# Patient Record
Sex: FEMALE | Employment: OTHER | URBAN - METROPOLITAN AREA
[De-identification: names, ages, dates, MRNs, and addresses within clinical notes are randomized per-mention and may not be internally consistent; named-entity substitution may affect disease eponyms.]

---

## 2017-02-03 ENCOUNTER — HOSPITAL ENCOUNTER (EMERGENCY)
Age: 67
Discharge: HOME | End: 2017-02-03
Attending: EMERGENCY MEDICINE

## 2017-02-03 VITALS
OXYGEN SATURATION: 98 % | HEIGHT: 59 IN | BODY MASS INDEX: 28.48 KG/M2 | DIASTOLIC BLOOD PRESSURE: 71 MMHG | SYSTOLIC BLOOD PRESSURE: 115 MMHG | RESPIRATION RATE: 18 BRPM | TEMPERATURE: 97.8 F | HEART RATE: 74 BPM | WEIGHT: 141.3 LBS

## 2017-02-03 DIAGNOSIS — K08.89 PAIN, DENTAL: Primary | ICD-10-CM

## 2017-02-03 RX ORDER — AMOXICILLIN 500 MG/1
500 TABLET, FILM COATED ORAL 3 TIMES DAILY
Qty: 30 TAB | Refills: 0 | Status: SHIPPED | OUTPATIENT
Start: 2017-02-03 | End: 2017-07-11

## 2017-02-03 NOTE — DISCHARGE INSTRUCTIONS
Dental Pain: After Your Visit  Your Care Instructions  The most common cause of dental pain is tooth decay. It can also be caused by an infection of the tooth (abscess) or gum, a tooth that has not broken all the way through the gum (impacted tooth), or a problem with the nerve-filled center of the tooth. Follow-up care is a key part of your treatment and safety. Be sure to make and go to all appointments, and call your doctor if you are having problems. Its also a good idea to know your test results and keep a list of the medicines you take. How can you care for yourself at home? · Contact a dentist for follow-up care. · Put ice or a cold pack on the outside of your mouth for 10 to 20 minutes at a time to reduce pain and swelling. Put a thin cloth between the ice and your skin. · Take an over-the-counter pain medicine, such as acetaminophen (Tylenol), ibuprofen (Advil, Motrin), or naproxen (Aleve). Read and follow all instructions on the label. · Do not take two or more pain medicines at the same time unless the doctor told you to. Many pain medicines have acetaminophen, which is Tylenol. Too much acetaminophen (Tylenol) can be harmful. · Rinse your mouth with warm salt water every 2 hours to help relieve pain and swelling from an infected tooth. Mix 1 teaspoon of salt in 8 ounces of water. · If your doctor prescribed antibiotics, take them as directed. Do not stop taking them just because you feel better. You need to take the full course of antibiotics. When should you call for help? Call your doctor now or seek immediate medical care if:  · You have signs of infection, such as:  ¨ Increased pain, swelling, warmth, or redness. ¨ Pus draining from the gum, tooth, or face. ¨ A fever. Watch closely for changes in your health, and be sure to contact your doctor if:  · You do not get better as expected. Where can you learn more?    Go to [employer]  Enter S764 in the search box to learn more about \"Dental Pain: After Your Visit. \"   © 1016-6531 Healthwise, Incorporated. Care instructions adapted under license by [facility] (which disclaims liability or warranty for this information). This care instruction is for use with your licensed healthcare professional. If you have questions about a medical condition or this instruction, always ask your healthcare professional. [address] any warranty or liability for your use of this information. Content Version: 82.2.284372;  Last Revised: May 17, 2013

## 2017-02-03 NOTE — UC PROVIDER NOTE
Patient is a 78 y.o. female presenting with dental problem. The history is provided by the patient. Dental Pain    This is a new problem. The current episode started more than 1 week ago. The problem occurs constantly. The problem has not changed since onset. The pain is located in the left lower mouth. The quality of the pain is aching. The pain is moderate. Associated symptoms include swelling and gum redness. There was no vomiting, no nausea, no fever, no chest pain, no shortness of breath, no headaches and no drainage. She has tried nothing for the symptoms. The treatment provided no relief. The patient has no cardiac history. 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 No pertinent family history. Social History     Social History    Marital status:      Spouse name: N/A    Number of children: N/A    Years of education: N/A     Occupational History    Not on file. Social History Main Topics    Smoking status: Never Smoker    Smokeless tobacco: Not on file    Alcohol use Yes      Comment: occ    Drug use: No    Sexual activity: Not on file     Other Topics Concern    Not on file     Social History Narrative                ALLERGIES: Review of patient's allergies indicates no known allergies. Review of Systems   HENT: Positive for dental problem. Negative for ear pain, facial swelling, mouth sores and nosebleeds. Neurological: Positive for headaches. All other systems reviewed and are negative.       Vitals:    02/03/17 1543   BP: 115/71   Pulse: 74   Resp: 18   Temp: 97.8 °F (36.6 °C)   SpO2: 98%   Weight: 64.1 kg (141 lb 4.8 oz)   Height: 4' 11\" (1.499 m)       Physical Exam   Constitutional: She is oriented to person, place, and time. She appears well-developed and well-nourished. HENT:   Head: Normocephalic and atraumatic. Nose: Nose normal.   Mouth/Throat: Oropharynx is clear and moist. No oropharyngeal exudate. Intraoral exam mild erythema at left lower canine, no exudate, no swelling   Eyes: Conjunctivae and EOM are normal. Pupils are equal, round, and reactive to light. Neurological: She is alert and oriented to person, place, and time. No cranial nerve deficit. She exhibits normal muscle tone. Skin: Skin is warm and dry. No rash noted. No erythema. No pallor. Psychiatric: She has a normal mood and affect. Her behavior is normal. Judgment and thought content normal.   Nursing note and vitals reviewed.       MDM     Differential Diagnosis; Clinical Impression; Plan:     Dental pain, gingivitis      Procedures

## 2017-06-30 ENCOUNTER — OFFICE VISIT (OUTPATIENT)
Dept: INTERNAL MEDICINE CLINIC | Age: 67
End: 2017-06-30

## 2017-06-30 VITALS
HEIGHT: 59 IN | SYSTOLIC BLOOD PRESSURE: 181 MMHG | DIASTOLIC BLOOD PRESSURE: 104 MMHG | HEART RATE: 66 BPM | TEMPERATURE: 97.7 F | WEIGHT: 134.6 LBS | RESPIRATION RATE: 18 BRPM | BODY MASS INDEX: 27.13 KG/M2 | OXYGEN SATURATION: 97 %

## 2017-06-30 DIAGNOSIS — N25.81 SECONDARY HYPERPARATHYROIDISM, RENAL (HCC): ICD-10-CM

## 2017-06-30 DIAGNOSIS — G89.29 CHRONIC INTRACTABLE HEADACHE, UNSPECIFIED HEADACHE TYPE: ICD-10-CM

## 2017-06-30 DIAGNOSIS — Z86.73 HISTORY OF CVA (CEREBROVASCULAR ACCIDENT): ICD-10-CM

## 2017-06-30 DIAGNOSIS — N18.3 CKD (CHRONIC KIDNEY DISEASE), STAGE 3 (MODERATE): ICD-10-CM

## 2017-06-30 DIAGNOSIS — R51.9 CHRONIC INTRACTABLE HEADACHE, UNSPECIFIED HEADACHE TYPE: ICD-10-CM

## 2017-06-30 DIAGNOSIS — N25.81 HYPERPARATHYROIDISM, SECONDARY (HCC): ICD-10-CM

## 2017-06-30 DIAGNOSIS — I10 ESSENTIAL HYPERTENSION: Primary | ICD-10-CM

## 2017-06-30 DIAGNOSIS — R80.9 PROTEINURIA, UNSPECIFIED TYPE: ICD-10-CM

## 2017-06-30 RX ORDER — TRAMADOL HYDROCHLORIDE 50 MG/1
50 TABLET ORAL
COMMUNITY
End: 2017-06-30 | Stop reason: ALTCHOICE

## 2017-06-30 RX ORDER — ACETAMINOPHEN AND CODEINE PHOSPHATE 300; 30 MG/1; MG/1
1 TABLET ORAL
Qty: 28 TAB | Refills: 0 | Status: SHIPPED | OUTPATIENT
Start: 2017-06-30 | End: 2017-08-30 | Stop reason: SDUPTHER

## 2017-06-30 NOTE — PROGRESS NOTES
RM#9  Chief Complaint   Patient presents with    Complete Physical     2 strokes last year     1. Have you been to the ER, urgent care clinic since your last visit? Hospitalized since your last visit? No    2. Have you seen or consulted any other health care providers outside of the [address] [place] [address] since your last visit? Include any pap smears or colon screening.  No  Health Maintenance Due   Topic Date Due    Hepatitis C Screening  1950    DTaP/Tdap/Td series (1 - Tdap) 02/25/1970    BREAST CANCER SCRN MAMMOGRAM  02/25/1999    FOBT Q 1 YEAR AGE 50-75  02/25/1999    ZOSTER VACCINE AGE 60>  02/25/2009    GLAUCOMA SCREENING Q2Y  02/25/2014    OSTEOPOROSIS SCREENING (DEXA)  02/25/2014    Pneumococcal 65+ Low/Medium Risk (1 of 2 - PCV13) 02/25/2014    MEDICARE YEARLY EXAM  02/25/2014

## 2017-06-30 NOTE — MR AVS SNAPSHOT
Visit Information Date & Time Provider Department Dept. Phone Encounter #  
 6/30/2017 10:30 AM Jamia Barrera NP [facility] [phone] [id number] Follow-up Instructions Return in about 1 week (around 7/7/2017) for htn,headache. Upcoming Health Maintenance Date Due Hepatitis C Screening 1950 DTaP/Tdap/Td series (1 - Tdap) 2/25/1970 BREAST CANCER SCRN MAMMOGRAM 2/25/1999 FOBT Q 1 YEAR AGE 50-75 2/25/1999 ZOSTER VACCINE AGE 60> 2/25/2009 GLAUCOMA SCREENING Q2Y 2/25/2014 OSTEOPOROSIS SCREENING (DEXA) 2/25/2014 Pneumococcal 65+ Low/Medium Risk (1 of 2 - PCV13) 2/25/2014 MEDICARE YEARLY EXAM 2/25/2014 INFLUENZA AGE 9 TO ADULT 8/1/2017 Allergies as of 6/30/2017  Review Complete On: 6/30/2017 By: Krista Carson No Known Allergies Current Immunizations  Never Reviewed No immunizations on file. Not reviewed this visit You Were Diagnosed With   
  
 Codes Comments Essential hypertension    -  Primary ICD-10-CM: I10 
ICD-9-CM: 401.9 CKD (chronic kidney disease), stage 3 (moderate)     ICD-10-CM: N18.3 ICD-9-CM: 794. 3 Proteinuria, unspecified type     ICD-10-CM: R80.9 ICD-9-CM: 791.0 Secondary hyperparathyroidism, renal ([facility] [address]     ICD-10-CM: N25.81 ICD-9-CM: 18.80 Chronic intractable headache, unspecified headache type     ICD-10-CM: R51 ICD-9-CM: 221. 0 Vitals BP Pulse Temp Resp Height(growth percentile) Weight(growth percentile) (!) 181/104 (BP 1 [facility]: Left arm, BP Patient Position: Sitting) 66 97.7 °F (36.5 °C) (Oral) 18 4' 11.02\" (1.499 m) 134 lb 9.6 oz (61.1 kg) SpO2 BMI OB Status Smoking Status 97% 27.17 kg/m2 Hysterectomy Never Smoker Vitals History BMI and BSA Data Body Mass Index Body Surface Area  
 27.17 kg/m 2 1.6 m 2 Preferred Pharmacy Pharmacy Name Phone [facility] [address] [phone] Your Updated Medication List  
  
   
This list is accurate as of: 6/30/17 11:56 AM.  Always use your most recent med list.  
  
  
  
  
 acetaminophen-codeine 300-30 mg per tablet Commonly known as:  TYLENOL #3 Take 1 Tab by mouth every six (6) hours as needed for Pain. Max Daily Amount: 4 Tabs. AMLODIPINE BESYLATE (BULK) Take 5 mg by mouth daily. amoxicillin 500 mg Tab Take 500 mg by mouth three (3) times daily. ANTACID (CALCIUM CARBONATE) PO Take 750 mg by mouth as needed (as needed for indigestion). BYSTOLIC 5 mg tablet Generic drug:  nebivolol Take 5 mg by mouth daily. cloNIDine HCl 0.3 mg tablet Commonly known as:  CATAPRES Take 0.4 mg by mouth nightly. EDARBYCLOR 40-12.5 mg Tab Generic drug:  azilsartan med-chlorthalidone Take  by mouth daily. ERGOCALCIFEROL (VITAMIN D2) Take 50,000 mg by mouth every seven (7) days. hydrALAZINE 25 mg tablet Commonly known as:  APRESOLINE Take 1 Tab by mouth three (3) times daily. methocarbamol 500 mg tablet Commonly known as:  ROBAXIN Take 1 Tab by mouth two (2) times daily as needed for Pain. Prescriptions Printed Refills  
 acetaminophen-codeine (TYLENOL #3) 300-30 mg per tablet 0 Sig: Take 1 Tab by mouth every six (6) hours as needed for Pain. Max Daily Amount: 4 Tabs. Class: Print Route: Oral  
  
Follow-up Instructions Return in about 1 week (around 7/7/2017) for htn,headache. Introducing [facility]! Elma Marvin introduces mPATH patient portal. Now you can access parts of your medical record, email your doctor's office, and request medication refills online. 1. In your internet browser, go to https://[facility]. [employer]/[facility] 2. Click on the First Time User? Click Here link in the Sign In box. You will see the New Member Sign Up page. 3. Enter your [employer] Access Code exactly as it appears below. You will not need to use this code after youve completed the sign-up process. If you do not sign up before the expiration date, you must request a new code. · [employer] Access Code: [email or web address] Expires: 9/28/2017 11:52 AM 
 
4. Enter the last four digits of your Social Security Number (xxxx) and Date of Birth (mm/dd/yyyy) as indicated and click Submit. You will be taken to the next sign-up page. 5. Create a [employer] ID. This will be your [employer] login ID and cannot be changed, so think of one that is secure and easy to remember. 6. Create a [employer] password. You can change your password at any time. 7. Enter your Password Reset Question and Answer. This can be used at a later time if you forget your password. 8. Enter your e-mail address. You will receive e-mail notification when new information is available in [address] 9. Click Sign Up. You can now view and download portions of your medical record. 10. Click the Download Summary menu link to download a portable copy of your medical information. If you have questions, please visit the Frequently Asked Questions section of the [employer] website. Remember, [employer] is NOT to be used for urgent needs. For medical emergencies, dial 911. Now available from your iPhone and Android! Please provide this summary of care documentation to your next provider. Your primary care clinician is listed as Carson Rutledge. If you have any questions after today's visit, please call [phone].

## 2017-07-11 PROBLEM — G89.29 CHRONIC INTRACTABLE HEADACHE: Status: ACTIVE | Noted: 2017-07-11

## 2017-07-11 PROBLEM — N25.81 SECONDARY HYPERPARATHYROIDISM, RENAL (HCC): Status: ACTIVE | Noted: 2017-07-11

## 2017-07-11 PROBLEM — Z86.73 HISTORY OF CVA (CEREBROVASCULAR ACCIDENT): Status: ACTIVE | Noted: 2017-07-11

## 2017-07-11 PROBLEM — R80.9 PROTEINURIA: Status: ACTIVE | Noted: 2017-07-11

## 2017-07-11 PROBLEM — N18.9 CKD (CHRONIC KIDNEY DISEASE): Status: ACTIVE | Noted: 2017-07-11

## 2017-07-11 PROBLEM — N25.81 HYPERPARATHYROIDISM, SECONDARY (HCC): Status: ACTIVE | Noted: 2017-07-11

## 2017-07-11 PROBLEM — R51.9 CHRONIC INTRACTABLE HEADACHE: Status: ACTIVE | Noted: 2017-07-11

## 2017-07-24 ENCOUNTER — OFFICE VISIT (OUTPATIENT)
Dept: INTERNAL MEDICINE CLINIC | Age: 67
End: 2017-07-24

## 2017-07-24 VITALS
SYSTOLIC BLOOD PRESSURE: 150 MMHG | HEART RATE: 66 BPM | BODY MASS INDEX: 27.78 KG/M2 | TEMPERATURE: 97.4 F | WEIGHT: 137.8 LBS | HEIGHT: 59 IN | OXYGEN SATURATION: 97 % | RESPIRATION RATE: 18 BRPM | DIASTOLIC BLOOD PRESSURE: 100 MMHG

## 2017-07-24 DIAGNOSIS — R29.898 RIGHT HAND WEAKNESS: ICD-10-CM

## 2017-07-24 DIAGNOSIS — N18.3 CKD (CHRONIC KIDNEY DISEASE), STAGE 3 (MODERATE): ICD-10-CM

## 2017-07-24 DIAGNOSIS — I10 ESSENTIAL HYPERTENSION: Primary | ICD-10-CM

## 2017-07-24 DIAGNOSIS — F51.01 PRIMARY INSOMNIA: ICD-10-CM

## 2017-07-24 DIAGNOSIS — M25.511 ACUTE PAIN OF RIGHT SHOULDER: ICD-10-CM

## 2017-07-24 RX ORDER — DOXEPIN HYDROCHLORIDE 25 MG/1
25 CAPSULE ORAL
Qty: 30 CAP | Refills: 3 | Status: SHIPPED | OUTPATIENT
Start: 2017-07-24 | End: 2017-10-23 | Stop reason: SDUPTHER

## 2017-07-24 RX ORDER — NEBIVOLOL 10 MG/1
10 TABLET ORAL DAILY
Qty: 30 TAB | Refills: 0 | Status: SHIPPED | OUTPATIENT
Start: 2017-07-24 | End: 2017-08-30 | Stop reason: SDUPTHER

## 2017-07-24 NOTE — PATIENT INSTRUCTIONS
Increase Bystolic to 10 mg daily    Monitor blood pressure.  Bring blood pressure monitor with you next visit       Get Xrays of shoulder and hand as soon as possible

## 2017-07-24 NOTE — PROGRESS NOTES
HISTORY OF PRESENT ILLNESS  Devon Orellana is a 75 y.o. female presents for short interval follo wup  HPI     Blood pressure medication not adjusted by nephrologist as expected. Blood pressure reading 150's/90's-100  She continues to have headaches. Long hx of migraine  Difficulty focusing and reading, poor sleep. Requests medical management    Daily walks     Right upper arm pain and limited ROM right shoulder since CVA in February; not evaluation in hospital    Past Medical History:   Diagnosis Date    Cancer ([facility] [address]     ovaian stomach    CVA (cerebral vascular accident) ([facility] [address]     Heart failure ([facility] [address]     MI    Hypertension        Current Outpatient Prescriptions on File Prior to Visit   Medication Sig Dispense Refill    acetaminophen-codeine (TYLENOL #3) 300-30 mg per tablet Take 1 Tab by mouth every six (6) hours as needed for Pain. Max Daily Amount: 4 Tabs. 28 Tab 0     No current facility-administered medications on file prior to visit. Review of Systems   Constitutional: Positive for malaise/fatigue. Eyes: Negative. Respiratory: Negative. Cardiovascular: Negative. Gastrointestinal: Negative. Genitourinary: Negative. Musculoskeletal: Positive for joint pain and myalgias. Negative for back pain, falls and neck pain. Neurological: Positive for speech change, focal weakness and headaches. Negative for dizziness. Psychiatric/Behavioral: Positive for memory loss. The patient is nervous/anxious and has insomnia. Physical Exam   Constitutional: She appears well-developed and well-nourished. No distress. Cardiovascular: Normal rate, regular rhythm and normal heart sounds. Pulmonary/Chest: Effort normal and breath sounds normal.   Musculoskeletal: She exhibits tenderness. She exhibits no edema. Right shoulder: She exhibits decreased range of motion and decreased strength. Right upper arm: She exhibits tenderness. She exhibits no edema and no deformity.         Right hand: She exhibits decreased range of motion, tenderness and swelling. Decreased sensation noted. Neurological: She is alert. No cranial nerve deficit. Gait abnormal. Coordination normal.   Right hand weakness, fingers mildly edematous   Skin: Skin is warm and dry. No rash noted. She is not diaphoretic. No erythema. Psychiatric: Her behavior is normal. Her mood appears anxious. Her speech is tangential.   Noticeable improvement in remote memory and speech since LOV       ASSESSMENT and PLAN    ICD-10-CM ICD-9-CM    1. Essential hypertension I10 401.9 nebivolol (BYSTOLIC) 10 mg tablet   2. Primary insomnia F51.01 307.42 doxepin (SINEQUAN) 25 mg capsule   3. CKD (chronic kidney disease), stage 3 (moderate) N18.3 585.3    4. Acute pain of right shoulder M25.511 719.41 XR SHOULDER RT AP/LAT MIN 2 V   5. Right hand weakness R29.898 728.87 XR HAND RT MIN 3 V     Follow-up Disposition:  Return in about 4 weeks (around 8/21/2017) for htn, shoulder pain, hand weakness. lab results and schedule of future lab studies reviewed with patient  reviewed diet, exercise and weight control  reviewed medications and side effects in detail      1. Uncontrolled, increase Bystolic to 10 mg     2. Discouraged use of caffeine    3. Follow up with nephrologist as prescribed.  Avoid NSAIDs    4 etiology unknown    Patient expected daughter to be present in exam room, she did not show

## 2017-07-24 NOTE — PROGRESS NOTES
RM#8  Chief Complaint   Patient presents with    Follow-up     f/u for HTN,headache     1. Have you been to the ER, urgent care clinic since your last visit? Hospitalized since your last visit? No    2. Have you seen or consulted any other health care providers outside of the [address] [place] [address] since your last visit? Include any pap smears or colon screening.  No  Health Maintenance Due   Topic Date Due    Hepatitis C Screening  1950    DTaP/Tdap/Td series (1 - Tdap) 02/25/1970    BREAST CANCER SCRN MAMMOGRAM  02/25/1999    FOBT Q 1 YEAR AGE 50-75  02/25/1999    ZOSTER VACCINE AGE 60>  12/25/2008    GLAUCOMA SCREENING Q2Y  02/25/2014    OSTEOPOROSIS SCREENING (DEXA)  02/25/2014    Pneumococcal 65+ Low/Medium Risk (1 of 2 - PCV13) 02/25/2014    MEDICARE YEARLY EXAM  02/25/2014

## 2017-08-30 DIAGNOSIS — R51.9 CHRONIC INTRACTABLE HEADACHE, UNSPECIFIED HEADACHE TYPE: ICD-10-CM

## 2017-08-30 DIAGNOSIS — I10 ESSENTIAL HYPERTENSION: ICD-10-CM

## 2017-08-30 DIAGNOSIS — G89.29 CHRONIC INTRACTABLE HEADACHE, UNSPECIFIED HEADACHE TYPE: ICD-10-CM

## 2017-09-01 ENCOUNTER — TELEPHONE (OUTPATIENT)
Dept: INTERNAL MEDICINE CLINIC | Age: 67
End: 2017-09-01

## 2017-09-01 DIAGNOSIS — R51.9 CHRONIC INTRACTABLE HEADACHE, UNSPECIFIED HEADACHE TYPE: ICD-10-CM

## 2017-09-01 DIAGNOSIS — G89.29 CHRONIC INTRACTABLE HEADACHE, UNSPECIFIED HEADACHE TYPE: ICD-10-CM

## 2017-09-01 RX ORDER — NEBIVOLOL HYDROCHLORIDE 10 MG/1
TABLET ORAL
Qty: 30 TAB | Refills: 0 | Status: SHIPPED | OUTPATIENT
Start: 2017-09-01 | End: 2017-10-06 | Stop reason: SDUPTHER

## 2017-09-01 RX ORDER — ACETAMINOPHEN AND CODEINE PHOSPHATE 300; 30 MG/1; MG/1
TABLET ORAL
Qty: 28 TAB | Refills: 0 | Status: SHIPPED | OUTPATIENT
Start: 2017-09-01 | End: 2017-09-01 | Stop reason: SDUPTHER

## 2017-09-01 RX ORDER — ACETAMINOPHEN AND CODEINE PHOSPHATE 300; 30 MG/1; MG/1
TABLET ORAL
Qty: 28 TAB | Refills: 0 | Status: SHIPPED | OUTPATIENT
Start: 2017-09-01 | End: 2018-01-12 | Stop reason: SDUPTHER

## 2017-10-04 ENCOUNTER — HOSPITAL ENCOUNTER (OUTPATIENT)
Dept: GENERAL RADIOLOGY | Age: 67
Discharge: HOME | End: 2017-10-04
Payer: MEDICARE

## 2017-10-04 DIAGNOSIS — R29.898 RIGHT HAND WEAKNESS: ICD-10-CM

## 2017-10-04 DIAGNOSIS — M25.511 ACUTE PAIN OF RIGHT SHOULDER: ICD-10-CM

## 2017-10-04 PROCEDURE — 73030 X-RAY EXAM OF SHOULDER: CPT

## 2017-10-04 PROCEDURE — 73130 X-RAY EXAM OF HAND: CPT

## 2017-10-06 DIAGNOSIS — I10 ESSENTIAL HYPERTENSION: ICD-10-CM

## 2017-10-06 RX ORDER — NEBIVOLOL HYDROCHLORIDE 10 MG/1
TABLET ORAL
Qty: 30 TAB | Refills: 0 | Status: SHIPPED | OUTPATIENT
Start: 2017-10-06 | End: 2017-10-22 | Stop reason: SDUPTHER

## 2017-10-10 ENCOUNTER — TELEPHONE (OUTPATIENT)
Dept: INTERNAL MEDICINE CLINIC | Age: 67
End: 2017-10-10

## 2017-10-10 NOTE — LETTER
10/10/2017 1:44 PM 
 
Ms. Kj Mccoy [address] [id number] Dear Kj Mccoy I have reviewed your results and have found the results showing arthritis of hand and shoulder. My recommendations are as follows: If you would like to see a orthopaedist, please contact our office for the name and number of specialist. 
 
 
 
Please call if you have any questions [phone] . Sincerely, Xavier Demarco NP

## 2017-10-22 DIAGNOSIS — I10 ESSENTIAL HYPERTENSION: ICD-10-CM

## 2017-10-23 DIAGNOSIS — F51.01 PRIMARY INSOMNIA: ICD-10-CM

## 2017-10-23 RX ORDER — NEBIVOLOL HYDROCHLORIDE 10 MG/1
TABLET ORAL
Qty: 30 TAB | Refills: 0 | Status: SHIPPED | OUTPATIENT
Start: 2017-10-23 | End: 2017-12-06 | Stop reason: SDUPTHER

## 2017-10-23 NOTE — TELEPHONE ENCOUNTER
[facility] sent a 90 day request for the pt's Doxepin 25 mg.   LOV 7/24/17  No Future Appt's    [facility] # [phone]  FAX # [phone]

## 2017-10-24 RX ORDER — DOXEPIN HYDROCHLORIDE 25 MG/1
25 CAPSULE ORAL
Qty: 90 CAP | Refills: 3 | Status: SHIPPED | OUTPATIENT
Start: 2017-10-24 | End: 2018-05-22 | Stop reason: ALTCHOICE

## 2017-11-14 ENCOUNTER — OFFICE VISIT (OUTPATIENT)
Dept: INTERNAL MEDICINE CLINIC | Age: 67
End: 2017-11-14

## 2017-11-14 VITALS
SYSTOLIC BLOOD PRESSURE: 173 MMHG | TEMPERATURE: 97.6 F | RESPIRATION RATE: 24 BRPM | DIASTOLIC BLOOD PRESSURE: 91 MMHG | HEART RATE: 76 BPM | BODY MASS INDEX: 31.65 KG/M2 | HEIGHT: 59 IN | WEIGHT: 157 LBS | OXYGEN SATURATION: 97 %

## 2017-11-14 DIAGNOSIS — J40 BRONCHITIS: Primary | ICD-10-CM

## 2017-11-14 DIAGNOSIS — I15.8 OTHER SECONDARY HYPERTENSION: ICD-10-CM

## 2017-11-14 DIAGNOSIS — J31.0 CHRONIC RHINITIS, UNSPECIFIED TYPE: ICD-10-CM

## 2017-11-14 DIAGNOSIS — R12 HEART BURN: ICD-10-CM

## 2017-11-14 RX ORDER — BENZONATATE 100 MG/1
100 CAPSULE ORAL
Qty: 24 CAP | Refills: 0 | Status: SHIPPED | OUTPATIENT
Start: 2017-11-14 | End: 2017-11-21

## 2017-11-14 RX ORDER — LOSARTAN POTASSIUM 25 MG/1
25 TABLET ORAL DAILY
Refills: 3 | COMMUNITY
Start: 2017-10-09 | End: 2018-01-12 | Stop reason: ALTCHOICE

## 2017-11-14 RX ORDER — PHENOL/SODIUM PHENOLATE
20 AEROSOL, SPRAY (ML) MUCOUS MEMBRANE DAILY
Qty: 30 TAB | Refills: 1 | Status: SHIPPED | OUTPATIENT
Start: 2017-11-14 | End: 2018-01-22 | Stop reason: SDUPTHER

## 2017-11-14 RX ORDER — FLUTICASONE PROPIONATE 50 MCG
2 SPRAY, SUSPENSION (ML) NASAL DAILY
Qty: 1 BOTTLE | Refills: 1 | Status: SHIPPED | OUTPATIENT
Start: 2017-11-14 | End: 2018-01-23

## 2017-11-14 NOTE — PROGRESS NOTES
ACUTE VISIT     HPI:   Shilpa Washburn is a 75 y.o. female, she presents today for:     Cough ongoing for 1 month. Has had some vomiting after coughing. No history of asthma, COPD, emphysema. Denies smoking or drinking. Follows with kidney specialist who adjust blood pressure medications. ROS: No fevers this week. Medications used for acute illness: Has not tried any medicines at home. Has used hot tea. Current Outpatient Prescriptions on File Prior to Visit   Medication Sig    acetaminophen-codeine (TYLENOL #3) 300-30 mg per tablet TAKE ONE TABLET BY MOUTH EVERY 6 HOURS AS NEEDED FOR PAIN (MAX 4/DAY)    doxepin (SINEQUAN) 25 mg capsule Take 1 Cap by mouth nightly.  BYSTOLIC 10 mg tablet TAKE 1 TABLET BY MOUTH EVERY DAY     No current facility-administered medications on file prior to visit. No Known Allergies    PMH/PSH/FH: reviewed and updated    Sochx:   reports that she has never smoked. She has never used smokeless tobacco. She reports that she drinks alcohol. She reports that she does not use illicit drugs. PE:  Blood pressure (!) 173/91, pulse 76, temperature 97.6 °F (36.4 °C), temperature source Oral, resp. rate 24, height 4' 11\" (1.499 m), weight 157 lb (71.2 kg), SpO2 97 %. Body mass index is 31.71 kg/(m^2). Physical Exam   Constitutional: She is oriented to person, place, and time. She appears well-developed and well-nourished. No distress. HENT:   Head: Normocephalic. Mouth/Throat: Oropharynx is clear and moist.   Eyes: Conjunctivae and EOM are normal. Pupils are equal, round, and reactive to light. Neck: Neck supple. Cardiovascular: Normal rate, regular rhythm and normal heart sounds. Pulmonary/Chest: Effort normal and breath sounds normal. No respiratory distress. She has no wheezes. Neurological: She is alert and oriented to person, place, and time. Skin: Skin is warm and dry. No rash noted. Nursing note and vitals reviewed.     A/P  Shilpa Washburn was seen today for had concerns including Cold. .  The diagnosis and plan was discussed including:        ICD-10-CM ICD-9-CM    1. Bronchitis J40 490 benzonatate (TESSALON) 100 mg capsule   2. Heart burn R12 787.1 Omeprazole delayed release (PRILOSEC D/R) 20 mg tablet   3. Chronic rhinitis, unspecified type J31.0 472.0 fluticasone (FLONASE) 50 mcg/actuation nasal spray   4. Other secondary hypertension I15.8 405.99      Bronchitis with chronic rhinitis: add nasal steroid, benzonatate. Heart burn: previously treating with tums or similar. Advised to trial omeprazole daily to see if resolved. HTN: to follow-up with renal specialist.    - I advised her to call back or return to office if symptoms worsen/change/persist.  - She was given AVS and expressed understanding with the diagnosis and plan as discussed. Follow-up Disposition:  Return in about 4 weeks (around 12/12/2017) for follow-up elevated blood pressure, cough etc..

## 2017-11-14 NOTE — PROGRESS NOTES
Rm#1  whats refill on tylenol #3  Chief Complaint   Patient presents with    Cold     cough, post-nasal drainage, nasal congested, upset stomach. x2-3months      1. Have you been to the ER, urgent care clinic since your last visit? Hospitalized since your last visit? No    2. Have you seen or consulted any other health care providers outside of the [address] [place] [address] since your last visit? Include any pap smears or colon screening. No  Health Maintenance Due   Topic Date Due    Hepatitis C Screening  1950    DTaP/Tdap/Td series (1 - Tdap) 02/25/1970    BREAST CANCER SCRN MAMMOGRAM  02/25/1999    FOBT Q 1 YEAR AGE 50-75  02/25/1999    ZOSTER VACCINE AGE 60>  12/25/2008    GLAUCOMA SCREENING Q2Y  02/25/2014    OSTEOPOROSIS SCREENING (DEXA)  02/25/2014    Pneumococcal 65+ Low/Medium Risk (1 of 2 - PCV13) 02/25/2014    MEDICARE YEARLY EXAM  02/25/2014    Influenza Age 9 to Adult  08/01/2017     Pt refused flu vaccine  PHQ over the last two weeks 11/14/2017   Little interest or pleasure in doing things Not at all   Feeling down, depressed or hopeless Not at all   Total Score PHQ 2 0     Fall Risk Assessment, last 12 mths 11/14/2017   Able to walk? Yes   Fall in past 12 months?  No   Fall with injury? -   Number of falls in past 12 months -   Fall Risk Score -

## 2017-11-14 NOTE — PATIENT INSTRUCTIONS
Bronchitis: Care Instructions  Your Care Instructions    Bronchitis is inflammation of the bronchial tubes, which carry air to the lungs. The tubes swell and produce mucus, or phlegm. The mucus and inflamed bronchial tubes make you cough. You may have trouble breathing. Most cases of bronchitis are caused by viruses like those that cause colds. Antibiotics usually do not help and they may be harmful. Bronchitis usually develops rapidly and lasts about 2 to 3 weeks in otherwise healthy people. Follow-up care is a key part of your treatment and safety. Be sure to make and go to all appointments, and call your doctor if you are having problems. It's also a good idea to know your test results and keep a list of the medicines you take. How can you care for yourself at home? · Take all medicines exactly as prescribed. Call your doctor if you think you are having a problem with your medicine. · Get some extra rest.  · Take an over-the-counter pain medicine, such as acetaminophen (Tylenol), ibuprofen (Advil, Motrin), or naproxen (Aleve) to reduce fever and relieve body aches. Read and follow all instructions on the label. · Do not take two or more pain medicines at the same time unless the doctor told you to. Many pain medicines have acetaminophen, which is Tylenol. Too much acetaminophen (Tylenol) can be harmful. · Take an over-the-counter cough medicine that contains dextromethorphan to help quiet a dry, hacking cough so that you can sleep. Avoid cough medicines that have more than one active ingredient. Read and follow all instructions on the label. · Breathe moist air from a humidifier, hot shower, or sink filled with hot water. The heat and moisture will thin mucus so you can cough it out. · Do not smoke. Smoking can make bronchitis worse. If you need help quitting, talk to your doctor about stop-smoking programs and medicines. These can increase your chances of quitting for good.   When should you call for help? Call 911 anytime you think you may need emergency care. For example, call if:  ? · You have severe trouble breathing. ?Call your doctor now or seek immediate medical care if:  ? · You have new or worse trouble breathing. ? · You cough up dark brown or bloody mucus (sputum). ? · You have a new or higher fever. ? · You have a new rash. ? Watch closely for changes in your health, and be sure to contact your doctor if:  ? · You cough more deeply or more often, especially if you notice more mucus or a change in the color of your mucus. ? · You are not getting better as expected. Where can you learn more? Go to http://heike-darcie.info/. Enter H333 in the search box to learn more about \"Bronchitis: Care Instructions. \"  Current as of: May 12, 2017  Content Version: 11.4  © 6866-1733 [employer]. Care instructions adapted under license by [facility] (which disclaims liability or warranty for this information). If you have questions about a medical condition or this instruction, always ask your healthcare professional. [place] [address] any warranty or liability for your use of this information. Rhinitis: Care Instructions  Your Care Instructions  Rhinitis is swelling and irritation in the nose. Allergies and infections are often the cause. Your nose may run or feel stuffy. Other symptoms are itchy and sore eyes, ears, throat, and mouth. If allergies are the cause, your doctor may do tests to find out what you are allergic to. You may be able to stop symptoms if you avoid the things that cause them. Your doctor may suggest or prescribe medicine to ease your symptoms. Follow-up care is a key part of your treatment and safety. Be sure to make and go to all appointments, and call your doctor if you are having problems. It's also a good idea to know your test results and keep a list of the medicines you take.   How can you care for yourself at home? · If your rhinitis is caused by allergies, try to find out what sets off (triggers) your symptoms. Take steps to avoid these triggers. ¨ Avoid yard work. It can stir up both pollen and mold. ¨ Do not smoke or allow others to smoke around you. If you need help quitting, talk to your doctor about stop-smoking programs and medicines. These can increase your chances of quitting for good. ¨ Do not use aerosol sprays, cleaning products, or perfumes. ¨ If pollen is one of your triggers, close your house and car windows during blooming season. ¨ Clean your house often to control dust.  ¨ Keep pets outside. · If your doctor recommends over-the-counter medicines to relieve symptoms, take your medicines exactly as prescribed. Call your doctor if you think you are having a problem with your medicine. · Use saline (saltwater) nasal washes to help keep your nasal passages open and wash out mucus and bacteria. You can buy saline nose drops at a grocery store or drugstore. Or you can make your own at home by adding 1 teaspoon of salt and 1 teaspoon of baking soda to 2 cups of distilled water. If you make your own, fill a bulb syringe with the solution, insert the tip into your nostril, and squeeze gently. Be Laura your nose. When should you call for help? Call your doctor now or seek immediate medical care if:  ? · You are having trouble breathing. ? Watch closely for changes in your health, and be sure to contact your doctor if:  ? · Mucus from your nose gets thicker (like pus) or has new blood in it. ? · You have new or worse symptoms. ? · You do not get better as expected. Where can you learn more? Go to http://heike-darcie.info/. Enter M030 in the search box to learn more about \"Rhinitis: Care Instructions. \"  Current as of: May 12, 2017  Content Version: 11.4  © 0122-0852 [employer].  Care instructions adapted under license by [employer] (which disclaims liability or warranty for this information). If you have questions about a medical condition or this instruction, always ask your healthcare professional. [address] any warranty or liability for your use of this information.

## 2017-11-14 NOTE — MR AVS SNAPSHOT
Visit Information Date & Time Provider Department Dept. Phone Encounter #  
 11/14/2017  3:45 PM Earl Stone MD [address] [facility] [phone] [id number] Follow-up Instructions Return in about 4 weeks (around 12/12/2017) for follow-up elevated blood pressure, cough etc.. Upcoming Health Maintenance Date Due Hepatitis C Screening 1950 DTaP/Tdap/Td series (1 - Tdap) 2/25/1970 BREAST CANCER SCRN MAMMOGRAM 2/25/1999 FOBT Q 1 YEAR AGE 50-75 2/25/1999 ZOSTER VACCINE AGE 60> 12/25/2008 GLAUCOMA SCREENING Q2Y 2/25/2014 OSTEOPOROSIS SCREENING (DEXA) 2/25/2014 Pneumococcal 65+ Low/Medium Risk (1 of 2 - PCV13) 2/25/2014 MEDICARE YEARLY EXAM 2/25/2014 Influenza Age 5 to Adult 8/1/2017 Allergies as of 11/14/2017  Review Complete On: 11/14/2017 By: Mian Jensen LPN No Known Allergies Current Immunizations  Never Reviewed No immunizations on file. Not reviewed this visit You Were Diagnosed With   
  
 Codes Comments Bronchitis    -  Primary ICD-10-CM: W10 ICD-9-CM: 510 Heart burn     ICD-10-CM: R12 
ICD-9-CM: 787.1 Chronic rhinitis, unspecified type     ICD-10-CM: J31.0 ICD-9-CM: 472.0 Other secondary hypertension     ICD-10-CM: I15.8 ICD-9-CM: 405.99 Vitals BP Pulse Temp Resp Height(growth percentile) Weight(growth percentile) (!) 173/91 (BP 1 [facility]: Left arm, BP Patient Position: Sitting) 76 97.6 °F (36.4 °C) (Oral) 24 4' 11\" (1.499 m) 157 lb (71.2 kg) SpO2 BMI OB Status Smoking Status 97% 31.71 kg/m2 Hysterectomy Never Smoker BMI and BSA Data Body Mass Index Body Surface Area 31.71 kg/m 2 1.72 m 2 Preferred Pharmacy Pharmacy Name Phone [id number] [address] [place] [address] [address] [phone] Your Updated Medication List  
  
   
 This list is accurate as of: 17  4:30 PM.  Always use your most recent med list.  
  
  
  
  
 acetaminophen-codeine 300-30 mg per tablet Commonly known as:  TYLENOL #3  
TAKE ONE TABLET BY MOUTH EVERY 6 HOURS AS NEEDED FOR PAIN (MAX 4/DAY)  
  
 benzonatate 100 mg capsule Commonly known as:  TESSALON Take 1 Cap by mouth three (3) times daily as needed for Cough for up to 7 days. BYSTOLIC 10 mg tablet Generic drug:  nebivolol TAKE 1 TABLET BY MOUTH EVERY DAY  
  
 doxepin 25 mg capsule Commonly known as:  SINEquan Take 1 Cap by mouth nightly. fluticasone 50 mcg/actuation nasal spray Commonly known as:  Janette Reges 2 Sprays by Both Nostrils route daily. losartan 25 mg tablet Commonly known as:  COZAAR Take 25 mg by mouth daily. Omeprazole delayed release 20 mg tablet Commonly known as:  PRILOSEC D/R Take 1 Tab by mouth daily. Prescriptions Sent to Pharmacy Refills  
 fluticasone (FLONASE) 50 mcg/actuation nasal spray 1 Si Sprays by Both Nostrils route daily. Class: Normal  
 Pharmacy: [address] [place] [address] [address] [place] [address] Ph #: [phone] Route: Both Nostrils Omeprazole delayed release (PRILOSEC D/R) 20 mg tablet 1 Sig: Take 1 Tab by mouth daily. Class: Normal  
 Pharmacy: [address] [place] [address] [address] [place] [address] Ph #: [phone] Route: Oral  
 benzonatate (TESSALON) 100 mg capsule 0 Sig: Take 1 Cap by mouth three (3) times daily as needed for Cough for up to 7 days. Class: Normal  
 Pharmacy: [address] [place] [address] [address] [place] [address] Ph #: [phone] Route: Oral  
  
Follow-up Instructions Return in about 4 weeks (around 2017) for follow-up elevated blood pressure, cough etc.. Patient Instructions Bronchitis: Care Instructions Your Care Instructions Bronchitis is inflammation of the bronchial tubes, which carry air to the lungs. The tubes swell and produce mucus, or phlegm. The mucus and inflamed bronchial tubes make you cough. You may have trouble breathing. Most cases of bronchitis are caused by viruses like those that cause colds. Antibiotics usually do not help and they may be harmful. Bronchitis usually develops rapidly and lasts about 2 to 3 weeks in otherwise healthy people. Follow-up care is a key part of your treatment and safety. Be sure to make and go to all appointments, and call your doctor if you are having problems. It's also a good idea to know your test results and keep a list of the medicines you take. How can you care for yourself at home? · Take all medicines exactly as prescribed. Call your doctor if you think you are having a problem with your medicine. · Get some extra rest. 
· Take an over-the-counter pain medicine, such as acetaminophen (Tylenol), ibuprofen (Advil, Motrin), or naproxen (Aleve) to reduce fever and relieve body aches. Read and follow all instructions on the label. · Do not take two or more pain medicines at the same time unless the doctor told you to. Many pain medicines have acetaminophen, which is Tylenol. Too much acetaminophen (Tylenol) can be harmful. · Take an over-the-counter cough medicine that contains dextromethorphan to help quiet a dry, hacking cough so that you can sleep. Avoid cough medicines that have more than one active ingredient. Read and follow all instructions on the label. · Breathe moist air from a humidifier, hot shower, or sink filled with hot water. The heat and moisture will thin mucus so you can cough it out. · Do not smoke. Smoking can make bronchitis worse. If you need help quitting, talk to your doctor about stop-smoking programs and medicines. These can increase your chances of quitting for good. When should you call for help? Call 911 anytime you think you may need emergency care. For example, call if: 
? · You have severe trouble breathing. ?Call your doctor now or seek immediate medical care if: 
? · You have new or worse trouble breathing. ? · You cough up dark brown or bloody mucus (sputum). ? · You have a new or higher fever. ? · You have a new rash. ? Watch closely for changes in your health, and be sure to contact your doctor if: 
? · You cough more deeply or more often, especially if you notice more mucus or a change in the color of your mucus. ? · You are not getting better as expected. Where can you learn more? Go to http://heike-darcie.info/. Enter H333 in the search box to learn more about \"Bronchitis: Care Instructions. \" Current as of: May 12, 2017 Content Version: 11.4 © 2421-3614 [employer]. Care instructions adapted under license by [employer] (which disclaims liability or warranty for this information). If you have questions about a medical condition or this instruction, always ask your healthcare professional. [place] [address] any warranty or liability for your use of this information. Rhinitis: Care Instructions Your Care Instructions Rhinitis is swelling and irritation in the nose. Allergies and infections are often the cause. Your nose may run or feel stuffy. Other symptoms are itchy and sore eyes, ears, throat, and mouth. If allergies are the cause, your doctor may do tests to find out what you are allergic to. You may be able to stop symptoms if you avoid the things that cause them. Your doctor may suggest or prescribe medicine to ease your symptoms. Follow-up care is a key part of your treatment and safety. Be sure to make and go to all appointments, and call your doctor if you are having problems. It's also a good idea to know your test results and keep a list of the medicines you take. How can you care for yourself at home? · If your rhinitis is caused by allergies, try to find out what sets off (triggers) your symptoms. Take steps to avoid these triggers. ¨ Avoid yard work. It can stir up both pollen and mold. ¨ Do not smoke or allow others to smoke around you. If you need help quitting, talk to your doctor about stop-smoking programs and medicines. These can increase your chances of quitting for good. ¨ Do not use aerosol sprays, cleaning products, or perfumes. ¨ If pollen is one of your triggers, close your house and car windows during blooming season. ¨ Clean your house often to control dust. 
¨ Keep pets outside. · If your doctor recommends over-the-counter medicines to relieve symptoms, take your medicines exactly as prescribed. Call your doctor if you think you are having a problem with your medicine. · Use saline (saltwater) nasal washes to help keep your nasal passages open and wash out mucus and bacteria. You can buy saline nose drops at a grocery store or drugstore. Or you can make your own at home by adding 1 teaspoon of salt and 1 teaspoon of baking soda to 2 cups of distilled water. If you make your own, fill a bulb syringe with the solution, insert the tip into your nostril, and squeeze gently. Helon Nathalie your nose. When should you call for help? Call your doctor now or seek immediate medical care if: 
? · You are having trouble breathing. ? Watch closely for changes in your health, and be sure to contact your doctor if: 
? · Mucus from your nose gets thicker (like pus) or has new blood in it. ? · You have new or worse symptoms. ? · You do not get better as expected. Where can you learn more? Go to http://heike-darcie.info/. Enter M030 in the search box to learn more about \"Rhinitis: Care Instructions. \" Current as of: May 12, 2017 Content Version: 11.4 © 0367-2607 Healthwise[employer].  Care instructions adapted under license by [address] (which disclaims liability or warranty for this information). If you have questions about a medical condition or this instruction, always ask your healthcare professional. [address] any warranty or liability for your use of this information. Introducing [facility]! [employer] introduces Karma Snap patient portal. Now you can access parts of your medical record, email your doctor's office, and request medication refills online. 1. In your internet browser, go to [email or web address] 2. Click on the First Time User? Click Here link in the Sign In box. You will see the New Member Sign Up page. 3. Enter your Karma Snap Access Code exactly as it appears below. You will not need to use this code after youve completed the sign-up process. If you do not sign up before the expiration date, you must request a new code. · Karma Snap Access Code: [email or web address] Expires: 2/12/2018  4:30 PM 
 
4. Enter the last four digits of your Social Security Number (xxxx) and Date of Birth (mm/dd/yyyy) as indicated and click Submit. You will be taken to the next sign-up page. 5. Create a Karma Snap ID. This will be your Karma Snap login ID and cannot be changed, so think of one that is secure and easy to remember. 6. Create a Karma Snap password. You can change your password at any time. 7. Enter your Password Reset Question and Answer. This can be used at a later time if you forget your password. 8. Enter your e-mail address. You will receive e-mail notification when new information is available in [address] 9. Click Sign Up. You can now view and download portions of your medical record. 10. Click the Download Summary menu link to download a portable copy of your medical information. If you have questions, please visit the Frequently Asked Questions section of the Karma Snap website.  Remember, Karma Snap is NOT to be used for urgent needs. For medical emergencies, dial 911. Now available from your iPhone and Android! Please provide this summary of care documentation to your next provider. Your primary care clinician is listed as Roberta Zapata. If you have any questions after today's visit, please call [phone].

## 2017-12-06 DIAGNOSIS — I10 ESSENTIAL HYPERTENSION: ICD-10-CM

## 2017-12-06 RX ORDER — NEBIVOLOL HYDROCHLORIDE 10 MG/1
TABLET ORAL
Qty: 30 TAB | Refills: 0 | Status: SHIPPED | OUTPATIENT
Start: 2017-12-06 | End: 2018-01-23 | Stop reason: SDUPTHER

## 2018-01-12 ENCOUNTER — OFFICE VISIT (OUTPATIENT)
Dept: INTERNAL MEDICINE CLINIC | Age: 68
End: 2018-01-12

## 2018-01-12 VITALS
WEIGHT: 161.2 LBS | HEIGHT: 59 IN | BODY MASS INDEX: 32.5 KG/M2 | DIASTOLIC BLOOD PRESSURE: 96 MMHG | HEART RATE: 71 BPM | RESPIRATION RATE: 18 BRPM | SYSTOLIC BLOOD PRESSURE: 175 MMHG | TEMPERATURE: 97.4 F | OXYGEN SATURATION: 94 %

## 2018-01-12 DIAGNOSIS — R41.3 MEMORY CHANGES: ICD-10-CM

## 2018-01-12 DIAGNOSIS — N18.30 STAGE 3 CHRONIC KIDNEY DISEASE (HCC): ICD-10-CM

## 2018-01-12 DIAGNOSIS — G89.29 CHRONIC INTRACTABLE HEADACHE, UNSPECIFIED HEADACHE TYPE: ICD-10-CM

## 2018-01-12 DIAGNOSIS — I10 ESSENTIAL HYPERTENSION: Primary | ICD-10-CM

## 2018-01-12 DIAGNOSIS — R51.9 CHRONIC INTRACTABLE HEADACHE, UNSPECIFIED HEADACHE TYPE: ICD-10-CM

## 2018-01-12 DIAGNOSIS — Z86.73 HISTORY OF CVA (CEREBROVASCULAR ACCIDENT): ICD-10-CM

## 2018-01-12 RX ORDER — ACETAMINOPHEN AND CODEINE PHOSPHATE 300; 30 MG/1; MG/1
TABLET ORAL
Qty: 28 TAB | Refills: 0 | Status: SHIPPED | OUTPATIENT
Start: 2018-01-12 | End: 2018-05-22

## 2018-01-12 RX ORDER — SPIRONOLACTONE 25 MG/1
25 TABLET ORAL DAILY
Qty: 30 TAB | Refills: 3 | Status: SHIPPED | OUTPATIENT
Start: 2018-01-12 | End: 2019-03-26

## 2018-01-12 NOTE — PATIENT INSTRUCTIONS
Discontinue Losartan 25 mg  Learning About High Blood Pressure  What is high blood pressure? Blood pressure is a measure of how hard the blood pushes against the walls of your arteries. It's normal for blood pressure to go up and down throughout the day, but if it stays up, you have high blood pressure. Another name for high blood pressure is hypertension. Two numbers tell you your blood pressure. The first number is the systolic pressure. It shows how hard the blood pushes when your heart is pumping. The second number is the diastolic pressure. It shows how hard the blood pushes between heartbeats, when your heart is relaxed and filling with blood. A blood pressure of less than 120/80 (say \"120 over 80\") is ideal for an adult. High blood pressure is 140/90 or higher. You have high blood pressure if your top number is 140 or higher or your bottom number is 90 or higher, or both. Many people fall into the category in between, called prehypertension. People with prehypertension need to make lifestyle changes to bring their blood pressure down and help prevent or delay high blood pressure. What happens when you have high blood pressure? · Blood flows through your arteries with too much force. Over time, this damages the walls of your arteries. But you can't feel it. High blood pressure usually doesn't cause symptoms. · Fat and calcium start to build up in your arteries. This buildup is called plaque. Plaque makes your arteries narrower and stiffer. Blood can't flow through them as easily. · This lack of good blood flow starts to damage some of the organs in your body. This can lead to problems such as coronary artery disease and heart attack, heart failure, stroke, kidney failure, and eye damage. How can you prevent high blood pressure? · Stay at a healthy weight. · Try to limit how much sodium you eat to less than 2,300 milligrams (mg) a day.  If you limit your sodium to 1,500 mg a day, you can lower your blood pressure even more. ¨ Buy foods that are labeled \"unsalted,\" \"sodium-free,\" or \"low-sodium. \" Foods labeled \"reduced-sodium\" and \"light sodium\" may still have too much sodium. ¨ Flavor your food with garlic, lemon juice, onion, vinegar, herbs, and spices instead of salt. Do not use soy sauce, steak sauce, onion salt, garlic salt, mustard, or ketchup on your food. ¨ Use less salt (or none) when recipes call for it. You can often use half the salt a recipe calls for without losing flavor. · Be physically active. Get at least 30 minutes of exercise on most days of the week. Walking is a good choice. You also may want to do other activities, such as running, swimming, cycling, or playing tennis or team sports. · Limit alcohol to 2 drinks a day for men and 1 drink a day for women. · Eat plenty of fruits, vegetables, and low-fat dairy products. Eat less saturated and total fats. How is high blood pressure treated? · Your doctor will suggest making lifestyle changes. For example, your doctor may ask you to eat healthy foods, quit smoking, lose extra weight, and be more active. · If lifestyle changes don't help enough or your blood pressure is very high, you will have to take medicine every day. Follow-up care is a key part of your treatment and safety. Be sure to make and go to all appointments, and call your doctor if you are having problems. It's also a good idea to know your test results and keep a list of the medicines you take. Where can you learn more? Go to http://heike-darcie.info/. Enter P501 in the search box to learn more about \"Learning About High Blood Pressure. \"  Current as of: September 21, 2016  Content Version: 11.4  © 5886-2161 [employer]. Care instructions adapted under license by [employer] (which disclaims liability or warranty for this information).  If you have questions about a medical condition or this instruction, always ask your healthcare professional. [address] any warranty or liability for your use of this information.

## 2018-01-12 NOTE — PROGRESS NOTES
RM#8  Chief Complaint   Patient presents with    Follow-up     HTN f/u     1. Have you been to the ER, urgent care clinic since your last visit? Hospitalized since your last visit? No    2. Have you seen or consulted any other health care providers outside of the [address] [place] [address] since your last visit? Include any pap smears or colon screening.  No  Health Maintenance Due   Topic Date Due    Hepatitis C Screening  1950    DTaP/Tdap/Td series (1 - Tdap) 02/25/1970    BREAST CANCER SCRN MAMMOGRAM  02/25/1999    FOBT Q 1 YEAR AGE 50-75  02/25/1999    ZOSTER VACCINE AGE 60>  12/25/2008    GLAUCOMA SCREENING Q2Y  02/25/2014    OSTEOPOROSIS SCREENING (DEXA)  02/25/2014    Pneumococcal 65+ Low/Medium Risk (1 of 2 - PCV13) 02/25/2014    MEDICARE YEARLY EXAM  02/25/2014    Influenza Age 9 to Adult  08/01/2017

## 2018-01-12 NOTE — MR AVS SNAPSHOT
Visit Information Date & Time Provider Department Dept. Phone Encounter #  
 1/12/2018 11:45 AM Elke Esparza [facility] [phone] [phone] [id number] Follow-up Instructions Return in about 2 weeks (around 1/26/2018) for htn. Upcoming Health Maintenance Date Due Hepatitis C Screening 1950 DTaP/Tdap/Td series (1 - Tdap) 2/25/1970 BREAST CANCER SCRN MAMMOGRAM 2/25/1999 FOBT Q 1 YEAR AGE 50-75 2/25/1999 ZOSTER VACCINE AGE 60> 12/25/2008 GLAUCOMA SCREENING Q2Y 2/25/2014 OSTEOPOROSIS SCREENING (DEXA) 2/25/2014 Pneumococcal 65+ Low/Medium Risk (1 of 2 - PCV13) 2/25/2014 MEDICARE YEARLY EXAM 2/25/2014 Influenza Age 5 to Adult 8/1/2017 Allergies as of 1/12/2018  Review Complete On: 1/12/2018 By: Samina Ashley NP No Known Allergies Current Immunizations  Never Reviewed No immunizations on file. Not reviewed this visit You Were Diagnosed With   
  
 Codes Comments Essential hypertension    -  Primary ICD-10-CM: I10 
ICD-9-CM: 401.9 Stage 3 chronic kidney disease     ICD-10-CM: N18.3 ICD-9-CM: 073. 3 Memory changes     ICD-10-CM: R41.3 ICD-9-CM: 780.93 Vitals BP Pulse Temp Resp Height(growth percentile) Weight(growth percentile) (!) 175/96 (BP 1 [facility]: Right arm, BP Patient Position: Sitting) 71 97.4 °F (36.3 °C) (Oral) 18 4' 11.02\" (1.499 m) 161 lb 3.2 oz (73.1 kg) SpO2 BMI OB Status Smoking Status 94% 32.54 kg/m2 Hysterectomy Never Smoker BMI and BSA Data Body Mass Index Body Surface Area 32.54 kg/m 2 1.74 m 2 Preferred Pharmacy Pharmacy Name Phone [id number] [address] [place] [address] [phone] Your Updated Medication List  
  
   
This list is accurate as of: 1/12/18  1:08 PM.  Always use your most recent med list.  
  
  
  
  
 acetaminophen-codeine 300-30 mg per tablet Commonly known as:  TYLENOL #3  
TAKE ONE TABLET BY MOUTH EVERY 6 HOURS AS NEEDED FOR PAIN (MAX 4/DAY) BYSTOLIC 10 mg tablet Generic drug:  nebivolol TAKE 1 TABLET BY MOUTH EVERY DAY  
  
 doxepin 25 mg capsule Commonly known as:  SINEquan Take 1 Cap by mouth nightly. fluticasone 50 mcg/actuation nasal spray Commonly known as:  Aubery Cables 2 Sprays by Both Nostrils route daily. Omeprazole delayed release 20 mg tablet Commonly known as:  PRILOSEC D/R Take 1 Tab by mouth daily. spironolactone 25 mg tablet Commonly known as:  ALDACTONE Take 1 Tab by mouth daily. Prescriptions Sent to Pharmacy Refills  
 spironolactone (ALDACTONE) 25 mg tablet 3 Sig: Take 1 Tab by mouth daily. Class: Normal  
 Pharmacy: [address] [place] [address] , [address] [place] [address] Ph #: [phone] Route: Oral  
  
We Performed the Following AMB POC URINALYSIS DIP STICK AUTO W/O MICRO [87967 CPT(R)] CBC WITH AUTOMATED DIFF [56060 CPT(R)] METABOLIC PANEL, COMPREHENSIVE [17069 CPT(R)] REFERRAL TO NEPHROLOGY [XDV46 Custom] TSH RFX ON ABNORMAL TO FREE T4 [[id number] Custom] Follow-up Instructions Return in about 2 weeks (around 1/26/2018) for htn. Referral Information Referral ID Referred By Referred To  
  
 [id number] Jarvis Kruse MD   
   [address] Phone: [phone] Fax: [phone] Visits Status Start Date End Date 1 New Request 1/12/18 1/12/19 If your referral has a status of pending review or denied, additional information will be sent to support the outcome of this decision. Patient Instructions Learning About High Blood Pressure What is high blood pressure?  
 
Blood pressure is a measure of how hard the blood pushes against the walls of your arteries. It's normal for blood pressure to go up and down throughout the day, but if it stays up, you have high blood pressure. Another name for high blood pressure is hypertension. Two numbers tell you your blood pressure. The first number is the systolic pressure. It shows how hard the blood pushes when your heart is pumping. The second number is the diastolic pressure. It shows how hard the blood pushes between heartbeats, when your heart is relaxed and filling with blood. A blood pressure of less than 120/80 (say \"120 over 80\") is ideal for an adult. High blood pressure is 140/90 or higher. You have high blood pressure if your top number is 140 or higher or your bottom number is 90 or higher, or both. Many people fall into the category in between, called prehypertension. People with prehypertension need to make lifestyle changes to bring their blood pressure down and help prevent or delay high blood pressure. What happens when you have high blood pressure? · Blood flows through your arteries with too much force. Over time, this damages the walls of your arteries. But you can't feel it. High blood pressure usually doesn't cause symptoms. · Fat and calcium start to build up in your arteries. This buildup is called plaque. Plaque makes your arteries narrower and stiffer. Blood can't flow through them as easily. · This lack of good blood flow starts to damage some of the organs in your body. This can lead to problems such as coronary artery disease and heart attack, heart failure, stroke, kidney failure, and eye damage. How can you prevent high blood pressure? · Stay at a healthy weight. · Try to limit how much sodium you eat to less than 2,300 milligrams (mg) a day. If you limit your sodium to 1,500 mg a day, you can lower your blood pressure even more. ¨ Buy foods that are labeled \"unsalted,\" \"sodium-free,\" or \"low-sodium. \" Foods labeled \"reduced-sodium\" and \"light sodium\" may still have too much sodium. ¨ Flavor your food with garlic, lemon juice, onion, vinegar, herbs, and spices instead of salt. Do not use soy sauce, steak sauce, onion salt, garlic salt, mustard, or ketchup on your food. ¨ Use less salt (or none) when recipes call for it. You can often use half the salt a recipe calls for without losing flavor. · Be physically active. Get at least 30 minutes of exercise on most days of the week. Walking is a good choice. You also may want to do other activities, such as running, swimming, cycling, or playing tennis or team sports. · Limit alcohol to 2 drinks a day for men and 1 drink a day for women. · Eat plenty of fruits, vegetables, and low-fat dairy products. Eat less saturated and total fats. How is high blood pressure treated? · Your doctor will suggest making lifestyle changes. For example, your doctor may ask you to eat healthy foods, quit smoking, lose extra weight, and be more active. · If lifestyle changes don't help enough or your blood pressure is very high, you will have to take medicine every day. Follow-up care is a key part of your treatment and safety. Be sure to make and go to all appointments, and call your doctor if you are having problems. It's also a good idea to know your test results and keep a list of the medicines you take. Where can you learn more? Go to http://heike-darcie.info/. Enter P501 in the search box to learn more about \"Learning About High Blood Pressure. \" Current as of: September 21, 2016 Content Version: 11.4 © 4266-1255 [employer]. Care instructions adapted under license by [employer] (which disclaims liability or warranty for this information).  If you have questions about a medical condition or this instruction, always ask your healthcare professional. [address] any warranty or liability for your use of this information. Introducing [facility]! [facility] introduces [employer] patient portal. Now you can access parts of your medical record, email your doctor's office, and request medication refills online. 1. In your internet browser, go to [email or web address] 2. Click on the First Time User? Click Here link in the Sign In box. You will see the New Member Sign Up page. 3. Enter your [employer] Access Code exactly as it appears below. You will not need to use this code after youve completed the sign-up process. If you do not sign up before the expiration date, you must request a new code. · [employer] Access Code: [email or web address] Expires: 2/12/2018  4:30 PM 
 
4. Enter the last four digits of your Social Security Number (xxxx) and Date of Birth (mm/dd/yyyy) as indicated and click Submit. You will be taken to the next sign-up page. 5. Create a [employer] ID. This will be your [employer] login ID and cannot be changed, so think of one that is secure and easy to remember. 6. Create a [employer] password. You can change your password at any time. 7. Enter your Password Reset Question and Answer. This can be used at a later time if you forget your password. 8. Enter your e-mail address. You will receive e-mail notification when new information is available in [address] 9. Click Sign Up. You can now view and download portions of your medical record. 10. Click the Download Summary menu link to download a portable copy of your medical information. If you have questions, please visit the Frequently Asked Questions section of the [employer] website. Remember, [employer] is NOT to be used for urgent needs. For medical emergencies, dial 911. Now available from your iPhone and Android! Please provide this summary of care documentation to your next provider. Your primary care clinician is listed as Gregory Augustin.  If you have any questions after today's visit, please call [phone].

## 2018-01-22 DIAGNOSIS — R12 HEART BURN: ICD-10-CM

## 2018-01-23 RX ORDER — OMEPRAZOLE 20 MG/1
CAPSULE, DELAYED RELEASE ORAL
Qty: 30 CAP | Refills: 1 | Status: SHIPPED | OUTPATIENT
Start: 2018-01-23 | End: 2018-10-31 | Stop reason: SDUPTHER

## 2018-01-23 RX ORDER — NEBIVOLOL 10 MG/1
TABLET ORAL
Qty: 30 TAB | Refills: 0 | Status: SHIPPED | OUTPATIENT
Start: 2018-01-23 | End: 2018-03-15 | Stop reason: SDUPTHER

## 2018-01-23 NOTE — PROGRESS NOTES
HISTORY OF PRESENT ILLNESS  Julio Howard is a 75 y.o. female presents for hypetension follow up  HPI  She insists she  had labs several weeks ago. Chronic migraine; only tylenol #3 effective    Impaired memory since CVA    Cannot recall seeing nephrologist, Dr. Marilynn Merlin, she was seen in March 2017 with uncontrolled hypertension, secondary hyperthyroidism and CKD. CVA March 2017 and 2012. 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 Qi Segura     Current Outpatient Prescriptions on File Prior to Visit   Medication Sig Dispense Refill    Omeprazole delayed release (PRILOSEC D/R) 20 mg tablet Take 1 Tab by mouth daily. 30 Tab 1    doxepin (SINEQUAN) 25 mg capsule Take 1 Cap by mouth nightly. 90 Cap 3     No current facility-administered medications on file prior to visit. Review of Systems   Constitutional: Negative. HENT: Negative. Eyes: Negative. Cardiovascular: Negative for chest pain. Gastrointestinal: Negative. Genitourinary: Negative. Musculoskeletal: Negative for falls and myalgias. Skin: Negative. Neurological: Positive for headaches. Psychiatric/Behavioral: Positive for memory loss. Negative for depression. The patient does not have insomnia. Physical Exam   Constitutional: She is oriented to person, place, and time. She appears well-developed and well-nourished. No distress. Neck: Normal range of motion. Neck supple. No JVD present. Carotid bruit is not present. Cardiovascular: Normal rate.     Pulmonary/Chest: Effort normal and breath sounds normal. No respiratory distress. Abdominal: Soft. Bowel sounds are normal.   Neurological: She is alert and oriented to person, place, and time. Skin: Skin is warm and dry. She is not diaphoretic. Psychiatric: Her mood appears anxious. Her speech is rapid and/or pressured and tangential. She is hyperactive. Cognition and memory are impaired. ASSESSMENT and PLAN    ICD-10-CM ICD-9-CM    1. Essential hypertension O28 587.9 METABOLIC PANEL, COMPREHENSIVE      spironolactone (ALDACTONE) 25 mg tablet      REFERRAL TO NEPHROLOGY      AMB POC URINALYSIS DIP STICK AUTO W/O MICRO      nebivolol (BYSTOLIC) 10 mg tablet   2. Stage 3 chronic kidney disease N18.3 585.3 spironolactone (ALDACTONE) 25 mg tablet      REFERRAL TO NEPHROLOGY      CBC WITH AUTOMATED DIFF   3. Memory changes R41.3 780.93 TSH RFX ON ABNORMAL TO FREE T4   4. Chronic intractable headache, unspecified headache type R51 784.0 acetaminophen-codeine (TYLENOL #3) 300-30 mg per tablet   5. History of CVA (cerebrovascular accident) Z86.73 V12.54      Follow-up Disposition:  Return in about 2 weeks (around 1/26/2018) for htn.  reviewed diet, exercise and weight control  reviewed medications and side effects in detail    1, 2, . Uncontrolled. sas managed by nephrologist, encouraged follow up. Add Spironolactone. No visit note from specialist, will request    3,4. Revisit, consider neuro evaluation. Concern  for patient living alone. Reported to have difficulty relationship with children. Patient voices understanding and acceptance of this advice and will call back if any further questions or concerns. An After Visit Summary was printed and given to the patient.

## 2018-01-25 ENCOUNTER — TELEPHONE (OUTPATIENT)
Dept: INTERNAL MEDICINE CLINIC | Age: 68
End: 2018-01-25

## 2018-03-15 DIAGNOSIS — I10 ESSENTIAL HYPERTENSION: ICD-10-CM

## 2018-03-16 RX ORDER — NEBIVOLOL HYDROCHLORIDE 10 MG/1
TABLET ORAL
Qty: 30 TAB | Refills: 0 | Status: SHIPPED | OUTPATIENT
Start: 2018-03-16 | End: 2018-05-09 | Stop reason: SDUPTHER

## 2018-05-07 DIAGNOSIS — I10 ESSENTIAL HYPERTENSION: ICD-10-CM

## 2018-05-09 DIAGNOSIS — I10 ESSENTIAL HYPERTENSION: ICD-10-CM

## 2018-05-09 RX ORDER — NEBIVOLOL HYDROCHLORIDE 10 MG/1
TABLET ORAL
Qty: 30 TAB | Refills: 0 | Status: SHIPPED | OUTPATIENT
Start: 2018-05-09 | End: 2018-05-09 | Stop reason: SDUPTHER

## 2018-05-09 RX ORDER — NEBIVOLOL HYDROCHLORIDE 10 MG/1
TABLET ORAL
Qty: 30 TAB | Refills: 0 | Status: SHIPPED | OUTPATIENT
Start: 2018-05-09 | End: 2018-06-19 | Stop reason: SDUPTHER

## 2018-05-22 ENCOUNTER — OFFICE VISIT (OUTPATIENT)
Dept: INTERNAL MEDICINE CLINIC | Age: 68
End: 2018-05-22

## 2018-05-22 ENCOUNTER — HOSPITAL ENCOUNTER (OUTPATIENT)
Dept: LAB | Age: 68
Discharge: HOME | End: 2018-05-22
Payer: MEDICARE

## 2018-05-22 VITALS
HEIGHT: 59 IN | OXYGEN SATURATION: 95 % | RESPIRATION RATE: 18 BRPM | TEMPERATURE: 97.4 F | SYSTOLIC BLOOD PRESSURE: 168 MMHG | BODY MASS INDEX: 32.42 KG/M2 | DIASTOLIC BLOOD PRESSURE: 94 MMHG | WEIGHT: 160.8 LBS | HEART RATE: 67 BPM

## 2018-05-22 DIAGNOSIS — E78.00 PURE HYPERCHOLESTEROLEMIA: ICD-10-CM

## 2018-05-22 DIAGNOSIS — R51.9 BILATERAL HEADACHES: ICD-10-CM

## 2018-05-22 DIAGNOSIS — Z86.73 HISTORY OF CVA (CEREBROVASCULAR ACCIDENT): ICD-10-CM

## 2018-05-22 DIAGNOSIS — Z00.00 MEDICARE ANNUAL WELLNESS VISIT, SUBSEQUENT: Primary | ICD-10-CM

## 2018-05-22 DIAGNOSIS — Z71.89 ADVANCE DIRECTIVE DISCUSSED WITH PATIENT: ICD-10-CM

## 2018-05-22 DIAGNOSIS — Z53.20 COLONOSCOPY REFUSED: ICD-10-CM

## 2018-05-22 DIAGNOSIS — R42 DIZZINESS: ICD-10-CM

## 2018-05-22 DIAGNOSIS — Z12.11 COLON CANCER SCREENING: ICD-10-CM

## 2018-05-22 DIAGNOSIS — Z28.21 PNEUMOCOCCAL VACCINATION DECLINED: ICD-10-CM

## 2018-05-22 DIAGNOSIS — Z53.20 MAMMOGRAM DECLINED: ICD-10-CM

## 2018-05-22 DIAGNOSIS — G47.00 INSOMNIA, UNSPECIFIED TYPE: ICD-10-CM

## 2018-05-22 DIAGNOSIS — D64.9 ANEMIA, UNSPECIFIED TYPE: ICD-10-CM

## 2018-05-22 DIAGNOSIS — I10 ESSENTIAL HYPERTENSION: ICD-10-CM

## 2018-05-22 PROCEDURE — 84443 ASSAY THYROID STIM HORMONE: CPT

## 2018-05-22 PROCEDURE — 80053 COMPREHEN METABOLIC PANEL: CPT

## 2018-05-22 PROCEDURE — 86800 THYROGLOBULIN ANTIBODY: CPT

## 2018-05-22 PROCEDURE — 84439 ASSAY OF FREE THYROXINE: CPT

## 2018-05-22 PROCEDURE — 85025 COMPLETE CBC W/AUTO DIFF WBC: CPT

## 2018-05-22 PROCEDURE — 80061 LIPID PANEL: CPT

## 2018-05-22 RX ORDER — AMLODIPINE BESYLATE 5 MG/1
5 TABLET ORAL DAILY
Qty: 30 TAB | Refills: 0 | Status: SHIPPED | OUTPATIENT
Start: 2018-05-22 | End: 2018-06-19 | Stop reason: SDUPTHER

## 2018-05-22 RX ORDER — LOSARTAN POTASSIUM 100 MG/1
TABLET ORAL
Refills: 0 | COMMUNITY
Start: 2018-02-27 | End: 2020-05-02

## 2018-05-22 RX ORDER — HYDROCODONE BITARTRATE AND ACETAMINOPHEN 5; 325 MG/1; MG/1
1 TABLET ORAL
Qty: 20 TAB | Refills: 0 | Status: SHIPPED | OUTPATIENT
Start: 2018-05-22 | End: 2019-03-26

## 2018-05-22 RX ORDER — TRAZODONE HYDROCHLORIDE 50 MG/1
50 TABLET ORAL
Qty: 30 TAB | Refills: 1 | Status: SHIPPED | OUTPATIENT
Start: 2018-05-22 | End: 2018-08-22 | Stop reason: SDUPTHER

## 2018-05-22 NOTE — PROGRESS NOTES
HISTORY OF PRESENT ILLNESS  Jd Sage is a 70 y.o. female with history of hypertension, CVA, lumbar stenosis, headache, CKD presents for Medicare Wellness   HPI  She has not seen nephrologist since shortly after she was discharged from hospital in 2017. She does not believe she needs follow up    Notes blood pressure will always be high. Declines referral for mammogram and colonoscopy    Declines immunization    Recurrent headache across front  of head and dizziness. She feel better after she sits for a while    Requests medication for headache and sleep    Declines neurology referral    She lives alone    No safety equipment in home    Denies fall    Good vision and hearing     No alcohol use          Past Medical History:   Diagnosis Date    [facility] [address]     ovaian stomach    [facility]cerebral [facility]) [facility] [address]     Heart failure ([facility] [address]     MI    Hypertension        Current Outpatient Prescriptions on File Prior to Visit   Medication Sig Dispense Refill    BYSTOLIC 10 mg tablet take 1 tablet by mouth once daily 30 Tab 0    spironolactone (ALDACTONE) 25 mg tablet Take 1 Tab by mouth daily. 30 Tab 3    omeprazole (PRILOSEC) 20 mg capsule TAKE 1 TAB BY MOUTH DAILY. 30 Cap 1     No current facility-administered medications on file prior to visit. Review of Systems   Constitutional: Positive for malaise/fatigue. HENT: Negative for hearing loss and sore throat. Eyes: Negative. Respiratory: Negative. Cardiovascular: Negative. Negative for chest pain and palpitations. Gastrointestinal: Negative. Genitourinary: Negative. Musculoskeletal: Positive for back pain. Negative for falls, myalgias and neck pain. Skin: Negative. Neurological: Positive for dizziness, focal weakness (extremity s/p CVA) and headaches. Negative for seizures and loss of consciousness. Psychiatric/Behavioral: Negative for substance abuse. The patient is nervous/anxious.         Physical Exam Constitutional: She is oriented to person, place, and time. She appears well-developed and well-nourished. No distress. HENT:   Head: Normocephalic. Neck: No JVD present. Carotid bruit is not present. Cardiovascular: Normal rate, regular rhythm and normal heart sounds. Exam reveals no gallop and no friction rub. No murmur heard. Abdominal: Soft. Bowel sounds are normal. She exhibits no distension. There is no tenderness. Musculoskeletal: She exhibits no edema, tenderness or deformity. Lymphadenopathy:     She has no cervical adenopathy. Neurological: She is alert and oriented to person, place, and time. Skin: Skin is warm and dry. No rash noted. She is not diaphoretic. No erythema. Psychiatric: Her behavior is normal. Thought content normal. Her mood appears anxious. Her speech is tangential. Cognition and memory are normal.       ASSESSMENT and PLAN    ICD-10-CM ICD-9-CM    1. Medicare annual wellness visit, subsequent Z00.00 V70.0 AMB POC URINALYSIS DIP STICK AUTO W/O MICRO   2. Colon cancer screening Z12.11 V76.51 OCCULT BLOOD, IMMUNOASSAY (FIT)   3. Pneumococcal vaccination declined Z28.21 V64.06    4. Mammogram declined Z53.20 V64.2    5. Colonoscopy refused Z53.20 V64.2    6. Advance directive discussed with patient Z71.89 V65.49    7. Dizziness R42 780.4 CBC WITH AUTOMATED DIFF      METABOLIC PANEL, COMPREHENSIVE      TSH RFX ON ABNORMAL TO FREE T4      THYROID ANTIBODY PANEL      REFERRAL TO NEUROLOGY   8. Bilateral headaches R51 784.0 REFERRAL TO NEUROLOGY      HYDROcodone-acetaminophen (NORCO) 5-325 mg per tablet   9. Anemia, unspecified type D64.9 285.9 CBC WITH AUTOMATED DIFF   10. Pure hypercholesterolemia E78.00 272.0 LIPID PANEL   11. Essential hypertension I10 401.9 amLODIPine (NORVASC) 5 mg tablet   12.  History of CVA (cerebrovascular accident) Z86.73 V12.54 REFERRAL TO NEUROLOGY   13. Insomnia, unspecified type G47.00 780.52 traZODone (DESYREL) 50 mg tablet     Follow-up Disposition:  Return in about 4 weeks (around 6/19/2018) for htn.  lab results and schedule of future lab studies reviewed with patient  reviewed diet, exercise and weight control  reviewed medications and side effects in detail  use of aspirin to prevent MI and TIA's discussed      Hypertension uncontrolled. Add Amlodipine to current medications     Patient strongly encouraged to see neurologist to evaluate and manage headache and dizziness    Patient declines HM exams    Patient voices understanding and acceptance of this advice and will call back if any further questions or concerns. An After Visit Summary was printed and given to the patient.

## 2018-05-22 NOTE — MR AVS SNAPSHOT
[address] [facility] [place] [facility] [place] [address] 
[phone] Patient: Génesis Goddard MRN: [id number] LRY:[birth year] Visit Information Date & Time Provider Department Dept. Phone Encounter #  
 5/22/2018 10:30 AM Garvin Libman, [id number] Pediatric[facility] Internal [facility] [phone] [id number] Follow-up Instructions Return in about 4 weeks (around 6/19/2018) for htn. Upcoming Health Maintenance Date Due Hepatitis C Screening 1950 DTaP/Tdap/Td series (1 - Tdap) 2/25/1970 BREAST CANCER SCRN MAMMOGRAM 2/25/1999 FOBT Q 1 YEAR AGE 50-75 2/25/1999 ZOSTER VACCINE AGE 60> 12/25/2008 GLAUCOMA SCREENING Q2Y 2/25/2014 Bone Densitometry (Dexa) Screening 2/25/2014 Pneumococcal 65+ Low/Medium Risk (1 of 2 - PCV13) 2/25/2014 Influenza Age 5 to Adult 8/1/2018 MEDICARE YEARLY EXAM 5/23/2019 Allergies as of 5/22/2018  Review Complete On: 5/22/2018 By: Garvin Libman, NP No Known Allergies Current Immunizations  Never Reviewed No immunizations on file. Not reviewed this visit You Were Diagnosed With   
  
 Codes Comments Medicare annual wellness visit, subsequent    -  Primary ICD-10-CM: Z00.00 ICD-9-CM: V70.0 Colon cancer screening     ICD-10-CM: Z12.11 ICD-9-CM: V76.51 Pneumococcal vaccination declined     ICD-10-CM: C29.10 ICD-9-CM: V64.06 Mammogram declined     ICD-10-CM: Z53.20 ICD-9-CM: V64.2 Colonoscopy refused     ICD-10-CM: Z53.20 ICD-9-CM: V64.2 Advance directive discussed with patient     ICD-10-CM: Z71.89 ICD-9-CM: V65.49 Dizziness     ICD-10-CM: I69 ICD-9-CM: 780.4 Bilateral headaches     ICD-10-CM: R51 ICD-9-CM: 784.0 Anemia, unspecified type     ICD-10-CM: D64.9 ICD-9-CM: 285.9 Pure hypercholesterolemia     ICD-10-CM: E78.00 ICD-9-CM: 272.0 Essential hypertension     ICD-10-CM: I10 
ICD-9-CM: 401.9 History of CVA (cerebrovascular accident)     ICD-10-CM: Z86.73 
ICD-9-CM: V12.54 Insomnia, unspecified type     ICD-10-CM: G47.00 ICD-9-CM: 780.52 Vitals BP Pulse Temp Resp Height(growth percentile) Weight(growth percentile) (!) 168/94 (BP 1 [facility]: Left arm, BP Patient Position: Sitting) 67 97.4 °F (36.3 °C) (Oral) 18 4' 11.02\" (1.499 m) 160 lb 12.8 oz (72.9 kg) SpO2 BMI OB Status Smoking Status 95% 32.46 kg/m2 Hysterectomy Never Smoker Vitals History BMI and BSA Data Body Mass Index Body Surface Area  
 32.46 kg/m 2 1.74 m 2 Preferred Pharmacy Pharmacy Name Phone [facility], [address] [phone] Your Updated Medication List  
  
   
This list is accurate as of 5/22/18 11:49 AM.  Always use your most recent med list. amLODIPine 5 mg tablet Commonly known as:  Remonia Grates Take 1 Tab by mouth daily. BYSTOLIC 10 mg tablet Generic drug:  nebivolol  
take 1 tablet by mouth once daily HYDROcodone-acetaminophen 5-325 mg per tablet Commonly known as:  Farideh Martinez Take 1 Tab by mouth every eight (8) hours as needed for Pain. Max Daily Amount: 3 Tabs. losartan 100 mg tablet Commonly known as:  COZAAR  
take 1 tablet by mouth once daily  
  
 omeprazole 20 mg capsule Commonly known as:  PRILOSEC  
TAKE 1 TAB BY MOUTH DAILY. spironolactone 25 mg tablet Commonly known as:  ALDACTONE Take 1 Tab by mouth daily. traZODone 50 mg tablet Commonly known as:  Brant [place] Take 1 Tab by mouth nightly. Prescriptions Printed Refills HYDROcodone-acetaminophen (NORCO) 5-325 mg per tablet 0 Sig: Take 1 Tab by mouth every eight (8) hours as needed for Pain. Max Daily Amount: 3 Tabs. Class: Print Route: Oral  
  
Prescriptions Sent to Pharmacy Refills  
 amLODIPine (NORVASC) 5 mg tablet 0 Sig: Take 1 Tab by mouth daily. Class: Normal  
 [email or web address] [address] [facility] [address], [address] Ph #: [phone] Route: Oral  
 traZODone (DESYREL) 50 mg tablet 1 Sig: Take 1 Tab by mouth nightly. Class: Normal  
 Pharmacy: [email or web address] [address] [facility] [address], [address] Ph #: [phone] Route: Oral  
  
We Performed the Following AMB POC URINALYSIS DIP STICK AUTO W/O MICRO [35746 CPT(R)] CBC WITH AUTOMATED DIFF [08719 CPT(R)] LIPID PANEL [48949 CPT(R)] METABOLIC PANEL, COMPREHENSIVE [36582 CPT(R)] OCCULT BLOOD, IMMUNOASSAY (FIT) [id number] CPT(R)] REFERRAL TO NEUROLOGY [AQD71 Custom] THYROID ANTIBODY PANEL [85815 CPT(R)] TSH RFX ON ABNORMAL TO FREE T4 [[id number] Custom] Follow-up Instructions Return in about 4 weeks (around 6/19/2018) for htn. Referral Information Referral ID Referred By Referred To  
  
 [id number] Mariangel Zamora MD   
   [address] [facility] [address] [place], [place] Cristhian Salazar Phone: [phone] Fax: [phone] Visits Status Start Date End Date 1 New Request 5/22/18 5/22/19 If your referral has a status of pending review or denied, additional information will be sent to support the outcome of this decision. Patient Instructions Stop current sleep medication, start medication prescribed today Schedule appointment with neurologist for evaluation of headache Start Amlodipine 5 mg for blood pressure sent to pharmacy, continue current blood pressure medications Follow up 4 weeks for blood pressure evaluation Dizziness: Care Instructions Your Care Instructions Dizziness is the feeling of unsteadiness or fuzziness in your head. It is different than having vertigo, which is a feeling that the room is spinning or that you are moving or falling. It is also different from lightheadedness, which is the feeling that you are about to faint. It can be hard to know what causes dizziness. Some people feel dizzy when they have migraine headaches. Sometimes bouts of flu can make you feel dizzy. Some medical conditions, such as heart problems or high blood pressure, can make you feel dizzy. Many medicines can cause dizziness, including medicines for high blood pressure, pain, or anxiety. If a medicine causes your symptoms, your doctor may recommend that you stop or change the medicine. If it is a problem with your heart, you may need medicine to help your heart work better. If there is no clear reason for your symptoms, your doctor may suggest watching and waiting for a while to see if the dizziness goes away on its own. Follow-up care is a key part of your treatment and safety. Be sure to make and go to all appointments, and call your doctor if you are having problems. It's also a good idea to know your test results and keep a list of the medicines you take. How can you care for yourself at home? · If your doctor recommends or prescribes medicine, take it exactly as directed. Call your doctor if you think you are having a problem with your medicine. · Do not drive while you feel dizzy. · Try to prevent falls. Steps you can take include: ¨ Using nonskid mats, adding grab bars near the tub, and using night-lights. ¨ Clearing your home so that walkways are free of anything you might trip on. ¨ Letting family and friends know that you have been feeling dizzy. This will help them know how to help you. When should you call for help? Call 911 anytime you think you may need emergency care. For example, call if: 
? · You passed out (lost consciousness). ? · You have dizziness along with symptoms of a heart attack. These may include: ¨ Chest pain or pressure, or a strange feeling in the chest. 
¨ Sweating. ¨ Shortness of breath. ¨ Nausea or vomiting.  
¨ Pain, pressure, or a strange feeling in the back, neck, jaw, or upper belly or in one or both shoulders or arms. ¨ Lightheadedness or sudden weakness. ¨ A fast or irregular heartbeat. ? · You have symptoms of a stroke. These may include: 
¨ Sudden numbness, tingling, weakness, or loss of movement in your face, arm, or leg, especially on only one side of your body. ¨ Sudden vision changes. ¨ Sudden trouble speaking. ¨ Sudden confusion or trouble understanding simple statements. ¨ Sudden problems with walking or balance. ¨ A sudden, severe headache that is different from past headaches. ?Call your doctor now or seek immediate medical care if: 
? · You feel dizzy and have a fever, headache, or ringing in your ears. ? · You have new or increased nausea and vomiting. ? · Your dizziness does not go away or comes back. ? Watch closely for changes in your health, and be sure to contact your doctor if: 
? · You do not get better as expected. Where can you learn more? Go to http://heike-darcie.info/. Enter B307 in the search box to learn more about \"Dizziness: Care Instructions. \" Current as of: March 20, 2017 Content Version: 11.4 © 1391-2971 [employer]. Care instructions adapted under license by [employer]iz (which disclaims liability or warranty for this information). If you have questions about a medical condition or this instruction, always ask your healthcare professional. [place] [address] any warranty or liability for your use of this information. Well Visit, Over 72: Care Instructions Your Care Instructions Physical exams can help you stay healthy. Your doctor has checked your overall health and may have suggested ways to take good care of yourself. He or she also may have recommended tests. At home, you can help prevent illness with healthy eating, regular exercise, and other steps. Follow-up care is a key part of your treatment and safety.  Be sure to make and go to all appointments, and call your doctor if you are having problems. It's also a good idea to know your test results and keep a list of the medicines you take. How can you care for yourself at home? · Reach and stay at a healthy weight. This will lower your risk for many problems, such as obesity, diabetes, heart disease, and high blood pressure. · Get at least 30 minutes of exercise on most days of the week. Walking is a good choice. You also may want to do other activities, such as running, swimming, cycling, or playing tennis or team sports. · Do not smoke. Smoking can make health problems worse. If you need help quitting, talk to your doctor about stop-smoking programs and medicines. These can increase your chances of quitting for good. · Protect your skin from too much sun. When you're outdoors from 10 a.m. to 4 p.m., stay in the shade or cover up with clothing and a hat with a wide brim. Wear sunglasses that block UV rays. Even when it's cloudy, put broad-spectrum sunscreen (SPF 30 or higher) on any exposed skin. · See a dentist one or two times a year for checkups and to have your teeth cleaned. · Wear a seat belt in the car. · Limit alcohol to 2 drinks a day for men and 1 drink a day for women. Too much alcohol can cause health problems. Follow your doctor's advice about when to have certain tests. These tests can spot problems early. For men and women · Cholesterol. Your doctor will tell you how often to have this done based on your overall health and other things that can increase your risk for heart attack and stroke. · Blood pressure. Have your blood pressure checked during a routine doctor visit. Your doctor will tell you how often to check your blood pressure based on your age, your blood pressure results, and other factors. · Diabetes. Ask your doctor whether you should have tests for diabetes. · Vision.  Experts recommend that you have yearly exams for glaucoma and other age-related eye problems. · Hearing. Tell your doctor if you notice any change in your hearing. You can have tests to find out how well you hear. · Colon cancer tests. Keep having colon cancer tests as your doctor recommends. You can have one of several types of tests. · Heart attack and stroke risk. At least every 4 to 6 years, you should have your risk for heart attack and stroke assessed. Your doctor uses factors such as your age, blood pressure, cholesterol, and whether you smoke or have diabetes to show what your risk for a heart attack or stroke is over the next 10 years. · Osteoporosis. Talk to your doctor about whether you should have a bone density test to find out whether you have thinning bones. Also ask your doctor about whether you should take calcium and vitamin D supplements. For women · Pap test and pelvic exam. You may no longer need a Pap test. Talk with your doctor about whether to stop or continue to have Pap tests. · Breast exam and mammogram. Ask how often you should have a mammogram, which is an X-ray of your breasts. A mammogram can spot breast cancer before it can be felt and when it is easiest to treat. · Thyroid disease. Talk to your doctor about whether to have your thyroid checked as part of a regular physical exam. Women have an increased chance of a thyroid problem. For men · Prostate exam. Talk to your doctor about whether you should have a blood test (called a PSA test) for prostate cancer. Experts disagree on whether men should have this test. Some experts recommend that you discuss the benefits and risks of the test with your doctor. · Abdominal aortic aneurysm. Ask your doctor whether you should have a test to check for an aneurysm. You may need a test if you ever smoked or if your parent, brother, sister, or child has had an aneurysm. When should you call for help?  
Watch closely for changes in your health, and be sure to contact your doctor if you have any problems or symptoms that concern you. Where can you learn more? Go to http://heike-darcie.info/. Enter L973 in the search box to learn more about \"Well Visit, Over 65: Care Instructions. \" Current as of: May 12, 2017 Content Version: 11.4 © 8545-2187 [employer]. Care instructions adapted under license by Kids Note (which disclaims liability or warranty for this information). If you have questions about a medical condition or this instruction, always ask your healthcare professional. [address] any warranty or liability for your use of this information. Learning About High Blood Pressure What is high blood pressure? Blood pressure is a measure of how hard the blood pushes against the walls of your arteries. It's normal for blood pressure to go up and down throughout the day, but if it stays up, you have high blood pressure. Another name for high blood pressure is hypertension. Two numbers tell you your blood pressure. The first number is the systolic pressure. It shows how hard the blood pushes when your heart is pumping. The second number is the diastolic pressure. It shows how hard the blood pushes between heartbeats, when your heart is relaxed and filling with blood. A blood pressure of less than 120/80 (say \"120 over 80\") is ideal for an adult. High blood pressure is 140/90 or higher. You have high blood pressure if your top number is 140 or higher or your bottom number is 90 or higher, or both. Many people fall into the category in between, called prehypertension. People with prehypertension need to make lifestyle changes to bring their blood pressure down and help prevent or delay high blood pressure. What happens when you have high blood pressure? · Blood flows through your arteries with too much force. Over time, this damages the walls of your arteries. But you can't feel it.  High blood pressure usually doesn't cause symptoms. · Fat and calcium start to build up in your arteries. This buildup is called plaque. Plaque makes your arteries narrower and stiffer. Blood can't flow through them as easily. · This lack of good blood flow starts to damage some of the organs in your body. This can lead to problems such as coronary artery disease and heart attack, heart failure, stroke, kidney failure, and eye damage. How can you prevent high blood pressure? · Stay at a healthy weight. · Try to limit how much sodium you eat to less than 2,300 milligrams (mg) a day. If you limit your sodium to 1,500 mg a day, you can lower your blood pressure even more. ¨ Buy foods that are labeled \"unsalted,\" \"sodium-free,\" or \"low-sodium. \" Foods labeled \"reduced-sodium\" and \"light sodium\" may still have too much sodium. ¨ Flavor your food with garlic, lemon juice, onion, vinegar, herbs, and spices instead of salt. Do not use soy sauce, steak sauce, onion salt, garlic salt, mustard, or ketchup on your food. ¨ Use less salt (or none) when recipes call for it. You can often use half the salt a recipe calls for without losing flavor. · Be physically active. Get at least 30 minutes of exercise on most days of the week. Walking is a good choice. You also may want to do other activities, such as running, swimming, cycling, or playing tennis or team sports. · Limit alcohol to 2 drinks a day for men and 1 drink a day for women. · Eat plenty of fruits, vegetables, and low-fat dairy products. Eat less saturated and total fats. How is high blood pressure treated? · Your doctor will suggest making lifestyle changes. For example, your doctor may ask you to eat healthy foods, quit smoking, lose extra weight, and be more active. · If lifestyle changes don't help enough or your blood pressure is very high, you will have to take medicine every day. Follow-up care is a key part of your treatment and safety.  Be sure to make and go to all appointments, and call your doctor if you are having problems. It's also a good idea to know your test results and keep a list of the medicines you take. Where can you learn more? Go to http://heike-darcie.info/. Enter P501 in the search box to learn more about \"Learning About High Blood Pressure. \" Current as of: September 21, 2016 Content Version: 11.4 © 2704-3033 [employer]. Care instructions adapted under license by [employer] (which disclaims liability or warranty for this information). If you have questions about a medical condition or this instruction, always ask your healthcare professional. [address] any warranty or liability for your use of this information. Introducing [facility]! Elly Díaz introduces [employer] [facility] [employer] [facility] [employer] patient portal. Now you can access parts of your medical record, email your doctor's office, and request medication refills online. 1. In your internet browser, go to [email or web address] 2. Click on the First Time User? Click Here link in the Sign In box. You will see the New Member Sign Up page. 3. Enter your [facility] [employer] [facility] [employer] Access Code exactly as it appears below. You will not need to use this code after youve completed the sign-up process. If you do not sign up before the expiration date, you must request a new code. · [employer] [facility] [employer] [facility] [employer] [place] Access Code: [email or web address] Expires: 8/20/2018 10:31 AM 
 
4. Enter the last four digits of your Social Security Number (xxxx) and Date of Birth (mm/dd/yyyy) as indicated and click Submit. You will be taken to the next sign-up page. 5. Create a zulilyt ID. This will be your [employer] [facility] [employer] [facility] [employer] login ID and cannot be changed, so think of one that is secure and easy to remember. 6. Create a [employer] [facility] [employer] [place] password. You can change your password at any time. 7. Enter your Password Reset Question and Answer.  This can be used at a later time if you forget your password. 8. Enter your e-mail address. You will receive e-mail notification when new information is available in [address] 9. Click Sign Up. You can now view and download portions of your medical record. 10. Click the Download Summary menu link to download a portable copy of your medical information. If you have questions, please visit the Frequently Asked Questions section of the Brightfish website. Remember, [employer] is NOT to be used for urgent needs. For medical emergencies, dial 911. Now available from your iPhone and Android! Please provide this summary of care documentation to your next provider. Your primary care clinician is listed as [place]. If you have any questions after today's visit, please call [phone].

## 2018-05-22 NOTE — PATIENT INSTRUCTIONS
Stop current sleep medication, start medication prescribed today    Schedule appointment with neurologist for evaluation of headache    Start Amlodipine 5 mg for blood pressure sent to pharmacy, continue current blood pressure medications    Follow up 4 weeks for blood pressure evaluation     Dizziness: Care Instructions  Your Care Instructions  Dizziness is the feeling of unsteadiness or fuzziness in your head. It is different than having vertigo, which is a feeling that the room is spinning or that you are moving or falling. It is also different from lightheadedness, which is the feeling that you are about to faint. It can be hard to know what causes dizziness. Some people feel dizzy when they have migraine headaches. Sometimes bouts of flu can make you feel dizzy. Some medical conditions, such as heart problems or high blood pressure, can make you feel dizzy. Many medicines can cause dizziness, including medicines for high blood pressure, pain, or anxiety. If a medicine causes your symptoms, your doctor may recommend that you stop or change the medicine. If it is a problem with your heart, you may need medicine to help your heart work better. If there is no clear reason for your symptoms, your doctor may suggest watching and waiting for a while to see if the dizziness goes away on its own. Follow-up care is a key part of your treatment and safety. Be sure to make and go to all appointments, and call your doctor if you are having problems. It's also a good idea to know your test results and keep a list of the medicines you take. How can you care for yourself at home? · If your doctor recommends or prescribes medicine, take it exactly as directed. Call your doctor if you think you are having a problem with your medicine. · Do not drive while you feel dizzy. · Try to prevent falls. Steps you can take include:  ¨ Using nonskid mats, adding grab bars near the tub, and using night-lights.   ¨ Clearing your home so that walkways are free of anything you might trip on. ¨ Letting family and friends know that you have been feeling dizzy. This will help them know how to help you. When should you call for help? Call 911 anytime you think you may need emergency care. For example, call if:  ? · You passed out (lost consciousness). ? · You have dizziness along with symptoms of a heart attack. These may include:  ¨ Chest pain or pressure, or a strange feeling in the chest.  ¨ Sweating. ¨ Shortness of breath. ¨ Nausea or vomiting. ¨ Pain, pressure, or a strange feeling in the back, neck, jaw, or upper belly or in one or both shoulders or arms. ¨ Lightheadedness or sudden weakness. ¨ A fast or irregular heartbeat. ? · You have symptoms of a stroke. These may include:  ¨ Sudden numbness, tingling, weakness, or loss of movement in your face, arm, or leg, especially on only one side of your body. ¨ Sudden vision changes. ¨ Sudden trouble speaking. ¨ Sudden confusion or trouble understanding simple statements. ¨ Sudden problems with walking or balance. ¨ A sudden, severe headache that is different from past headaches. ?Call your doctor now or seek immediate medical care if:  ? · You feel dizzy and have a fever, headache, or ringing in your ears. ? · You have new or increased nausea and vomiting. ? · Your dizziness does not go away or comes back. ? Watch closely for changes in your health, and be sure to contact your doctor if:  ? · You do not get better as expected. Where can you learn more? Go to http://heike-darcie.info/. Enter E465 in the search box to learn more about \"Dizziness: Care Instructions. \"  Current as of: March 20, 2017  Content Version: 11.4  © 9953-7991 [employer]. Care instructions adapted under license by [employer] (which disclaims liability or warranty for this information).  If you have questions about a medical condition or this instruction, always ask your healthcare professional. [place] [address] any warranty or liability for your use of this information. Well Visit, Over 72: Care Instructions  Your Care Instructions    Physical exams can help you stay healthy. Your doctor has checked your overall health and may have suggested ways to take good care of yourself. He or she also may have recommended tests. At home, you can help prevent illness with healthy eating, regular exercise, and other steps. Follow-up care is a key part of your treatment and safety. Be sure to make and go to all appointments, and call your doctor if you are having problems. It's also a good idea to know your test results and keep a list of the medicines you take. How can you care for yourself at home? · Reach and stay at a healthy weight. This will lower your risk for many problems, such as obesity, diabetes, heart disease, and high blood pressure. · Get at least 30 minutes of exercise on most days of the week. Walking is a good choice. You also may want to do other activities, such as running, swimming, cycling, or playing tennis or team sports. · Do not smoke. Smoking can make health problems worse. If you need help quitting, talk to your doctor about stop-smoking programs and medicines. These can increase your chances of quitting for good. · Protect your skin from too much sun. When you're outdoors from 10 a.m. to 4 p.m., stay in the shade or cover up with clothing and a hat with a wide brim. Wear sunglasses that block UV rays. Even when it's cloudy, put broad-spectrum sunscreen (SPF 30 or higher) on any exposed skin. · See a dentist one or two times a year for checkups and to have your teeth cleaned. · Wear a seat belt in the car. · Limit alcohol to 2 drinks a day for men and 1 drink a day for women. Too much alcohol can cause health problems. Follow your doctor's advice about when to have certain tests.  These tests can spot problems early.  For men and women  · Cholesterol. Your doctor will tell you how often to have this done based on your overall health and other things that can increase your risk for heart attack and stroke. · Blood pressure. Have your blood pressure checked during a routine doctor visit. Your doctor will tell you how often to check your blood pressure based on your age, your blood pressure results, and other factors. · Diabetes. Ask your doctor whether you should have tests for diabetes. · Vision. Experts recommend that you have yearly exams for glaucoma and other age-related eye problems. · Hearing. Tell your doctor if you notice any change in your hearing. You can have tests to find out how well you hear. · Colon cancer tests. Keep having colon cancer tests as your doctor recommends. You can have one of several types of tests. · Heart attack and stroke risk. At least every 4 to 6 years, you should have your risk for heart attack and stroke assessed. Your doctor uses factors such as your age, blood pressure, cholesterol, and whether you smoke or have diabetes to show what your risk for a heart attack or stroke is over the next 10 years. · Osteoporosis. Talk to your doctor about whether you should have a bone density test to find out whether you have thinning bones. Also ask your doctor about whether you should take calcium and vitamin D supplements. For women  · Pap test and pelvic exam. You may no longer need a Pap test. Talk with your doctor about whether to stop or continue to have Pap tests. · Breast exam and mammogram. Ask how often you should have a mammogram, which is an X-ray of your breasts. A mammogram can spot breast cancer before it can be felt and when it is easiest to treat. · Thyroid disease. Talk to your doctor about whether to have your thyroid checked as part of a regular physical exam. Women have an increased chance of a thyroid problem.   For men  · Prostate exam. Talk to your doctor about whether you should have a blood test (called a PSA test) for prostate cancer. Experts disagree on whether men should have this test. Some experts recommend that you discuss the benefits and risks of the test with your doctor. · Abdominal aortic aneurysm. Ask your doctor whether you should have a test to check for an aneurysm. You may need a test if you ever smoked or if your parent, brother, sister, or child has had an aneurysm. When should you call for help? Watch closely for changes in your health, and be sure to contact your doctor if you have any problems or symptoms that concern you. Where can you learn more? Go to http://heike-darcie.info/. Enter Y394 in the search box to learn more about \"Well Visit, Over 65: Care Instructions. \"  Current as of: May 12, 2017  Content Version: 11.4  © 7361-4655 [employer]. Care instructions adapted under license by [employer] (which disclaims liability or warranty for this information). If you have questions about a medical condition or this instruction, always ask your healthcare professional. [place] [address] any warranty or liability for your use of this information. Learning About High Blood Pressure  What is high blood pressure? Blood pressure is a measure of how hard the blood pushes against the walls of your arteries. It's normal for blood pressure to go up and down throughout the day, but if it stays up, you have high blood pressure. Another name for high blood pressure is hypertension. Two numbers tell you your blood pressure. The first number is the systolic pressure. It shows how hard the blood pushes when your heart is pumping. The second number is the diastolic pressure. It shows how hard the blood pushes between heartbeats, when your heart is relaxed and filling with blood. A blood pressure of less than 120/80 (say \"120 over 80\") is ideal for an adult.  High blood pressure is 140/90 or higher. You have high blood pressure if your top number is 140 or higher or your bottom number is 90 or higher, or both. Many people fall into the category in between, called prehypertension. People with prehypertension need to make lifestyle changes to bring their blood pressure down and help prevent or delay high blood pressure. What happens when you have high blood pressure? · Blood flows through your arteries with too much force. Over time, this damages the walls of your arteries. But you can't feel it. High blood pressure usually doesn't cause symptoms. · Fat and calcium start to build up in your arteries. This buildup is called plaque. Plaque makes your arteries narrower and stiffer. Blood can't flow through them as easily. · This lack of good blood flow starts to damage some of the organs in your body. This can lead to problems such as coronary artery disease and heart attack, heart failure, stroke, kidney failure, and eye damage. How can you prevent high blood pressure? · Stay at a healthy weight. · Try to limit how much sodium you eat to less than 2,300 milligrams (mg) a day. If you limit your sodium to 1,500 mg a day, you can lower your blood pressure even more. ¨ Buy foods that are labeled \"unsalted,\" \"sodium-free,\" or \"low-sodium. \" Foods labeled \"reduced-sodium\" and \"light sodium\" may still have too much sodium. ¨ Flavor your food with garlic, lemon juice, onion, vinegar, herbs, and spices instead of salt. Do not use soy sauce, steak sauce, onion salt, garlic salt, mustard, or ketchup on your food. ¨ Use less salt (or none) when recipes call for it. You can often use half the salt a recipe calls for without losing flavor. · Be physically active. Get at least 30 minutes of exercise on most days of the week. Walking is a good choice. You also may want to do other activities, such as running, swimming, cycling, or playing tennis or team sports.   · Limit alcohol to 2 drinks a day for men and 1 drink a day for women. · Eat plenty of fruits, vegetables, and low-fat dairy products. Eat less saturated and total fats. How is high blood pressure treated? · Your doctor will suggest making lifestyle changes. For example, your doctor may ask you to eat healthy foods, quit smoking, lose extra weight, and be more active. · If lifestyle changes don't help enough or your blood pressure is very high, you will have to take medicine every day. Follow-up care is a key part of your treatment and safety. Be sure to make and go to all appointments, and call your doctor if you are having problems. It's also a good idea to know your test results and keep a list of the medicines you take. Where can you learn more? Go to http://heike-darcie.info/. Enter P501 in the search box to learn more about \"Learning About High Blood Pressure. \"  Current as of: September 21, 2016  Content Version: 11.4  © 4559-9195 [employer]. Care instructions adapted under license by [employer] (which disclaims liability or warranty for this information). If you have questions about a medical condition or this instruction, always ask your healthcare professional. [address] any warranty or liability for your use of this information.

## 2018-05-22 NOTE — PROGRESS NOTES
Exam Room 8  Christian Gardiner is a 70 y.o. female  Chief Complaint   Patient presents with   [facility] Wellness Visit     1. Have you been to the ER, urgent care clinic since your last visit? Hospitalized since your last visit? No    2. Have you seen or consulted any other health care providers outside of the [address] [place] [address] since your last visit? Include any pap smears or colon screening.  No

## 2018-05-23 LAB
ALBUMIN SERPL-MCNC: 4.5 G/DL (ref 3.6–4.8)
ALBUMIN/GLOB SERPL: 1.6 {RATIO} (ref 1.2–2.2)
ALP SERPL-CCNC: 126 IU/L (ref 39–117)
ALT SERPL-CCNC: 13 IU/L (ref 0–32)
AST SERPL-CCNC: 18 IU/L (ref 0–40)
BASOPHILS # BLD AUTO: 0 X10E3/UL (ref 0–0.2)
BASOPHILS NFR BLD AUTO: 1 %
BILIRUB SERPL-MCNC: 0.5 MG/DL (ref 0–1.2)
BUN SERPL-MCNC: 25 MG/DL (ref 8–27)
BUN/CREAT SERPL: 13 (ref 12–28)
CALCIUM SERPL-MCNC: 10.1 MG/DL (ref 8.7–10.3)
CHLORIDE SERPL-SCNC: 108 MMOL/L (ref 96–106)
CHOLEST SERPL-MCNC: 204 MG/DL (ref 100–199)
CO2 SERPL-SCNC: 21 MMOL/L (ref 18–29)
CREAT SERPL-MCNC: 1.98 MG/DL (ref 0.57–1)
EOSINOPHIL # BLD AUTO: 0.2 X10E3/UL (ref 0–0.4)
EOSINOPHIL NFR BLD AUTO: 3 %
ERYTHROCYTE [DISTWIDTH] IN BLOOD BY AUTOMATED COUNT: 15.4 % (ref 12.3–15.4)
GLOBULIN SER CALC-MCNC: 2.9 G/DL (ref 1.5–4.5)
GLUCOSE SERPL-MCNC: 110 MG/DL (ref 65–99)
HCT VFR BLD AUTO: 38.1 % (ref 34–46.6)
HDLC SERPL-MCNC: 61 MG/DL
HGB BLD-MCNC: 12.4 G/DL (ref 11.1–15.9)
IMM GRANULOCYTES # BLD: 0 X10E3/UL (ref 0–0.1)
IMM GRANULOCYTES NFR BLD: 0 %
LDLC SERPL CALC-MCNC: 122 MG/DL (ref 0–99)
LYMPHOCYTES # BLD AUTO: 2.3 X10E3/UL (ref 0.7–3.1)
LYMPHOCYTES NFR BLD AUTO: 29 %
MCH RBC QN AUTO: 29.5 PG (ref 26.6–33)
MCHC RBC AUTO-ENTMCNC: 32.5 G/DL (ref 31.5–35.7)
MCV RBC AUTO: 91 FL (ref 79–97)
MONOCYTES # BLD AUTO: 0.8 X10E3/UL (ref 0.1–0.9)
MONOCYTES NFR BLD AUTO: 10 %
NEUTROPHILS # BLD AUTO: 4.5 X10E3/UL (ref 1.4–7)
NEUTROPHILS NFR BLD AUTO: 57 %
PLATELET # BLD AUTO: 288 X10E3/UL (ref 150–379)
POTASSIUM SERPL-SCNC: 5.7 MMOL/L (ref 3.5–5.2)
PROT SERPL-MCNC: 7.4 G/DL (ref 6–8.5)
RBC # BLD AUTO: 4.21 X10E6/UL (ref 3.77–5.28)
SODIUM SERPL-SCNC: 142 MMOL/L (ref 134–144)
T4 FREE SERPL-MCNC: 0.95 NG/DL (ref 0.82–1.77)
THYROGLOB AB SERPL-ACNC: <1 IU/ML (ref 0–0.9)
THYROPEROXIDASE AB SERPL-ACNC: 9 IU/ML (ref 0–34)
TRIGL SERPL-MCNC: 103 MG/DL (ref 0–149)
TSH SERPL DL<=0.005 MIU/L-ACNC: 7.62 UIU/ML (ref 0.45–4.5)
VLDLC SERPL CALC-MCNC: 21 MG/DL (ref 5–40)
WBC # BLD AUTO: 7.9 X10E3/UL (ref 3.4–10.8)

## 2018-06-19 DIAGNOSIS — I10 ESSENTIAL HYPERTENSION: ICD-10-CM

## 2018-06-19 RX ORDER — NEBIVOLOL HYDROCHLORIDE 10 MG/1
TABLET ORAL
Qty: 30 TAB | Refills: 0 | Status: SHIPPED | OUTPATIENT
Start: 2018-06-19 | End: 2018-08-22 | Stop reason: SDUPTHER

## 2018-06-19 RX ORDER — AMLODIPINE BESYLATE 5 MG/1
TABLET ORAL
Qty: 30 TAB | Refills: 0 | Status: SHIPPED | OUTPATIENT
Start: 2018-06-19 | End: 2018-08-22 | Stop reason: SDUPTHER

## 2018-08-22 DIAGNOSIS — I10 ESSENTIAL HYPERTENSION: ICD-10-CM

## 2018-08-22 DIAGNOSIS — G47.00 INSOMNIA, UNSPECIFIED TYPE: ICD-10-CM

## 2018-08-23 RX ORDER — NEBIVOLOL HYDROCHLORIDE 10 MG/1
TABLET ORAL
Qty: 30 TAB | Refills: 0 | Status: SHIPPED | OUTPATIENT
Start: 2018-08-23 | End: 2018-10-22 | Stop reason: SDUPTHER

## 2018-08-23 RX ORDER — TRAZODONE HYDROCHLORIDE 50 MG/1
TABLET ORAL
Qty: 30 TAB | Refills: 1 | Status: SHIPPED | OUTPATIENT
Start: 2018-08-23 | End: 2018-12-04 | Stop reason: SDUPTHER

## 2018-08-23 RX ORDER — AMLODIPINE BESYLATE 5 MG/1
TABLET ORAL
Qty: 30 TAB | Refills: 0 | Status: SHIPPED | OUTPATIENT
Start: 2018-08-23 | End: 2018-10-31 | Stop reason: SDUPTHER

## 2018-10-22 DIAGNOSIS — I10 ESSENTIAL HYPERTENSION: ICD-10-CM

## 2018-10-22 RX ORDER — NEBIVOLOL HYDROCHLORIDE 10 MG/1
TABLET ORAL
Qty: 30 TAB | Refills: 0 | Status: SHIPPED | OUTPATIENT
Start: 2018-10-22 | End: 2018-10-24 | Stop reason: SDUPTHER

## 2018-10-23 DIAGNOSIS — I10 ESSENTIAL HYPERTENSION: ICD-10-CM

## 2018-10-24 RX ORDER — NEBIVOLOL HYDROCHLORIDE 10 MG/1
TABLET ORAL
Qty: 30 TAB | Refills: 0 | Status: SHIPPED | OUTPATIENT
Start: 2018-10-24 | End: 2018-12-05 | Stop reason: SDUPTHER

## 2018-10-31 DIAGNOSIS — R12 HEART BURN: ICD-10-CM

## 2018-10-31 DIAGNOSIS — I10 ESSENTIAL HYPERTENSION: ICD-10-CM

## 2018-11-01 RX ORDER — AMLODIPINE BESYLATE 5 MG/1
TABLET ORAL
Qty: 30 TAB | Refills: 0 | Status: SHIPPED | OUTPATIENT
Start: 2018-11-01 | End: 2018-12-04 | Stop reason: SDUPTHER

## 2018-11-01 RX ORDER — OMEPRAZOLE 20 MG/1
CAPSULE, DELAYED RELEASE ORAL
Qty: 30 CAP | Refills: 1 | Status: SHIPPED | OUTPATIENT
Start: 2018-11-01 | End: 2019-03-26

## 2018-12-04 DIAGNOSIS — G47.00 INSOMNIA, UNSPECIFIED TYPE: ICD-10-CM

## 2018-12-04 DIAGNOSIS — I10 ESSENTIAL HYPERTENSION: ICD-10-CM

## 2018-12-05 RX ORDER — NEBIVOLOL HYDROCHLORIDE 10 MG/1
TABLET ORAL
Qty: 30 TAB | Refills: 0 | Status: SHIPPED | OUTPATIENT
Start: 2018-12-05 | End: 2019-01-11 | Stop reason: SDUPTHER

## 2018-12-05 RX ORDER — TRAZODONE HYDROCHLORIDE 50 MG/1
TABLET ORAL
Qty: 30 TAB | Refills: 1 | Status: SHIPPED | OUTPATIENT
Start: 2018-12-05 | End: 2020-08-24 | Stop reason: SDUPTHER

## 2018-12-05 RX ORDER — AMLODIPINE BESYLATE 5 MG/1
TABLET ORAL
Qty: 30 TAB | Refills: 0 | Status: SHIPPED | OUTPATIENT
Start: 2018-12-05 | End: 2019-01-11 | Stop reason: SDUPTHER

## 2019-01-11 DIAGNOSIS — I10 ESSENTIAL HYPERTENSION: ICD-10-CM

## 2019-01-14 RX ORDER — NEBIVOLOL HYDROCHLORIDE 10 MG/1
TABLET ORAL
Qty: 30 TAB | Refills: 0 | Status: SHIPPED | OUTPATIENT
Start: 2019-01-14 | End: 2019-02-25 | Stop reason: SDUPTHER

## 2019-01-14 RX ORDER — AMLODIPINE BESYLATE 5 MG/1
TABLET ORAL
Qty: 30 TAB | Refills: 0 | Status: SHIPPED | OUTPATIENT
Start: 2019-01-14 | End: 2019-02-25 | Stop reason: SDUPTHER

## 2019-02-25 DIAGNOSIS — I10 ESSENTIAL HYPERTENSION: ICD-10-CM

## 2019-02-25 RX ORDER — AMLODIPINE BESYLATE 5 MG/1
TABLET ORAL
Qty: 30 TAB | Refills: 0 | Status: SHIPPED | OUTPATIENT
Start: 2019-02-25 | End: 2019-03-26

## 2019-02-25 RX ORDER — NEBIVOLOL HYDROCHLORIDE 10 MG/1
TABLET ORAL
Qty: 30 TAB | Refills: 0 | Status: SHIPPED | OUTPATIENT
Start: 2019-02-25 | End: 2019-03-26 | Stop reason: SDUPTHER

## 2019-03-07 ENCOUNTER — TELEPHONE (OUTPATIENT)
Dept: INTERNAL MEDICINE CLINIC | Age: 69
End: 2019-03-07

## 2019-03-07 NOTE — TELEPHONE ENCOUNTER
Angela Poisson called to schedule appt for pt. She states that pt has had a couple strokes and thinks she had another one a couple weeks ago in her sleep. She states that her speech has gotten worse but she has been evaluated for that. Asked Li if pt was having any facial drooping, or weakness in arms she states that she is unsure as she was not with the pt right now. Advised Li that if she has any facial drooping or weakness in arms she needs to go to the ER to be evaluated and Li verbalized understanding. Pt is scheduled with Dr. Mak Blackman at the request of daughter Dillan Kong to change pcp at first avail 3/26 @ 230. Let Li know that we will be in touch with her if we need to change appt.

## 2019-03-07 NOTE — TELEPHONE ENCOUNTER
Chart reviewed. It appears pt has been reluctant to accept recommendations for various things in the past.  But, [facility] could do a home safety assessment, speech therapy, and PT/OT in the pt's own home. Please verify if these services would be desired or accepted and we can send an order to [facility].

## 2019-03-26 ENCOUNTER — OFFICE VISIT (OUTPATIENT)
Dept: INTERNAL MEDICINE CLINIC | Age: 69
End: 2019-03-26

## 2019-03-26 ENCOUNTER — HOSPITAL ENCOUNTER (OUTPATIENT)
Dept: LAB | Age: 69
Discharge: HOME | End: 2019-03-26
Payer: MEDICARE

## 2019-03-26 VITALS
BODY MASS INDEX: 30.24 KG/M2 | HEIGHT: 59 IN | HEART RATE: 74 BPM | TEMPERATURE: 97.9 F | WEIGHT: 150 LBS | DIASTOLIC BLOOD PRESSURE: 86 MMHG | SYSTOLIC BLOOD PRESSURE: 178 MMHG | OXYGEN SATURATION: 98 % | RESPIRATION RATE: 16 BRPM

## 2019-03-26 DIAGNOSIS — K21.9 GASTROESOPHAGEAL REFLUX DISEASE, ESOPHAGITIS PRESENCE NOT SPECIFIED: ICD-10-CM

## 2019-03-26 DIAGNOSIS — R73.9 HYPERGLYCEMIA: ICD-10-CM

## 2019-03-26 DIAGNOSIS — Z86.73 HISTORY OF CVA (CEREBROVASCULAR ACCIDENT): ICD-10-CM

## 2019-03-26 DIAGNOSIS — N18.30 STAGE 3 CHRONIC KIDNEY DISEASE (HCC): ICD-10-CM

## 2019-03-26 DIAGNOSIS — I10 ESSENTIAL HYPERTENSION: ICD-10-CM

## 2019-03-26 DIAGNOSIS — R51.9 NONINTRACTABLE HEADACHE, UNSPECIFIED CHRONICITY PATTERN, UNSPECIFIED HEADACHE TYPE: ICD-10-CM

## 2019-03-26 DIAGNOSIS — E55.9 VITAMIN D DEFICIENCY: ICD-10-CM

## 2019-03-26 DIAGNOSIS — E78.00 PURE HYPERCHOLESTEROLEMIA: ICD-10-CM

## 2019-03-26 DIAGNOSIS — R94.6 ABNORMAL THYROID FUNCTION TEST: ICD-10-CM

## 2019-03-26 DIAGNOSIS — R29.898 RIGHT ARM WEAKNESS: ICD-10-CM

## 2019-03-26 DIAGNOSIS — I63.9 CEREBROVASCULAR ACCIDENT (CVA), UNSPECIFIED MECHANISM (HCC): Primary | ICD-10-CM

## 2019-03-26 DIAGNOSIS — I67.89 OTHER CEREBROVASCULAR DISEASE: ICD-10-CM

## 2019-03-26 DIAGNOSIS — I69.322 DYSARTHRIA AS LATE EFFECT OF CEREBROVASCULAR ACCIDENT (CVA): ICD-10-CM

## 2019-03-26 DIAGNOSIS — E53.8 B12 DEFICIENCY: ICD-10-CM

## 2019-03-26 PROCEDURE — 82306 VITAMIN D 25 HYDROXY: CPT

## 2019-03-26 PROCEDURE — 85025 COMPLETE CBC W/AUTO DIFF WBC: CPT

## 2019-03-26 PROCEDURE — 85651 RBC SED RATE NONAUTOMATED: CPT

## 2019-03-26 PROCEDURE — 84439 ASSAY OF FREE THYROXINE: CPT

## 2019-03-26 PROCEDURE — 80061 LIPID PANEL: CPT

## 2019-03-26 PROCEDURE — 83036 HEMOGLOBIN GLYCOSYLATED A1C: CPT

## 2019-03-26 PROCEDURE — 80053 COMPREHEN METABOLIC PANEL: CPT

## 2019-03-26 PROCEDURE — 82607 VITAMIN B-12: CPT

## 2019-03-26 PROCEDURE — 84443 ASSAY THYROID STIM HORMONE: CPT

## 2019-03-26 RX ORDER — BUTALBITAL, ACETAMINOPHEN AND CAFFEINE 50; 325; 40 MG/1; MG/1; MG/1
1 TABLET ORAL
Qty: 20 TAB | Refills: 1 | Status: ON HOLD | OUTPATIENT
Start: 2019-03-26 | End: 2020-05-02 | Stop reason: SDUPTHER

## 2019-03-26 RX ORDER — TOPIRAMATE 50 MG/1
50 TABLET, FILM COATED ORAL
Qty: 90 TAB | Refills: 1 | Status: SHIPPED | OUTPATIENT
Start: 2019-03-26 | End: 2020-08-24 | Stop reason: SDUPTHER

## 2019-03-26 RX ORDER — NEBIVOLOL 20 MG/1
20 TABLET ORAL DAILY
Qty: 90 TAB | Refills: 1 | Status: SHIPPED | OUTPATIENT
Start: 2019-03-26 | End: 2020-01-21

## 2019-03-26 RX ORDER — AMLODIPINE BESYLATE 10 MG/1
10 TABLET ORAL DAILY
Qty: 90 TAB | Refills: 1 | Status: SHIPPED | OUTPATIENT
Start: 2019-03-26 | End: 2019-10-01 | Stop reason: SDUPTHER

## 2019-03-26 RX ORDER — PRAVASTATIN SODIUM 20 MG/1
20 TABLET ORAL
Qty: 90 TAB | Refills: 1 | Status: SHIPPED | OUTPATIENT
Start: 2019-03-26 | End: 2020-08-24 | Stop reason: SDUPTHER

## 2019-03-26 RX ORDER — ASPIRIN 81 MG/1
81 TABLET ORAL DAILY
Qty: 90 TAB | Refills: 3 | Status: SHIPPED | OUTPATIENT
Start: 2019-03-26

## 2019-03-26 RX ORDER — OMEPRAZOLE 20 MG/1
20 CAPSULE, DELAYED RELEASE ORAL DAILY
Qty: 90 CAP | Refills: 1 | Status: SHIPPED | OUTPATIENT
Start: 2019-03-26 | End: 2021-03-10 | Stop reason: ALTCHOICE

## 2019-03-26 NOTE — PROGRESS NOTES
HPI: 
Presents for f/u HTN, HAs, neurologic changes Pt is a poor historian. Lives alone. Pt denies any family support initially. But, does acknowledge that daughter is in town and helps provide transportation. Pt cites that daughter has her own family and is too busy to assist her. Pt reports daily HAs Left sided, throbbing Better with activity Worse when at rest 
 
Within the past 6 months, pt has developed a dysarthria as well as right arm weakness. Pt reports good daily compliance with norvasc 5 mg and bystolic 10 mg each daily. Pt has GERD Past medical, Social, and Family history reviewed Prior to Admission medications Medication Sig Start Date End Date Taking? Authorizing Provider  
pravastatin (PRAVACHOL) 20 mg tablet Take 1 Tab by mouth nightly. 3/26/19  Yes Porfirio Duran MD  
amLODIPine (NORVASC) 10 mg tablet Take 1 Tab by mouth daily. 3/26/19  Yes Porfirio Duran MD  
nebivolol (BYSTOLIC) 20 mg tablet Take 1 Tab by mouth daily. 3/26/19  Yes Porfirio Duran MD  
aspirin delayed-release (ADULT LOW DOSE ASPIRIN) 81 mg tablet Take 1 Tab by mouth daily. 3/26/19  Yes Porfirio Duran MD  
butalbital-acetaminophen-caffeine (FIORICET, [facility]) -40 mg per tablet Take 1 Tab by mouth every six (6) hours as needed for Headache. 3/26/19  Yes Porfirio Duran MD  
topiramate (TOPAMAX) 50 mg tablet Take 1 Tab by mouth nightly. 3/26/19  Yes Porfirio Duran MD  
omeprazole (PRILOSEC) 20 mg capsule Take 1 Cap by mouth daily. 3/26/19  Yes Porfirio Duran MD  
levothyroxine (SYNTHROID) 50 mcg tablet Take 1 Tab by mouth Daily (before breakfast). 3/27/19   Porfirio Duran MD  
cholecalciferol (VITAMIN D3) 1,000 unit tablet Take 1 Tab by mouth daily.  3/27/19   Porfirio Durna MD  
traZODone (DESYREL) 50 mg tablet take 1 tablet by mouth NIGHTLY 12/5/18   aPige Cobb TREEMA  
losartan (COZAAR) 100 mg tablet take 1 tablet by mouth once daily 2/27/18   Provider, Historical ROS 
Complete ROS reviewed and negative or stable except as noted in HPI. Physical Exam  
Constitutional: She is oriented to person, place, and time. She appears well-nourished. No distress. HENT:  
Head: Normocephalic and atraumatic. Eyes: Pupils are equal, round, and reactive to light. EOM are normal. No scleral icterus. Neck: Normal range of motion. Neck supple. No bruits Cardiovascular: Normal rate, regular rhythm and normal heart sounds. Exam reveals no gallop and no friction rub. No murmur heard. Pulmonary/Chest: Effort normal and breath sounds normal. No respiratory distress. She has no wheezes. She has no rales. Abdominal: Soft. She exhibits no distension. There is no tenderness. Musculoskeletal: Normal range of motion. She exhibits no edema. Neurological: She is alert and oriented to person, place, and time. She exhibits normal muscle tone. Facial asymmetry. +dysarthria. Right upper extremity weakness. Skin: Skin is warm. No rash noted. Psychiatric: She has a normal mood and affect. Nursing note and vitals reviewed. Prior labs reviewed. Reviewed prior imaging reports Assessment/Plan: ICD-10-CM ICD-9-CM 1. Cerebrovascular accident (CVA), unspecified mechanism ([facility] [address] I63.9 434.91 aspirin delayed-release (ADULT LOW DOSE ASPIRIN) 81 mg tablet SED RATE (ESR) AMB POC EKG ROUTINE W/ 12 LEADS, INTER & REP  
   MRA BRAIN WO CONT  
   MRA NECK W CONT  
   MRI BRAIN WO CONT  
   ECHO ADULT COMPLETE  
   REFERRAL TO NEUROLOGY 2. Essential hypertension I10 401.9 amLODIPine (NORVASC) 10 mg tablet  
   nebivolol (BYSTOLIC) 20 mg tablet CBC WITH AUTOMATED DIFF 3. Stage 3 chronic kidney disease (HCC) N18.3 585.3 4. History of CVA (cerebrovascular accident) Z86.73 V12.54   
5. Pure hypercholesterolemia E78.00 272.0 pravastatin (PRAVACHOL) 20 mg tablet LIPID PANEL  
   METABOLIC PANEL, COMPREHENSIVE 6.  Right arm weakness R29.898 729.89 REFERRAL TO NEUROLOGY 7. Dysarthria as late effect of cerebrovascular accident (CVA) I86.968 438.13 REFERRAL TO NEUROLOGY 8. Nonintractable headache, unspecified chronicity pattern, unspecified headache type R51 784.0 butalbital-acetaminophen-caffeine (FIORICET, ESGIC) -40 mg per tablet  
   topiramate (TOPAMAX) 50 mg tablet REFERRAL TO NEUROLOGY 9. B12 deficiency E53.8 266.2 VITAMIN B12  
10. Vitamin D deficiency E55.9 268.9 VITAMIN D, 25 HYDROXY 11. Abnormal thyroid function test R94.6 794.5 T4, FREE  
   TSH 3RD GENERATION 12. Hyperglycemia R73.9 790.29 HEMOGLOBIN A1C WITH EAG  
13. Other cerebrovascular disease  I67.89 437.8 ECHO ADULT COMPLETE 14. Gastroesophageal reflux disease, esophagitis presence not specified K21.9 530.81 omeprazole (PRILOSEC) 20 mg capsule Follow-up and Dispositions · Return in about 1 month (around 4/26/2019), or if symptoms worsen or fail to improve, for blood pressure, cholesterol, thyroid. results and schedule of future studies reviewed with patient 
reviewed diet, exercise and weight   
cardiovascular risk and specific lipid/LDL goals reviewed 
reviewed medications and side effects in detail Increase norvasc to 10mg Increase bystolic to 20 mg 
EKG Echo MRI/MRA Ref to PT - Beatrice Caldera PT - PT/OT, speech Ref neuro ASA EC 
PPI Defer triptan due to possible vascular compromise 
fioricet HA proph - topamax Repeat labs May need thyroid supplement >40 minutes time spent with >50% in counseling and coordination of care

## 2019-03-26 NOTE — PROGRESS NOTES
Rm#13 Chief Complaint Patient presents with  
 Headache  
  pt states she believes her bp is elveated. and has had 3 strokes 1. Have you been to the ER, urgent care clinic since your last visit? Hospitalized since your last visit? No 
 
2. Have you seen or consulted any other health care providers outside of the [address] [place] [address] since your last visit? Include any pap smears or colon screening. No 
Health Maintenance Due Topic Date Due  
 Hepatitis C Screening  1950  
 DTaP/Tdap/Td series (1 - Tdap) 02/25/1970  Shingrix Vaccine Age 50> (1 of 2) 02/25/1999  BREAST CANCER SCRN MAMMOGRAM  02/25/1999  
 FOBT Q 1 YEAR AGE 50-75  02/25/1999  GLAUCOMA SCREENING Q2Y  02/25/2014  Bone Densitometry (Dexa) Screening  02/25/2014  Pneumococcal 65+ years (1 of 2 - PCV13) 02/25/2014  Influenza Age 5 to Adult  08/01/2018 Fall Risk Assessment, last 12 mths 3/26/2019 Able to walk? Yes Fall in past 12 months? No  
Fall with injury? -  
Number of falls in past 12 months - Fall Risk Score -  
 
3 most recent PHQ Screens 3/26/2019 Little interest or pleasure in doing things Nearly every day Feeling down, depressed, irritable, or hopeless Nearly every day Total Score PHQ 2 6

## 2019-03-27 DIAGNOSIS — E55.9 VITAMIN D DEFICIENCY: ICD-10-CM

## 2019-03-27 DIAGNOSIS — E03.9 ACQUIRED HYPOTHYROIDISM: Primary | ICD-10-CM

## 2019-03-27 LAB
25(OH)D3+25(OH)D2 SERPL-MCNC: 21.5 NG/ML (ref 30–100)
ALBUMIN SERPL-MCNC: 4.6 G/DL (ref 3.5–4.8)
ALBUMIN/GLOB SERPL: 1.5 {RATIO} (ref 1.2–2.2)
ALP SERPL-CCNC: 113 IU/L (ref 39–117)
ALT SERPL-CCNC: 11 IU/L (ref 0–32)
AST SERPL-CCNC: 20 IU/L (ref 0–40)
BASOPHILS # BLD AUTO: 0 X10E3/UL (ref 0–0.2)
BASOPHILS NFR BLD AUTO: 1 %
BILIRUB SERPL-MCNC: 0.4 MG/DL (ref 0–1.2)
BUN SERPL-MCNC: 18 MG/DL (ref 8–27)
BUN/CREAT SERPL: 10 (ref 12–28)
CALCIUM SERPL-MCNC: 10.1 MG/DL (ref 8.7–10.3)
CHLORIDE SERPL-SCNC: 103 MMOL/L (ref 96–106)
CHOLEST SERPL-MCNC: 238 MG/DL (ref 100–199)
CO2 SERPL-SCNC: 21 MMOL/L (ref 20–29)
CREAT SERPL-MCNC: 1.76 MG/DL (ref 0.57–1)
EOSINOPHIL # BLD AUTO: 0.2 X10E3/UL (ref 0–0.4)
EOSINOPHIL NFR BLD AUTO: 3 %
ERYTHROCYTE [DISTWIDTH] IN BLOOD BY AUTOMATED COUNT: 15.2 % (ref 12.3–15.4)
ERYTHROCYTE [SEDIMENTATION RATE] IN BLOOD BY WESTERGREN METHOD: 42 MM/HR (ref 0–40)
EST. AVERAGE GLUCOSE BLD GHB EST-MCNC: 117 MG/DL
GLOBULIN SER CALC-MCNC: 3 G/DL (ref 1.5–4.5)
GLUCOSE SERPL-MCNC: 94 MG/DL (ref 65–99)
HBA1C MFR BLD: 5.7 % (ref 4.8–5.6)
HCT VFR BLD AUTO: 37.9 % (ref 34–46.6)
HDLC SERPL-MCNC: 61 MG/DL
HGB BLD-MCNC: 12.8 G/DL (ref 11.1–15.9)
IMM GRANULOCYTES # BLD AUTO: 0 X10E3/UL (ref 0–0.1)
IMM GRANULOCYTES NFR BLD AUTO: 0 %
LDLC SERPL CALC-MCNC: 151 MG/DL (ref 0–99)
LYMPHOCYTES # BLD AUTO: 2.5 X10E3/UL (ref 0.7–3.1)
LYMPHOCYTES NFR BLD AUTO: 31 %
MCH RBC QN AUTO: 29.6 PG (ref 26.6–33)
MCHC RBC AUTO-ENTMCNC: 33.8 G/DL (ref 31.5–35.7)
MCV RBC AUTO: 88 FL (ref 79–97)
MONOCYTES # BLD AUTO: 0.7 X10E3/UL (ref 0.1–0.9)
MONOCYTES NFR BLD AUTO: 9 %
NEUTROPHILS # BLD AUTO: 4.5 X10E3/UL (ref 1.4–7)
NEUTROPHILS NFR BLD AUTO: 56 %
PLATELET # BLD AUTO: 329 X10E3/UL (ref 150–379)
POTASSIUM SERPL-SCNC: 4.9 MMOL/L (ref 3.5–5.2)
PROT SERPL-MCNC: 7.6 G/DL (ref 6–8.5)
RBC # BLD AUTO: 4.32 X10E6/UL (ref 3.77–5.28)
SODIUM SERPL-SCNC: 142 MMOL/L (ref 134–144)
T4 FREE SERPL-MCNC: 0.86 NG/DL (ref 0.82–1.77)
TRIGL SERPL-MCNC: 130 MG/DL (ref 0–149)
TSH SERPL DL<=0.005 MIU/L-ACNC: 8.36 UIU/ML (ref 0.45–4.5)
VIT B12 SERPL-MCNC: 635 PG/ML (ref 232–1245)
VLDLC SERPL CALC-MCNC: 26 MG/DL (ref 5–40)
WBC # BLD AUTO: 7.9 X10E3/UL (ref 3.4–10.8)

## 2019-03-27 RX ORDER — MELATONIN
1000 DAILY
Qty: 90 TAB | Refills: 3 | Status: SHIPPED | OUTPATIENT
Start: 2019-03-27 | End: 2020-08-24 | Stop reason: SDUPTHER

## 2019-03-27 RX ORDER — LEVOTHYROXINE SODIUM 50 UG/1
50 TABLET ORAL
Qty: 90 TAB | Refills: 1 | Status: SHIPPED | OUTPATIENT
Start: 2019-03-27 | End: 2020-08-24 | Stop reason: SDUPTHER

## 2019-03-27 NOTE — PROGRESS NOTES
Please notify pt of results Cholesterol is high as expected, pt needs to take the pravastatin as Rx'd. Thyroid levels are confirmed to be low. This is contributing to her weight gain. She should start a low dosed thyroid supplement at 50 mcg per day. Rx sent. Vitamin D is low. Pt should take 1000 units of vitamin D daily. HgbA1C at 5.7 is right at the threshold for pre-diabetes. Pt should limit her sugar intake. Other labs are at least stable.

## 2019-03-28 ENCOUNTER — DOCUMENTATION ONLY (OUTPATIENT)
Dept: INTERNAL MEDICINE CLINIC | Age: 69
End: 2019-03-28

## 2019-09-06 ENCOUNTER — TELEPHONE (OUTPATIENT)
Dept: INTERNAL MEDICINE CLINIC | Age: 69
End: 2019-09-06

## 2019-09-06 NOTE — TELEPHONE ENCOUNTER
Spoke with pt after verifying pt name and . Pt stated she had been experiencing elevated BP for 1 week ,last bp reading 191/113. Pt stated she would take \" 2 pills \" and her bp would not go down. While on the phone pt speech became more slurred. Advised pt to do F.A.S.T.( explained what she needed to do). Pt  Stated she could not lift her left arm as high as her right arm. Pt has history of CVA's . Pt denied any chest discomfort . Pt did complain of a headache. I asked pt if she had anyone with her or if we could call her daughter to come over . Pt stated her daughter had not spoken with her in awhile. I advised pt that due to her current symptoms and history of CVA's that I would like for her to evaluated at the ER. Pt stated \" I dont have a way there\" I advised pt that I can have one of the other nurses call EMS to go over to her home . Verified pt address, Anayeli Leonard LPN called EMS while I stayed on the phone with pt. Once EMS arrived , I spoke with one of the paramedics , provided some medical history along with current situation. Call ended.

## 2019-09-16 ENCOUNTER — PATIENT OUTREACH (OUTPATIENT)
Dept: INTERNAL MEDICINE CLINIC | Age: 69
End: 2019-09-16

## 2019-09-16 NOTE — PROGRESS NOTES
Dr Armando Valentin requested records on patient. Patient was in the ED at [facility] to rule out CVA on 9/6/19. Gave records to [facility] because patient has an appointment with Dr Shoshana Moran on 10/1/19.

## 2019-10-01 ENCOUNTER — OFFICE VISIT (OUTPATIENT)
Dept: INTERNAL MEDICINE CLINIC | Age: 69
End: 2019-10-01

## 2019-10-01 VITALS
HEART RATE: 63 BPM | OXYGEN SATURATION: 96 % | SYSTOLIC BLOOD PRESSURE: 199 MMHG | HEIGHT: 59 IN | WEIGHT: 158.25 LBS | BODY MASS INDEX: 31.9 KG/M2 | DIASTOLIC BLOOD PRESSURE: 82 MMHG | TEMPERATURE: 97.4 F | RESPIRATION RATE: 16 BRPM

## 2019-10-01 DIAGNOSIS — R20.0 NUMBNESS OF RIGHT HAND: ICD-10-CM

## 2019-10-01 DIAGNOSIS — I10 ESSENTIAL HYPERTENSION: Primary | ICD-10-CM

## 2019-10-01 DIAGNOSIS — R47.01 EXPRESSIVE APHASIA: ICD-10-CM

## 2019-10-01 RX ORDER — AMLODIPINE BESYLATE 10 MG/1
10 TABLET ORAL DAILY
Qty: 90 TAB | Refills: 1 | Status: SHIPPED | OUTPATIENT
Start: 2019-10-01 | End: 2020-05-02

## 2019-10-01 NOTE — PATIENT INSTRUCTIONS
1.  Please re-start the amlodipine, and return here for BP monitoring as reviewed. 2.  Please bring your home BP monitor to your next visit here. Please bring all your home medications, to make your medication review more clear. 3.  Please schedule appt with neurology, as reviewed with Dr. Crow Vela in March 2019. Please follow the following instructions to process/authorize your referral, if needed: 
 
Referrals processing Please verify with your insurance IF you need referral authorization submitted. For insurance plans which require this, please follow the following steps. FAILURE TO DO SO MAY RESULT IN INABILITY TO SEE THE SPECIALIST YOU HAVE BEEN REFERRED TO (once you are scheduled to see them). 1. Call and schedule appointment with specialist 
2. Call our clinic and leave message with provider name, and date of appointment 3. We will then submit the referral to your insurance. This process takes 2-5 business days. If you have questions about scheduling or authorizing referral, you can review with our referral coordinator Nuvia Louis). You can review with her today if available/if you have time, or you can call to review once you have made your referral/appointment. If you are not sure if you need referral authorizations, please review with the referral coordinator(s), either prior to or after you have made the appointment, as reviewed.

## 2019-10-01 NOTE — LETTER
10/28/2019 8:16 PM 
 
Ms. Chloe Almodovar [address] [facility] [address] [id number] Dear Ms. Zoran Marquez [facility] missed you! Please call our office at [phone] and schedule a follow up appointment for your continued care.  
 
 
 
Sincerely, 
 
 
Josh Nathan MD

## 2019-10-01 NOTE — PROGRESS NOTES
RM 16    Patient refused flu vaccine. Patient states she has headaches occur every day, wakes her up at night sometimes. Walking improves head aches. Patient unsure of last eye exam.     Chief Complaint   Patient presents with    Blood Pressure Check    Cholesterol Problem     follow up    Thyroid Problem     follow up    [facility] [address] Visit     Patient fasting at this time. 1. Have you been to the ER, urgent care clinic since your last visit? Hospitalized since your last visit? Yes Reason for visit: A month ago, HDH, elevated bp     2. Have you seen or consulted any other health care providers outside of the [address] [place] [address] since your last visit? Include any pap smears or colon screening. No    Health Maintenance Due   Topic Date Due    Hepatitis C Screening  1950    DTaP/Tdap/Td series (1 - Tdap) 02/25/1970    Shingrix Vaccine Age 50> (1 of 2) 02/25/1999    BREAST CANCER SCRN MAMMOGRAM  02/25/1999    FOBT Q 1 YEAR AGE 50-75  02/25/1999    GLAUCOMA SCREENING Q2Y  02/25/2014    Bone Densitometry (Dexa) Screening  02/25/2014    Pneumococcal 65+ years (1 of 2 - PCV13) 02/25/2014    MEDICARE YEARLY EXAM  05/23/2019     Abuse Screening Questionnaire 10/1/2019   Do you ever feel afraid of your partner? N   Are you in a relationship with someone who physically or mentally threatens you? N   Is it safe for you to go home? Y     3 most recent PHQ Screens 10/1/2019   Little interest or pleasure in doing things Not at all   Feeling down, depressed, irritable, or hopeless Not at all   Total Score PHQ 2 0     Fall Risk Assessment, last 12 mths 10/1/2019   Able to walk? Yes   Fall in past 12 months?  No   Fall with injury? -   Number of falls in past 12 months -   Fall Risk Score -       ADL Assessment 10/1/2019   Feeding yourself No Help Needed   Getting from bed to chair No Help Needed   Getting dressed No Help Needed   Bathing or showering No Help Needed   Walk across the room (includes cane/walker) No Help Needed   Using the telphone No Help Needed   Taking your medications No Help Needed   Preparing meals No Help Needed   Managing money (expenses/bills) No Help Needed   Moderately strenuous housework (laundry) No Help Needed   Shopping for personal items (toiletries/medicines) No Help Needed   Shopping for groceries No Help Needed   Driving No Help Needed   Climbing a flight of stairs No Help Needed   Getting to places beyond walking distances No Help Needed     Learning Assessment 5/22/2018   PRIMARY LEARNER Patient   HIGHEST LEVEL OF EDUCATION - PRIMARY LEARNER  GRADUATED HIGH SCHOOL OR GED   BARRIERS PRIMARY LEARNER COGNITIVE   PRIMARY LANGUAGE ENGLISH   LEARNER PREFERENCE PRIMARY PICTURES   ANSWERED BY Lila Salazar

## 2019-10-01 NOTE — PROGRESS NOTES
History of Present Illness:   Madeleine Duke is a 78 y.o. female here for evaluation:    Chief Complaint   Patient presents with    Blood Pressure Check    Cholesterol Problem     follow up    Thyroid Problem     follow up    [facility] Visit     Patient fasting at this time. Patient refused flu vaccine. Patient states she has headaches occur every day, wakes her up at night sometimes. Walking improves head aches. Patient unsure of last eye exam.     Reviewed unable to do AWV today due to other acute concerns needed. Here by herself. She has daughter who helps her. Her daughter has indicated to her that pt will be responsible for her transportation. She has covered transportation now through insurance. She notes continued speech problems. She has problems with expressive aphasia. She notes had more rapid speech prior but trouble finding words now. She is right-handed and cannot write as well as prior--since ~Jan 2019. She notes did MRI through ED [facility]. She was in ED for elev BP. This was 2wks ago. She was there for elevated BP. She thinks they changed her old meds. She is not aware of medication changes there. Nursing screenings reviewed by provider at visit. 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 Authorizing Provider   levothyroxine (SYNTHROID) 50 mcg tablet Take 1 Tab by mouth Daily (before breakfast). 3/27/19  Yes Ryan Rodriguez MD   nebivolol (BYSTOLIC) 20 mg tablet Take 1 Tab by mouth daily. 3/26/19  Yes Ryan Rodriguez MD   aspirin delayed-release (ADULT LOW DOSE ASPIRIN) 81 mg tablet Take 1 Tab by mouth daily.  3/26/19  Yes Ryan Rodriguez MD   butalbital-acetaminophen-caffeine (FIORICET, ESGIC) -40 mg per tablet Take 1 Tab by mouth every six (6) hours as needed for Headache. 3/26/19  Yes Fay Ta MD   topiramate (TOPAMAX) 50 mg tablet Take 1 Tab by mouth nightly. 3/26/19  Yes Fay Ta MD   omeprazole (PRILOSEC) 20 mg capsule Take 1 Cap by mouth daily. 3/26/19  Yes aFy Ta MD   traZODone (DESYREL) 50 mg tablet take 1 tablet by mouth NIGHTLY 12/5/18  Yes Italia Ward NP   cholecalciferol (VITAMIN D3) 1,000 unit tablet Take 1 Tab by mouth daily. 3/27/19   Fay Ta MD   pravastatin (PRAVACHOL) 20 mg tablet Take 1 Tab by mouth nightly. 3/26/19   Fay Ta MD   amLODIPine (NORVASC) 10 mg tablet Take 1 Tab by mouth daily. 3/26/19   Fay Ta MD   losartan (COZAAR) 100 mg tablet take 1 tablet by mouth once daily 2/27/18   Provider, Historical        ROS    Vitals:    10/01/19 1351   BP: 196/83   Pulse: 63   Resp: 16   Temp: 97.4 °F (36.3 °C)   TempSrc: Oral   SpO2: 96%   Weight: 158 lb 4 oz (71.8 kg)   Height: 4' 11.02\" (1.499 m)   PainSc:   0 - No pain      Body mass index is 31.94 kg/m². Physical Exam:     Physical Exam   Constitutional: She appears well-developed and well-nourished. No distress. HENT:   Head: Normocephalic and atraumatic. Face grossly intact--no droop. Eyes: Pupils are equal, round, and reactive to light. Conjunctivae are normal. Right eye exhibits no discharge. Left eye exhibits no discharge. No scleral icterus. Neck: Neck supple. Cardiovascular: Normal rate, regular rhythm, normal heart sounds and intact distal pulses. Exam reveals no gallop and no friction rub. No murmur heard. Pulmonary/Chest: Effort normal and breath sounds normal. No respiratory distress. She has no wheezes. She has no rales. She exhibits no tenderness. Abdominal: Soft. Bowel sounds are normal. She exhibits no distension. There is no tenderness. Musculoskeletal: She exhibits no edema or tenderness. Neurological: She is alert. She exhibits normal muscle tone.  Coordination normal.   RUE/hand weakness. Notes numbness right hand--chronic but slightly improved. No LE symptoms noted bilat. Expressive aphasia. Skin: Skin is warm. No rash noted. She is not diaphoretic. No erythema. No pallor. Psychiatric: She has a normal mood and affect. Her behavior is normal. Judgment and thought content normal.       Assessment and Plan:       ICD-10-CM ICD-9-CM    1. Essential hypertension I10 401.9 amLODIPine (NORVASC) 10 mg tablet   2. Numbness of right hand R20.0 782.0    3. Expressive aphasia R47.01 784.3        1. Re-starting medication reviewed at visit. 2.  Stable as reviewed above. 3.  Stable. Reviewed prior neuro evaluation recommendations with pt. Follow-up and Dispositions    · Return in about 1 week (around 10/8/2019) for Blood Pressure follow-up, fasting labs. lab results and schedule of future lab studies reviewed with patient  reviewed medications and side effects in detail  radiology results and schedule of future radiology studies reviewed with patient    For additional documentation of information and/or recommendations discussed this visit, please see notes in instructions. Plan and evaluation (above) reviewed with pt at visit  Patient voiced understanding of plan and provided with time to ask/review questions. After Visit Summary (AVS) provided to pt after visit with additional instructions as needed/reviewed. No future appointments.

## 2019-10-25 ENCOUNTER — PATIENT OUTREACH (OUTPATIENT)
Dept: INTERNAL MEDICINE CLINIC | Age: 69
End: 2019-10-25

## 2019-10-25 NOTE — PROGRESS NOTES
Left Ariel Cummings NP with [facility] a message to call me concerning patient. Placed a call to the transportation line with [facility]. Spoke to [place]. Patient has not used her transportation benefit at all this year. Patient needs to call 3 day prior to physician's visit to schedule her ride. Will call [facility] to see if patient has a CM first part of next week.

## 2019-10-28 ENCOUNTER — PATIENT OUTREACH (OUTPATIENT)
Dept: INTERNAL MEDICINE CLINIC | Age: 69
End: 2019-10-28

## 2019-10-28 NOTE — PROGRESS NOTES
Tried to reach out to [facility] concerning getting patient into case management. Was transferred several times and got a different number to try for Case Management. Was told patient will have to request these services herself. See it the [address] team could help.

## 2019-10-31 ENCOUNTER — DOCUMENTATION ONLY (OUTPATIENT)
Dept: INTERNAL MEDICINE CLINIC | Age: 69
End: 2019-10-31

## 2019-11-06 ENCOUNTER — PATIENT OUTREACH (OUTPATIENT)
Dept: INTERNAL MEDICINE CLINIC | Age: 69
End: 2019-11-06

## 2019-11-06 NOTE — PROGRESS NOTES
Spoke to daughter about patient missing appointment. Per daughter, patient did not tell her about the appointment on 10/8. I explained it was to follow up on her BP. Spoke about Medicaid transport and daughter was unsure on how to use it. Explained she needed to call the number on the back on insurance card 7 days prior to appointment to set up transport. Daughter went on to say patient's gas was cut off and her electric was about to be cut off. Per daughter she has called the gas company, [employer]  with the [place] [address]  and [employer]. Per daughter, she has gotten the run a round. Told daughter to call patient's [facility] Medicaid the number on the back of the care and ask for case management. Daughter said she would.

## 2019-11-10 ENCOUNTER — OFFICE VISIT (OUTPATIENT)
Dept: URGENT CARE | Age: 69
End: 2019-11-10

## 2019-11-10 VITALS
HEART RATE: 75 BPM | TEMPERATURE: 98.1 F | OXYGEN SATURATION: 97 % | WEIGHT: 158 LBS | DIASTOLIC BLOOD PRESSURE: 93 MMHG | SYSTOLIC BLOOD PRESSURE: 165 MMHG | RESPIRATION RATE: 18 BRPM | BODY MASS INDEX: 31.85 KG/M2 | HEIGHT: 59 IN

## 2019-11-10 DIAGNOSIS — M79.672 LEFT FOOT PAIN: Primary | ICD-10-CM

## 2019-11-10 NOTE — PATIENT INSTRUCTIONS
Rest and seek medical care for increased problems, any questions or concern including but not limited to the ones discussed with you here today. Consider trying tylenol as directed for pain relief and discussing other options for treatment with your primary care physician as the medications that would often be used in gout can affect your kidney function. If your pain or redness gets worse or you develop new symptoms, please, get immediate re-evaluation. Continue to take your medications as directed. Your blood pressure her was elevated     Gout: Care Instructions  Your Care Instructions    Gout is a form of arthritis caused by a buildup of uric acid crystals in a joint. It causes sudden attacks of pain, swelling, redness, and stiffness, usually in one joint, especially the big toe. Gout usually comes on without a cause. But it can be brought on by drinking alcohol (especially beer) or eating seafood and red meat. Taking certain medicines, such as diuretics or aspirin, also can bring on an attack of gout. Taking your medicines as prescribed and following up with your doctor regularly can help you avoid gout attacks in the future. Follow-up care is a key part of your treatment and safety. Be sure to make and go to all appointments, and call your doctor if you are having problems. It's also a good idea to know your test results and keep a list of the medicines you take. How can you care for yourself at home? · If the joint is swollen, put ice or a cold pack on the area for 10 to 20 minutes at a time. Put a thin cloth between the ice and your skin. · Prop up the sore limb on a pillow when you ice it or anytime you sit or lie down during the next 3 days. Try to keep it above the level of your heart. This will help reduce swelling. · Rest sore joints. Avoid activities that put weight or strain on the joints for a few days. Take short rest breaks from your regular activities during the day.   · Take your medicines exactly as prescribed. Call your doctor if you think you are having a problem with your medicine. · Take pain medicines exactly as directed. ? If the doctor gave you a prescription medicine for pain, take it as prescribed. ? If you are not taking a prescription pain medicine, ask your doctor if you can take an over-the-counter medicine. · Eat less seafood and red meat. · Check with your doctor before drinking alcohol. · Losing weight, if you are overweight, may help reduce attacks of gout. But do not go on a [employer]. \" Losing a lot of weight in a short amount of time can cause a gout attack. When should you call for help? Call your doctor now or seek immediate medical care if:    · You have a fever.     · The joint is so painful you cannot use it.     · You have sudden, unexplained swelling, redness, warmth, or severe pain in one or more joints.    Watch closely for changes in your health, and be sure to contact your doctor if:    · You have joint pain.     · Your symptoms get worse or are not improving after 2 or 3 days. Where can you learn more? Go to http://heike-darcie.info/. Enter S852 in the search box to learn more about \"Gout: Care Instructions. \"  Current as of: April 1, 2019  Content Version: 12.2  © 0652-5867 [employer]. Care instructions adapted under license by [employer] (which disclaims liability or warranty for this information). If you have questions about a medical condition or this instruction, always ask your healthcare professional. [place] [address] any warranty or liability for your use of this information.

## 2019-11-10 NOTE — PROGRESS NOTES
Some pain and redness and swelling noted to the her left great toe where it meets the foot for about 2 days. No injuries. No h/o this     The history is provided by the patient and a relative (daughter). Foot Pain   This is a new problem. The current episode started 2 days ago. The problem occurs constantly. The problem has not changed since onset. Pertinent negatives include no chest pain, no abdominal pain, no headaches and no shortness of breath. Exacerbated by: palpation and walking. Nothing relieves the symptoms. She has tried nothing for the symptoms.         Past Medical History:   Diagnosis Date    Cancer ([address]     ovaian stomach    CVA (cerebral vascular accident) ([address]     Heart failure ([address]     MI    Hypertension         Past Surgical History:   Procedure Laterality Date    ABDOMEN SURGERY PROC UNLISTED      cancer removal    HX GYN      hysterectomy    HX LUMBAR LAMINECTOMY  06/29/2016    L4-S1, Dr. Hany Hernandez         Family History   Problem Relation Age of Onset    Hypertension Mother     Hypertension Sister     Heart Disease Brother     Suicide Brother     Diabetes Brother     Diabetes Brother     Suicide Brother     Cancer Brother     Hypertension Sister     Hypertension Sister     Hypertension Sister     Hypertension Sister         Social History     Socioeconomic History    Marital status:      Spouse name: Not on file    Number of children: Not on file    Years of education: Not on file    Highest education level: Not on file   Occupational History    Not on file   Social Needs    Financial resource strain: Not on file    Food insecurity:     Worry: Not on file     Inability: Not on file    Transportation needs:     Medical: Not on file     Non-medical: Not on file   Tobacco Use    Smoking status: Never Smoker    Smokeless tobacco: Never Used   Substance and Sexual Activity    Alcohol use: Not Currently     Frequency: Never     Comment: occ    Drug use: No    Sexual activity: Not on file   Lifestyle    Physical activity:     Days per week: Not on file     Minutes per session: Not on file    Stress: Not on file   Relationships    Social connections:     Talks on phone: Not on file     Gets together: Not on file     Attends Mandaeism service: Not on file     Active member of club or organization: Not on file     Attends meetings of clubs or organizations: Not on file     Relationship status: Not on file    Intimate partner violence:     Fear of current or ex partner: Not on file     Emotionally abused: Not on file     Physically abused: Not on file     Forced sexual activity: Not on file   Other Topics Concern    Not on file   Social History Narrative    Not on file                ALLERGIES: Patient has no known allergies. Review of Systems   Constitutional: Negative. Respiratory: Negative for shortness of breath. Cardiovascular: Negative for chest pain. Gastrointestinal: Negative for abdominal pain. Musculoskeletal: Positive for gait problem (pain in her left great toe with walking ) and joint swelling (left MTP ). Skin: Positive for color change (erythema). Neurological: Negative for weakness, numbness and headaches. Vitals:    11/10/19 1549   BP: 179/80   Pulse: 75   Resp: 18   Temp: 98.1 °F (36.7 °C)   SpO2: 97%   Weight: 158 lb (71.7 kg)   Height: 4' 11\" (1.499 m)       Physical Exam   Constitutional: She is oriented to person, place, and time. She appears well-developed and well-nourished. No distress. HENT:   Head: Normocephalic and atraumatic. Mouth/Throat: Oropharynx is clear and moist. No oropharyngeal exudate. Eyes: Pupils are equal, round, and reactive to light. Conjunctivae and EOM are normal. Right eye exhibits no discharge. Left eye exhibits no discharge. No scleral icterus. Neck: Normal range of motion. No tracheal deviation present. No thyromegaly present.    Cardiovascular: Normal rate, regular rhythm and normal heart sounds. No murmur heard. Pulmonary/Chest: Effort normal and breath sounds normal. No respiratory distress. She has no wheezes. She has no rales. Abdominal: Soft. Bowel sounds are normal. She exhibits no distension. There is no tenderness. There is no rebound and no guarding. Musculoskeletal: Normal range of motion. She exhibits tenderness. She exhibits no edema. Left foot: There is tenderness and swelling (mild). There is normal range of motion, no bony tenderness, normal capillary refill, no crepitus, no deformity and no laceration. Feet:    Mild local blanchable erythema without crepitance or deformity. Rest of foot and heel are NT, No ankle pain. Pt able to ambulate and weight bear with slight limp   Lymphadenopathy:     She has no cervical adenopathy. Neurological: She is alert and oriented to person, place, and time. No cranial nerve deficit. Coordination normal.   NVI distal left great toe   Skin: Skin is warm. She is not diaphoretic. There is erythema (local at left MTP without lymphatic streaking. No signs of trauma. Slight increased warmth). Psychiatric: She has a normal mood and affect. Her behavior is normal. Judgment and thought content normal.   Nursing note and vitals reviewed. MDM     ICD-10-CM ICD-9-CM    1. Left foot pain M79.672 729.5 XR FOOT LT MIN 3 V    suspect gout     No orders of the defined types were placed in this encounter. The patients condition was discussed with the patient and they understand. The patient is to follow up with primary care doctor ,If signs and symptoms become worse the pt is to go to the ER. The patient is to take medications as prescribed.        Encouraged to try tylenol as she has tried nothing yet and encouraged to d/w PCP            Procedures

## 2019-12-09 ENCOUNTER — TELEPHONE (OUTPATIENT)
Dept: INTERNAL MEDICINE CLINIC | Age: 69
End: 2019-12-09

## 2019-12-09 NOTE — TELEPHONE ENCOUNTER
----- Message from [address] sent at 12/9/2019  1:23 PM EST -----  Regarding: Dr. Kaila Fernández first and last name: Myrtis Meigs      Reason for call: Pt reported that the power is off and would like to have something sent to [employer] to have her electricity turned back on.       Callback required yes/no and why: Yes      Best contact number(s): [phone]      Details to clarify the request: Pt is also requesting to be seen sometime this month for a f/up      Ellie James

## 2019-12-09 NOTE — TELEPHONE ENCOUNTER
Lvm for returned call. What medical reason is needed for the form to be completed?      Also, please schedule f/u apt upon request.

## 2020-01-21 DIAGNOSIS — I10 ESSENTIAL HYPERTENSION: ICD-10-CM

## 2020-01-21 RX ORDER — NEBIVOLOL HYDROCHLORIDE 20 MG/1
TABLET ORAL
Qty: 90 TAB | Refills: 0 | Status: SHIPPED | OUTPATIENT
Start: 2020-01-21 | End: 2020-05-02

## 2020-01-24 DIAGNOSIS — I10 ESSENTIAL HYPERTENSION: ICD-10-CM

## 2020-01-25 RX ORDER — NEBIVOLOL HYDROCHLORIDE 20 MG/1
TABLET ORAL
Qty: 90 TAB | Refills: 0 | OUTPATIENT
Start: 2020-01-25

## 2020-04-23 ENCOUNTER — HOSPITAL ENCOUNTER (INPATIENT)
Age: 70
LOS: 9 days | Discharge: NURSING, REHAB OR LONG-TERM CARE | DRG: 193 | End: 2020-05-02
Attending: EMERGENCY MEDICINE | Admitting: FAMILY MEDICINE
Payer: MEDICARE

## 2020-04-23 ENCOUNTER — APPOINTMENT (OUTPATIENT)
Dept: GENERAL RADIOLOGY | Age: 70
DRG: 193 | End: 2020-04-23
Attending: EMERGENCY MEDICINE
Payer: MEDICARE

## 2020-04-23 DIAGNOSIS — R05.9 COUGH: ICD-10-CM

## 2020-04-23 DIAGNOSIS — I48.91 ATRIAL FIBRILLATION WITH RAPID VENTRICULAR RESPONSE (HCC): ICD-10-CM

## 2020-04-23 DIAGNOSIS — N17.9 AKI (ACUTE KIDNEY INJURY) (HCC): ICD-10-CM

## 2020-04-23 DIAGNOSIS — R06.02 SOB (SHORTNESS OF BREATH): ICD-10-CM

## 2020-04-23 DIAGNOSIS — R51.9 NONINTRACTABLE HEADACHE, UNSPECIFIED CHRONICITY PATTERN, UNSPECIFIED HEADACHE TYPE: ICD-10-CM

## 2020-04-23 DIAGNOSIS — I10 ESSENTIAL HYPERTENSION: ICD-10-CM

## 2020-04-23 DIAGNOSIS — Z71.89 GOALS OF CARE, COUNSELING/DISCUSSION: ICD-10-CM

## 2020-04-23 DIAGNOSIS — J18.9 PNEUMONIA OF RIGHT LUNG DUE TO INFECTIOUS ORGANISM, UNSPECIFIED PART OF LUNG: Primary | ICD-10-CM

## 2020-04-23 DIAGNOSIS — R53.83 OTHER FATIGUE: ICD-10-CM

## 2020-04-23 LAB
ALBUMIN SERPL-MCNC: 3.7 G/DL (ref 3.5–5)
ALBUMIN/GLOB SERPL: 1 {RATIO} (ref 1.1–2.2)
ALP SERPL-CCNC: 105 U/L (ref 45–117)
ALT SERPL-CCNC: 21 U/L (ref 12–78)
ANION GAP SERPL CALC-SCNC: 9 MMOL/L (ref 5–15)
APTT PPP: 22.3 SEC (ref 22.1–32)
AST SERPL-CCNC: 11 U/L (ref 15–37)
BASOPHILS # BLD: 0.1 K/UL (ref 0–0.1)
BASOPHILS NFR BLD: 1 % (ref 0–1)
BILIRUB SERPL-MCNC: 0.7 MG/DL (ref 0.2–1)
BNP SERPL-MCNC: 6272 PG/ML
BUN SERPL-MCNC: 24 MG/DL (ref 6–20)
BUN/CREAT SERPL: 10 (ref 12–20)
CALCIUM SERPL-MCNC: 8.9 MG/DL (ref 8.5–10.1)
CHLORIDE SERPL-SCNC: 111 MMOL/L (ref 97–108)
CK SERPL-CCNC: 68 U/L (ref 26–192)
CO2 SERPL-SCNC: 20 MMOL/L (ref 21–32)
COMMENT, HOLDF: NORMAL
CREAT SERPL-MCNC: 2.31 MG/DL (ref 0.55–1.02)
D DIMER PPP FEU-MCNC: 2.82 MG/L FEU (ref 0–0.65)
DIFFERENTIAL METHOD BLD: ABNORMAL
EOSINOPHIL # BLD: 0.2 K/UL (ref 0–0.4)
EOSINOPHIL NFR BLD: 3 % (ref 0–7)
ERYTHROCYTE [DISTWIDTH] IN BLOOD BY AUTOMATED COUNT: 15.2 % (ref 11.5–14.5)
GLOBULIN SER CALC-MCNC: 3.7 G/DL (ref 2–4)
GLUCOSE SERPL-MCNC: 166 MG/DL (ref 65–100)
HCT VFR BLD AUTO: 35.8 % (ref 35–47)
HGB BLD-MCNC: 11 G/DL (ref 11.5–16)
IMM GRANULOCYTES # BLD AUTO: 0 K/UL (ref 0–0.04)
IMM GRANULOCYTES NFR BLD AUTO: 0 % (ref 0–0.5)
LACTATE SERPL-SCNC: 2.2 MMOL/L (ref 0.4–2)
LACTATE SERPL-SCNC: 2.2 MMOL/L (ref 0.4–2)
LDH SERPL L TO P-CCNC: 260 U/L (ref 81–246)
LYMPHOCYTES # BLD: 1 K/UL (ref 0.8–3.5)
LYMPHOCYTES NFR BLD: 14 % (ref 12–49)
MCH RBC QN AUTO: 29.2 PG (ref 26–34)
MCHC RBC AUTO-ENTMCNC: 30.7 G/DL (ref 30–36.5)
MCV RBC AUTO: 95 FL (ref 80–99)
MONOCYTES # BLD: 0.6 K/UL (ref 0–1)
MONOCYTES NFR BLD: 8 % (ref 5–13)
NEUTS SEG # BLD: 5.4 K/UL (ref 1.8–8)
NEUTS SEG NFR BLD: 74 % (ref 32–75)
NRBC # BLD: 0 K/UL (ref 0–0.01)
NRBC BLD-RTO: 0 PER 100 WBC
PLATELET # BLD AUTO: 297 K/UL (ref 150–400)
PMV BLD AUTO: 10.7 FL (ref 8.9–12.9)
POTASSIUM SERPL-SCNC: 4.3 MMOL/L (ref 3.5–5.1)
PROT SERPL-MCNC: 7.4 G/DL (ref 6.4–8.2)
RBC # BLD AUTO: 3.77 M/UL (ref 3.8–5.2)
SAMPLES BEING HELD,HOLD: NORMAL
SODIUM SERPL-SCNC: 140 MMOL/L (ref 136–145)
THERAPEUTIC RANGE,PTTT: NORMAL SECS (ref 58–77)
TROPONIN I SERPL-MCNC: <0.05 NG/ML
WBC # BLD AUTO: 7.3 K/UL (ref 3.6–11)

## 2020-04-23 PROCEDURE — 83605 ASSAY OF LACTIC ACID: CPT

## 2020-04-23 PROCEDURE — 80053 COMPREHEN METABOLIC PANEL: CPT

## 2020-04-23 PROCEDURE — 87635 SARS-COV-2 COVID-19 AMP PRB: CPT

## 2020-04-23 PROCEDURE — 99285 EMERGENCY DEPT VISIT HI MDM: CPT

## 2020-04-23 PROCEDURE — 71045 X-RAY EXAM CHEST 1 VIEW: CPT

## 2020-04-23 PROCEDURE — 74011000250 HC RX REV CODE- 250: Performed by: EMERGENCY MEDICINE

## 2020-04-23 PROCEDURE — 74011250636 HC RX REV CODE- 250/636: Performed by: EMERGENCY MEDICINE

## 2020-04-23 PROCEDURE — 93005 ELECTROCARDIOGRAM TRACING: CPT

## 2020-04-23 PROCEDURE — 74011250636 HC RX REV CODE- 250/636

## 2020-04-23 PROCEDURE — 96365 THER/PROPH/DIAG IV INF INIT: CPT

## 2020-04-23 PROCEDURE — 85379 FIBRIN DEGRADATION QUANT: CPT

## 2020-04-23 PROCEDURE — 96367 TX/PROPH/DG ADDL SEQ IV INF: CPT

## 2020-04-23 PROCEDURE — 82728 ASSAY OF FERRITIN: CPT

## 2020-04-23 PROCEDURE — 74011000258 HC RX REV CODE- 258: Performed by: EMERGENCY MEDICINE

## 2020-04-23 PROCEDURE — 96375 TX/PRO/DX INJ NEW DRUG ADDON: CPT

## 2020-04-23 PROCEDURE — 83880 ASSAY OF NATRIURETIC PEPTIDE: CPT

## 2020-04-23 PROCEDURE — 0100U RESPIRATORY PANEL,PCR,NASOPHARYNGEAL: CPT

## 2020-04-23 PROCEDURE — 85730 THROMBOPLASTIN TIME PARTIAL: CPT

## 2020-04-23 PROCEDURE — 65660000000 HC RM CCU STEPDOWN

## 2020-04-23 PROCEDURE — 86140 C-REACTIVE PROTEIN: CPT

## 2020-04-23 PROCEDURE — 36415 COLL VENOUS BLD VENIPUNCTURE: CPT

## 2020-04-23 PROCEDURE — 84484 ASSAY OF TROPONIN QUANT: CPT

## 2020-04-23 PROCEDURE — 74011250637 HC RX REV CODE- 250/637: Performed by: FAMILY MEDICINE

## 2020-04-23 PROCEDURE — 85025 COMPLETE CBC W/AUTO DIFF WBC: CPT

## 2020-04-23 PROCEDURE — 83615 LACTATE (LD) (LDH) ENZYME: CPT

## 2020-04-23 PROCEDURE — 74011250636 HC RX REV CODE- 250/636: Performed by: FAMILY MEDICINE

## 2020-04-23 PROCEDURE — 82550 ASSAY OF CK (CPK): CPT

## 2020-04-23 RX ORDER — LEVOTHYROXINE SODIUM 50 UG/1
50 TABLET ORAL
Status: DISCONTINUED | OUTPATIENT
Start: 2020-04-24 | End: 2020-05-02 | Stop reason: HOSPADM

## 2020-04-23 RX ORDER — PANTOPRAZOLE SODIUM 40 MG/10ML
40 INJECTION, POWDER, LYOPHILIZED, FOR SOLUTION INTRAVENOUS EVERY 24 HOURS
Status: DISCONTINUED | OUTPATIENT
Start: 2020-04-23 | End: 2020-04-23

## 2020-04-23 RX ORDER — SODIUM CHLORIDE 0.9 % (FLUSH) 0.9 %
5-40 SYRINGE (ML) INJECTION EVERY 8 HOURS
Status: DISCONTINUED | OUTPATIENT
Start: 2020-04-23 | End: 2020-05-02 | Stop reason: HOSPADM

## 2020-04-23 RX ORDER — HEPARIN SODIUM 5000 [USP'U]/ML
80 INJECTION, SOLUTION INTRAVENOUS; SUBCUTANEOUS AS NEEDED
Status: DISCONTINUED | OUTPATIENT
Start: 2020-04-23 | End: 2020-04-24

## 2020-04-23 RX ORDER — ONDANSETRON 2 MG/ML
INJECTION INTRAMUSCULAR; INTRAVENOUS
Status: COMPLETED
Start: 2020-04-23 | End: 2020-04-23

## 2020-04-23 RX ORDER — PRAVASTATIN SODIUM 10 MG/1
20 TABLET ORAL
Status: DISCONTINUED | OUTPATIENT
Start: 2020-04-23 | End: 2020-05-02 | Stop reason: HOSPADM

## 2020-04-23 RX ORDER — NEBIVOLOL 10 MG/1
20 TABLET ORAL DAILY
Status: DISCONTINUED | OUTPATIENT
Start: 2020-04-24 | End: 2020-04-26

## 2020-04-23 RX ORDER — SODIUM CHLORIDE 0.9 % (FLUSH) 0.9 %
5-40 SYRINGE (ML) INJECTION AS NEEDED
Status: DISCONTINUED | OUTPATIENT
Start: 2020-04-23 | End: 2020-05-02 | Stop reason: HOSPADM

## 2020-04-23 RX ORDER — HEPARIN SODIUM 5000 [USP'U]/ML
40 INJECTION, SOLUTION INTRAVENOUS; SUBCUTANEOUS AS NEEDED
Status: DISCONTINUED | OUTPATIENT
Start: 2020-04-23 | End: 2020-04-24

## 2020-04-23 RX ORDER — ACETAMINOPHEN 650 MG/1
650 SUPPOSITORY RECTAL
Status: DISCONTINUED | OUTPATIENT
Start: 2020-04-23 | End: 2020-05-02 | Stop reason: HOSPADM

## 2020-04-23 RX ORDER — LANOLIN ALCOHOL/MO/W.PET/CERES
3 CREAM (GRAM) TOPICAL
Status: DISCONTINUED | OUTPATIENT
Start: 2020-04-23 | End: 2020-05-02 | Stop reason: HOSPADM

## 2020-04-23 RX ORDER — TRAZODONE HYDROCHLORIDE 50 MG/1
50 TABLET ORAL
Status: DISCONTINUED | OUTPATIENT
Start: 2020-04-23 | End: 2020-05-02 | Stop reason: HOSPADM

## 2020-04-23 RX ORDER — SODIUM CHLORIDE 9 MG/ML
100 INJECTION, SOLUTION INTRAVENOUS CONTINUOUS
Status: DISPENSED | OUTPATIENT
Start: 2020-04-23 | End: 2020-04-25

## 2020-04-23 RX ORDER — AMLODIPINE BESYLATE 5 MG/1
10 TABLET ORAL DAILY
Status: DISCONTINUED | OUTPATIENT
Start: 2020-04-24 | End: 2020-04-24

## 2020-04-23 RX ORDER — DILTIAZEM HYDROCHLORIDE 5 MG/ML
10 INJECTION INTRAVENOUS
Status: COMPLETED | OUTPATIENT
Start: 2020-04-23 | End: 2020-04-23

## 2020-04-23 RX ORDER — ASPIRIN 81 MG/1
81 TABLET ORAL DAILY
Status: DISCONTINUED | OUTPATIENT
Start: 2020-04-24 | End: 2020-05-02 | Stop reason: HOSPADM

## 2020-04-23 RX ORDER — HEPARIN SODIUM 10000 [USP'U]/100ML
12-25 INJECTION, SOLUTION INTRAVENOUS
Status: DISCONTINUED | OUTPATIENT
Start: 2020-04-23 | End: 2020-04-24

## 2020-04-23 RX ORDER — BUTALBITAL, ACETAMINOPHEN AND CAFFEINE 50; 325; 40 MG/1; MG/1; MG/1
1 TABLET ORAL
Status: DISCONTINUED | OUTPATIENT
Start: 2020-04-23 | End: 2020-05-02 | Stop reason: HOSPADM

## 2020-04-23 RX ORDER — MELATONIN
1000 DAILY
Status: DISCONTINUED | OUTPATIENT
Start: 2020-04-24 | End: 2020-05-02 | Stop reason: HOSPADM

## 2020-04-23 RX ORDER — GUAIFENESIN/DEXTROMETHORPHAN 100-10MG/5
10 SYRUP ORAL
Status: DISCONTINUED | OUTPATIENT
Start: 2020-04-23 | End: 2020-05-02 | Stop reason: HOSPADM

## 2020-04-23 RX ORDER — ONDANSETRON 2 MG/ML
4 INJECTION INTRAMUSCULAR; INTRAVENOUS
Status: COMPLETED | OUTPATIENT
Start: 2020-04-23 | End: 2020-04-23

## 2020-04-23 RX ORDER — HEPARIN SODIUM 5000 [USP'U]/ML
4000 INJECTION, SOLUTION INTRAVENOUS; SUBCUTANEOUS ONCE
Status: COMPLETED | OUTPATIENT
Start: 2020-04-23 | End: 2020-04-23

## 2020-04-23 RX ORDER — TOPIRAMATE 25 MG/1
50 TABLET ORAL
Status: DISCONTINUED | OUTPATIENT
Start: 2020-04-23 | End: 2020-05-02 | Stop reason: HOSPADM

## 2020-04-23 RX ORDER — ACETAMINOPHEN 325 MG/1
650 TABLET ORAL
Status: DISCONTINUED | OUTPATIENT
Start: 2020-04-23 | End: 2020-05-02 | Stop reason: HOSPADM

## 2020-04-23 RX ADMIN — AZITHROMYCIN MONOHYDRATE 500 MG: 500 INJECTION, POWDER, LYOPHILIZED, FOR SOLUTION INTRAVENOUS at 20:34

## 2020-04-23 RX ADMIN — ONDANSETRON 4 MG: 2 INJECTION INTRAMUSCULAR; INTRAVENOUS at 21:15

## 2020-04-23 RX ADMIN — Medication 10 ML: at 23:30

## 2020-04-23 RX ADMIN — TRAZODONE HYDROCHLORIDE 50 MG: 50 TABLET ORAL at 22:48

## 2020-04-23 RX ADMIN — HEPARIN SODIUM 4000 UNITS: 5000 INJECTION INTRAVENOUS; SUBCUTANEOUS at 22:43

## 2020-04-23 RX ADMIN — DILTIAZEM HYDROCHLORIDE 10 MG: 5 INJECTION INTRAVENOUS at 18:51

## 2020-04-23 RX ADMIN — HEPARIN SODIUM 12 UNITS/KG/HR: 10000 INJECTION, SOLUTION INTRAVENOUS at 22:44

## 2020-04-23 RX ADMIN — TOPIRAMATE 50 MG: 25 TABLET, FILM COATED ORAL at 22:48

## 2020-04-23 RX ADMIN — PRAVASTATIN SODIUM 20 MG: 40 TABLET ORAL at 22:52

## 2020-04-23 RX ADMIN — CEFTRIAXONE 1 G: 1 INJECTION, POWDER, FOR SOLUTION INTRAMUSCULAR; INTRAVENOUS at 20:32

## 2020-04-23 RX ADMIN — DEXTROSE MONOHYDRATE 2.5 MG/HR: 50 INJECTION, SOLUTION INTRAVENOUS at 21:11

## 2020-04-23 RX ADMIN — SODIUM CHLORIDE 75 ML/HR: 900 INJECTION, SOLUTION INTRAVENOUS at 22:41

## 2020-04-23 NOTE — ED NOTES
Bedside and Verbal shift change report given to Xavi Griffith RN (oncoming nurse) by Lien Elder RN (offgoing nurse). Report included the following information SBAR, Kardex, ED Summary, Intake/Output, MAR and Recent Results     CXR at bedside.

## 2020-04-23 NOTE — ED TRIAGE NOTES
Pt arrives by medic, has been sob for about three weeks, pt has a barking cough, and labored breathing pt was sating at 96 on room when medics picked her up was placed on 6 L non rebreather

## 2020-04-24 ENCOUNTER — APPOINTMENT (OUTPATIENT)
Dept: NON INVASIVE DIAGNOSTICS | Age: 70
DRG: 193 | End: 2020-04-24
Attending: FAMILY MEDICINE
Payer: MEDICARE

## 2020-04-24 LAB
ALBUMIN SERPL-MCNC: 3.3 G/DL (ref 3.5–5)
ALBUMIN/GLOB SERPL: 1 {RATIO} (ref 1.1–2.2)
ALP SERPL-CCNC: 92 U/L (ref 45–117)
ALT SERPL-CCNC: 17 U/L (ref 12–78)
ANION GAP SERPL CALC-SCNC: 9 MMOL/L (ref 5–15)
APTT PPP: 88.4 SEC (ref 22.1–32)
AST SERPL-CCNC: 12 U/L (ref 15–37)
ATRIAL RATE: 119 BPM
ATRIAL RATE: 83 BPM
B PERT DNA SPEC QL NAA+PROBE: NOT DETECTED
BASOPHILS # BLD: 0.1 K/UL (ref 0–0.1)
BASOPHILS NFR BLD: 1 % (ref 0–1)
BILIRUB SERPL-MCNC: 0.4 MG/DL (ref 0.2–1)
BORDETELLA PARAPERTUSSIS PCR, BORPAR: NOT DETECTED
BUN SERPL-MCNC: 26 MG/DL (ref 6–20)
BUN/CREAT SERPL: 12 (ref 12–20)
C PNEUM DNA SPEC QL NAA+PROBE: NOT DETECTED
CALCIUM SERPL-MCNC: 8.4 MG/DL (ref 8.5–10.1)
CALCULATED R AXIS, ECG10: 11 DEGREES
CALCULATED R AXIS, ECG10: 11 DEGREES
CALCULATED T AXIS, ECG11: -52 DEGREES
CALCULATED T AXIS, ECG11: -93 DEGREES
CHLORIDE SERPL-SCNC: 114 MMOL/L (ref 97–108)
CO2 SERPL-SCNC: 20 MMOL/L (ref 21–32)
CREAT SERPL-MCNC: 2.22 MG/DL (ref 0.55–1.02)
CRP SERPL-MCNC: 1.28 MG/DL (ref 0–0.6)
DIAGNOSIS, 93000: NORMAL
DIAGNOSIS, 93000: NORMAL
DIFFERENTIAL METHOD BLD: ABNORMAL
EOSINOPHIL # BLD: 0 K/UL (ref 0–0.4)
EOSINOPHIL NFR BLD: 0 % (ref 0–7)
ERYTHROCYTE [DISTWIDTH] IN BLOOD BY AUTOMATED COUNT: 15.2 % (ref 11.5–14.5)
FERRITIN SERPL-MCNC: 30 NG/ML (ref 26–388)
FLUAV H1 2009 PAND RNA SPEC QL NAA+PROBE: NOT DETECTED
FLUAV H1 RNA SPEC QL NAA+PROBE: NOT DETECTED
FLUAV H3 RNA SPEC QL NAA+PROBE: NOT DETECTED
FLUAV SUBTYP SPEC NAA+PROBE: NOT DETECTED
FLUBV RNA SPEC QL NAA+PROBE: NOT DETECTED
GLOBULIN SER CALC-MCNC: 3.3 G/DL (ref 2–4)
GLUCOSE SERPL-MCNC: 90 MG/DL (ref 65–100)
HADV DNA SPEC QL NAA+PROBE: NOT DETECTED
HCOV 229E RNA SPEC QL NAA+PROBE: NOT DETECTED
HCOV HKU1 RNA SPEC QL NAA+PROBE: NOT DETECTED
HCOV NL63 RNA SPEC QL NAA+PROBE: NOT DETECTED
HCOV OC43 RNA SPEC QL NAA+PROBE: NOT DETECTED
HCT VFR BLD AUTO: 30.4 % (ref 35–47)
HGB BLD-MCNC: 9.5 G/DL (ref 11.5–16)
HMPV RNA SPEC QL NAA+PROBE: NOT DETECTED
HPIV1 RNA SPEC QL NAA+PROBE: NOT DETECTED
HPIV2 RNA SPEC QL NAA+PROBE: NOT DETECTED
HPIV3 RNA SPEC QL NAA+PROBE: NOT DETECTED
HPIV4 RNA SPEC QL NAA+PROBE: NOT DETECTED
IMM GRANULOCYTES # BLD AUTO: 0.1 K/UL (ref 0–0.04)
IMM GRANULOCYTES NFR BLD AUTO: 1 % (ref 0–0.5)
LACTATE SERPL-SCNC: 1.7 MMOL/L (ref 0.4–2)
LYMPHOCYTES # BLD: 1.1 K/UL (ref 0.8–3.5)
LYMPHOCYTES NFR BLD: 13 % (ref 12–49)
M PNEUMO DNA SPEC QL NAA+PROBE: NOT DETECTED
MAGNESIUM SERPL-MCNC: 2 MG/DL (ref 1.6–2.4)
MCH RBC QN AUTO: 29.4 PG (ref 26–34)
MCHC RBC AUTO-ENTMCNC: 31.3 G/DL (ref 30–36.5)
MCV RBC AUTO: 94.1 FL (ref 80–99)
MONOCYTES # BLD: 0.7 K/UL (ref 0–1)
MONOCYTES NFR BLD: 8 % (ref 5–13)
NEUTS SEG # BLD: 6.6 K/UL (ref 1.8–8)
NEUTS SEG NFR BLD: 77 % (ref 32–75)
NRBC # BLD: 0 K/UL (ref 0–0.01)
NRBC BLD-RTO: 0 PER 100 WBC
PLATELET # BLD AUTO: 253 K/UL (ref 150–400)
PMV BLD AUTO: 10.5 FL (ref 8.9–12.9)
POTASSIUM SERPL-SCNC: 4.9 MMOL/L (ref 3.5–5.1)
PROT SERPL-MCNC: 6.6 G/DL (ref 6.4–8.2)
Q-T INTERVAL, ECG07: 314 MS
Q-T INTERVAL, ECG07: 346 MS
QRS DURATION, ECG06: 64 MS
QRS DURATION, ECG06: 68 MS
QTC CALCULATION (BEZET), ECG08: 446 MS
QTC CALCULATION (BEZET), ECG08: 465 MS
RBC # BLD AUTO: 3.23 M/UL (ref 3.8–5.2)
RSV RNA SPEC QL NAA+PROBE: NOT DETECTED
RV+EV RNA SPEC QL NAA+PROBE: NOT DETECTED
SODIUM SERPL-SCNC: 143 MMOL/L (ref 136–145)
THERAPEUTIC RANGE,PTTT: ABNORMAL SECS (ref 58–77)
VENTRICULAR RATE, ECG03: 100 BPM
VENTRICULAR RATE, ECG03: 132 BPM
WBC # BLD AUTO: 8.5 K/UL (ref 3.6–11)

## 2020-04-24 PROCEDURE — 85025 COMPLETE CBC W/AUTO DIFF WBC: CPT

## 2020-04-24 PROCEDURE — 65660000000 HC RM CCU STEPDOWN

## 2020-04-24 PROCEDURE — 36415 COLL VENOUS BLD VENIPUNCTURE: CPT

## 2020-04-24 PROCEDURE — 83735 ASSAY OF MAGNESIUM: CPT

## 2020-04-24 PROCEDURE — C9113 INJ PANTOPRAZOLE SODIUM, VIA: HCPCS | Performed by: FAMILY MEDICINE

## 2020-04-24 PROCEDURE — 74011250637 HC RX REV CODE- 250/637: Performed by: FAMILY MEDICINE

## 2020-04-24 PROCEDURE — 74011000250 HC RX REV CODE- 250: Performed by: FAMILY MEDICINE

## 2020-04-24 PROCEDURE — 93306 TTE W/DOPPLER COMPLETE: CPT

## 2020-04-24 PROCEDURE — 74011250636 HC RX REV CODE- 250/636: Performed by: FAMILY MEDICINE

## 2020-04-24 PROCEDURE — 85730 THROMBOPLASTIN TIME PARTIAL: CPT

## 2020-04-24 PROCEDURE — 80053 COMPREHEN METABOLIC PANEL: CPT

## 2020-04-24 PROCEDURE — 74011250636 HC RX REV CODE- 250/636: Performed by: INTERNAL MEDICINE

## 2020-04-24 PROCEDURE — 83605 ASSAY OF LACTIC ACID: CPT

## 2020-04-24 PROCEDURE — 74011000258 HC RX REV CODE- 258: Performed by: FAMILY MEDICINE

## 2020-04-24 PROCEDURE — 77010033678 HC OXYGEN DAILY

## 2020-04-24 RX ORDER — DILTIAZEM HYDROCHLORIDE 60 MG/1
60 TABLET, FILM COATED ORAL
Status: DISCONTINUED | OUTPATIENT
Start: 2020-04-24 | End: 2020-04-26

## 2020-04-24 RX ORDER — ENOXAPARIN SODIUM 100 MG/ML
1 INJECTION SUBCUTANEOUS EVERY 24 HOURS
Status: DISCONTINUED | OUTPATIENT
Start: 2020-04-24 | End: 2020-04-28

## 2020-04-24 RX ADMIN — Medication 10 ML: at 21:00

## 2020-04-24 RX ADMIN — ASPIRIN 81 MG: 81 TABLET ORAL at 09:12

## 2020-04-24 RX ADMIN — TOPIRAMATE 50 MG: 25 TABLET, FILM COATED ORAL at 21:00

## 2020-04-24 RX ADMIN — Medication 10 ML: at 06:00

## 2020-04-24 RX ADMIN — TRAZODONE HYDROCHLORIDE 50 MG: 50 TABLET ORAL at 21:00

## 2020-04-24 RX ADMIN — CEFTRIAXONE 1 G: 1 INJECTION, POWDER, FOR SOLUTION INTRAMUSCULAR; INTRAVENOUS at 21:00

## 2020-04-24 RX ADMIN — SODIUM CHLORIDE 75 ML/HR: 900 INJECTION, SOLUTION INTRAVENOUS at 05:51

## 2020-04-24 RX ADMIN — PRAVASTATIN SODIUM 20 MG: 40 TABLET ORAL at 21:00

## 2020-04-24 RX ADMIN — LEVOTHYROXINE SODIUM 50 MCG: 0.05 TABLET ORAL at 09:12

## 2020-04-24 RX ADMIN — SODIUM CHLORIDE 40 MG: 9 INJECTION INTRAMUSCULAR; INTRAVENOUS; SUBCUTANEOUS at 09:13

## 2020-04-24 RX ADMIN — AZITHROMYCIN DIHYDRATE 500 MG: 500 INJECTION, POWDER, LYOPHILIZED, FOR SOLUTION INTRAVENOUS at 20:00

## 2020-04-24 RX ADMIN — ENOXAPARIN SODIUM 70 MG: 100 INJECTION SUBCUTANEOUS at 12:01

## 2020-04-24 RX ADMIN — AMLODIPINE BESYLATE 10 MG: 5 TABLET ORAL at 09:12

## 2020-04-24 RX ADMIN — MELATONIN 1 TABLET: at 09:12

## 2020-04-24 NOTE — CONSULTS
[facility]    Name:  Amanda Car  MR#:  [id number]  :  1950  ACCOUNT #:  [de-identified]  DATE OF SERVICE:  2020      REQUESTING PHYSICIAN:  Mi Miller MD    REASON FOR CONSULTATION:  Evaluate AFib. CHIEF COMPLAINT:  Shortness of breath and cough. HISTORY OF PRESENT ILLNESS:  The patient is a 76-year-old female with a history of hypertension, dyslipidemia and an apparent history of coronary artery disease and prior myocardial infarction. We do not have prior records. The patient also has a history of a prior CVA and multiple other medical issues. She was admitted after she presented yesterday to the [facility] ER with complaints of shortness of breath, cough, low-grade fever as well as nausea, vomiting and diarrhea. Apparently, the patient has been quite weak and has been unable to get up or move around much for the last few days with persistent nausea, vomiting and diarrhea. As noted above, she has also had a fever and cough. She came to the [facility] ER finally and was noted to be in rapid AFib. Her chest x-ray did show evidence of an infiltrate and she was admitted for evaluation and treatment. She is currently being ruled out for COVID-19 and the results are pending. Influenza check was negative. She was initially placed on IV diltiazem for her AFib, but this was stopped overnight, when the patient became hypotensive. An echocardiogram has been ordered and the results are pending. The patient has remained in atrial fibrillation and initial cardiac enzymes were negative. PAST MEDICAL HISTORY:  As noted above. Also apparent chronic kidney disease with prior creatinine around 2.0, hypothyroidism, prior CVA 4 years ago, ovarian cancer. SURGERIES:  Status post resection of ovarian cancer, status post hysterectomy, status post lumbar laminectomy.     CURRENT MEDICATIONS:  IV heparin, Norvasc, Bystolic, aspirin, Zithromax, L-thyroxine, Topamax, trazodone, vitamin D3, Protonix. SOCIAL HISTORY:  The patient does not smoke or abuse alcohol. FAMILY HISTORY:  The patient's mother had hypertension. REVIEW OF SYSTEMS:  As noted above. PHYSICAL EXAMINATION:  Exam was deferred because of COVID-19 rule out and the findings below were taken from the admitting physician's exam.  VITAL SIGNS:  Blood pressure 119/72, pulse 100, respirations 22, temperature 98.3. That is her current vital signs. GENERAL:  The patient appeared well-developed and well-nourished. HEENT:  Unremarkable. No obvious trauma. CHEST:  Diminished breath sounds. No wheezing. CARDIAC:  Irregularly irregular rhythm. No apparent gallops or murmurs. ABDOMEN:  Soft, nontender, nondistended. Bowel sounds positive. No obvious organomegaly. EXTREMITIES:  No edema. SKIN:  No rashes or ulcers. NEURO:  Unremarkable. PSYCHIATRIC:  Oriented to person, place and time. LABORATORIES ON ADMISSION:  Troponin negative. Hemoglobin 9.5, BUN 26, creatinine 2.2, repeat was 1.7. Creatinine 2.2, lactic acid 2.2, repeat was 1.7. ProBNP 6272. EKG atrial fibrillation, rate at 100 beats per minute. Nonspecific ST-T changes. Chest x-ray on admission showed right middle lobe infiltrate. IMPRESSION:  1. Atrial fibrillation of uncertain duration. 2.  History of coronary artery disease with apparent prior myocardial infarction. 3.  Hypertension. 4.  Chronic renal insufficiency. 5.  Cough and fever, rule out COVID-19 infection. 6.  Nausea and vomiting, probably associated with acute infection. 7.  Chronic anemia. 8.  Prior history of cerebrovascular accident with expressive aphasia. 9.  History of ovarian cancer status post surgery. RECOMMENDATIONS:  The patient's heart rate appears reasonably controlled although she remains in AFib. She is off IV diltiazem. I would try to control the heart rate with oral medications and can add oral diltiazem as needed.   In addition, we will stop IV heparin and start the patient on Lovenox, which will be easier to administer. We will get an echocardiogram and we also need to obtain prior records or information about the patient's cardiac history. I have tried to call the patient's daughter, but at this point, I have been unable to get in touch with her. Further recommendations will follow.         Pooja Trinidad MD      [id number]  D:  04/24/2020 10:30  T:  04/24/2020 11:46  JOB #:  [id number]  CC:  Gilford Lime, MD Prudencio Devonshire, MD

## 2020-04-24 NOTE — PROGRESS NOTES
Patient transferred from ER to room 2244. Patient remains on O2 at 5L with dyspnea upon excertion. Three drips, Heparin/Cardizem/Fluids. Patient placed on monitor in room with satisfactory Artelia Rast in place. Skin Swarm performed with Santo Meléndez, with no issues found.

## 2020-04-24 NOTE — ED NOTES
TRANSFER - OUT REPORT:    Verbal report given to Fanta Madrid RN (name) on Cristin Rolle  being transferred to PCU (unit) for routine progression of care       Report consisted of patients Situation, Background, Assessment and   Recommendations(SBAR). Information from the following report(s) SBAR, Kardex, ED Summary, MAR, Recent Results and Cardiac Rhythm Afib RVR was reviewed with the receiving nurse. Lines:   Peripheral IV 04/23/20 Left Antecubital (Active)   Site Assessment Clean, dry, & intact 4/23/2020  6:40 PM   Phlebitis Assessment 0 4/23/2020  6:40 PM   Infiltration Assessment 0 4/23/2020  6:40 PM       Peripheral IV 04/23/20 Right Antecubital (Active)   Site Assessment Clean, dry, & intact 4/23/2020  6:40 PM   Phlebitis Assessment 0 4/23/2020  6:40 PM   Infiltration Assessment 0 4/23/2020  6:40 PM        Opportunity for questions and clarification was provided.       Patient transported with:   Monitor  O2 @ 5 liters  Registered Nurse  [facility]

## 2020-04-24 NOTE — ROUTINE PROCESS
Patients SBP in 80s, HR in low 70s. Put the dilt gtt on hold, back flushed the PIV with return. Notified dayshift RN of changes in report.

## 2020-04-24 NOTE — CONSULTS
Pt seen and examined. Full consult dictated. Remains in afib but with controlled rate. Would switch IV heparin to Lovenox. Duration of afib unclear. Prior Hx of CAD, MI but we have no records. Will try and obtain further info.

## 2020-04-24 NOTE — CONSULTS
[facility] INTENSIVIST Consult Service Note  Pulmonary, Critical Care, and Sleep Medicine    Name: Altaf Bess MRN: [id number]   : 1950 Hospital: [address] [facility]   Date: 2020  Admission date: 2020 Hospital Day: 2       Subjective/Interval History:   Seen earlier today on rounds. Pt is very unstable, unpredictable and acutely ill. Patient was re-evaluated multiple times with repeated discussions with team throughout the day    IMPRESSION:   1. Acute Respiratory Failure with Hypoxia,   2. COVID 19 suspect; results pending: date of testing ; risk factors? 3. Sepsis and at risk for Cytokine Release Syndrome  4. Hypotension on arrival  5. Possible COVID associated gastroenteritis- Diarrhea past two days and rthat prompted her to come in and had emesis, slept onbathroom floor fot two days for convenience. Pt would not let them in the house. No phone, no food. 6. CAP- Right Middle lobe on CXR;  IV Rocephin,azithromycin   7. CHF, Chronic   8. H/o CVA 2016 right side and fully recovered   9. AFIB RVR d/w Dr. Abby Martínez   10. ROJELIO IVF hydration needed/ CKD  11. Body mass index is 32.64 kg/m². 12. Additional workup outlined below  13. Multiorgan dysfunction as outlined above: Pt has one or more acute or chronic illnesses with severe exacerbation with progression or side effects of treatment that poses a threat to life or bodily function  14. Pt is unstable, unpredictable needing more inpatient monitoring; at high risk of sudden decline and decompensation with life threatening consequenses and continued end organ dysfunction and failure      RECOMMENDATIONS/PLAN:   1. Please make sure pt always has two good PIV if possible   2. ABx per protocol; azithromycin, ceftriaxone   3. Sputum culture  4. Hold BP meds  5. Gentle rehydration  6. O2 to keep sats > 93% but if gas exchange worsens must be prepared for abrupt decline in 12 -24 hours  7.  If covid positive, follow inflammatory markers carefully including DDimer, Ferritin, CK, CRP, LD, IL 6 level   8. Consider HIV testing if unknown status   9. IV Fluids if pt has had GI sx with close watch of fluid status and avoid volume overload  10. If pt deteriorates and develops need for more oxygen, pt should receive SCTocilizumab but quantities very limited - please call to discuss  11. Awake and sleep proning as much as tolerable 12-18 hours/ 24 hours  12. Incentive spirometer   13. Empower and encourage pt to cover mouth and muffle cough and work with nurses to mitigate droplet or aerosol production by giving pt something to cough into( gowns have no sleeves)   14. No systemic steroids   15. No aerosolization producing procedures or devices unless in negative pressure room and even then only with select patients  16. Inhalers- bronchodilators if needed   17. NPO if intubation anticipated   18. Supplemental O2 for refractory hypoxia to keep sats > 93%  19. Low threshold to Intubate and place on vent if gas exchange declines;    20. Agree with Empiric IV antibiotics pending culture results   21. Follow culture results   22. Replete electrolytes  23. Probiotics   24. Antitussives   25. Antiemetics prn  26. Nutritional supplements at a minimum  27. Minimize imaging, procedures that may exposure others to infections  28. Labs to follow electrolytes, renal function and and blood counts   29. Prescription drug management with home med reconciliation reviewed  30. DVT prophylaxis   31. pt counseling with enduring education about protection, public health safety topics   28. Clarify goals of care independent of COVID status and consider enlisting help from palliative care team; families are scared        Subjective/Initial History:   I have reviewed the flowsheet and previous days notes. Seen earlier today on rounds. I was asked by Terra Martel MD to see Cristin Rolle a 69 y.o.    female  in consultation for a chief complaint of peenumonia, hypotension and rspiratory failurein a     Excerpts from admission 4/23/2020 and consult notes reviewed as follows:     \"Ms. Michelle Rodríguez is a 69 y.o.  female who is admitted with AFIB RVR. Ms. Michelle Rodríguez presented to the Emergency Department today complaining of shortness of breath, coughing, has phlegm, also nausea,vomiting,diarrhea, worsening breathing for the past three weeks. Patient denies any chest pain or abdominal pain. Patient had low grade fever at home, generalized weakness, fatigue. Has sick contacts. No swelling of her legs. Patient has a hx of CVA, overall a poor historian. Patient admitted for pneumonia, AFIB RVR, cardizem drip, IV abx. \"     Pt would not let family in for fear of them catching something. Past two days, she slept in her bathroom because diarrhea was significant and she had a bout of emesis as well. No blood. Remote CVA four yrs ago. Never smoked. Never lung problems until this. Sputum has been very thick. Pale. Feels better on oxygen. Otheriwse lived alone. 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 Right side weakness fully recoverred. Travel Screening       Question Response     Do you have any of the following symptoms? Cough     In the last month, have you been in contact with someone who was confirmed or suspected to have Coronavirus / COVID-19? No / Unsure     Have you traveled internationally in the last month? No      Travel History   Travel since 03/24/20     No documented travel since 03/24/20          Patient PCP: Ivy Mesa MD  PMH:  has a past medical history of Cancer [facility]), CVA (cerebral vascular accident) ([facility] [address] Heart failure ([facility] [address] and Hypertension. PSH:   has a past surgical history that includes hx gyn; pr abdomen surgery proc unlisted; and hx lumbar laminectomy (06/29/2016).    FHX: family history includes Cancer in her brother; Diabetes in her brother and brother; Heart Disease in her brother; Hypertension in her mother, sister, sister, sister, sister, and sister; Suicide in her brother and brother. SHX:  reports that she has never smoked. She has never used smokeless tobacco. She reports previous alcohol use. She reports that she does not use drugs. ROS:A comprehensive review of systems was negative except for that written in the HPI.     No Known Allergies   MEDS:   Current Facility-Administered Medications   Medication    sodium chloride (NS) flush 5-40 mL    sodium chloride (NS) flush 5-40 mL    dilTIAZem (CARDIZEM) 100 mg in dextrose 5% (MBP/ADV) 100 mL infusion    traZODone (DESYREL) tablet 50 mg    pravastatin (PRAVACHOL) tablet 20 mg    aspirin delayed-release tablet 81 mg    butalbital-acetaminophen-caffeine (FIORICET, ESGIC) -40 mg per tablet 1 Tab    topiramate (TOPAMAX) tablet 50 mg    levothyroxine (SYNTHROID) tablet 50 mcg    cholecalciferol (VITAMIN D3) (1000 Units /25 mcg) tablet 1 Tab    amLODIPine (NORVASC) tablet 10 mg    nebivoloL (BYSTOLIC) tablet 20 mg    acetaminophen (TYLENOL) tablet 650 mg    Or    acetaminophen (TYLENOL) suppository 650 mg    sodium chloride (NS) flush 5-40 mL    sodium chloride (NS) flush 5-40 mL    cefTRIAXone (ROCEPHIN) 1 g in 0.9% sodium chloride (MBP/ADV) 50 mL    azithromycin (ZITHROMAX) 500 mg in 0.9% sodium chloride (MBP/ADV) 250 mL    guaiFENesin-dextromethorphan (ROBITUSSIN DM) 100-10 mg/5 mL syrup 10 mL    melatonin tablet 3 mg    0.9% sodium chloride infusion    heparin 25,000 units in D5W 250 ml infusion    heparin (porcine) injection 3,000 Units    Or    heparin (porcine) injection 6,000 Units    pantoprazole (PROTONIX) 40 mg in 0.9% sodium chloride 10 mL injection          Objective:     Vital Signs: Telemetry:    AFIB Intake/Output:   Visit Vitals  /72 (BP 1 [facility]: Left arm, BP Patient Position: At rest)   Pulse 100   Temp 98.3 °F (36.8 °C)   Resp 22   Ht 4' 11\" (1.499 m)   Wt 73.3 kg (161 lb 9.6 oz)   SpO2 94%   BMI 32.64 kg/m²       Temp (24hrs), Av.9 °F (36.6 °C), Min:97.3 °F (36.3 °C), Max:98.3 °F (36.8 °C)        O2 Device: Hi flow nasal cannula, Humidifier O2 Flow Rate (L/min): 5 l/min         Body mass index is 32.64 kg/m². Wt Readings from Last 4 Encounters:   20 73.3 kg (161 lb 9.6 oz)   11/10/19 71.7 kg (158 lb)   10/01/19 71.8 kg (158 lb 4 oz)   19 68 kg (150 lb)          Intake/Output Summary (Last 24 hours) at 2020 0947  Last data filed at 2020 0600  Gross per 24 hour   Intake 1189.79 ml   Output 0 ml   Net 1189.79 ml       Last shift:      No intake/output data recorded. Last 3 shifts:  1901 -  0700  In: 1189.8 [P.O.:240; I.V.:949.8]  Out: 0        Physical Exam:    General:  female; appears dehydrated and moderately ill;  NC; dry cough  HEENT: NCAT, lips and mucosa dry;   Eyes: anicteric; conjunctiva clear  Neck: no nodes, no cuff leak, no accessory MM use. Chest: no deformity, symmetric movement  Cardiac: IR regular; no murmur, tachycardia  Lungs: deferred due to risk and airborne precautions  Abd: soft, NT, hypoactive BS  Ext: no edema; no joint swelling;  No clubbing  : no gibbs, clear urine  Neuro: fluent, generalized weakness  Psych- no agitation, oriented to person;   Skin: warm, dry, no cyanosis;   Pulses: 1-2+ Bilateral pedal, radial  Capillary: brisk; pale      Labs:    Recent Labs     20  03320   WBC 8.5  --  7.3   HGB 9.5*  --  11.0*     --  297   APTT 88.4* 22.3  --      Recent Labs     20  0334 20  0035 20  18420  1838     --   --   --  140   K 4.9  --   --   --  4.3   *  --   --   --  111*   CO2 20*  --   --   --  20*   GLU 90  --   --   --  166*   BUN 26*  --   --   --  24*   CREA 2.22*  --   --   --  2.31*   CA 8.4*  --   --   --  8.9   MG 2.0  --   --   --   --    LAC  --  1.7 2.2* 2.2*  --    ALB 3.3*  --   --   --  3.7   SGOT 12*  --   --   --  11*   ALT 17 --   --   --  21     No results for input(s): PH, PCO2, PO2, HCO3, FIO2 in the last 72 hours. Recent Labs     04/23/20  1838   TROIQ <0.05     No results found for: BNPP, BNP   Lab Results   Component Value Date/Time    Culture result: ESCHERICHIA COLI 04/22/2012 04:00 PM      Lab Results   Component Value Date/Time     (H) 04/25/2012 06:25 PM     (H) 04/24/2012 04:50 PM       Imaging:  I have personally reviewed the patients radiographs and have reviewed the reports:    CXR Results  (Last 48 hours)               04/23/20 1948  XR CHEST PORT Final result    Impression:  IMPRESSION: Right middle lobe infiltrate suspicious for pneumonia in the   appropriate clinical setting. Narrative:  EXAM: XR CHEST PORT       INDICATION: Shortness of breath x3 weeks with cough and labored breathing. COMPARISON: Chest x-ray 4/14/2015. FINDINGS: A portable AP radiograph of the chest was obtained at 19:27 hours. The   patient is on a cardiac monitor. There is suspected airspace infiltrate   appearing the right heart margin with otherwise clear lungs. The cardiac and   mediastinal contours and pulmonary vascularity are normal.  Atherosclerotic   calcifications affect the aortic arch. The chest wall structures and visualized   upper abdomen show no acute findings with incidental note of degenerative spine   and shoulder changes. Results from [place] encounter on 04/23/20   XR CHEST PORT    Narrative EXAM: XR CHEST PORT    INDICATION: Shortness of breath x3 weeks with cough and labored breathing. COMPARISON: Chest x-ray 4/14/2015. FINDINGS: A portable AP radiograph of the chest was obtained at 19:27 hours. The  patient is on a cardiac monitor. There is suspected airspace infiltrate  appearing the right heart margin with otherwise clear lungs. The cardiac and  mediastinal contours and pulmonary vascularity are normal.  Atherosclerotic  calcifications affect the aortic arch. The chest wall structures and visualized  upper abdomen show no acute findings with incidental note of degenerative spine  and shoulder changes. Impression IMPRESSION: Right middle lobe infiltrate suspicious for pneumonia in the  appropriate clinical setting. Results from [facility] encounter on 11/10/19   XR FOOT LT MIN 3 V    Narrative INDICATION: pain    COMPARISON: None    FINDINGS:  3 views of the left foot demonstrate no acute fracture or subluxation. Mild  diffuse soft tissue swelling. Small ventral calcaneal spur. There is no  radiopaque foreign body. Impression IMPRESSION:  No acute fracture. Results from [place] encounter on 10/04/17   XR HAND RT MIN 3 V    Narrative EXAM:  XR HAND RT MIN 3 V    INDICATION: Acute right hand pain, swelling, and weakness. COMPARISON: None. FINDINGS: Three views of the right hand demonstrate no acute fracture or  dislocation. There is marked degenerative change at the first carpometacarpal  joints. There is also diffuse joint space narrowing involving the digits. The  soft tissues are within normal limits. Impression IMPRESSION:  No acute abnormality. Degenerative changes suggestive of  osteoarthritis most advanced at the first carpometacarpal joint. Results from [place] encounter on 09/13/16   CT SPINE LUMB WO CONT    Narrative INDICATION:  Lower back pain which is chronic without trauma. Radiates to both  legs. EXAM: CT lumbar spine. Comparison March 25, 2013. Thin section axial images were obtained. From these sagittal and coronal  reformats were performed. CT dose reduction was achieved through use of a  standardized protocol tailored for this examination and automatic exposure  control for dose modulation. Adaptive statistical iterative reconstruction  (ASIR) was utilized.  Study was performed per the [facility] protocol    FINDINGS: Anterolisthesis of L4 on L5 and retrolisthesis of L5 on S1 is again  noted with significant degeneration of both these levels. There is extrusion  posterior to L4 with significant narrowing of the bilateral foramen and canal at  this level and at L4-5 there is a diffuse disc bulge asymmetric to the left  narrowing the left subarticular zone and severe bilateral foraminal narrowing. There is no evidence of acute fracture. Minimal degenerative changes are noted  of the remaining lumbar disc levels. There is scarring of both kidneys and the  aorta is atherosclerotic      Impression IMPRESSION:  1. Significant degenerative changes L4-5 and L5-S1. Study performed for  preoperative planning         This care involved high complexity medical decision making to treat acute and unstable vital organ system failure, and to prevent life threatening deterioration of the patients condition. I personally:  · Reviewed the flowsheet and previous days notes  · Reviewed and summarized records or history from previous days note or discussions with staff, family  · Parenteral controlled substances - Reviewed/ Adjusted / [place] / Started  · High Risk Drug therapy requiring intensive monitoring for toxicity: eg steroids, pressors, antibiotics  · Reviewed and/or ordered Clinical lab tests  · Reviewed and/or ordered Radiology tests  · Reviewed and/or ordered of Medicine tests  · Independently visualized radiologic Images  · Reviewed the patients ECG / Telemetry  · discussed my assessment/management with : Consultants, Nursing, Respiratory Therapy, Hospitalist for coordination of care    Pt is critically ill. Time spent with pt and staff actively rendering care, managing pt and coordinating care as stated below;  35 minutes, exclusive of any procedures           Thank you for allowing us to participate in the care of this patient.       Theodore Toth MD

## 2020-04-24 NOTE — PROGRESS NOTES
Bedside shift change report given to akosua (oncoming nurse) by Estephania Brenner (offgoing nurse). Report included the following information SBAR, Kardex, Intake/Output, MAR and Cardiac Rhythm afib. rec'd report. Pt noted to have bp 88/51 hr76. Informed that pt was on cardizem gtt at 5. Gtt was turned off by night nurse and vitals retaken. First [id number] bp115/65 hr88. Next repeat 1710TW28/22 0312 bp110/70 hr79. md made aware.

## 2020-04-24 NOTE — H&P
[address]  Admission History and Physical      NAME:  Nilda Black   :   1950   MRN:  [id number]     PCP:  Chela Higgins MD     Date/Time:  2020           Assessment/Plan:         My assessment of this patient's clinical condition and my plan of care is as follows:      Given the patient's current clinical presentation, I have a high level of concern for decompensation if discharged from the ED. Complex decision making was performed which includes reviewing the patient's available past medical records, laboratory results, and Xray films. I have also directly communicated my plan and discussed this case with the involved ED physician.      Assessment / Plan:  Acute Respiratory Failure with Hypoxia   Due to pneumonia, RML Pneumonia on X ray  Rule out COVID 19  Isolation   IV abx  Pulmonary consult    AFIB RVR  IV cardizem drip  IV heparin drip  Echo   Cardio consult     ROJELIO  IVF hydration  Hold nephrotoxic agents    CAP  Right Middle lobe  IV Rocephin,azithromycin  X ray Right middle lobe infiltrate suspicious for pneumonia in the  appropriate clinical setting    CHF, Chronic  Continue medical management       DVT Prophylaxis: heparin drip  GI Prophylaxis:IV protonix  Code status full code   Surrogate Decision Maker ? Baseline: AAOx 3              Subjective:     CHIEF COMPLAINT:      HISTORY OF PRESENT ILLNESS:     Ms. Buckley Ormond is a 69 y.o.  female who is admitted with AFIB RVR. Ms. Buckley Ormond presented to the Emergency Department today complaining of shortness of breath, coughing, has phlegm, also nausea,vomiting,diarrhea, worsening breathing for the past three weeks. Patient denies any chest pain or abdominal pain. Patient had low grade fever at home, generalized weakness, fatigue. Has sick contacts. No swelling of her legs. Patient has a hx of CVA, overall a poor historian. Patient admitted for pneumonia, AFIB RVR, cardizem drip, IV abx.  Pulmonary and Cardio consulted. Past Medical History:   Diagnosis Date    Cancer ([facility]     ovaian stomach    CVA (cerebral vascular accident) ([facility]     Heart failure ([facility]     MI    Hypertension         Past Surgical History:   Procedure Laterality Date    ABDOMEN SURGERY PROC UNLISTED      cancer removal    HX GYN      hysterectomy    HX LUMBAR LAMINECTOMY  06/29/2016    L4-S1, Dr. Lin Cage History     Tobacco Use    Smoking status: Never Smoker    Smokeless tobacco: Never Used   Substance Use Topics    Alcohol use: Not Currently     Frequency: Never     Comment: occ        Family History   Problem Relation Age of Onset   Billie Bob Hypertension Mother     Hypertension Sister     Heart Disease Brother     Suicide Brother     Diabetes Brother     Diabetes Brother     Suicide Brother     Cancer Brother     Hypertension Sister     Hypertension Sister     Hypertension Sister     Hypertension Sister         No Known Allergies     Prior to Admission medications    Medication Sig Start Date End Date Taking? Authorizing Provider   BYSTOLIC 20 mg tablet TAKE 1 TABLET BY MOUTH ONCE DAILY 1/21/20   Kacie Shelton MD   amLODIPine (NORVASC) 10 mg tablet Take 1 Tab by mouth daily. 10/1/19   Felicitas Andersen MD   levothyroxine (SYNTHROID) 50 mcg tablet Take 1 Tab by mouth Daily (before breakfast). 3/27/19   Kacie Shelton MD   cholecalciferol (VITAMIN D3) 1,000 unit tablet Take 1 Tab by mouth daily. 3/27/19   Kacie Shelton MD   pravastatin (PRAVACHOL) 20 mg tablet Take 1 Tab by mouth nightly. 3/26/19   Kacie Shelton MD   aspirin delayed-release (ADULT LOW DOSE ASPIRIN) 81 mg tablet Take 1 Tab by mouth daily. 3/26/19   Kacie Shelton MD   butalbital-acetaminophen-caffeine (FIORICET, ESGIC) -40 mg per tablet Take 1 Tab by mouth every six (6) hours as needed for Headache. 3/26/19   Kacie Shelton MD   topiramate (TOPAMAX) 50 mg tablet Take 1 Tab by mouth nightly.  3/26/19   Lavon Will Yo MD   omeprazole (PRILOSEC) 20 mg capsule Take 1 Cap by mouth daily.  3/26/19   Miguelito Tan MD   traZODone (DESYREL) 50 mg tablet take 1 tablet by mouth NIGHTLY 12/5/18   Luna Andrade NP   losartan (COZAAR) 100 mg tablet take 1 tablet by mouth once daily 2/27/18   Provider, Historical         Review of Systems:    Constitutional: positive for fevers,fatigue, malaise  Eyes: negative for irritation, redness and icterus   Ears, nose, mouth, throat, and face: negative for ear drainage, earaches, nasal congestion, sore mouth and sore throat   Respiratory: positive for cough, sputum, dyspnea on exertion   Cardiovascular: negative for dyspnea, palpitations, irregular heart beats, near-syncope, syncope, orthopnea, paroxysmal nocturnal dyspnea, lower extremity edema, tachypnea   Gastrointestinal: negative for nausea, vomiting, diarrhea, constipation and abdominal pain   Genitourinary:negative for frequency and dysuria   Hematologic/lymphatic: negative for easy bruising, bleeding, lymphadenopathy, petechiae and coughing up blood   Musculoskeletal:negative for myalgias, arthralgias and muscle weakness   Neurological:weakness  Endocrine: Denies heat or cold intolerance       Objective:      VITALS:    Vital signs reviewed; most recent are:    Visit Vitals  BP (!) 135/97   Pulse 74   Temp 97.3 °F (36.3 °C)   Resp 22   Ht 4' 11\" (1.499 m)   Wt 74.8 kg (164 lb 14.5 oz)   SpO2 97%   BMI 33.31 kg/m²     SpO2 Readings from Last 6 Encounters:   04/23/20 97%   11/10/19 97%   10/01/19 96%   03/26/19 98%   05/22/18 95%   01/12/18 94%    O2 Flow Rate (L/min): 5 l/min       Intake/Output Summary (Last 24 hours) at 4/23/2020 2110  Last data filed at 4/23/2020 2102  Gross per 24 hour   Intake 50 ml   Output    Net 50 ml            Exam:     Physical Exam:  Via Telemedicine, RN assisted exam  Gen:  Well-developed, well-nourished, in no acute distress  HEENT:  ATNC  Resp:  Diminished breath sounds blt, no wheezing  Card: normal S1, S2 , tachycardia irregular rhythm   Abd:  Soft, non-tender, non-distended, normoactive bowel sounds are present, no palpable organomegaly  Musc:  Moves all extremities, no edema BL LE  Skin:  No rashes or ulcers, skin turgor is good, dry MM  Neuro:  Cranial nerves 3-12 are grossly intact,  strength is 5/5 bilaterally, dorsi / plantarflexion strength is 5/5 bilaterally, follows commands appropriately  Psych:  Alert with good insight. Oriented to person, place, and time       Labs:    Recent Labs     04/23/20  1838   WBC 7.3   HGB 11.0*   HCT 35.8        Recent Labs     04/23/20  1838      K 4.3   *   CO2 20*   *   BUN 24*   CREA 2.31*   CA 8.9   ALB 3.7   SGOT 11*   ALT 21     No components found for: GLPOC  No results for input(s): PH, PCO2, PO2, HCO3, FIO2 in the last 72 hours. No results for input(s): INR, INREXT in the last 72 hours. Chest Xray:  Chest X-ray: Positive findings of: Infiltrate Right Middle lobe. Results reviewed with Radiologist.  EKG reviewed:   Initial EKG: AFIB RVR      Total time spent with patient: [address] discussed with: Patient, Nursing Staff and >50% of time spent in counseling and coordination of care    Discussed:  Code Status and Care Plan    Prophylaxis: Heparin and H2B/PPI    Probable Disposition:  Home w/Family           ___________________________________________________    Attending Physician: Willian White MD

## 2020-04-24 NOTE — ED PROVIDER NOTES
EMERGENCY DEPARTMENT HISTORY AND PHYSICAL EXAM      Date: 4/23/2020  Patient Name: Elinor Kanner    History of Presenting Illness     Chief Complaint   Patient presents with    Shortness of Breath       History Provided By: Patient    HPI: Elinor Kanner, 69 y.o. female with PMHx significant for prior MI, prior CVA with intermittent expressive aphasia as a result who presents with a chief complaint of shortness of breath progressively worsening for the last 2 weeks, cough, palpitations. Patient denies any chest pain, abdominal pain, fever. Does states she has been fatigued at home and has had occasional nausea and vomiting. Denies any history of irregular heart rate. PCP: Frankie Dubose MD    There are no other complaints, changes, or physical findings at this time. Current Facility-Administered Medications   Medication Dose Route Frequency Provider Last Rate Last Dose    sodium chloride (NS) flush 5-40 mL  5-40 mL IntraVENous Q8H Lloyd Morse MD        sodium chloride (NS) flush 5-40 mL  5-40 mL IntraVENous PRN Marge Cavanaugh MD        cefTRIAXone (ROCEPHIN) 1 g in 0.9% sodium chloride (MBP/ADV) 50 mL  1 g IntraVENous NOW Lloyd Morse MD        azithromycin (ZITHROMAX) 500 mg in 0.9% sodium chloride (MBP/ADV) 250 mL  500 mg IntraVENous NOW Lloyd Morse MD         Current Outpatient Medications   Medication Sig Dispense Refill    BYSTOLIC 20 mg tablet TAKE 1 TABLET BY MOUTH ONCE DAILY 90 Tab 0    amLODIPine (NORVASC) 10 mg tablet Take 1 Tab by mouth daily. 90 Tab 1    levothyroxine (SYNTHROID) 50 mcg tablet Take 1 Tab by mouth Daily (before breakfast). 90 Tab 1    cholecalciferol (VITAMIN D3) 1,000 unit tablet Take 1 Tab by mouth daily. 90 Tab 3    pravastatin (PRAVACHOL) 20 mg tablet Take 1 Tab by mouth nightly. 90 Tab 1    aspirin delayed-release (ADULT LOW DOSE ASPIRIN) 81 mg tablet Take 1 Tab by mouth daily.  90 Tab 3    butalbital-acetaminophen-caffeine (FIORICET, ESGIC) -40 mg per tablet Take 1 Tab by mouth every six (6) hours as needed for Headache. 20 Tab 1    topiramate (TOPAMAX) 50 mg tablet Take 1 Tab by mouth nightly. 90 Tab 1    omeprazole (PRILOSEC) 20 mg capsule Take 1 Cap by mouth daily. 90 Cap 1    traZODone (DESYREL) 50 mg tablet take 1 tablet by mouth NIGHTLY 30 Tab 1    losartan (COZAAR) 100 mg tablet take 1 tablet by mouth once daily  0     Past History     Past Medical History:  Past Medical History:   Diagnosis Date    Cancer ([facility]     ovaian stomach    CVA (cerebral vascular accident) ([facility]     Heart failure ([facility]     MI    Hypertension      Past Surgical History:  Past Surgical History:   Procedure Laterality Date    ABDOMEN SURGERY PROC UNLISTED      cancer removal    HX GYN      hysterectomy    HX LUMBAR LAMINECTOMY  06/29/2016    L4-S1, Dr. Murtaza Osborne     Family History:  Family History   Problem Relation Age of Onset    Hypertension Mother     Hypertension Sister     Heart Disease Brother     Suicide Brother     Diabetes Brother     Diabetes Brother     Suicide Brother     Cancer Brother     Hypertension Sister     Hypertension Sister     Hypertension Sister     Hypertension Sister      Social History:  Social History     Tobacco Use    Smoking status: Never Smoker    Smokeless tobacco: Never Used   Substance Use Topics    Alcohol use: Not Currently     Frequency: Never     Comment: occ    Drug use: No     Allergies:  No Known Allergies  Review of Systems   Review of Systems   Constitutional: Negative for chills and fever. HENT: Negative for congestion, rhinorrhea and sore throat. Respiratory: Positive for cough and shortness of breath. Cardiovascular: Negative for chest pain. Gastrointestinal: Negative for abdominal pain, nausea and vomiting. Genitourinary: Negative for dysuria and urgency. Skin: Negative for rash. Neurological: Negative for dizziness, light-headedness and headaches.    All other systems reviewed and are negative. Physical Exam   Physical Exam  Vitals signs and nursing note reviewed. Constitutional:       General: She is not in acute distress. Appearance: She is well-developed. HENT:      Head: Normocephalic and atraumatic. Eyes:      Conjunctiva/sclera: Conjunctivae normal.      Pupils: Pupils are equal, round, and reactive to light. Neck:      Musculoskeletal: Normal range of motion. Cardiovascular:      Rate and Rhythm: Tachycardia present. Rhythm regularly irregular. Pulmonary:      Effort: Pulmonary effort is normal. Tachypnea present. No respiratory distress. Breath sounds: Normal breath sounds. No stridor. Abdominal:      General: There is no distension. Palpations: Abdomen is soft. Tenderness: There is no abdominal tenderness. Musculoskeletal: Normal range of motion. Skin:     General: Skin is warm and dry. Neurological:      Mental Status: She is alert and oriented to person, place, and time. Diagnostic Study Results   Labs -     Recent Results (from the past 12 hour(s))   EKG, 12 LEAD, INITIAL    Collection Time: 04/23/20  6:30 PM   Result Value Ref Range    Ventricular Rate 132 BPM    Atrial Rate 83 BPM    QRS Duration 64 ms    Q-T Interval 314 ms    QTC Calculation (Bezet) 465 ms    Calculated R Axis 11 degrees    Calculated T Axis -93 degrees    Diagnosis       Atrial fibrillation with rapid ventricular response  Low voltage QRS  Cannot rule out Anterior infarct , age undetermined  When compared with ECG of 28-SEP-2016 07:27,  Atrial fibrillation has replaced Sinus rhythm  Vent.  rate has increased BY  59 BPM  Minimal criteria for Anterior infarct are now present  Nonspecific T wave abnormality now evident in Lateral leads     CBC WITH AUTOMATED DIFF    Collection Time: 04/23/20  6:38 PM   Result Value Ref Range    WBC 7.3 3.6 - 11.0 K/uL    RBC 3.77 (L) 3.80 - 5.20 M/uL    HGB 11.0 (L) 11.5 - 16.0 g/dL    HCT 35.8 35.0 - 47.0 %    MCV 95.0 80.0 - 99.0 FL    MCH 29.2 26.0 - 34.0 PG    MCHC 30.7 30.0 - 36.5 g/dL    RDW 15.2 (H) 11.5 - 14.5 %    PLATELET 939 050 - 805 K/uL    MPV 10.7 8.9 - 12.9 FL    NRBC 0.0 0  WBC    ABSOLUTE NRBC 0.00 0.00 - 0.01 K/uL    NEUTROPHILS 74 32 - 75 %    LYMPHOCYTES 14 12 - 49 %    MONOCYTES 8 5 - 13 %    EOSINOPHILS 3 0 - 7 %    BASOPHILS 1 0 - 1 %    IMMATURE GRANULOCYTES 0 0.0 - 0.5 %    ABS. NEUTROPHILS 5.4 1.8 - 8.0 K/UL    ABS. LYMPHOCYTES 1.0 0.8 - 3.5 K/UL    ABS. MONOCYTES 0.6 0.0 - 1.0 K/UL    ABS. EOSINOPHILS 0.2 0.0 - 0.4 K/UL    ABS. BASOPHILS 0.1 0.0 - 0.1 K/UL    ABS. IMM. GRANS. 0.0 0.00 - 0.04 K/UL    DF AUTOMATED     METABOLIC PANEL, COMPREHENSIVE    Collection Time: 04/23/20  6:38 PM   Result Value Ref Range    Sodium 140 136 - 145 mmol/L    Potassium 4.3 3.5 - 5.1 mmol/L    Chloride 111 (H) 97 - 108 mmol/L    CO2 20 (L) 21 - 32 mmol/L    Anion gap 9 5 - 15 mmol/L    Glucose 166 (H) 65 - 100 mg/dL    BUN 24 (H) 6 - 20 MG/DL    Creatinine 2.31 (H) 0.55 - 1.02 MG/DL    BUN/Creatinine ratio 10 (L) 12 - 20      GFR est AA 25 (L) >60 ml/min/1.73m2    GFR est non-AA 21 (L) >60 ml/min/1.73m2    Calcium 8.9 8.5 - 10.1 MG/DL    Bilirubin, total 0.7 0.2 - 1.0 MG/DL    ALT (SGPT) 21 12 - 78 U/L    AST (SGOT) 11 (L) 15 - 37 U/L    Alk.  phosphatase 105 45 - 117 U/L    Protein, total 7.4 6.4 - 8.2 g/dL    Albumin 3.7 3.5 - 5.0 g/dL    Globulin 3.7 2.0 - 4.0 g/dL    A-G Ratio 1.0 (L) 1.1 - 2.2     CK W/ REFLX CKMB    Collection Time: 04/23/20  6:38 PM   Result Value Ref Range    CK 68 26 - 192 U/L   TROPONIN I    Collection Time: 04/23/20  6:38 PM   Result Value Ref Range    Troponin-I, Qt. <0.05 <0.05 ng/mL   NT-PRO BNP    Collection Time: 04/23/20  6:38 PM   Result Value Ref Range    NT pro-BNP 6,272 (H) <125 PG/ML   LACTIC ACID    Collection Time: 04/23/20  6:42 PM   Result Value Ref Range    Lactic acid 2.2 (HH) 0.4 - 2.0 MMOL/L   SAMPLES BEING HELD    Collection Time: 04/23/20  6:42 PM Result Value Ref Range    SAMPLES BEING HELD GOLD     COMMENT        Add-on orders for these samples will be processed based on acceptable specimen integrity and analyte stability, which may vary by analyte. EKG, 12 LEAD, SUBSEQUENT    Collection Time: 04/23/20  8:05 PM   Result Value Ref Range    Ventricular Rate 100 BPM    Atrial Rate 119 BPM    QRS Duration 68 ms    Q-T Interval 346 ms    QTC Calculation (Bezet) 446 ms    Calculated R Axis 11 degrees    Calculated T Axis -52 degrees    Diagnosis       Atrial fibrillation  Possible Anterior infarct , age undetermined  When compared with ECG of 23-APR-2020 18:30,  MANUAL COMPARISON REQUIRED, DATA IS UNCONFIRMED         Radiologic Studies -   XR CHEST PORT   Final Result   IMPRESSION: Right middle lobe infiltrate suspicious for pneumonia in the   appropriate clinical setting. Xr Chest Port    Result Date: 4/23/2020  IMPRESSION: Right middle lobe infiltrate suspicious for pneumonia in the appropriate clinical setting. Medical Decision Making   I am the first provider for this patient. I reviewed the vital signs, available nursing notes, past medical history, past surgical history, family history and social history. Vital Signs-Reviewed the patient's vital signs. Patient Vitals for the past 12 hrs:   Temp Pulse Resp BP SpO2   04/23/20 1915  74 22 (!) 135/97 97 %   04/23/20 1829 97.3 °F (36.3 °C) (!) 127 29 (!) 162/116 95 %       Pulse Oximetry Analysis - 94% on ra    Cardiac Monitor:   Rate: 130 bpm  Rhythm: Atrial Fibrillation      ED EKG interpretation:  Rhythm: atrial fib; and irregular. Rate (approx.): 132; Axis: normal; P wave: absent; QRS interval: normal ; ST/T wave: normal; Other findings: low voltage QRS. This EKG was interpreted by TIANA Baez MD,ED Provider. Records Reviewed: Nursing Notes and Old Medical Records    Provider Notes (Medical Decision Making):   Patient presents with cough, shortness of breath, palpitations.   On arrival noted to be tachycardic to the 130s. EKG shows A. fib with RVR. Patient was initially placed on oxygen for increased work of breathing, however was never hypoxic at rest and was able to be weaned off. Differential includes pneumonia, CHF, COVID-19, new A. fib. Will check basic lab work, troponin, BNP, chest x-ray    ED Course:   Initial assessment performed. The patients presenting problems have been discussed, and they are in agreement with the care plan formulated and outlined with them. I have encouraged them to ask questions as they arise throughout their visit. Heart rate improved with bolus Cardizem. Patient was placed on a Cardizem drip. Chest x-ray shows right-sided infiltrate. Patient was given antibiotics. We will send COVID testing and inflammatory markers. Discussed with Dr. Barbara Mcmahan who request that I speak with the telemetry hospitalist for admission    ED Course as of Apr 23 2350   Thu Apr 23, 2020   2102 Discussed with Dr. Savannah Garcia, tele-hospitalist, will see patient for admit    [JEREL]      ED Course User Index  Jamal Herman MD       Critical Care:  CRITICAL CARE NOTE :  IMPENDING DETERIORATION -Airway, Respiratory and Cardiovascular  ASSOCIATED RISK FACTORS - Hypotension, Shock, Dysrhythmia, Metabolic changes and Dehydration  MANAGEMENT- Bedside Assessment  INTERPRETATION -  Xrays, ECG and Blood Pressure  INTERVENTIONS - hemodynamic mngmt  CASE REVIEW - Hospitalist  TREATMENT RESPONSE -Improved  PERFORMED BY - Self    NOTES   :    I have spent 45 minutes of critical care time involved in lab review, consultations with specialist, family decision- making, bedside attention and documentation. During this entire length of time I was immediately available to the patient .   Tamiko Casey MD      Disposition:    Admission Note:  Patient is being admitted to the hospital by Dr. Savannah Garcia, Service: Hospitalist.  The results of their tests and reasons for their admission have been discussed with them and available family. They convey agreement and understanding for the need to be admitted and for their admission diagnosis. Diagnosis     Clinical Impression:   1. Pneumonia of right lung due to infectious organism, unspecified part of lung    2. Atrial fibrillation with rapid ventricular response ([address]    3. ROJELIO (acute kidney injury) ([address]        This note will not be viewable in [address] Please note that this dictation was completed with [employer], the computer voice recognition software. Quite often unanticipated grammatical, syntax, homophones, and other interpretive errors are inadvertently transcribed by the computer software. Please disregard these errors.   Please excuse any errors that have escaped final proofreading

## 2020-04-24 NOTE — ROUTINE PROCESS
Patient arrived to floor at 2330. Deficits from previous CVA apparent with speech being affected. Patient redirected many times throughout night. Patient is not confused, but does need reminding about bending her arm and coughing directly in nurses faces. Patient pulled out L AC IV with hep gtt running in it. Compression given to site for 10 to 15 minutes with resolution of bleeding. New IV placed in the R forearm. Patient continues on Cardizem 5mg gtt, Hep, changed from 12u/kg/hr to 9u/kg/hr after this mornings pTT which also called for a 1 hour hold of gtt. Saline also running at 75/hr. Lafonda Hector in place however, patient didn't void during her time here. Said she didn't have to right now. No other issues over night. Will continue to monitor and pass off to dayshift RN.

## 2020-04-24 NOTE — PROGRESS NOTES
Hospitalist Progress Note    NAME: Elza Schilder   :  1950   MRN:  [id number]     Subjective:   Daily Progress Note: 2020 11:33 AM      Chief complaint: admitted with Resp failure/Afib and r/o COVID 19   case d/w staff, she is comfortable in bed and sleeping.  Due to COVID 19 not physically exam.       Current Facility-Administered Medications   Medication Dose Route Frequency    enoxaparin (LOVENOX) injection 70 mg  1 mg/kg SubCUTAneous Q24H    dilTIAZem IR (CARDIZEM) IR tablet 60 mg  60 mg Oral TIDAC    sodium chloride (NS) flush 5-40 mL  5-40 mL IntraVENous Q8H    sodium chloride (NS) flush 5-40 mL  5-40 mL IntraVENous PRN    traZODone (DESYREL) tablet 50 mg  50 mg Oral QHS    pravastatin (PRAVACHOL) tablet 20 mg  20 mg Oral QHS    aspirin delayed-release tablet 81 mg  81 mg Oral DAILY    butalbital-acetaminophen-caffeine (FIORICET, ESGIC) -40 mg per tablet 1 Tab  1 Tab Oral Q6H PRN    topiramate (TOPAMAX) tablet 50 mg  50 mg Oral QHS    levothyroxine (SYNTHROID) tablet 50 mcg  50 mcg Oral ACB    cholecalciferol (VITAMIN D3) (1000 Units /25 mcg) tablet 1 Tab  1,000 Units Oral DAILY    nebivoloL (BYSTOLIC) tablet 20 mg  20 mg Oral DAILY    acetaminophen (TYLENOL) tablet 650 mg  650 mg Oral Q6H PRN    Or    acetaminophen (TYLENOL) suppository 650 mg  650 mg Rectal Q6H PRN    sodium chloride (NS) flush 5-40 mL  5-40 mL IntraVENous Q8H    sodium chloride (NS) flush 5-40 mL  5-40 mL IntraVENous PRN    cefTRIAXone (ROCEPHIN) 1 g in 0.9% sodium chloride (MBP/ADV) 50 mL  1 g IntraVENous Q24H    azithromycin (ZITHROMAX) 500 mg in 0.9% sodium chloride (MBP/ADV) 250 mL  500 mg IntraVENous Q24H    guaiFENesin-dextromethorphan (ROBITUSSIN DM) 100-10 mg/5 mL syrup 10 mL  10 mL Oral Q6H PRN    melatonin tablet 3 mg  3 mg Oral QHS PRN    0.9% sodium chloride infusion  75 mL/hr IntraVENous CONTINUOUS    heparin (porcine) injection 3,000 Units  40 Units/kg IntraVENous PRN    Or    heparin (porcine) injection 6,000 Units  80 Units/kg IntraVENous PRN    pantoprazole (PROTONIX) 40 mg in 0.9% sodium chloride 10 mL injection  40 mg IntraVENous DAILY          Objective:     Visit Vitals  /72 (BP 1 [facility]: Left arm, BP Patient Position: At rest)   Pulse 100   Temp 98.3 °F (36.8 °C)   Resp 22   Ht 4' 11\" (1.499 m)   Wt 73.3 kg (161 lb 9.6 oz)   SpO2 94%   BMI 32.64 kg/m²    O2 Flow Rate (L/min): 5 l/min O2 Device: Nasal cannula    Temp (24hrs), Av.9 °F (36.6 °C), Min:97.3 °F (36.3 °C), Max:98.3 °F (36.8 °C)        PHYSICAL EXAM:  Gen NAD  Chest symmetric expansion  Abd ND  Neuro sleepy arousable  Psych non-agitated    Data Review    Recent Results (from the past 24 hour(s))   EKG, 12 LEAD, INITIAL    Collection Time: 20  6:30 PM   Result Value Ref Range    Ventricular Rate 132 BPM    Atrial Rate 83 BPM    QRS Duration 64 ms    Q-T Interval 314 ms    QTC Calculation (Bezet) 465 ms    Calculated R Axis 11 degrees    Calculated T Axis -93 degrees    Diagnosis       Atrial fibrillation with rapid ventricular response  Low voltage QRS  Cannot rule out Anterior infarct , age undetermined  When compared with ECG of 28-SEP-2016 07:27,  Atrial fibrillation has replaced Sinus rhythm  Vent.  rate has increased BY  59 BPM  Minimal criteria for Anterior infarct are now present  Nonspecific T wave abnormality now evident in Lateral leads     CBC WITH AUTOMATED DIFF    Collection Time: 20  6:38 PM   Result Value Ref Range    WBC 7.3 3.6 - 11.0 K/uL    RBC 3.77 (L) 3.80 - 5.20 M/uL    HGB 11.0 (L) 11.5 - 16.0 g/dL    HCT 35.8 35.0 - 47.0 %    MCV 95.0 80.0 - 99.0 FL    MCH 29.2 26.0 - 34.0 PG    MCHC 30.7 30.0 - 36.5 g/dL    RDW 15.2 (H) 11.5 - 14.5 %    PLATELET 449 381 - 128 K/uL    MPV 10.7 8.9 - 12.9 FL    NRBC 0.0 0  WBC    ABSOLUTE NRBC 0.00 0.00 - 0.01 K/uL    NEUTROPHILS 74 32 - 75 %    LYMPHOCYTES 14 12 - 49 %    MONOCYTES 8 5 - 13 %    EOSINOPHILS 3 0 - 7 %    BASOPHILS 1 0 - 1 %    IMMATURE GRANULOCYTES 0 0.0 - 0.5 %    ABS. NEUTROPHILS 5.4 1.8 - 8.0 K/UL    ABS. LYMPHOCYTES 1.0 0.8 - 3.5 K/UL    ABS. MONOCYTES 0.6 0.0 - 1.0 K/UL    ABS. EOSINOPHILS 0.2 0.0 - 0.4 K/UL    ABS. BASOPHILS 0.1 0.0 - 0.1 K/UL    ABS. IMM. GRANS. 0.0 0.00 - 0.04 K/UL    DF AUTOMATED     METABOLIC PANEL, COMPREHENSIVE    Collection Time: 04/23/20  6:38 PM   Result Value Ref Range    Sodium 140 136 - 145 mmol/L    Potassium 4.3 3.5 - 5.1 mmol/L    Chloride 111 (H) 97 - 108 mmol/L    CO2 20 (L) 21 - 32 mmol/L    Anion gap 9 5 - 15 mmol/L    Glucose 166 (H) 65 - 100 mg/dL    BUN 24 (H) 6 - 20 MG/DL    Creatinine 2.31 (H) 0.55 - 1.02 MG/DL    BUN/Creatinine ratio 10 (L) 12 - 20      GFR est AA 25 (L) >60 ml/min/1.73m2    GFR est non-AA 21 (L) >60 ml/min/1.73m2    Calcium 8.9 8.5 - 10.1 MG/DL    Bilirubin, total 0.7 0.2 - 1.0 MG/DL    ALT (SGPT) 21 12 - 78 U/L    AST (SGOT) 11 (L) 15 - 37 U/L    Alk. phosphatase 105 45 - 117 U/L    Protein, total 7.4 6.4 - 8.2 g/dL    Albumin 3.7 3.5 - 5.0 g/dL    Globulin 3.7 2.0 - 4.0 g/dL    A-G Ratio 1.0 (L) 1.1 - 2.2     CK W/ REFLX CKMB    Collection Time: 04/23/20  6:38 PM   Result Value Ref Range    CK 68 26 - 192 U/L   TROPONIN I    Collection Time: 04/23/20  6:38 PM   Result Value Ref Range    Troponin-I, Qt. <0.05 <0.05 ng/mL   NT-PRO BNP    Collection Time: 04/23/20  6:38 PM   Result Value Ref Range    NT pro-BNP 6,272 (H) <125 PG/ML   LACTIC ACID    Collection Time: 04/23/20  6:42 PM   Result Value Ref Range    Lactic acid 2.2 (HH) 0.4 - 2.0 MMOL/L   SAMPLES BEING HELD    Collection Time: 04/23/20  6:42 PM   Result Value Ref Range    SAMPLES BEING HELD GOLD, red     COMMENT        Add-on orders for these samples will be processed based on acceptable specimen integrity and analyte stability, which may vary by analyte.    EKG, 12 LEAD, SUBSEQUENT    Collection Time: 04/23/20  8:05 PM   Result Value Ref Range    Ventricular Rate 100 BPM    Atrial Rate 119 BPM    QRS Duration 68 ms    Q-T Interval 346 ms    QTC Calculation (Bezet) 446 ms    Calculated R Axis 11 degrees    Calculated T Axis -52 degrees    Diagnosis       Atrial fibrillation  Possible Anterior infarct , age undetermined  When compared with ECG of 23-APR-2020 18:30,  MANUAL COMPARISON REQUIRED, DATA IS UNCONFIRMED     LACTIC ACID    Collection Time: 04/23/20  9:15 PM   Result Value Ref Range    Lactic acid 2.2 (HH) 0.4 - 2.0 MMOL/L   C REACTIVE PROTEIN, QT    Collection Time: 04/23/20  9:15 PM   Result Value Ref Range    C-Reactive protein 1.28 (H) 0.00 - 0.60 mg/dL   LD    Collection Time: 04/23/20  9:15 PM   Result Value Ref Range     (H) 81 - 246 U/L   FERRITIN    Collection Time: 04/23/20  9:15 PM   Result Value Ref Range    Ferritin 30 26 - 388 NG/ML   D DIMER    Collection Time: 04/23/20  9:15 PM   Result Value Ref Range    D-dimer 2.82 (H) 0.00 - 0.65 mg/L FEU   PTT    Collection Time: 04/23/20  9:15 PM   Result Value Ref Range    aPTT 22.3 22.1 - 32.0 sec    aPTT, therapeutic range     58.0 - 77.0 SECS   RESPIRATORY PANEL,PCR,NASOPHARYNGEAL    Collection Time: 04/23/20  9:16 PM   Result Value Ref Range    Adenovirus Not detected NOTD      Coronavirus 229E Not detected NOTD      Coronavirus HKU1 Not detected NOTD      Coronavirus CVNL63 Not detected NOTD      Coronavirus OC43 Not detected NOTD      Metapneumovirus Not detected NOTD      Rhinovirus and Enterovirus Not detected NOTD      Influenza A Not detected NOTD      Influenza A, subtype H1 Not detected NOTD      Influenza A, subtype H3 Not detected NOTD      INFLUENZA A H1N1 PCR Not detected NOTD      Influenza B Not detected NOTD      Parainfluenza 1 Not detected NOTD      Parainfluenza 2 Not detected NOTD      Parainfluenza 3 Not detected NOTD      Parainfluenza virus 4 Not detected NOTD      RSV by PCR Not detected NOTD      B. parapertussis, PCR Not detected NOTD      Bordetella pertussis - PCR Not detected NOTD      Chlamydophila pneumoniae DNA, QL, PCR Not detected NOTD      Mycoplasma pneumoniae DNA, QL, PCR Not detected NOTD     SARS-COV-2    Collection Time: 04/23/20  9:16 PM   Result Value Ref Range    COVID-19 PENDING NEG   LACTIC ACID    Collection Time: 04/24/20 12:35 AM   Result Value Ref Range    Lactic acid 1.7 0.4 - 2.0 MMOL/L   METABOLIC PANEL, COMPREHENSIVE    Collection Time: 04/24/20  3:34 AM   Result Value Ref Range    Sodium 143 136 - 145 mmol/L    Potassium 4.9 3.5 - 5.1 mmol/L    Chloride 114 (H) 97 - 108 mmol/L    CO2 20 (L) 21 - 32 mmol/L    Anion gap 9 5 - 15 mmol/L    Glucose 90 65 - 100 mg/dL    BUN 26 (H) 6 - 20 MG/DL    Creatinine 2.22 (H) 0.55 - 1.02 MG/DL    BUN/Creatinine ratio 12 12 - 20      GFR est AA 26 (L) >60 ml/min/1.73m2    GFR est non-AA 22 (L) >60 ml/min/1.73m2    Calcium 8.4 (L) 8.5 - 10.1 MG/DL    Bilirubin, total 0.4 0.2 - 1.0 MG/DL    ALT (SGPT) 17 12 - 78 U/L    AST (SGOT) 12 (L) 15 - 37 U/L    Alk. phosphatase 92 45 - 117 U/L    Protein, total 6.6 6.4 - 8.2 g/dL    Albumin 3.3 (L) 3.5 - 5.0 g/dL    Globulin 3.3 2.0 - 4.0 g/dL    A-G Ratio 1.0 (L) 1.1 - 2.2     CBC WITH AUTOMATED DIFF    Collection Time: 04/24/20  3:34 AM   Result Value Ref Range    WBC 8.5 3.6 - 11.0 K/uL    RBC 3.23 (L) 3.80 - 5.20 M/uL    HGB 9.5 (L) 11.5 - 16.0 g/dL    HCT 30.4 (L) 35.0 - 47.0 %    MCV 94.1 80.0 - 99.0 FL    MCH 29.4 26.0 - 34.0 PG    MCHC 31.3 30.0 - 36.5 g/dL    RDW 15.2 (H) 11.5 - 14.5 %    PLATELET 209 533 - 938 K/uL    MPV 10.5 8.9 - 12.9 FL    NRBC 0.0 0  WBC    ABSOLUTE NRBC 0.00 0.00 - 0.01 K/uL    NEUTROPHILS 77 (H) 32 - 75 %    LYMPHOCYTES 13 12 - 49 %    MONOCYTES 8 5 - 13 %    EOSINOPHILS 0 0 - 7 %    BASOPHILS 1 0 - 1 %    IMMATURE GRANULOCYTES 1 (H) 0.0 - 0.5 %    ABS. NEUTROPHILS 6.6 1.8 - 8.0 K/UL    ABS. LYMPHOCYTES 1.1 0.8 - 3.5 K/UL    ABS. MONOCYTES 0.7 0.0 - 1.0 K/UL    ABS. EOSINOPHILS 0.0 0.0 - 0.4 K/UL    ABS. BASOPHILS 0.1 0.0 - 0.1 K/UL    ABS. IMM.  GRANS. 0.1 (H) 0.00 - 0.04 K/UL    DF AUTOMATED     MAGNESIUM    Collection Time: 04/24/20  3:34 AM   Result Value Ref Range    Magnesium 2.0 1.6 - 2.4 mg/dL   PTT    Collection Time: 04/24/20  3:34 AM   Result Value Ref Range    aPTT 88.4 (HH) 22.1 - 32.0 sec    aPTT, therapeutic range     58.0 - 77.0 SECS     No results found for this visit on 04/23/20. All Micro Results     Procedure Component Value Units Date/Time    CULTURE, RESPIRATORY/SPUTUM/BRONCH Michelle Crowell [[id number]]     Order Status:  Sent Specimen:  Sputum     RESPIRATORY Ludy Montez [[id number]] Collected:  04/23/20 2116    Order Status:  Completed Specimen:  Nasopharyngeal Updated:  04/24/20 0024     Adenovirus Not detected        Coronavirus 229E Not detected        Coronavirus HKU1 Not detected        Coronavirus CVNL63 Not detected        Coronavirus OC43 Not detected        Metapneumovirus Not detected        Rhinovirus and Enterovirus Not detected        Influenza A Not detected        Influenza A, subtype H1 Not detected        Influenza A, subtype H3 Not detected        INFLUENZA A H1N1 PCR Not detected        Influenza B Not detected        Parainfluenza 1 Not detected        Parainfluenza 2 Not detected        Parainfluenza 3 Not detected        Parainfluenza virus 4 Not detected        RSV by PCR Not detected        B. parapertussis, PCR Not detected        Bordetella pertussis - PCR Not detected        Chlamydophila pneumoniae DNA, QL, PCR Not detected        Mycoplasma pneumoniae DNA, QL, PCR Not detected              Radiology reports and films for the last 24 hours have been reviewed.     Assessment/Plan:     Acute Respiratory Failure with Hypoxia   Due to pneumonia, RML Pneumonia on X ray  Rule out COVID 19 f/u results  Isolation, hold HC with arrythmias  IV abx  Pulmonary to see  Clinically not in distress      AFIB RVR  IV cardizem drip transition to PO  IV heparin drip changed to sq lovenox, defer to cards for long term AC  Echo  pend  Cardio on case     ROJELIO  IVF hydration  Hold nephrotoxic agents  Monitor crt slowly improving if not renal consult     CAP  Right Middle lobe  IV Rocephin,azithromycin  X ray Right middle lobe infiltrate suspicious for pneumonia in the  appropriate clinical setting     CHF, Chronic  Continue medical management  Well compensated  Echo     Hypothyroidism  Cont home dose     DVT Prophylaxis: lovenox  GI Prophylaxis:IV protonix  Code status full code   Baseline: AAOx 3    Signed By: Nohemi Carvajal MD     April 24, 2020

## 2020-04-24 NOTE — PROGRESS NOTES
Spoke w DR Kiran Courser and Lesa Hadley concerning pt's recent low bp. Orders for 250cc fluid bolus and parameters for recent p.o. cardizem dose.

## 2020-04-25 ENCOUNTER — APPOINTMENT (OUTPATIENT)
Dept: GENERAL RADIOLOGY | Age: 70
DRG: 193 | End: 2020-04-25
Attending: INTERNAL MEDICINE
Payer: MEDICARE

## 2020-04-25 LAB
ANION GAP SERPL CALC-SCNC: 10 MMOL/L (ref 5–15)
APTT PPP: 27.6 SEC (ref 22.1–32)
AV VELOCITY RATIO: 0.96
AV VTI RATIO: 1
BUN SERPL-MCNC: 29 MG/DL (ref 6–20)
BUN/CREAT SERPL: 11 (ref 12–20)
CALCIUM SERPL-MCNC: 8.3 MG/DL (ref 8.5–10.1)
CHLORIDE SERPL-SCNC: 115 MMOL/L (ref 97–108)
CO2 SERPL-SCNC: 17 MMOL/L (ref 21–32)
CREAT SERPL-MCNC: 2.6 MG/DL (ref 0.55–1.02)
CRP SERPL HS-MCNC: 8.2 MG/L
ECHO AO ROOT DIAM: 3.83 CM
ECHO AV AREA PEAK VELOCITY: 3.2 CM2
ECHO AV AREA VTI: 3.2 CM2
ECHO AV MEAN GRADIENT: 1.3 MMHG
ECHO AV MEAN VELOCITY: 0.51 M/S
ECHO AV PEAK GRADIENT: 2.6 MMHG
ECHO AV PEAK VELOCITY: 80.26 CM/S
ECHO AV VTI: 15.01 CM
ECHO EST RA PRESSURE: 10 MMHG
ECHO LA MAJOR AXIS: 4.58 CM
ECHO LA TO AORTIC ROOT RATIO: 1.19
ECHO LV INTERNAL DIMENSION DIASTOLIC: 3.65 CM (ref 3.9–5.3)
ECHO LV INTERNAL DIMENSION SYSTOLIC: 2.35 CM
ECHO LV IVSD: 1.36 CM (ref 0.6–0.9)
ECHO LV MASS 2D: 181.6 G (ref 67–162)
ECHO LV MASS INDEX 2D: 108 G/M2 (ref 43–95)
ECHO LV POSTERIOR WALL DIASTOLIC: 1.18 CM (ref 0.6–0.9)
ECHO LVOT CARDIAC OUTPUT: 3.4 L/MIN
ECHO LVOT DIAM: 2.05 CM
ECHO LVOT PEAK GRADIENT: 2.4 MMHG
ECHO LVOT PEAK VELOCITY: 77.24 CM/S
ECHO LVOT SV: 48 ML
ECHO LVOT VTI: 14.57 CM
ECHO MV A VELOCITY: 80.57 CM/S
ECHO MV E DECELERATION TIME (DT): 124.8 MS
ECHO MV E VELOCITY: 182.58 CM/S
ECHO MV E/A RATIO: 2.27
ECHO MV MEAN INFLOW VELOCITY: 3.33 M/S
ECHO MV REGURGITANT PEAK GRADIENT: 75.2 MMHG
ECHO MV REGURGITANT PEAK VELOCITY: 433.53 CM/S
ECHO MV REGURGITANT VTIA: 126.71 CM
ECHO PULMONARY ARTERY SYSTOLIC PRESSURE (PASP): 30.5 MMHG
ECHO PV MAX VELOCITY: 59.35 CM/S
ECHO PV MEAN GRADIENT: 0.7 MMHG
ECHO PV PEAK GRADIENT: 1.4 MMHG
ECHO PV VTI: 11.99 CM
ECHO RIGHT VENTRICULAR SYSTOLIC PRESSURE (RVSP): 30.5 MMHG
ECHO TV REGURGITANT MAX VELOCITY: 226.65 CM/S
ECHO TV REGURGITANT PEAK GRADIENT: 20.5 MMHG
ERYTHROCYTE [DISTWIDTH] IN BLOOD BY AUTOMATED COUNT: 15.1 % (ref 11.5–14.5)
GLUCOSE SERPL-MCNC: 108 MG/DL (ref 65–100)
HCT VFR BLD AUTO: 30.5 % (ref 35–47)
HGB BLD-MCNC: 9.4 G/DL (ref 11.5–16)
LVFS 2D: 35.64 %
LVOT MG: 1.34 MMHG
LVOT MV: 0.55 CM/S
MCH RBC QN AUTO: 29.8 PG (ref 26–34)
MCHC RBC AUTO-ENTMCNC: 30.8 G/DL (ref 30–36.5)
MCV RBC AUTO: 96.8 FL (ref 80–99)
MV DEC SLOPE: 14.63
NRBC # BLD: 0 K/UL (ref 0–0.01)
NRBC BLD-RTO: 0 PER 100 WBC
PLATELET # BLD AUTO: 250 K/UL (ref 150–400)
PMV BLD AUTO: 10.3 FL (ref 8.9–12.9)
POTASSIUM SERPL-SCNC: 4.7 MMOL/L (ref 3.5–5.1)
RBC # BLD AUTO: 3.15 M/UL (ref 3.8–5.2)
SODIUM SERPL-SCNC: 142 MMOL/L (ref 136–145)
THERAPEUTIC RANGE,PTTT: NORMAL SECS (ref 58–77)
WBC # BLD AUTO: 7 K/UL (ref 3.6–11)

## 2020-04-25 PROCEDURE — 65660000000 HC RM CCU STEPDOWN

## 2020-04-25 PROCEDURE — 74011250637 HC RX REV CODE- 250/637: Performed by: FAMILY MEDICINE

## 2020-04-25 PROCEDURE — 77010033678 HC OXYGEN DAILY

## 2020-04-25 PROCEDURE — 86141 C-REACTIVE PROTEIN HS: CPT

## 2020-04-25 PROCEDURE — 74011000258 HC RX REV CODE- 258: Performed by: FAMILY MEDICINE

## 2020-04-25 PROCEDURE — 71045 X-RAY EXAM CHEST 1 VIEW: CPT

## 2020-04-25 PROCEDURE — 74011250637 HC RX REV CODE- 250/637: Performed by: INTERNAL MEDICINE

## 2020-04-25 PROCEDURE — 74011250636 HC RX REV CODE- 250/636: Performed by: INTERNAL MEDICINE

## 2020-04-25 PROCEDURE — 85730 THROMBOPLASTIN TIME PARTIAL: CPT

## 2020-04-25 PROCEDURE — C9113 INJ PANTOPRAZOLE SODIUM, VIA: HCPCS | Performed by: FAMILY MEDICINE

## 2020-04-25 PROCEDURE — 74011000250 HC RX REV CODE- 250: Performed by: FAMILY MEDICINE

## 2020-04-25 PROCEDURE — 36415 COLL VENOUS BLD VENIPUNCTURE: CPT

## 2020-04-25 PROCEDURE — 74011250636 HC RX REV CODE- 250/636: Performed by: FAMILY MEDICINE

## 2020-04-25 PROCEDURE — 80048 BASIC METABOLIC PNL TOTAL CA: CPT

## 2020-04-25 PROCEDURE — 85027 COMPLETE CBC AUTOMATED: CPT

## 2020-04-25 RX ORDER — ALBUTEROL SULFATE 90 UG/1
2 AEROSOL, METERED RESPIRATORY (INHALATION)
Status: DISCONTINUED | OUTPATIENT
Start: 2020-04-25 | End: 2020-04-30

## 2020-04-25 RX ADMIN — AZITHROMYCIN DIHYDRATE 500 MG: 500 INJECTION, POWDER, LYOPHILIZED, FOR SOLUTION INTRAVENOUS at 19:54

## 2020-04-25 RX ADMIN — CEFTRIAXONE 1 G: 1 INJECTION, POWDER, FOR SOLUTION INTRAMUSCULAR; INTRAVENOUS at 20:55

## 2020-04-25 RX ADMIN — ASPIRIN 81 MG: 81 TABLET ORAL at 08:02

## 2020-04-25 RX ADMIN — SODIUM CHLORIDE 40 MG: 9 INJECTION INTRAMUSCULAR; INTRAVENOUS; SUBCUTANEOUS at 08:01

## 2020-04-25 RX ADMIN — SODIUM CHLORIDE 100 ML/HR: 900 INJECTION, SOLUTION INTRAVENOUS at 02:00

## 2020-04-25 RX ADMIN — Medication 10 ML: at 06:00

## 2020-04-25 RX ADMIN — MELATONIN 1 TABLET: at 08:02

## 2020-04-25 RX ADMIN — TRAZODONE HYDROCHLORIDE 50 MG: 50 TABLET ORAL at 21:00

## 2020-04-25 RX ADMIN — DILTIAZEM HYDROCHLORIDE 60 MG: 60 TABLET, FILM COATED ORAL at 08:03

## 2020-04-25 RX ADMIN — SODIUM CHLORIDE 250 ML: 900 INJECTION, SOLUTION INTRAVENOUS at 11:29

## 2020-04-25 RX ADMIN — Medication 10 ML: at 21:01

## 2020-04-25 RX ADMIN — PRAVASTATIN SODIUM 20 MG: 40 TABLET ORAL at 21:00

## 2020-04-25 RX ADMIN — NEBIVOLOL HYDROCHLORIDE 20 MG: 10 TABLET ORAL at 08:02

## 2020-04-25 RX ADMIN — LEVOTHYROXINE SODIUM 50 MCG: 0.05 TABLET ORAL at 08:03

## 2020-04-25 RX ADMIN — TOPIRAMATE 50 MG: 25 TABLET, FILM COATED ORAL at 21:00

## 2020-04-25 NOTE — PROGRESS NOTES
This RN alerted there is a family member of the patient's on the phone.  -when this RN made it to the phone there was no family present

## 2020-04-25 NOTE — PROGRESS NOTES
Bedside shift change report given to Rupert Moore (oncoming nurse) by Ria Mancuso (offgoing nurse). Report included the following information SBAR, Kardex, Intake/Output, MAR, Recent Results and Cardiac Rhythm Afib. 2015  Patient got up to bedside commode on own w/o calling out. Patient assisted back to bed. Patient visibly SOB. Head of bed elevated and patient cautioned not to get OOB on own, d/t fall risk. 2315 Patient has audible wheezing and SOB. Patient's breathing is audibly congested. Paged hospitalist for respiratory treatment. Dr. She Mendoza returned page and ordered stat chest Xray and prn albuterol inhaler. 0055  ABG drawn. Results paged to telehospitalist.  Patient had portable chest Xray done and prn albuterol inhaler administered. Bedside shift change report given to Ria Mancuso (oncoming nurse) by Rupert Moore (offgoing nurse). Report included the following information SBAR, Kardex, Intake/Output, MAR, Recent Results and Cardiac Rhythm Afib.

## 2020-04-25 NOTE — PROGRESS NOTES
Patient's sister returned the call to check on the patient.  -The Update: the patient is stable, resting, and progressing

## 2020-04-25 NOTE — PROGRESS NOTES
Progress Note      4/25/2020 9:38 AM  NAME: John Cantor   MRN:  [id number]   Admit Diagnosis: Atrial fibrillation with RVR ([facility] [address] [I48.91]      Problem List:     1. Persistent atrial fibrillation  2. Hypoxic respiratory failure  3. Hypertension  4. Hyperlipidemia  5. Remote CAD and MI history  6. Remote CVA  7. Chronic kidney disease; Stg 3  8. Ovarian cancer  9. COVID-19 rule out  10. Cardiologist:  [place]     Assessment/Plan:   Rate control will be goal; currently in 76s  Remains in AFib    1. Echo is pending  2. Continue oral diltiazem  3. Continue nebivolol  4. Continue lovenox   5. Continue ASA  6. Continue statin  7. Records still pending  8. Pending COVID-19 testing         [x]       High complexity decision making was performed in this patient at high risk for decompensation with multiple organ involvement. Subjective:     John Cantor denies chest pain, dyspnea. Discussed with RN events overnight. Review of Systems:    Symptom Y/N Comments  Symptom Y/N Comments   Fever/Chills N   Chest Pain N    Poor Appetite N   Edema N    Cough N   Abdominal Pain N    Sputum N   Joint Pain N    SOB/RIVERS N   Pruritis/Rash N    Nausea/vomit N   Tolerating PT/OT Y    Diarrhea N   Tolerating Diet Y    Constipation N   Other       Could NOT obtain due to:      Objective:      Physical Exam:    Last 24hrs VS reviewed since prior progress note. Most recent are:    Visit Vitals  /81 (BP 1 [facility]: Left arm, BP Patient Position: At rest)   Pulse (!) 106   Temp 97.4 °F (36.3 °C)   Resp 19   Ht 4' 11\" (1.499 m)   Wt 73.5 kg (162 lb 1.6 oz)   SpO2 95%   BMI 32.74 kg/m²       Intake/Output Summary (Last 24 hours) at 4/25/2020 7506  Last data filed at 4/24/2020 1851  Gross per 24 hour   Intake 1375 ml   Output    Net 1375 ml        General Appearance: Well developed, well nourished, alert & oriented x 3,    no acute distress. Slurred speech. R facial droop.   Ears/Nose/Mouth/Throat: Hearing grossly normal.  Neck: Supple. Chest: Lungs clear to auscultation bilaterally. Cardiovascular: Irregular rate and rhythm, S1S2 normal, no murmur. Abdomen: Soft, non-tender, bowel sounds are active. Extremities: No edema bilaterally. Skin: Warm and dry. []         Post-cath site without hematoma, bruit, tenderness, or thrill. Distal pulses intact. PMH/ reviewed - no change compared to H&P    Data Review    Telemetry: AF     EKG:   [x]  No new EKG for review    Lab Data Personally Reviewed:    Recent Labs     04/24/20 0334 04/23/20  1838   WBC 8.5 7.3   HGB 9.5* 11.0*   HCT 30.4* 35.8    297     Recent Labs     04/25/20  0246 04/24/20 0334 04/23/20 2115   APTT 27.6 88.4* 22.3      Recent Labs     04/24/20 0334 04/23/20  1838    140   K 4.9 4.3   * 111*   CO2 20* 20*   BUN 26* 24*   CREA 2.22* 2.31*   GLU 90 166*   CA 8.4* 8.9   MG 2.0  --      Recent Labs     04/23/20  1838   TROIQ <0.05     Lab Results   Component Value Date/Time    Cholesterol, total 238 (H) 03/26/2019 03:51 PM    HDL Cholesterol 61 03/26/2019 03:51 PM    LDL, calculated 151 (H) 03/26/2019 03:51 PM    Triglyceride 130 03/26/2019 03:51 PM    CHOL/HDL Ratio 3.0 11/06/2012 11:06 PM       Recent Labs     04/24/20  0334 04/23/20  1838   SGOT 12* 11*   AP 92 105   TP 6.6 7.4   ALB 3.3* 3.7   GLOB 3.3 3.7     No results for input(s): PH, PCO2, PO2 in the last 72 hours.     Medications Personally Reviewed:    Current Facility-Administered Medications   Medication Dose Route Frequency    enoxaparin (LOVENOX) injection 70 mg  1 mg/kg SubCUTAneous Q24H    dilTIAZem IR (CARDIZEM) IR tablet 60 mg  60 mg Oral TIDAC    sodium chloride (NS) flush 5-40 mL  5-40 mL IntraVENous Q8H    sodium chloride (NS) flush 5-40 mL  5-40 mL IntraVENous PRN    traZODone (DESYREL) tablet 50 mg  50 mg Oral QHS    pravastatin (PRAVACHOL) tablet 20 mg  20 mg Oral QHS    aspirin delayed-release tablet 81 mg  81 mg Oral DAILY    butalbital-acetaminophen-caffeine (FIORICET, ESGIC) -40 mg per tablet 1 Tab  1 Tab Oral Q6H PRN    topiramate (TOPAMAX) tablet 50 mg  50 mg Oral QHS    levothyroxine (SYNTHROID) tablet 50 mcg  50 mcg Oral ACB    cholecalciferol (VITAMIN D3) (1000 Units /25 mcg) tablet 1 Tab  1,000 Units Oral DAILY    nebivoloL (BYSTOLIC) tablet 20 mg  20 mg Oral DAILY    acetaminophen (TYLENOL) tablet 650 mg  650 mg Oral Q6H PRN    Or    acetaminophen (TYLENOL) suppository 650 mg  650 mg Rectal Q6H PRN    sodium chloride (NS) flush 5-40 mL  5-40 mL IntraVENous Q8H    sodium chloride (NS) flush 5-40 mL  5-40 mL IntraVENous PRN    cefTRIAXone (ROCEPHIN) 1 g in 0.9% sodium chloride (MBP/ADV) 50 mL  1 g IntraVENous Q24H    azithromycin (ZITHROMAX) 500 mg in 0.9% sodium chloride (MBP/ADV) 250 mL  500 mg IntraVENous Q24H    guaiFENesin-dextromethorphan (ROBITUSSIN DM) 100-10 mg/5 mL syrup 10 mL  10 mL Oral Q6H PRN    melatonin tablet 3 mg  3 mg Oral QHS PRN    0.9% sodium chloride infusion  100 mL/hr IntraVENous CONTINUOUS    pantoprazole (PROTONIX) 40 mg in 0.9% sodium chloride 10 mL injection  40 mg IntraVENous DAILY         Serafin Cheung III, DO

## 2020-04-25 NOTE — CONSULTS
PULMONARY ASSOCIATES [facility] INTENSIVIST Consult Service Note  Pulmonary, Critical Care, and Sleep Medicine    Name: Yobany Henriquez MRN: [id number]   : 1950 Hospital: [facility]   Date: 2020  Admission date: 2020 Hospital Day: 3       Subjective/Interval History:     Chart reviewed  remains in afib   BP down this am after anti-HTN  Fluid bolus yesterday helpful  Barking NP cough. .. No labs today  Less GI sx per report  d/w RN    IMPRESSION:   1. Acute Respiratory Failure with Hypoxia,   2. COVID 19 suspect; results pending: date of testing ; risk factors? 3. Sepsis and at risk for Cytokine Release Syndrome  4. Hypotension on arrival  5. Possible COVID associated gastroenteritis- Diarrhea past two days and rthat prompted her to come in and had emesis, slept onbathroom floor fot two days for convenience. Pt would not let them in the house. No phone, no food. 6. CAP- Right Middle lobe on CXR;  IV Rocephin,azithromycin   7. CHF, Chronic   8. H/o CVA 2016 right side and fully recovered   9. AFIB RVR d/w Dr. Denia Santana   10. ROJELIO IVF hydration needed/ CKD  Body mass index is 32.74 kg/m². Ovarian CA  11. Additional workup outlined below  12. Multiorgan dysfunction as outlined above: Pt has one or more acute or chronic illnesses with severe exacerbation with progression or side effects of treatment that poses a threat to life or bodily function  13. Pt is unstable, unpredictable needing more inpatient monitoring; at high risk of sudden decline and decompensation with life threatening consequenses and continued end organ dysfunction and failure      RECOMMENDATIONS/PLAN:   1. Please make sure pt always has two good PIV if possible   2. Continue ABx per protocol; azithromycin, ceftriaxone   3. Sputum culture  4. Hold BP meds!!!  5. Gentle rehydration- rebolus  6. Recheck labs--> hgb down yesterday ? worsening anemia  7.  O2 to keep sats > 93% but if gas exchange worsens must be prepared for abrupt decline in 12 -24 hours  8. If covid positive, follow inflammatory markers carefully including DDimer, Ferritin, CK, CRP, LD, IL 6 level   9. Consider HIV testing if unknown status   10. IV Fluids if pt has had GI sx with close watch of fluid status and avoid volume overload  11. If pt deteriorates and develops need for more oxygen, pt should receive SCTocilizumab but quantities very limited - please call to discuss  12. Awake and sleep proning as much as tolerable 12-18 hours/ 24 hours  13. Incentive spirometer   14. Empower and encourage pt to cover mouth and muffle cough and work with nurses to mitigate droplet or aerosol production by giving pt something to cough into( gowns have no sleeves)   15. No systemic steroids   16. No aerosolization producing procedures or devices unless in negative pressure room and even then only with select patients  17. Inhalers- bronchodilators if needed   18. NPO if intubation anticipated   19. Supplemental O2 for refractory hypoxia to keep sats > 93%  20. Low threshold to Intubate and place on vent if gas exchange declines;    21. Agree with Empiric IV antibiotics pending culture results   22. Follow culture results   23. Replete electrolytes  24. Probiotics   25. Antitussives   26. Antiemetics prn  27. Nutritional supplements at a minimum  28. Minimize imaging, procedures that may exposure others to infections  29. Labs to follow electrolytes, renal function and and blood counts   30. Prescription drug management with home med reconciliation reviewed  31. DVT prophylaxis   32. pt counseling with enduring education about protection, public health safety topics   33. Clarify goals of care independent of COVID status and consider enlisting help from palliative care team; families are scared        Subjective/Initial History:   I have reviewed the flowsheet and previous days notes. Seen earlier today on rounds.     I was asked by Ramiro Cisneros MD to see Nati Garner Daljit Guerrero a 69 y.o.  female  in consultation for a chief complaint of peenumonia, hypotension and rspiratory failurein a     Excerpts from admission 4/23/2020 and consult notes reviewed as follows:     \"Ms. Daljit Guerrero is a 69 y.o.  female who is admitted with AFIB RVR. Ms. Daljit Guerrero presented to the Emergency Department today complaining of shortness of breath, coughing, has phlegm, also nausea,vomiting,diarrhea, worsening breathing for the past three weeks. Patient denies any chest pain or abdominal pain. Patient had low grade fever at home, generalized weakness, fatigue. Has sick contacts. No swelling of her legs. Patient has a hx of CVA, overall a poor historian. Patient admitted for pneumonia, AFIB RVR, cardizem drip, IV abx. \"     Pt would not let family in for fear of them catching something. Past two days, she slept in her bathroom because diarrhea was significant and she had a bout of emesis as well. No blood. Remote CVA four yrs ago. Never smoked. Never lung problems until this. Sputum has been very thick. Pale. Feels better on oxygen. Otheriwse lived alone. 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 Right side weakness fully recoverred. Travel Screening       Question Response     Do you have any of the following symptoms? Cough     In the last month, have you been in contact with someone who was confirmed or suspected to have Coronavirus / COVID-19? No / Unsure     Have you traveled internationally in the last month? No      Travel History   Travel since 03/24/20     No documented travel since 03/24/20          Patient PCP: Abe Abbasi MD  PMH:  has a past medical history of Cancer [facility]), CVA (cerebral vascular accident) ([facility] [address] Heart failure ([facility] [address] and Hypertension. PSH:   has a past surgical history that includes hx gyn; pr abdomen surgery proc unlisted; and hx lumbar laminectomy (06/29/2016).    FHX: family history includes Cancer in her brother; Diabetes in her brother and brother; Heart Disease in her brother; Hypertension in her mother, sister, sister, sister, sister, and sister; Suicide in her brother and brother. SHX:  reports that she has never smoked. She has never used smokeless tobacco. She reports previous alcohol use. She reports that she does not use drugs. ROS:A comprehensive review of systems was negative except for that written in the HPI.     No Known Allergies   MEDS:   Current Facility-Administered Medications   Medication    enoxaparin (LOVENOX) injection 70 mg    dilTIAZem IR (CARDIZEM) IR tablet 60 mg    sodium chloride (NS) flush 5-40 mL    sodium chloride (NS) flush 5-40 mL    traZODone (DESYREL) tablet 50 mg    pravastatin (PRAVACHOL) tablet 20 mg    aspirin delayed-release tablet 81 mg    butalbital-acetaminophen-caffeine (FIORICET, ESGIC) -40 mg per tablet 1 Tab    topiramate (TOPAMAX) tablet 50 mg    levothyroxine (SYNTHROID) tablet 50 mcg    cholecalciferol (VITAMIN D3) (1000 Units /25 mcg) tablet 1 Tab    nebivoloL (BYSTOLIC) tablet 20 mg    acetaminophen (TYLENOL) tablet 650 mg    Or    acetaminophen (TYLENOL) suppository 650 mg    sodium chloride (NS) flush 5-40 mL    sodium chloride (NS) flush 5-40 mL    cefTRIAXone (ROCEPHIN) 1 g in 0.9% sodium chloride (MBP/ADV) 50 mL    azithromycin (ZITHROMAX) 500 mg in 0.9% sodium chloride (MBP/ADV) 250 mL    guaiFENesin-dextromethorphan (ROBITUSSIN DM) 100-10 mg/5 mL syrup 10 mL    melatonin tablet 3 mg    0.9% sodium chloride infusion    pantoprazole (PROTONIX) 40 mg in 0.9% sodium chloride 10 mL injection          Objective:     Vital Signs: Telemetry:    AFIB Intake/Output:   Visit Vitals  /81 (BP 1 [facility]: Left arm, BP Patient Position: At rest)   Pulse (!) 106   Temp 97.4 °F (36.3 °C)   Resp 19   Ht 4' 11\" (1.499 m)   Wt 73.5 kg (162 lb 1.6 oz)   SpO2 95%   BMI 32.74 kg/m²       Temp (24hrs), Av.8 °F (36.6 °C), Min:97.4 °F (36.3 °C), Max:98.4 °F (36.9 °C)        O2 Device: Nasal cannula O2 Flow Rate (L/min): 5 l/min         Body mass index is 32.74 kg/m². Wt Readings from Last 4 Encounters:   04/25/20 73.5 kg (162 lb 1.6 oz)   11/10/19 71.7 kg (158 lb)   10/01/19 71.8 kg (158 lb 4 oz)   03/26/19 68 kg (150 lb)          Intake/Output Summary (Last 24 hours) at 4/25/2020 1053  Last data filed at 4/24/2020 1851  Gross per 24 hour   Intake 1375 ml   Output    Net 1375 ml       Last shift:      No intake/output data recorded. Last 3 shifts: 04/23 1901 - 04/25 0700  In: 2564.8 [P.O.:640; I.V.:1924.8]  Out: 0        Physical Exam:    General:  female; appears dehydrated and moderately ill;   Exam from door  Patient resting quietly  HR down w sleep        Labs:    Recent Labs     04/25/20  0246 04/24/20 0334 04/23/20 2115 04/23/20  1838   WBC  --  8.5  --  7.3   HGB  --  9.5*  --  11.0*   PLT  --  253  --  297   APTT 27.6 88.4* 22.3  --      Recent Labs     04/24/20  0334 04/24/20  0035 04/23/20 2115 04/23/20  1842 04/23/20  1838     --   --   --  140   K 4.9  --   --   --  4.3   *  --   --   --  111*   CO2 20*  --   --   --  20*   GLU 90  --   --   --  166*   BUN 26*  --   --   --  24*   CREA 2.22*  --   --   --  2.31*   CA 8.4*  --   --   --  8.9   MG 2.0  --   --   --   --    LAC  --  1.7 2.2* 2.2*  --    ALB 3.3*  --   --   --  3.7   SGOT 12*  --   --   --  11*   ALT 17  --   --   --  21     No results for input(s): PH, PCO2, PO2, HCO3, FIO2 in the last 72 hours.   Recent Labs     04/23/20  1838   TROIQ <0.05     No results found for: BNPP, BNP   Lab Results   Component Value Date/Time    Culture result: ESCHERICHIA COLI 04/22/2012 04:00 PM      Lab Results   Component Value Date/Time     (H) 04/25/2012 06:25 PM     (H) 04/24/2012 04:50 PM       Imaging:  I have personally reviewed the patients radiographs and have reviewed the reports:    CXR Results  (Last 48 hours)               04/25/20 0615  XR CHEST PORT Final result Impression:  IMPRESSION:    1. Interval worsening of heterogeneous opacity at the right lung base, favored   to represent pneumonia. 2. Unchanged cardiomegaly. Narrative:  EXAM:  XR CHEST PORT       INDICATION:   respiratory failure       COMPARISON: Chest radiograph 4/23/2020. FINDINGS: AP radiograph of the chest was obtained. Interval worsening of heterogeneous opacity at the right lung base. Unchanged   cardiomegaly. No pneumothorax. No acute osseous abnormality. 04/23/20 1948  XR CHEST PORT Final result    Impression:  IMPRESSION: Right middle lobe infiltrate suspicious for pneumonia in the   appropriate clinical setting. Narrative:  EXAM: XR CHEST PORT       INDICATION: Shortness of breath x3 weeks with cough and labored breathing. COMPARISON: Chest x-ray 4/14/2015. FINDINGS: A portable AP radiograph of the chest was obtained at 19:27 hours. The   patient is on a cardiac monitor. There is suspected airspace infiltrate   appearing the right heart margin with otherwise clear lungs. The cardiac and   mediastinal contours and pulmonary vascularity are normal.  Atherosclerotic   calcifications affect the aortic arch. The chest wall structures and visualized   upper abdomen show no acute findings with incidental note of degenerative spine   and shoulder changes. Results from [place] encounter on 04/23/20   XR CHEST PORT    Narrative EXAM:  XR CHEST PORT    INDICATION:   respiratory failure    COMPARISON: Chest radiograph 4/23/2020. FINDINGS: AP radiograph of the chest was obtained. Interval worsening of heterogeneous opacity at the right lung base. Unchanged  cardiomegaly. No pneumothorax. No acute osseous abnormality. Impression IMPRESSION:   1. Interval worsening of heterogeneous opacity at the right lung base, favored  to represent pneumonia. 2. Unchanged cardiomegaly.      XR CHEST PORT    Narrative EXAM: XR CHEST PORT    INDICATION: Shortness of breath x3 weeks with cough and labored breathing. COMPARISON: Chest x-ray 4/14/2015. FINDINGS: A portable AP radiograph of the chest was obtained at 19:27 hours. The  patient is on a cardiac monitor. There is suspected airspace infiltrate  appearing the right heart margin with otherwise clear lungs. The cardiac and  mediastinal contours and pulmonary vascularity are normal.  Atherosclerotic  calcifications affect the aortic arch. The chest wall structures and visualized  upper abdomen show no acute findings with incidental note of degenerative spine  and shoulder changes. Impression IMPRESSION: Right middle lobe infiltrate suspicious for pneumonia in the  appropriate clinical setting. Results from Appointment encounter on 11/10/19   XR FOOT LT MIN 3 V    Narrative INDICATION: pain    COMPARISON: None    FINDINGS:  3 views of the left foot demonstrate no acute fracture or subluxation. Mild  diffuse soft tissue swelling. Small ventral calcaneal spur. There is no  radiopaque foreign body. Impression IMPRESSION:  No acute fracture. Results from [place] encounter on 09/13/16   CT SPINE LUMB WO CONT    Narrative INDICATION:  Lower back pain which is chronic without trauma. Radiates to both  legs. EXAM: CT lumbar spine. Comparison March 25, 2013. Thin section axial images were obtained. From these sagittal and coronal  reformats were performed. CT dose reduction was achieved through use of a  standardized protocol tailored for this examination and automatic exposure  control for dose modulation. Adaptive statistical iterative reconstruction  (ASIR) was utilized. Study was performed per the [facility] protocol    FINDINGS: Anterolisthesis of L4 on L5 and retrolisthesis of L5 on S1 is again  noted with significant degeneration of both these levels.  There is extrusion  posterior to L4 with significant narrowing of the bilateral foramen and canal at  this level and at L4-5 there is a diffuse disc bulge asymmetric to the left  narrowing the left subarticular zone and severe bilateral foraminal narrowing. There is no evidence of acute fracture. Minimal degenerative changes are noted  of the remaining lumbar disc levels. There is scarring of both kidneys and the  aorta is atherosclerotic      Impression IMPRESSION:  1. Significant degenerative changes L4-5 and L5-S1. Study performed for  preoperative planning         This care involved high complexity medical decision making to treat acute and unstable vital organ system failure, and to prevent life threatening deterioration of the patients condition. I personally:  · Reviewed the flowsheet and previous days notes  · Reviewed and summarized records or history from previous days note or discussions with staff, family  · Parenteral controlled substances - Reviewed/ Adjusted / Jd [place] / Started  · High Risk Drug therapy requiring intensive monitoring for toxicity: eg steroids, pressors, antibiotics  · Reviewed and/or ordered Clinical lab tests  · Reviewed and/or ordered Radiology tests  · Reviewed and/or ordered of Medicine tests  · Independently visualized radiologic Images  · Reviewed the patients ECG / Telemetry  · discussed my assessment/management with : Consultants, Nursing, Respiratory Therapy, Hospitalist for coordination of care    Pt is critically ill. Time spent with pt and staff actively rendering care, managing pt and coordinating care as stated below;  35 minutes, exclusive of any procedures           Thank you for allowing us to participate in the care of this patient.       Nguyễn Ramirez MD

## 2020-04-25 NOTE — PROGRESS NOTES
Care of this patient assumed by this RN at time. Patient resting in no apparent distress with RR and effort WDP.   -Patient A&OX4  -Patient denies pain and is watching TV

## 2020-04-25 NOTE — PROGRESS NOTES
Bedside shift change report given to akosua (oncoming nurse) by mike (offgoing nurse). Report included the following information SBAR, Kardex, Intake/Output and Cardiac Rhythm afib. rec'd report, pt resting in bed, 0 s/sx of distress.

## 2020-04-26 ENCOUNTER — APPOINTMENT (OUTPATIENT)
Dept: GENERAL RADIOLOGY | Age: 70
DRG: 193 | End: 2020-04-26
Attending: FAMILY MEDICINE
Payer: MEDICARE

## 2020-04-26 LAB
ANION GAP SERPL CALC-SCNC: 8 MMOL/L (ref 5–15)
ARTERIAL PATENCY WRIST A: YES
BASE DEFICIT BLD-SCNC: 12 MMOL/L
BASOPHILS # BLD: 0.1 K/UL (ref 0–0.1)
BASOPHILS NFR BLD: 1 % (ref 0–1)
BDY SITE: ABNORMAL
BUN SERPL-MCNC: 29 MG/DL (ref 6–20)
BUN/CREAT SERPL: 12 (ref 12–20)
CA-I BLD-SCNC: 1.3 MMOL/L (ref 1.12–1.32)
CALCIUM SERPL-MCNC: 8.5 MG/DL (ref 8.5–10.1)
CHLORIDE SERPL-SCNC: 114 MMOL/L (ref 97–108)
CO2 SERPL-SCNC: 18 MMOL/L (ref 21–32)
CREAT SERPL-MCNC: 2.46 MG/DL (ref 0.55–1.02)
DIFFERENTIAL METHOD BLD: ABNORMAL
EOSINOPHIL # BLD: 0.3 K/UL (ref 0–0.4)
EOSINOPHIL NFR BLD: 5 % (ref 0–7)
ERYTHROCYTE [DISTWIDTH] IN BLOOD BY AUTOMATED COUNT: 15 % (ref 11.5–14.5)
GAS FLOW.O2 O2 DELIVERY SYS: ABNORMAL L/MIN
GAS FLOW.O2 SETTING OXYMISER: 6 L/M
GLUCOSE SERPL-MCNC: 85 MG/DL (ref 65–100)
HCO3 BLD-SCNC: 15.2 MMOL/L (ref 22–26)
HCT VFR BLD AUTO: 31.5 % (ref 35–47)
HGB BLD-MCNC: 9.4 G/DL (ref 11.5–16)
IMM GRANULOCYTES # BLD AUTO: 0.1 K/UL (ref 0–0.04)
IMM GRANULOCYTES NFR BLD AUTO: 1 % (ref 0–0.5)
LYMPHOCYTES # BLD: 1.1 K/UL (ref 0.8–3.5)
LYMPHOCYTES NFR BLD: 16 % (ref 12–49)
MCH RBC QN AUTO: 29.6 PG (ref 26–34)
MCHC RBC AUTO-ENTMCNC: 29.8 G/DL (ref 30–36.5)
MCV RBC AUTO: 99.1 FL (ref 80–99)
MONOCYTES # BLD: 0.8 K/UL (ref 0–1)
MONOCYTES NFR BLD: 12 % (ref 5–13)
NEUTS SEG # BLD: 4.5 K/UL (ref 1.8–8)
NEUTS SEG NFR BLD: 65 % (ref 32–75)
NRBC # BLD: 0.02 K/UL (ref 0–0.01)
NRBC BLD-RTO: 0.3 PER 100 WBC
O2/TOTAL GAS SETTING VFR VENT: 44 %
PCO2 BLD: 32 MMHG (ref 35–45)
PH BLD: 7.28 [PH] (ref 7.35–7.45)
PLATELET # BLD AUTO: 234 K/UL (ref 150–400)
PMV BLD AUTO: 10.3 FL (ref 8.9–12.9)
PO2 BLD: 76 MMHG (ref 80–100)
POTASSIUM SERPL-SCNC: 4.5 MMOL/L (ref 3.5–5.1)
RBC # BLD AUTO: 3.18 M/UL (ref 3.8–5.2)
SAO2 % BLD: 93 % (ref 92–97)
SODIUM SERPL-SCNC: 140 MMOL/L (ref 136–145)
SPECIMEN TYPE: ABNORMAL
TOTAL RESP. RATE, ITRR: 23
WBC # BLD AUTO: 6.8 K/UL (ref 3.6–11)

## 2020-04-26 PROCEDURE — C9113 INJ PANTOPRAZOLE SODIUM, VIA: HCPCS | Performed by: FAMILY MEDICINE

## 2020-04-26 PROCEDURE — 36415 COLL VENOUS BLD VENIPUNCTURE: CPT

## 2020-04-26 PROCEDURE — 74011250636 HC RX REV CODE- 250/636: Performed by: INTERNAL MEDICINE

## 2020-04-26 PROCEDURE — 77010033678 HC OXYGEN DAILY

## 2020-04-26 PROCEDURE — 94640 AIRWAY INHALATION TREATMENT: CPT

## 2020-04-26 PROCEDURE — 74011000250 HC RX REV CODE- 250: Performed by: FAMILY MEDICINE

## 2020-04-26 PROCEDURE — 65660000000 HC RM CCU STEPDOWN

## 2020-04-26 PROCEDURE — 74011000258 HC RX REV CODE- 258: Performed by: FAMILY MEDICINE

## 2020-04-26 PROCEDURE — 36600 WITHDRAWAL OF ARTERIAL BLOOD: CPT

## 2020-04-26 PROCEDURE — 85025 COMPLETE CBC W/AUTO DIFF WBC: CPT

## 2020-04-26 PROCEDURE — 74011250637 HC RX REV CODE- 250/637: Performed by: FAMILY MEDICINE

## 2020-04-26 PROCEDURE — 80048 BASIC METABOLIC PNL TOTAL CA: CPT

## 2020-04-26 PROCEDURE — 74011250636 HC RX REV CODE- 250/636: Performed by: FAMILY MEDICINE

## 2020-04-26 PROCEDURE — 71045 X-RAY EXAM CHEST 1 VIEW: CPT

## 2020-04-26 PROCEDURE — 82803 BLOOD GASES ANY COMBINATION: CPT

## 2020-04-26 RX ORDER — PANTOPRAZOLE SODIUM 40 MG/1
40 TABLET, DELAYED RELEASE ORAL
Status: DISCONTINUED | OUTPATIENT
Start: 2020-04-27 | End: 2020-05-02 | Stop reason: HOSPADM

## 2020-04-26 RX ORDER — DILTIAZEM HYDROCHLORIDE 30 MG/1
30 TABLET, FILM COATED ORAL
Status: DISCONTINUED | OUTPATIENT
Start: 2020-04-26 | End: 2020-04-28

## 2020-04-26 RX ADMIN — TRAZODONE HYDROCHLORIDE 50 MG: 50 TABLET ORAL at 20:56

## 2020-04-26 RX ADMIN — SODIUM CHLORIDE 40 MG: 9 INJECTION INTRAMUSCULAR; INTRAVENOUS; SUBCUTANEOUS at 08:20

## 2020-04-26 RX ADMIN — ASPIRIN 81 MG: 81 TABLET ORAL at 08:20

## 2020-04-26 RX ADMIN — MELATONIN 1 TABLET: at 08:20

## 2020-04-26 RX ADMIN — CEFTRIAXONE 1 G: 1 INJECTION, POWDER, FOR SOLUTION INTRAMUSCULAR; INTRAVENOUS at 20:55

## 2020-04-26 RX ADMIN — ALBUTEROL SULFATE 2 PUFF: 90 AEROSOL, METERED RESPIRATORY (INHALATION) at 01:08

## 2020-04-26 RX ADMIN — ENOXAPARIN SODIUM 70 MG: 100 INJECTION SUBCUTANEOUS at 12:30

## 2020-04-26 RX ADMIN — Medication 10 ML: at 06:34

## 2020-04-26 RX ADMIN — AZITHROMYCIN DIHYDRATE 500 MG: 500 INJECTION, POWDER, LYOPHILIZED, FOR SOLUTION INTRAVENOUS at 19:57

## 2020-04-26 RX ADMIN — PRAVASTATIN SODIUM 20 MG: 40 TABLET ORAL at 20:56

## 2020-04-26 RX ADMIN — LEVOTHYROXINE SODIUM 50 MCG: 0.05 TABLET ORAL at 08:20

## 2020-04-26 RX ADMIN — TOPIRAMATE 50 MG: 25 TABLET, FILM COATED ORAL at 20:56

## 2020-04-26 NOTE — PROGRESS NOTES
Progress Note      4/26/2020 9:38 AM  NAME: Vikram Guzman   MRN:  [id number]   Admit Diagnosis: Atrial fibrillation with RVR ([facility] [address] [I48.91]      Problem List:     1. Persistent atrial fibrillation  2. Hypoxic respiratory failure  3. Hypertension  4. Hyperlipidemia  5. Remote CAD and MI history  6. Remote CVA  7. Chronic kidney disease; Stg 3  8. Ovarian cancer  9. COVID-19 rule out  10. Cardiologist:  [place]     Assessment/Plan:   Rate control will be goal; currently in 90s  Remains in AFib    Echo w/ EF 50%, CLARISSA, mildly dil RV, mod-sev MR, mod TR, PAp 26, small pericardial effusion    Events noted yesterday; inc WOB, hypoTN. CXR w/ increased opacity. BB/CCB held all day yesterday. 1. Continue oral diltiazem if able for HR control (if able); can reduce to 30mg and see where she goes. Goal will be for lenient rate control (HR < 110). No great option for rate control. Dig not the best idea w/ sCr. Can consider amio if hemodynamics become an issue. 2. Hold nebivolol  3. Continue lovenox   4. Continue ASA  5. Continue statin  6. Records still pending  7. Pending COVID-19 testing (drawn 4/23)         [x]       High complexity decision making was performed in this patient at high risk for decompensation with multiple organ involvement. Subjective:     Vikram Guzman is nonverbal.  Discussed with RN events overnight. Review of Systems:    Symptom Y/N Comments  Symptom Y/N Comments   Fever/Chills N   Chest Pain N    Poor Appetite N   Edema N    Cough N   Abdominal Pain N    Sputum N   Joint Pain N    SOB/RIVERS N   Pruritis/Rash N    Nausea/vomit N   Tolerating PT/OT Y    Diarrhea N   Tolerating Diet Y    Constipation N   Other       Could NOT obtain due to:      Objective:      Physical Exam:    Last 24hrs VS reviewed since prior progress note.  Most recent are:    Visit Vitals  /71 (BP 1 [facility]: Left arm, BP Patient Position: At rest)   Pulse (!) 114   Temp 99.3 °F (37.4 °C)   Resp 22   Ht 4' 11\" (1.499 m)   Wt 73.5 kg (162 lb 1.6 oz)   SpO2 96%   BMI 32.74 kg/m²       Intake/Output Summary (Last 24 hours) at 4/26/2020 0809  Last data filed at 4/25/2020 2055  Gross per 24 hour   Intake 1526.25 ml   Output    Net 1526.25 ml        General Appearance: Well developed, well nourished, alert & oriented x 3,    no acute distress. Slurred speech. R facial droop. Ears/Nose/Mouth/Throat: Hearing grossly normal.  Neck: Supple. Chest: Lungs with decreased BS diffusely; scattered rhonchi  Cardiovascular: Irregular rate and rhythm, S1S2 normal, no murmur. Abdomen: Soft, non-tender, bowel sounds are active. Extremities: No edema bilaterally. Skin: Warm and dry. []         Post-cath site without hematoma, bruit, tenderness, or thrill. Distal pulses intact. PMH/ reviewed - no change compared to H&P    Data Review    Telemetry: AF     EKG:   [x]  No new EKG for review    Lab Data Personally Reviewed:    Recent Labs     04/26/20  0424 04/25/20  1137   WBC 6.8 7.0   HGB 9.4* 9.4*   HCT 31.5* 30.5*    250     Recent Labs     04/25/20  0246 04/24/20  0334 04/23/20  2115   APTT 27.6 88.4* 22.3      Recent Labs     04/26/20  0424 04/25/20  1137 04/24/20  0334    142 143   K 4.5 4.7 4.9   * 115* 114*   CO2 18* 17* 20*   BUN 29* 29* 26*   CREA 2.46* 2.60* 2.22*   GLU 85 108* 90   CA 8.5 8.3* 8.4*   MG  --   --  2.0     Recent Labs     04/23/20  1838   TROIQ <0.05     Lab Results   Component Value Date/Time    Cholesterol, total 238 (H) 03/26/2019 03:51 PM    HDL Cholesterol 61 03/26/2019 03:51 PM    LDL, calculated 151 (H) 03/26/2019 03:51 PM    Triglyceride 130 03/26/2019 03:51 PM    CHOL/HDL Ratio 3.0 11/06/2012 11:06 PM       Recent Labs     04/24/20  0334 04/23/20  1838   SGOT 12* 11*   AP 92 105   TP 6.6 7.4   ALB 3.3* 3.7   GLOB 3.3 3.7     No results for input(s): PH, PCO2, PO2 in the last 72 hours.     Medications Personally Reviewed:    Current Facility-Administered Medications Medication Dose Route Frequency    albuterol (PROVENTIL HFA, VENTOLIN HFA, PROAIR HFA) inhaler 2 Puff  2 Puff Inhalation Q4H PRN    enoxaparin (LOVENOX) injection 70 mg  1 mg/kg SubCUTAneous Q24H    dilTIAZem IR (CARDIZEM) IR tablet 60 mg  60 mg Oral TIDAC    sodium chloride (NS) flush 5-40 mL  5-40 mL IntraVENous Q8H    sodium chloride (NS) flush 5-40 mL  5-40 mL IntraVENous PRN    traZODone (DESYREL) tablet 50 mg  50 mg Oral QHS    pravastatin (PRAVACHOL) tablet 20 mg  20 mg Oral QHS    aspirin delayed-release tablet 81 mg  81 mg Oral DAILY    butalbital-acetaminophen-caffeine (FIORICET, ESGIC) -40 mg per tablet 1 Tab  1 Tab Oral Q6H PRN    topiramate (TOPAMAX) tablet 50 mg  50 mg Oral QHS    levothyroxine (SYNTHROID) tablet 50 mcg  50 mcg Oral ACB    cholecalciferol (VITAMIN D3) (1000 Units /25 mcg) tablet 1 Tab  1,000 Units Oral DAILY    nebivoloL (BYSTOLIC) tablet 20 mg  20 mg Oral DAILY    acetaminophen (TYLENOL) tablet 650 mg  650 mg Oral Q6H PRN    Or    acetaminophen (TYLENOL) suppository 650 mg  650 mg Rectal Q6H PRN    sodium chloride (NS) flush 5-40 mL  5-40 mL IntraVENous Q8H    sodium chloride (NS) flush 5-40 mL  5-40 mL IntraVENous PRN    cefTRIAXone (ROCEPHIN) 1 g in 0.9% sodium chloride (MBP/ADV) 50 mL  1 g IntraVENous Q24H    azithromycin (ZITHROMAX) 500 mg in 0.9% sodium chloride (MBP/ADV) 250 mL  500 mg IntraVENous Q24H    guaiFENesin-dextromethorphan (ROBITUSSIN DM) 100-10 mg/5 mL syrup 10 mL  10 mL Oral Q6H PRN    melatonin tablet 3 mg  3 mg Oral QHS PRN    pantoprazole (PROTONIX) 40 mg in 0.9% sodium chloride 10 mL injection  40 mg IntraVENous DAILY         Cleda Avendaño III, DO

## 2020-04-26 NOTE — PROGRESS NOTES
Bedside shift change report given to akosua (oncoming nurse) by Chasidy Figueroa   (offgoing nurse). Report included the following information SBAR, Kardex, MAR, Med Rec Status and Cardiac Rhythm afib. rec'd report, pt resting in bed, 0 s/sx of distress.

## 2020-04-26 NOTE — PROGRESS NOTES
PER RN,\"Patient has visibly labored breathing, very congested lung sounds. 97% on 5L/NC\",  Patient admitted with pneumonia, COVID rule out, on IV abx, pulmonary following.   Albuterol prn ordered, ABG stat,Chest X ray stat portal. Continue to monitor, may need BIPAP or high flow N.[place].

## 2020-04-26 NOTE — PROGRESS NOTES
PULMONARY ASSOCI[facility] INTENSIVIST Consult Service Note  Pulmonary, Critical Care, and Sleep Medicine    Name: Jesse So MRN: [id number]   : 1950 Hospital: [facility]   Date: 2020  Admission date: 2020 Hospital Day: 4       Subjective/Interval History:       D/w RN- better overall  Less cough  Remains in afib  BP better w med held yesterday  ABG done early am-- NAG met acid w CRI    CXR congestion w persistent r base inflitrate - no overt progression  Remains on 4-6 liters NC  CRP 8   Covid still pending          Chart reviewed  remains in afib   BP down this am after anti-HTN  Fluid bolus yesterday helpful  Barking NP cough. .. No labs today  Less GI sx per report  d/w RN    IMPRESSION:   1. Acute Respiratory Failure with Hypoxia,   2. COVID 19 suspect; results pending: date of testing ; risk factors? 3. Sepsis and at risk for Cytokine Release Syndrome  4. Hypotension on arrival  5. Possible COVID associated gastroenteritis- Diarrhea past two days and rthat prompted her to come in and had emesis, slept onbathroom floor fot two days for convenience. Pt would not let them in the house. No phone, no food. 6. CAP- Right Middle lobe on CXR;  IV Rocephin,azithromycin   7. CHF, Chronic   8. H/o CVA 2016 right side and fully recovered   9. AFIB RVR d/w Dr. Luda Preston   10. ROJELIO IVF hydration needed/ CKD  Body mass index is 32.74 kg/m². Ovarian CA  11. Additional workup outlined below  12. Multiorgan dysfunction as outlined above: Pt has one or more acute or chronic illnesses with severe exacerbation with progression or side effects of treatment that poses a threat to life or bodily function  13. Pt is unstable, unpredictable needing more inpatient monitoring; at high risk of sudden decline and decompensation with life threatening consequenses and continued end organ dysfunction and failure      RECOMMENDATIONS/PLAN:   1.  Please make sure pt always has two good PIV if possible   2. Continue ABx per protocol; azithromycin, ceftriaxone   3. Sputum culture  4. Holding BP meds!!!  5. Gentle rehydration- rebolus  6. Recheck labs--> hgb down yesterday ? worsening anemia  7. O2 to keep sats > 93% but if gas exchange worsens must be prepared for abrupt decline in 12 -24 hours  8. If covid positive, follow inflammatory markers carefully including DDimer, Ferritin, CK, CRP, LD, IL 6 level   9. Consider HIV testing if unknown status   10. IV Fluids if pt has had GI sx with close watch of fluid status and avoid volume overload  11. If pt deteriorates and develops need for more oxygen, pt should receive SCTocilizumab but quantities very limited - please call to discuss  12. Awake and sleep proning as much as tolerable 12-18 hours/ 24 hours  13. Incentive spirometer   14. Empower and encourage pt to cover mouth and muffle cough and work with nurses to mitigate droplet or aerosol production by giving pt something to cough into( gowns have no sleeves)   15. No systemic steroids   16. No aerosolization producing procedures or devices unless in negative pressure room and even then only with select patients  17. Inhalers- bronchodilators if needed   18. NPO if intubation anticipated   19. Supplemental O2 for refractory hypoxia to keep sats > 93%  20. Low threshold to Intubate and place on vent if gas exchange declines;    21. Agree with Empiric IV antibiotics pending culture results   22. Follow culture results   23. Replete electrolytes  24. Probiotics   25. Antitussives   26. Antiemetics prn  27. Nutritional supplements at a minimum  28. Minimize imaging, procedures that may exposure others to infections  29. Labs to follow electrolytes, renal function and and blood counts   30. Prescription drug management with home med reconciliation reviewed  31. DVT prophylaxis   32. pt counseling with enduring education about protection, public health safety topics   33.  Clarify goals of care independent of COVID status and consider enlisting help from palliative care team; families are scared        Subjective/Initial History:   I have reviewed the flowsheet and previous days notes. Seen earlier today on rounds. I was asked by Naatcha Zuluaga MD to see Timothy Sosa a 69 y.o.  female  in consultation for a chief complaint of peenumonia, hypotension and rspiratory failurein a     Excerpts from admission 4/23/2020 and consult notes reviewed as follows:     \"Ms. Mansoor Prado is a 69 y.o.  female who is admitted with AFIB RVR. Ms. Mansoor Prado presented to the Emergency Department today complaining of shortness of breath, coughing, has phlegm, also nausea,vomiting,diarrhea, worsening breathing for the past three weeks. Patient denies any chest pain or abdominal pain. Patient had low grade fever at home, generalized weakness, fatigue. Has sick contacts. No swelling of her legs. Patient has a hx of CVA, overall a poor historian. Patient admitted for pneumonia, AFIB RVR, cardizem drip, IV abx. \"     Pt would not let family in for fear of them catching something. Past two days, she slept in her bathroom because diarrhea was significant and she had a bout of emesis as well. No blood. Remote CVA four yrs ago. Never smoked. Never lung problems until this. Sputum has been very thick. Pale. Feels better on oxygen. Otheriwse lived alone. 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 Right side weakness fully recoverred. Travel Screening       Question Response     Do you have any of the following symptoms? Cough     In the last month, have you been in contact with someone who was confirmed or suspected to have Coronavirus / COVID-19? No / Unsure     Have you traveled internationally in the last month?  No      Travel History   Travel since 03/24/20     No documented travel since 03/24/20          Patient PCP: Papa Parisi MD  [facility]:  has a past medical history of Cancer [facility], CVA (cerebral vascular accident) ([facility] [address] Heart failure ([facility] [address] and Hypertension. PSH:   has a past surgical history that includes hx gyn; pr abdomen surgery proc unlisted; and hx lumbar laminectomy (06/29/2016). FHX: family history includes Cancer in her brother; Diabetes in her brother and brother; Heart Disease in her brother; Hypertension in her mother, sister, sister, sister, sister, and sister; Suicide in her brother and brother. SHX:  reports that she has never smoked. She has never used smokeless tobacco. She reports previous alcohol use. She reports that she does not use drugs. ROS:A comprehensive review of systems was negative except for that written in the HPI.     No Known Allergies   MEDS:   Current Facility-Administered Medications   Medication    dilTIAZem IR (CARDIZEM) IR tablet 30 mg    albuterol (PROVENTIL HFA, VENTOLIN HFA, PROAIR HFA) inhaler 2 Puff    enoxaparin (LOVENOX) injection 70 mg    sodium chloride (NS) flush 5-40 mL    sodium chloride (NS) flush 5-40 mL    traZODone (DESYREL) tablet 50 mg    pravastatin (PRAVACHOL) tablet 20 mg    aspirin delayed-release tablet 81 mg    butalbital-acetaminophen-caffeine (FIORICET, ESGIC) -40 mg per tablet 1 Tab    topiramate (TOPAMAX) tablet 50 mg    levothyroxine (SYNTHROID) tablet 50 mcg    cholecalciferol (VITAMIN D3) (1000 Units /25 mcg) tablet 1 Tab    acetaminophen (TYLENOL) tablet 650 mg    Or    acetaminophen (TYLENOL) suppository 650 mg    sodium chloride (NS) flush 5-40 mL    sodium chloride (NS) flush 5-40 mL    cefTRIAXone (ROCEPHIN) 1 g in 0.9% sodium chloride (MBP/ADV) 50 mL    azithromycin (ZITHROMAX) 500 mg in 0.9% sodium chloride (MBP/ADV) 250 mL    guaiFENesin-dextromethorphan (ROBITUSSIN DM) 100-10 mg/5 mL syrup 10 mL    melatonin tablet 3 mg    pantoprazole (PROTONIX) 40 mg in 0.9% sodium chloride 10 mL injection          Objective:     Vital Signs: Telemetry:    AFIB Intake/Output:   Visit Vitals  BP 122/79 (BP 1 [facility]: Left arm, BP Patient Position: At rest)   Pulse (!) 103   Temp 97.5 °F (36.4 °C)   Resp 20   Ht 4' 11\" (1.499 m)   Wt 73.5 kg (162 lb 1.6 oz)   SpO2 96%   BMI 32.74 kg/m²       Temp (24hrs), Av.7 °F (36.5 °C), Min:97.1 °F (36.2 °C), Max:99.3 °F (37.4 °C)        O2 Device: Nasal cannula O2 Flow Rate (L/min): 6 l/min         Body mass index is 32.74 kg/m². Wt Readings from Last 4 Encounters:   20 73.5 kg (162 lb 1.6 oz)   11/10/19 71.7 kg (158 lb)   10/01/19 71.8 kg (158 lb 4 oz)   19 68 kg (150 lb)          Intake/Output Summary (Last 24 hours) at 2020 1017  Last data filed at 2020 2055  Gross per 24 hour   Intake 1526.25 ml   Output    Net 1526.25 ml       Last shift:      No intake/output data recorded. Last 3 shifts:  1901 -  0700  In: 1526.3 [I.V.:1526.3]  Out: -        Physical Exam:  General:  female; appears mildly ill;  NC  HEENT: NCAT, lips and mucosa dry;   Eyes: anicteric; conjunctiva clear  Neck: no nodes, no cuff leak, no accessory MM use. Chest: no deformity, symmetric movement  Cardiac: IR regular; no murmur, tachycardia  Lungs: deferred due to risk and airborne precautions  Abd: soft, NT, hypoactive BS  Ext: no edema; no joint swelling;  No clubbing  : no gibbs, clear urine  Neuro: fluent, generalized weakness  Psych- no agitation, oriented to person;   Skin: warm, dry, no cyanosis;   Pulses: 1-2+ Bilateral pedal, radial  Capillary: brisk; pale          Labs:    Recent Labs     20  0424 20  1137 20  0246 20  0334 20  2115   WBC 6.8 7.0  --  8.5  --    HGB 9.4* 9.4*  --  9.5*  --     250  --  253  --    APTT  --   --  27.6 88.4* 22.3     Recent Labs     20  0424 20  1137 20  0334 20  0035 20  2115 20  1842 20  1838    142 143  --   --   --  140   K 4.5 4.7 4.9  --   --   --  4.3   * 115* 114*  --   --   --  111*   CO2 18* 17* 20*  --   -- --  20*   GLU 85 108* 90  --   --   --  166*   BUN 29* 29* 26*  --   --   --  24*   CREA 2.46* 2.60* 2.22*  --   --   --  2.31*   CA 8.5 8.3* 8.4*  --   --   --  8.9   MG  --   --  2.0  --   --   --   --    LAC  --   --   --  1.7 2.2* 2.2*  --    ALB  --   --  3.3*  --   --   --  3.7   SGOT  --   --  12*  --   --   --  11*   ALT  --   --  17  --   --   --  21     No results for input(s): PH, PCO2, PO2, HCO3, FIO2 in the last 72 hours. Recent Labs     04/23/20  1838   TROIQ <0.05     No results found for: BNPP, BNP   Lab Results   Component Value Date/Time    Culture result: ESCHERICHIA COLI 04/22/2012 04:00 PM      Lab Results   Component Value Date/Time     (H) 04/25/2012 06:25 PM     (H) 04/24/2012 04:50 PM       Imaging:  I have personally reviewed the patients radiographs and have reviewed the reports:    CXR Results  (Last 48 hours)               04/26/20 0057  XR CHEST PORT Final result    Impression:  IMPRESSION:       Right lung opacities are unchanged since yesterday but increased since 3 days   ago. Narrative:  EXAM: XR CHEST PORT       INDICATION: Increasing shortness of breath and congestion. COMPARISON: Portable chest earlier today at 5:56 AM and on 4/23/2020. TECHNIQUE: Semiupright portable chest AP view       FINDINGS: Cardiac monitoring wires overlie the thorax. Cardiomegaly is   unchanged. The pulmonary vasculature is within normal limits. Patchy airspace opacities in the right lung are increased since 3 days ago but   unchanged since yesterday. Left lung is clear. Probable small right pleural   effusion. No pneumothorax. Bones are unchanged. 04/25/20 0615  XR CHEST PORT Final result    Impression:  IMPRESSION:    1. Interval worsening of heterogeneous opacity at the right lung base, favored   to represent pneumonia. 2. Unchanged cardiomegaly.            Narrative:  EXAM:  XR CHEST PORT       INDICATION:   respiratory failure       COMPARISON: Chest radiograph 4/23/2020. FINDINGS: AP radiograph of the chest was obtained. Interval worsening of heterogeneous opacity at the right lung base. Unchanged   cardiomegaly. No pneumothorax. No acute osseous abnormality. Results from [place] encounter on 04/23/20   XR CHEST PORT    Narrative EXAM: XR CHEST PORT    INDICATION: Increasing shortness of breath and congestion. COMPARISON: Portable chest earlier today at 5:56 AM and on 4/23/2020. TECHNIQUE: Semiupright portable chest AP view    FINDINGS: Cardiac monitoring wires overlie the thorax. Cardiomegaly is  unchanged. The pulmonary vasculature is within normal limits. Patchy airspace opacities in the right lung are increased since 3 days ago but  unchanged since yesterday. Left lung is clear. Probable small right pleural  effusion. No pneumothorax. Bones are unchanged. Impression IMPRESSION:    Right lung opacities are unchanged since yesterday but increased since 3 days  ago. XR CHEST PORT    Narrative EXAM:  XR CHEST PORT    INDICATION:   respiratory failure    COMPARISON: Chest radiograph 4/23/2020. FINDINGS: AP radiograph of the chest was obtained. Interval worsening of heterogeneous opacity at the right lung base. Unchanged  cardiomegaly. No pneumothorax. No acute osseous abnormality. Impression IMPRESSION:   1. Interval worsening of heterogeneous opacity at the right lung base, favored  to represent pneumonia. 2. Unchanged cardiomegaly. XR CHEST PORT    Narrative EXAM: XR CHEST PORT    INDICATION: Shortness of breath x3 weeks with cough and labored breathing. COMPARISON: Chest x-ray 4/14/2015. FINDINGS: A portable AP radiograph of the chest was obtained at 19:27 hours. The  patient is on a cardiac monitor. There is suspected airspace infiltrate  appearing the right heart margin with otherwise clear lungs.  The cardiac and  mediastinal contours and pulmonary vascularity are normal. Atherosclerotic  calcifications affect the aortic arch. The chest wall structures and visualized  upper abdomen show no acute findings with incidental note of degenerative spine  and shoulder changes. Impression IMPRESSION: Right middle lobe infiltrate suspicious for pneumonia in the  appropriate clinical setting. Results from [place] encounter on 09/13/16   CT SPINE LUMB WO CONT    Narrative INDICATION:  Lower back pain which is chronic without trauma. Radiates to both  legs. EXAM: CT lumbar spine. Comparison March 25, 2013. Thin section axial images were obtained. From these sagittal and coronal  reformats were performed. CT dose reduction was achieved through use of a  standardized protocol tailored for this examination and automatic exposure  control for dose modulation. Adaptive statistical iterative reconstruction  (ASIR) was utilized. Study was performed per the [facility] protocol    FINDINGS: Anterolisthesis of L4 on L5 and retrolisthesis of L5 on S1 is again  noted with significant degeneration of both these levels. There is extrusion  posterior to L4 with significant narrowing of the bilateral foramen and canal at  this level and at L4-5 there is a diffuse disc bulge asymmetric to the left  narrowing the left subarticular zone and severe bilateral foraminal narrowing. There is no evidence of acute fracture. Minimal degenerative changes are noted  of the remaining lumbar disc levels. There is scarring of both kidneys and the  aorta is atherosclerotic      Impression IMPRESSION:  1. Significant degenerative changes L4-5 and L5-S1. Study performed for  preoperative planning              Thank you for allowing us to participate in the care of this patient.       Lucien Land MD

## 2020-04-26 NOTE — PROGRESS NOTES
Pharmacy - Enoxaparin (Lovenox®) Monitoring      Indication: Afib with RVR     Current Dose: Enoxaparin 70 mg subcutaneously every 24 hours    Creatinine Clearance (mL/min): 15.1 mL/min (IBW)     Current Weight: 73 Kg    Labs:  Recent Labs     04/26/20  0424 04/25/20  1137 04/24/20  0334   CREA 2.46* 2.60* 2.22*   HGB 9.4* 9.4* 9.5*    250 253     Wt Readings from Last 1 Encounters:   04/25/20 73.5 kg (162 lb 1.6 oz)     Ht Readings from Last 1 Encounters:   04/24/20 149.9 cm (59\")       Impression/Plan:   Crcl <20  Enoxaparin dose not given yesterday due to drop in Hg per chart review  Continue current dose.   Anti-Xa level changed to tomorrow 4/27 at 1600 (4 hr after 2nd dose)     Thanks,  Di Burton, [address]

## 2020-04-27 ENCOUNTER — APPOINTMENT (OUTPATIENT)
Dept: GENERAL RADIOLOGY | Age: 70
DRG: 193 | End: 2020-04-27
Attending: INTERNAL MEDICINE
Payer: MEDICARE

## 2020-04-27 LAB
ANION GAP SERPL CALC-SCNC: 9 MMOL/L (ref 5–15)
BASOPHILS # BLD: 0.1 K/UL (ref 0–0.1)
BASOPHILS NFR BLD: 1 % (ref 0–1)
BUN SERPL-MCNC: 27 MG/DL (ref 6–20)
BUN/CREAT SERPL: 13 (ref 12–20)
CALCIUM SERPL-MCNC: 9.3 MG/DL (ref 8.5–10.1)
CHLORIDE SERPL-SCNC: 113 MMOL/L (ref 97–108)
CO2 SERPL-SCNC: 18 MMOL/L (ref 21–32)
CREAT SERPL-MCNC: 2.16 MG/DL (ref 0.55–1.02)
CRP SERPL-MCNC: 0.82 MG/DL (ref 0–0.6)
DIFFERENTIAL METHOD BLD: ABNORMAL
EOSINOPHIL # BLD: 0.3 K/UL (ref 0–0.4)
EOSINOPHIL NFR BLD: 5 % (ref 0–7)
ERYTHROCYTE [DISTWIDTH] IN BLOOD BY AUTOMATED COUNT: 14.7 % (ref 11.5–14.5)
GLUCOSE SERPL-MCNC: 107 MG/DL (ref 65–100)
HCT VFR BLD AUTO: 31.5 % (ref 35–47)
HGB BLD-MCNC: 9.6 G/DL (ref 11.5–16)
IMM GRANULOCYTES # BLD AUTO: 0 K/UL (ref 0–0.04)
IMM GRANULOCYTES NFR BLD AUTO: 1 % (ref 0–0.5)
LYMPHOCYTES # BLD: 0.9 K/UL (ref 0.8–3.5)
LYMPHOCYTES NFR BLD: 14 % (ref 12–49)
MCH RBC QN AUTO: 29.6 PG (ref 26–34)
MCHC RBC AUTO-ENTMCNC: 30.5 G/DL (ref 30–36.5)
MCV RBC AUTO: 97.2 FL (ref 80–99)
MONOCYTES # BLD: 0.7 K/UL (ref 0–1)
MONOCYTES NFR BLD: 11 % (ref 5–13)
NEUTS SEG # BLD: 4.5 K/UL (ref 1.8–8)
NEUTS SEG NFR BLD: 68 % (ref 32–75)
NRBC # BLD: 0 K/UL (ref 0–0.01)
NRBC BLD-RTO: 0 PER 100 WBC
PLATELET # BLD AUTO: 238 K/UL (ref 150–400)
PMV BLD AUTO: 10.5 FL (ref 8.9–12.9)
POTASSIUM SERPL-SCNC: 4.3 MMOL/L (ref 3.5–5.1)
RBC # BLD AUTO: 3.24 M/UL (ref 3.8–5.2)
SODIUM SERPL-SCNC: 140 MMOL/L (ref 136–145)
WBC # BLD AUTO: 6.5 K/UL (ref 3.6–11)

## 2020-04-27 PROCEDURE — 74011250636 HC RX REV CODE- 250/636: Performed by: INTERNAL MEDICINE

## 2020-04-27 PROCEDURE — 71045 X-RAY EXAM CHEST 1 VIEW: CPT

## 2020-04-27 PROCEDURE — 36415 COLL VENOUS BLD VENIPUNCTURE: CPT

## 2020-04-27 PROCEDURE — 74011250637 HC RX REV CODE- 250/637: Performed by: INTERNAL MEDICINE

## 2020-04-27 PROCEDURE — 65660000000 HC RM CCU STEPDOWN

## 2020-04-27 PROCEDURE — 85520 HEPARIN ASSAY: CPT

## 2020-04-27 PROCEDURE — 80048 BASIC METABOLIC PNL TOTAL CA: CPT

## 2020-04-27 PROCEDURE — 77010033678 HC OXYGEN DAILY

## 2020-04-27 PROCEDURE — 74011250637 HC RX REV CODE- 250/637: Performed by: FAMILY MEDICINE

## 2020-04-27 PROCEDURE — 74011250636 HC RX REV CODE- 250/636: Performed by: FAMILY MEDICINE

## 2020-04-27 PROCEDURE — 85025 COMPLETE CBC W/AUTO DIFF WBC: CPT

## 2020-04-27 PROCEDURE — 86140 C-REACTIVE PROTEIN: CPT

## 2020-04-27 PROCEDURE — 74011000258 HC RX REV CODE- 258: Performed by: FAMILY MEDICINE

## 2020-04-27 RX ADMIN — TRAZODONE HYDROCHLORIDE 50 MG: 50 TABLET ORAL at 21:20

## 2020-04-27 RX ADMIN — LEVOTHYROXINE SODIUM 50 MCG: 0.05 TABLET ORAL at 08:25

## 2020-04-27 RX ADMIN — DILTIAZEM HYDROCHLORIDE 30 MG: 30 TABLET, FILM COATED ORAL at 08:25

## 2020-04-27 RX ADMIN — ENOXAPARIN SODIUM 70 MG: 100 INJECTION SUBCUTANEOUS at 11:23

## 2020-04-27 RX ADMIN — TOPIRAMATE 50 MG: 25 TABLET, FILM COATED ORAL at 21:20

## 2020-04-27 RX ADMIN — PANTOPRAZOLE SODIUM 40 MG: 40 TABLET, DELAYED RELEASE ORAL at 08:25

## 2020-04-27 RX ADMIN — Medication 10 ML: at 14:53

## 2020-04-27 RX ADMIN — DILTIAZEM HYDROCHLORIDE 30 MG: 30 TABLET, FILM COATED ORAL at 11:23

## 2020-04-27 RX ADMIN — MELATONIN 1 TABLET: at 08:25

## 2020-04-27 RX ADMIN — ASPIRIN 81 MG: 81 TABLET ORAL at 08:25

## 2020-04-27 RX ADMIN — Medication 10 ML: at 14:54

## 2020-04-27 RX ADMIN — PRAVASTATIN SODIUM 20 MG: 40 TABLET ORAL at 21:20

## 2020-04-27 RX ADMIN — DILTIAZEM HYDROCHLORIDE 30 MG: 30 TABLET, FILM COATED ORAL at 16:10

## 2020-04-27 RX ADMIN — AZITHROMYCIN DIHYDRATE 500 MG: 500 INJECTION, POWDER, LYOPHILIZED, FOR SOLUTION INTRAVENOUS at 19:21

## 2020-04-27 RX ADMIN — CEFTRIAXONE 1 G: 1 INJECTION, POWDER, FOR SOLUTION INTRAMUSCULAR; INTRAVENOUS at 23:08

## 2020-04-27 NOTE — PROGRESS NOTES
Reason for Admission:   Patient came to ed for complaint of sob, cough, palpitation, nausea and vomiting, weakness and low grade fever. In ed patient was noticed to have rapid atrial fib. RUR Score:    15%                 Plan for utilizing home health:  Patient has had [facility] in the past.         PCP: First and Last name:  Unknown Burkitt      Name of Practice:  [facility]. Are you a current patient: Yes/No: Yes     Approximate date of last visit: October 1,2019. Current Advanced Directive/Advance Care Plan:  Not on file. Patient states she wants to talk with her daughter before making the decision. Attempted to call the daughter several times but no answer. Transition of Care Plan:   Patient has hx prior mi, cva with intermittent expressive aphasia. Attempted to call daughter at [phone] and [phone] however no answer. Patient states she is on oxygen 24/7 at home however could not tell me how many liters or what company she purchased her oxygen from. Message left for daughter to return my call. Ramona Modi  RN BSN CRM        [phone]

## 2020-04-27 NOTE — PROGRESS NOTES
[facility] INTENSIVIST Consult Service Note  Pulmonary, Critical Care, and Sleep Medicine    Name: Elinor Kanner MRN: [id number]   : 1950 Hospital: [facility]   Date: 2020  Admission date: 2020 Hospital Day: 5         IMPRESSION:   1. Acute Respiratory Failure with Hypoxia,   2. COVID 19 suspect; results pending: date of testing ; risk factors? 3. Sepsis and at risk for Cytokine Release Syndrome  4. Hypotension on arrival  5. Possible COVID associated gastroenteritis- Diarrhea past two days and rthat prompted her to come in and had emesis, slept onbathroom floor fot two days for convenience. Pt would not let them in the house. No phone, no food. 6. CAP- Right Middle lobe on CXR;  IV Rocephin,azithromycin   7. CHF, Chronic   8. H/o CVA 2016 right side and fully recovered   9. AFIB RVR d/w Dr. Kiran Courser   10. ROJELIO IVF hydration needed/ CKD  11. Additional workup outlined below      RECOMMENDATIONS/PLAN:   1. Please make sure pt always has two good PIV if possible   2. ABx per protocol; azithromycin, ceftriaxone   3. Sputum culture  4. Hold BP meds  5. Gentle rehydration  6. O2 to keep sats > 93% but if gas exchange worsens must be prepared for abrupt decline in 12 -24 hours  7. If covid positive, follow inflammatory markers carefully including DDimer, Ferritin, CK, CRP, LD, IL 6 level   8. Consider HIV testing if unknown status   9. IV Fluids if pt has had GI sx with close watch of fluid status and avoid volume overload  10. If pt deteriorates and develops need for more oxygen, pt should receive SCTocilizumab but quantities very limited - please call to discuss  11. Incentive spirometer   12. Empower and encourage pt to cover mouth and muffle cough and work with nurses to mitigate droplet or aerosol production by giving pt something to cough into( gowns have no sleeves)   13. No systemic steroids   14.  No aerosolization producing procedures or devices unless in negative pressure room and even then only with select patients  15. Inhalers- bronchodilators if needed    16. Supplemental O2 for refractory hypoxia to keep sats > 90%    17. Agree with Empiric IV antibiotics pending culture results   18. Follow culture results   19. Replete electrolytes as needed  20. Probiotics   21. Antitussives   22. Antiemetics prn  23. Nutritional supplements at a minimum  24. Minimize imaging, procedures that may exposure others to infections  25. Labs to follow electrolytes, renal function and and blood counts   26. Prescription drug management with home med reconciliation reviewed  27. DVT prophylaxis   28. pt counseling with enduring education about protection, public health safety topics   29. Clarify goals of care independent of COVID status and consider enlisting help from palliative care team; families are scared        Subjective/Initial History:     No acute events overnight  No severe distress  No acute complaints        ROS:A comprehensive review of systems was negative except for that written in the HPI.     No Known Allergies   MEDS:   Current Facility-Administered Medications   Medication    dilTIAZem IR (CARDIZEM) IR tablet 30 mg    pantoprazole (PROTONIX) tablet 40 mg    Please draw LMWH (anti-xa) level 4 hours after enoxaparin dose due 4/27 at 1200    albuterol (PROVENTIL HFA, VENTOLIN HFA, PROAIR HFA) inhaler 2 Puff    enoxaparin (LOVENOX) injection 70 mg    sodium chloride (NS) flush 5-40 mL    sodium chloride (NS) flush 5-40 mL    traZODone (DESYREL) tablet 50 mg    pravastatin (PRAVACHOL) tablet 20 mg    aspirin delayed-release tablet 81 mg    butalbital-acetaminophen-caffeine (FIORICET, ESGIC) -40 mg per tablet 1 Tab    topiramate (TOPAMAX) tablet 50 mg    levothyroxine (SYNTHROID) tablet 50 mcg    cholecalciferol (VITAMIN D3) (1000 Units /25 mcg) tablet 1 Tab    acetaminophen (TYLENOL) tablet 650 mg    Or    acetaminophen (TYLENOL) suppository 650 mg    sodium chloride (NS) flush 5-40 mL    sodium chloride (NS) flush 5-40 mL    cefTRIAXone (ROCEPHIN) 1 g in 0.9% sodium chloride (MBP/ADV) 50 mL    azithromycin (ZITHROMAX) 500 mg in 0.9% sodium chloride (MBP/ADV) 250 mL    guaiFENesin-dextromethorphan (ROBITUSSIN DM) 100-10 mg/5 mL syrup 10 mL    melatonin tablet 3 mg          Objective:     Vital Signs: Telemetry:    AFIB Intake/Output:   Visit Vitals  /82 (BP 1 [facility]: Left arm, BP Patient Position: At rest)   Pulse (!) 107   Temp 97.9 °F (36.6 °C)   Resp 25   Ht 4' 11\" (1.499 m)   Wt 79.5 kg (175 lb 4.3 oz)   SpO2 95%   BMI 35.40 kg/m²       Temp (24hrs), Av.7 °F (36.5 °C), Min:97.4 °F (36.3 °C), Max:98 °F (36.7 °C)        O2 Device: Nasal cannula O2 Flow Rate (L/min): 6 l/min         Body mass index is 35.4 kg/m². Wt Readings from Last 4 Encounters:   20 79.5 kg (175 lb 4.3 oz)   11/10/19 71.7 kg (158 lb)   10/01/19 71.8 kg (158 lb 4 oz)   19 68 kg (150 lb)          Intake/Output Summary (Last 24 hours) at 2020 0817  Last data filed at 2020 0236  Gross per 24 hour   Intake 750 ml   Output 200 ml   Net 550 ml       Last shift:      No intake/output data recorded. Last 3 shifts:  1901 -  0700  In: 2276.3 [I.V.:2276.3]  Out: 200 [Urine:200]       Physical Exam:    General:  female; on NC; dry cough  HEENT: NCAT, lips and mucosa dry;   Eyes: anicteric; conjunctiva clear  Neck: no nodes, no cuff leak, no accessory MM use. Chest: no deformity, symmetric movement  Cardiac: IR regular; no murmur, tachycardia  Lungs: deferred due to risk and airborne precautions  Abd: soft, NT, hypoactive BS  Ext: no edema; no joint swelling;  No clubbing  : no gibbs, clear urine  Neuro: fluent, generalized weakness  Psych- no agitation, oriented to person;   Skin: warm, dry, no cyanosis;   Pulses: 1-2+ Bilateral pedal, radial  Capillary: brisk; pale      Labs:    Recent Labs     20  0241 04/26/20  0424 04/25/20  1137 04/25/20  0246   WBC 6.5 6.8 7.0  --    HGB 9.6* 9.4* 9.4*  --     234 250  --    APTT  --   --   --  27.6     Recent Labs     04/27/20  0243 04/26/20 0424 04/25/20  1137    140 142   K 4.3 4.5 4.7   * 114* 115*   CO2 18* 18* 17*   * 85 108*   BUN 27* 29* 29*   CREA 2.16* 2.46* 2.60*   CA 9.3 8.5 8.3*     No results for input(s): PH, PCO2, PO2, HCO3, FIO2 in the last 72 hours. No results for input(s): CPK, CKNDX, TROIQ in the last 72 hours. No lab exists for component: CPKMB  No results found for: BNPP, BNP   Lab Results   Component Value Date/Time    Culture result: ESCHERICHIA COLI 04/22/2012 04:00 PM      Lab Results   Component Value Date/Time     (H) 04/25/2012 06:25 PM     (H) 04/24/2012 04:50 PM       Imaging:  I have personally reviewed the patients radiographs and have reviewed the reports:    CXR: no acute changes    This care involved high complexity medical decision making to treat acute and unstable vital organ system failure, and to prevent life threatening deterioration of the patients condition. I personally:  · Reviewed the flowsheet and previous days notes  · Reviewed and summarized records or history from previous days note or discussions with staff, family  · Parenteral controlled substances - Reviewed/ Adjusted / Karenann Camp / Started  · High Risk Drug therapy requiring intensive monitoring for toxicity: eg steroids, pressors, antibiotics  · Reviewed and/or ordered Clinical lab tests  · Reviewed and/or ordered Radiology tests  · Reviewed and/or ordered of Medicine tests  · Independently visualized radiologic Images  · Reviewed the patients ECG / Telemetry  · discussed my assessment/management with : Consultants, Nursing, Respiratory Therapy, Hospitalist for coordination of care    Pt is critically ill.  Time spent with pt and staff actively rendering care, managing pt and coordinating care as stated below;  35 minutes, exclusive of any procedures           Thank you for allowing us to participate in the care of this patient.       Madai Resendiz MD

## 2020-04-27 NOTE — PROGRESS NOTES
Problem: Falls - Risk of  Goal: *Absence of Falls  Description: Document Saji Radford Fall Risk and appropriate interventions in the flowsheet. Outcome: Progressing Towards Goal  Note: Fall Risk Interventions:  Mobility Interventions: Bed/chair exit alarm    Mentation Interventions: Bed/chair exit alarm    Medication Interventions: Bed/chair exit alarm    Elimination Interventions: Bed/chair exit alarm, Call light in reach              Problem: Patient Education: Go to Patient Education Activity  Goal: Patient/Family Education  Outcome: Progressing Towards Goal     Problem: Pressure Injury - Risk of  Goal: *Prevention of pressure injury  Description: Document Aldo Scale and appropriate interventions in the flowsheet.   Outcome: Progressing Towards Goal  Note: Pressure Injury Interventions:       Moisture Interventions: Absorbent underpads, Internal/External urinary devices    Activity Interventions: PT/OT evaluation    Mobility Interventions: PT/OT evaluation    Nutrition Interventions: Offer support with meals,snacks and hydration    Friction and Shear Interventions: HOB 30 degrees or less                Problem: Patient Education: Go to Patient Education Activity  Goal: Patient/Family Education  Outcome: Progressing Towards Goal

## 2020-04-27 NOTE — PROGRESS NOTES
Bedside and Verbal shift change report given to Whitley Meneses (oncoming nurse) by Tory Sanderson (offgoing nurse).  Report included the following information SBAR, Kardex, Intake/Output, MAR, Recent Results and Cardiac Rhythm A.FIB.

## 2020-04-27 NOTE — PROGRESS NOTES
Bedside and Verbal shift change report [facility] and [facility] Jake RN (oncoming nurse) by Barry Okeefe RN (offgoing nurse). Report included the following information SBAR, Kardex, Intake/Output, MAR, Recent Results and Med Rec Status. 0740: Bedside report complete. 1445: Patient heart rate vinay at 33 bmp for 10 seconds. Patient stable and asymptomatic w/ blood pressure of 114/88. Dr. Lau Mins paged and notified. 1610: LMWH lab drawn and sent down.

## 2020-04-27 NOTE — PROGRESS NOTES
Hospitalist Progress Note    NAME: Mark Agarwal   :  1950   MRN:  [id number]       Assessment / Plan:  Acute Respiratory Failure with Hypoxia   Due to pneumonia, RML Pneumonia on X ray  Rule out COVID 19, PCR still pending from ? ? Isolation, hold HC with arrythmias  IV abx  Pulmonary to see  Clinically not in distress      Persistent Afib  RVR on admission  IV cardizem drip transition to PO  IV heparin drip changed to sq lovenox, defer to cards for long term AC  Echo  pend  Cardio on case     ROJELIO  IVF hydration  Hold nephrotoxic agents  Monitor crt slowly improving if not renal consult     CAP  Right Middle lobe  IV Rocephin,azithromycin  X ray Right middle lobe infiltrate suspicious for pneumonia in the  appropriate clinical setting     CHF, Chronic  Continue medical management  Well compensated  Echo     Hypothyroidism  Cont home dose     DVT Prophylaxis: lovenox  GI Prophylaxis:IV protonix  Code status full code   Baseline: AAOx 3     Subjective:     Chief Complaint / Reason for Physician Visit  No new complaints. Discussed with RN events overnight. Review of Systems:  Symptom Y/N Comments  Symptom Y/N Comments   Fever/Chills n   Chest Pain n    Poor Appetite n   Edema     Cough n   Abdominal Pain n    Sputum n   Joint Pain     SOB/RIVERS n   Pruritis/Rash     Nausea/vomit n   Tolerating PT/OT     Diarrhea n   Tolerating Diet y    Constipation n   Other       Could NOT obtain due to:      Objective:     VITALS:   Last 24hrs VS reviewed since prior progress note.  Most recent are:  Patient Vitals for the past 24 hrs:   Temp Pulse Resp BP SpO2   20 97.6 °F (36.4 °C) 96 20 127/77    20 1600  97      20 1231 97.4 °F (36.3 °C) 97 20 100/74    20 1200  97      20 0827 97.5 °F (36.4 °C) (!) 103 20 122/79 96 %   20 0800  (!) 108   98 %   20 0308 99.3 °F (37.4 °C) (!) 114 22 112/71 96 %   20 0110     96 %   20 2257 97.3 °F (36.3 °C) 95 20 117/65 97 %     No intake or output data in the 24 hours ending 04/26/20 7683     PHYSICAL EXAM:  General: WD, WN. Alert, cooperative, no acute distress    EENT:  EOMI. Anicteric sclerae. MMM  Resp:  CTA bilaterally, no wheezing or rales. No accessory muscle use  CV:  Regular  rhythm,  No edema  GI:  Soft, Non distended, Non tender.  +Bowel sounds  Neurologic:  Alert and oriented X 3, normal speech,   Psych:   Not anxious nor agitated  Skin:  No rashes. No jaundice    Reviewed most current lab test results and cultures  YES  Reviewed most current radiology test results   YES  Review and summation of old records today    NO  Reviewed patient's current orders and MAR    YES  PMH/SH reviewed - no change compared to H&P  ________________________________________________________________________  Care Plan discussed with:    Comments   Patient x    Family      RN x    Care Manager     Consultant                        Multidiciplinary team rounds were held today with , nursing, pharmacist and clinical coordinator. Patient's plan of care was discussed; medications were reviewed and discharge planning was addressed. ________________________________________________________________________  Total NON critical care TIME:  30   Minutes    Total CRITICAL CARE TIME Spent:   Minutes non procedure based      Comments   >50% of visit spent in counseling and coordination of care     ________________________________________________________________________  Candy Douse, DO     Procedures: see electronic medical records for all procedures/Xrays and details which were not copied into this note but were reviewed prior to creation of Plan. LABS:  I reviewed today's most current labs and imaging studies.   Pertinent labs include:  Recent Labs     04/26/20  0424 04/25/20  1137 04/24/20  0334   WBC 6.8 7.0 8.5   HGB 9.4* 9.4* 9.5*   HCT 31.5* 30.5* 30.4*    250 253     Recent Labs 04/26/20  0424 04/25/20  1137 04/24/20  0334    142 143   K 4.5 4.7 4.9   * 115* 114*   CO2 18* 17* 20*   GLU 85 108* 90   BUN 29* 29* 26*   CREA 2.46* 2.60* 2.22*   CA 8.5 8.3* 8.4*   MG  --   --  2.0   ALB  --   --  3.3*   TBILI  --   --  0.4   SGOT  --   --  12*   ALT  --   --  17       Signed: Bertha Wilkinson, DO

## 2020-04-27 NOTE — PROGRESS NOTES
Cardiology Progress Note      4/27/2020 8:43 AM    Admit Date: 4/23/2020          Subjective: Remains in afib with controlled rate. Visit Vitals  /82 (BP 1 [facility]: Left arm, BP Patient Position: At rest)   Pulse (!) 107   Temp 97.9 °F (36.6 °C)   Resp 25   Ht 4' 11\" (1.499 m)   Wt 79.5 kg (175 lb 4.3 oz)   SpO2 95%   BMI 35.40 kg/m²     04/25 1901 - 04/27 0700  In: 2276.3 [I.V.:2276.3]  Out: 200 [Urine:200]        Objective:      Physical Exam:  VS as above    Data Review:   Labs:    BUN 27  Creat 2.2  Hgb 9.6  CRP 0.82    Telemetry: afib R 70-80       Assessment:       1. Persistent atrial fibrillation  2. Hypoxic respiratory failure  3. Hypertension  4. Hyperlipidemia  5. Remote CAD and MI history, details unclear   6. Remote CVA  7. Chronic kidney disease; Stg 3-4  8. Ovarian cancer  9. COVID-19 rule out  Plan: Cont Lovenox and ASA. Stable cardiac wise. No additional recc. Will check back later in week.  Please call for questions

## 2020-04-28 LAB
ANION GAP SERPL CALC-SCNC: 9 MMOL/L (ref 5–15)
BASOPHILS # BLD: 0.1 K/UL (ref 0–0.1)
BASOPHILS NFR BLD: 1 % (ref 0–1)
BUN SERPL-MCNC: 24 MG/DL (ref 6–20)
BUN/CREAT SERPL: 13 (ref 12–20)
CALCIUM SERPL-MCNC: 8.6 MG/DL (ref 8.5–10.1)
CHLORIDE SERPL-SCNC: 115 MMOL/L (ref 97–108)
CO2 SERPL-SCNC: 17 MMOL/L (ref 21–32)
COVID-19, XGCOVT: NEGATIVE
CREAT SERPL-MCNC: 1.91 MG/DL (ref 0.55–1.02)
DIFFERENTIAL METHOD BLD: ABNORMAL
EOSINOPHIL # BLD: 0.3 K/UL (ref 0–0.4)
EOSINOPHIL NFR BLD: 5 % (ref 0–7)
ERYTHROCYTE [DISTWIDTH] IN BLOOD BY AUTOMATED COUNT: 14.6 % (ref 11.5–14.5)
GLUCOSE SERPL-MCNC: 85 MG/DL (ref 65–100)
HCT VFR BLD AUTO: 30.1 % (ref 35–47)
HGB BLD-MCNC: 9.5 G/DL (ref 11.5–16)
IMM GRANULOCYTES # BLD AUTO: 0 K/UL (ref 0–0.04)
IMM GRANULOCYTES NFR BLD AUTO: 1 % (ref 0–0.5)
LOW MW HEPARIN, ULMWHT: 0.5 IU/ML
LYMPHOCYTES # BLD: 0.8 K/UL (ref 0.8–3.5)
LYMPHOCYTES NFR BLD: 15 % (ref 12–49)
MCH RBC QN AUTO: 29.8 PG (ref 26–34)
MCHC RBC AUTO-ENTMCNC: 31.6 G/DL (ref 30–36.5)
MCV RBC AUTO: 94.4 FL (ref 80–99)
MONOCYTES # BLD: 0.7 K/UL (ref 0–1)
MONOCYTES NFR BLD: 12 % (ref 5–13)
NEUTS SEG # BLD: 3.9 K/UL (ref 1.8–8)
NEUTS SEG NFR BLD: 66 % (ref 32–75)
NRBC # BLD: 0 K/UL (ref 0–0.01)
NRBC BLD-RTO: 0 PER 100 WBC
PLATELET # BLD AUTO: 229 K/UL (ref 150–400)
PMV BLD AUTO: 10.5 FL (ref 8.9–12.9)
POTASSIUM SERPL-SCNC: 4.1 MMOL/L (ref 3.5–5.1)
RBC # BLD AUTO: 3.19 M/UL (ref 3.8–5.2)
SODIUM SERPL-SCNC: 141 MMOL/L (ref 136–145)
WBC # BLD AUTO: 5.8 K/UL (ref 3.6–11)

## 2020-04-28 PROCEDURE — 65660000000 HC RM CCU STEPDOWN

## 2020-04-28 PROCEDURE — 74011250636 HC RX REV CODE- 250/636: Performed by: INTERNAL MEDICINE

## 2020-04-28 PROCEDURE — 74011250637 HC RX REV CODE- 250/637: Performed by: NURSE PRACTITIONER

## 2020-04-28 PROCEDURE — 74011250637 HC RX REV CODE- 250/637: Performed by: INTERNAL MEDICINE

## 2020-04-28 PROCEDURE — 85025 COMPLETE CBC W/AUTO DIFF WBC: CPT

## 2020-04-28 PROCEDURE — 97165 OT EVAL LOW COMPLEX 30 MIN: CPT | Performed by: OCCUPATIONAL THERAPIST

## 2020-04-28 PROCEDURE — 80048 BASIC METABOLIC PNL TOTAL CA: CPT

## 2020-04-28 PROCEDURE — 97535 SELF CARE MNGMENT TRAINING: CPT | Performed by: OCCUPATIONAL THERAPIST

## 2020-04-28 PROCEDURE — 83520 IMMUNOASSAY QUANT NOS NONAB: CPT

## 2020-04-28 PROCEDURE — 77010033678 HC OXYGEN DAILY

## 2020-04-28 PROCEDURE — 36415 COLL VENOUS BLD VENIPUNCTURE: CPT

## 2020-04-28 PROCEDURE — 74011250637 HC RX REV CODE- 250/637: Performed by: FAMILY MEDICINE

## 2020-04-28 PROCEDURE — 97530 THERAPEUTIC ACTIVITIES: CPT | Performed by: OCCUPATIONAL THERAPIST

## 2020-04-28 RX ORDER — DILTIAZEM HYDROCHLORIDE 30 MG/1
60 TABLET, FILM COATED ORAL
Status: DISCONTINUED | OUTPATIENT
Start: 2020-04-28 | End: 2020-04-29

## 2020-04-28 RX ORDER — HYDRALAZINE HYDROCHLORIDE 20 MG/ML
10 INJECTION INTRAMUSCULAR; INTRAVENOUS
Status: DISCONTINUED | OUTPATIENT
Start: 2020-04-28 | End: 2020-05-02 | Stop reason: HOSPADM

## 2020-04-28 RX ORDER — METOPROLOL TARTRATE 5 MG/5ML
5 INJECTION INTRAVENOUS ONCE
Status: DISCONTINUED | OUTPATIENT
Start: 2020-04-28 | End: 2020-04-28

## 2020-04-28 RX ORDER — METOPROLOL TARTRATE 25 MG/1
12.5 TABLET, FILM COATED ORAL EVERY 12 HOURS
Status: DISCONTINUED | OUTPATIENT
Start: 2020-04-28 | End: 2020-04-29

## 2020-04-28 RX ADMIN — MELATONIN 1 TABLET: at 08:00

## 2020-04-28 RX ADMIN — LEVOTHYROXINE SODIUM 50 MCG: 0.05 TABLET ORAL at 08:00

## 2020-04-28 RX ADMIN — ASPIRIN 81 MG: 81 TABLET ORAL at 08:00

## 2020-04-28 RX ADMIN — METOPROLOL TARTRATE 12.5 MG: 25 TABLET, FILM COATED ORAL at 14:57

## 2020-04-28 RX ADMIN — ENOXAPARIN SODIUM 70 MG: 100 INJECTION SUBCUTANEOUS at 12:06

## 2020-04-28 RX ADMIN — Medication 10 ML: at 22:18

## 2020-04-28 RX ADMIN — PANTOPRAZOLE SODIUM 40 MG: 40 TABLET, DELAYED RELEASE ORAL at 08:00

## 2020-04-28 RX ADMIN — DILTIAZEM HYDROCHLORIDE 30 MG: 30 TABLET, FILM COATED ORAL at 12:06

## 2020-04-28 RX ADMIN — DILTIAZEM HYDROCHLORIDE 30 MG: 30 TABLET, FILM COATED ORAL at 07:59

## 2020-04-28 RX ADMIN — Medication 10 ML: at 22:19

## 2020-04-28 RX ADMIN — TRAZODONE HYDROCHLORIDE 50 MG: 50 TABLET ORAL at 22:18

## 2020-04-28 RX ADMIN — TOPIRAMATE 50 MG: 25 TABLET, FILM COATED ORAL at 22:17

## 2020-04-28 RX ADMIN — Medication 10 ML: at 05:28

## 2020-04-28 RX ADMIN — APIXABAN 5 MG: 5 TABLET, FILM COATED ORAL at 22:17

## 2020-04-28 RX ADMIN — PRAVASTATIN SODIUM 20 MG: 40 TABLET ORAL at 22:17

## 2020-04-28 RX ADMIN — DILTIAZEM HYDROCHLORIDE 60 MG: 30 TABLET, FILM COATED ORAL at 18:25

## 2020-04-28 RX ADMIN — METOPROLOL TARTRATE 12.5 MG: 25 TABLET, FILM COATED ORAL at 22:18

## 2020-04-28 NOTE — PROGRESS NOTES
0445:Pt hypertensive and tachycardic this morning . Spoke with Dr. Doreatha Primrose via telephone to report changes. New orders received. 0530: pt resting in bed Hr 105-115. B/p 123/82. Recheck in opposite arm  139/96. Will hold off giving Metoprolol at this time. Pt b/p and HR sensitive to b/p lowering medications. 0605:HR . Pt resting in bed.     0710: Report given to DANNY Duran.

## 2020-04-28 NOTE — PROGRESS NOTES
Bedside shift change report given to akosua (oncoming nurse) by Danelle Harman (offgoing nurse). Report included the following information SBAR, Kardex, MAR, Med Rec Status and Cardiac Rhythm afib. rec'd report, pt resting in bed 0 s/sx of distress.

## 2020-04-28 NOTE — PROGRESS NOTES
Hospitalist Progress Note    NAME: Margarette Green   :  1950   MRN:  [id number]       Assessment / Plan:  Acute Respiratory Failure with Hypoxia   Due to pneumonia, RML Pneumonia on X ray  Rule out COVID 19, PCR still pending from ? ? Isolation, hold HC with arrythmias  IV abx  Pulmonary to see  Clinically not in distress, still on 6L NC  Wean O2 as able while maintaining sats > 92%      Persistent Afib  RVR on admission  IV cardizem drip transition to PO  IV heparin drip changed to sq lovenox, defer to cards for long term AC  Echo  pend  Cardio on case     ROJELIO  IVF hydration  Hold nephrotoxic agents  Monitor crt slowly improving if not renal consult     CAP  Right Middle lobe  IV Rocephin,azithromycin  X ray Right middle lobe infiltrate suspicious for pneumonia in the  appropriate clinical setting     CHF, Chronic  Continue medical management  Well compensated  Echo     Hypothyroidism  Cont home dose     DVT Prophylaxis: lovenox  GI Prophylaxis:IV protonix  Code status full code   Baseline: AAOx 3     Subjective:     Chief Complaint / Reason for Physician Visit  No new complaints. Not in distress. Encouraged get from bed to chair    Discussed with RN events overnight. Review of Systems:  Symptom Y/N Comments  Symptom Y/N Comments   Fever/Chills n   Chest Pain n    Poor Appetite n   Edema     Cough n   Abdominal Pain n    Sputum n   Joint Pain     SOB/RIVERS n   Pruritis/Rash     Nausea/vomit n   Tolerating PT/OT     Diarrhea n   Tolerating Diet y    Constipation n   Other       Could NOT obtain due to:      Objective:     VITALS:   Last 24hrs VS reviewed since prior progress note.  Most recent are:  Patient Vitals for the past 24 hrs:   Temp Pulse Resp BP SpO2   20 2309 97.6 °F (36.4 °C) 82 21 92/56 96 %   20 1944 97.8 °F (36.6 °C) 75 25 119/76 92 %   20 1601  91 16  92 %   20 1600    131/73    20 1449 97.7 °F (36.5 °C) 84 18 114/88 92 %   20 1121 97.6 °F (36.4 °C) 87 20 124/72 92 %   04/27/20 1019  (!) 113 21 122/67 91 %   04/27/20 0811 97.9 °F (36.6 °C) (!) 107 25 129/82 95 %   04/27/20 0801  (!) 103 15     04/27/20 0800    91/58    04/27/20 0236 98 °F (36.7 °C) (!) 107 24 122/86 96 %       Intake/Output Summary (Last 24 hours) at 4/27/2020 2835  Last data filed at 4/27/2020 1944  Gross per 24 hour   Intake 120 ml   Output 100 ml   Net 20 ml        PHYSICAL EXAM:  General: WD, WN. Alert, cooperative, no acute distress    EENT:  EOMI. Anicteric sclerae. MMM  Resp:  CTA bilaterally, no wheezing or rales. No accessory muscle use  CV:  Regular  rhythm,  No edema  GI:  Soft, Non distended, Non tender.  +Bowel sounds  Neurologic:  Alert and oriented X 3, normal speech,   Psych:   Not anxious nor agitated  Skin:  No rashes. No jaundice    Reviewed most current lab test results and cultures  YES  Reviewed most current radiology test results   YES  Review and summation of old records today    NO  Reviewed patient's current orders and MAR    YES  PMH/ reviewed - no change compared to H&P  ________________________________________________________________________  Care Plan discussed with:    Comments   Patient x    Family      RN x    Care Manager     Consultant                        Multidiciplinary team rounds were held today with , nursing, pharmacist and clinical coordinator. Patient's plan of care was discussed; medications were reviewed and discharge planning was addressed.      ________________________________________________________________________  Total NON critical care TIME:  30   Minutes    Total CRITICAL CARE TIME Spent:   Minutes non procedure based      Comments   >50% of visit spent in counseling and coordination of care     ________________________________________________________________________  Amelia Perez DO     Procedures: see electronic medical records for all procedures/Xrays and details which were not copied into this note but were reviewed prior to creation of Plan. LABS:  I reviewed today's most current labs and imaging studies.   Pertinent labs include:  Recent Labs     04/27/20 0243 04/26/20 0424 04/25/20  1137   WBC 6.5 6.8 7.0   HGB 9.6* 9.4* 9.4*   HCT 31.5* 31.5* 30.5*    234 250     Recent Labs     04/27/20 0243 04/26/20 0424 04/25/20  1137    140 142   K 4.3 4.5 4.7   * 114* 115*   CO2 18* 18* 17*   * 85 108*   BUN 27* 29* 29*   CREA 2.16* 2.46* 2.60*   CA 9.3 8.5 8.3*       Signed: Elissa Pro DO

## 2020-04-28 NOTE — PROGRESS NOTES
Hospitalist Progress Note    NAME: Viraj Antunez   :  1950   MRN:  [id number]       Interim Hospital Summary: 69 y.o. female whom presented on 2020 with acute respiratory failure with hypoxia in presence of A-fib with RVR. Assessment / Plan:  Acute Respiratory Failure with Hypoxia (improving)  CAP  Due to pneumonia, RML Pneumonia on X ray  - COVID 19 ruled out; inflammatory markers were not elevated. Completed 5 day course of IV ABX    Wean O2 as able while maintaining sats > 92%, pt is currently 4L via n/c    Duoneb PRN      Persistent Afib with RVR  Hypertension  Hyperlipidemia  - increased the dose of cardizem with parameter    Lovenox changed to Eliquis 5mg BID    Continue with lopressor with parameter    Continue with ASA and statin    Echo: Estimated left ventricular ejection fraction is 50 - 55%. Appreciate cardiology input; will have the pt follow up with Dr. Neela Toussaint as outpatient     ROJELIO on CKD   -  Received IVF hydration     Hold nephrotoxic agents     Creat trending down; 1.9 on  (was 2.6 on admission)      CHF, Chronic  -  NT pro-BNP 6272; no diuretic at this point, it seems like pt is coming down. Will monitor one more day before consider any diuretic therapy     Echo addressed above      Last 3 Recorded Weights in this Encounter    20 0400 20 0236 20 0500   Weight: 73.5 kg (162 lb 1.6 oz) 79.5 kg (175 lb 4.3 oz) 75.4 kg (166 lb 2 oz)        Hypothyroidism  - Continue with synthroid     DVT Prophylaxis: eliquis  GI Prophylaxis:IV protonix  Code status: full code       Recommended Disposition: Home w/Family     Subjective:     Chief Complaint / Reason for Physician Visit  \"I feel pretty tired\". Discussed with RN events overnight.      Review of Systems:  Symptom Y/N Comments  Symptom Y/N Comments   Fever/Chills n   Chest Pain n    Poor Appetite    Edema     Cough n   Abdominal Pain     Sputum    Joint Pain     SOB/RIVERS y   Pruritis/Rash     Nausea/vomit n Tolerating PT/OT     Diarrhea n   Tolerating Diet     Constipation n   Other       Could NOT obtain due to:      Objective:     VITALS:   Last 24hrs VS reviewed since prior progress note. Most recent are:  Patient Vitals for the past 24 hrs:   Temp Pulse Resp BP SpO2   04/28/20 0422 98.2 °F (36.8 °C) (!) 117 17 (!) 153/126 97 %   04/28/20 0418    (!) 167/122    04/28/20 0411  (!) 106 23 (!) 150/100 95 %   04/28/20 0131     96 %   04/27/20 2309 97.6 °F (36.4 °C) 82 21 92/56 96 %   04/27/20 1944 97.8 °F (36.6 °C) 75 25 119/76 92 %   04/27/20 1601  91 16  92 %   04/27/20 1600    131/73    04/27/20 1449 97.7 °F (36.5 °C) 84 18 114/88 92 %   04/27/20 1121 97.6 °F (36.4 °C) 87 20 124/72 92 %   04/27/20 1019  (!) 113 21 122/67 91 %   04/27/20 0811 97.9 °F (36.6 °C) (!) 107 25 129/82 95 %   04/27/20 0801  (!) 103 15     04/27/20 0800    91/58        Intake/Output Summary (Last 24 hours) at 4/28/2020 0518  Last data filed at 4/28/2020 0422  Gross per 24 hour   Intake 320 ml   Output 150 ml   Net 170 ml        PHYSICAL EXAM:  General: Ill appearing. Alert, cooperative, no acute distress    EENT:  EOMI. Anicteric sclerae. MMM  Resp:  Clear in apex with decreased breath sounds at bases. no wheezing or rales. No accessory muscle use  CV:  irregular  rhythm,  No edema  GI:  Soft, Non distended, Non tender. +Bowel sounds  Neurologic:  Alert and oriented X 3, normal speech,   Psych:   Good insight. Not anxious nor agitated  Skin:  No rashes.   No jaundice    Reviewed most current lab test results and cultures  YES  Reviewed most current radiology test results   YES  Review and summation of old records today    NO  Reviewed patient's current orders and MAR    YES  PMH/SH reviewed - no change compared to H&P  ________________________________________________________________________  Care Plan discussed with:    Comments   Patient y    Family      RN y    Care Manager     Consultant Multidiciplinary team rounds were held today with , nursing, pharmacist and clinical coordinator. Patient's plan of care was discussed; medications were reviewed and discharge planning was addressed. ________________________________________________________________________  Delores Caldera NP     Procedures: see electronic medical records for all procedures/Xrays and details which were not copied into this note but were reviewed prior to creation of Plan. LABS:  I reviewed today's most current labs and imaging studies.   Pertinent labs include:  Recent Labs     04/28/20 0411 04/27/20 0243 04/26/20 0424   WBC 5.8 6.5 6.8   HGB 9.5* 9.6* 9.4*   HCT 30.1* 31.5* 31.5*    238 234     Recent Labs     04/28/20 0411 04/27/20 0243 04/26/20  0424    140 140   K 4.1 4.3 4.5   * 113* 114*   CO2 17* 18* 18*   GLU 85 107* 85   BUN 24* 27* 29*   CREA 1.91* 2.16* 2.46*   CA 8.6 9.3 8.5       Signed: )Mikayla Arce NP

## 2020-04-28 NOTE — PROGRESS NOTES
Problem: Falls - Risk of  Goal: *Absence of Falls  Description: Document Vannessa Bejarano Fall Risk and appropriate interventions in the flowsheet. Outcome: Progressing Towards Goal  Note: Fall Risk Interventions:  Mobility Interventions: Bed/chair exit alarm, Patient to call before getting OOB, Strengthening exercises (ROM-active/passive)    Mentation Interventions: Bed/chair exit alarm, Adequate sleep, hydration, pain control, Reorient patient    Medication Interventions: Bed/chair exit alarm, Teach patient to arise slowly, Patient to call before getting OOB    Elimination Interventions: Call light in reach, Bed/chair exit alarm, Stay With Me (per policy), Patient to call for help with toileting needs              Problem: Pressure Injury - Risk of  Goal: *Prevention of pressure injury  Description: Document Aldo Scale and appropriate interventions in the flowsheet.   Outcome: Progressing Towards Goal  Note: Pressure Injury Interventions:  Sensory Interventions: Assess changes in LOC, Check visual cues for pain, Float heels, Keep linens dry and wrinkle-free, Monitor skin under medical devices, Minimize linen layers    Moisture Interventions: Absorbent underpads, Check for incontinence Q2 hours and as needed, Internal/External urinary devices, Minimize layers, Moisture barrier    Activity Interventions: Pressure redistribution bed/mattress(bed type)    Mobility Interventions: Float heels, Pressure redistribution bed/mattress (bed type)    Nutrition Interventions: Document food/fluid/supplement intake, Offer support with meals,snacks and hydration    Friction and Shear Interventions: Lift sheet, Minimize layers                Problem: Afib Pathway: Day 3  Goal: Medications  Outcome: Progressing Towards Goal     Problem: Afib Pathway: Day 3  Goal: Activity/Safety  Outcome: Progressing Towards Goal

## 2020-04-28 NOTE — PROGRESS NOTES
Problem: Self Care Deficits Care Plan (Adult)  Goal: *Acute Goals and Plan of Care (Insert Text)  Description:     FUNCTIONAL STATUS PRIOR TO ADMISSION: Patient was independent to mod I and active without use of DME. H/o CVA with RUE hemiparesis. HOME SUPPORT: The patient lived alone, but has a daughter living near by. Daughter does some of patient's house cleaning and buys her groceries. Occupational Therapy Goals  Initiated 4/28/2020  1. Patient will perform grooming standing at sink with modified independence within 7 day(s). 2.  Patient will perform upper body dressing with modified independence within 7 day(s). 3.  Patient will perform lower body dressing with modified independence within 7 day(s). 4.  Patient will perform toilet transfers with modified independence within 7 day(s). 5.  Patient will perform all aspects of toileting with modified independence within 7 day(s). 6.  Patient will perform ADL setup with modified independence within 7 day(s). Outcome: Progressing Towards Goal    OCCUPATIONAL THERAPY EVALUATION  Patient: Amauri Galeana (64 y.o. female)  Date: 4/28/2020  Primary Diagnosis: Atrial fibrillation with RVR ([facility] [address] [I48.91]        Precautions:  Fall    ASSESSMENT  Based on the objective data described below, the patient presents with GW, decreased activity tolerance, decreased safety awareness and decreased balance which is impairing her functional independence. Patient also has a h/o CVA with expressive aphasia and R hemiparesis. She is  functioning below her independent to mod I baseline, now performing ADLs at a mod I to min A level and is supervision to [place] [id number] for functional mobility. She is not on supplemental oxygen at baseline, but is now requiring 3 L NC to maintain her SpO2 in the 90s during activity. At this time the patient will benefit from acute OT intervention and will need HHOT, PT and supervision after discharge. Functional Outcome Measure:   The patient scored 45/100 on the Barthel Index outcome measure which is indicative of a 55% impairment in function. PLAN :  Recommendations and Planned Interventions: self care training, functional mobility training, therapeutic exercise, balance training, therapeutic activities, endurance activities, patient education, home safety training, and family training/education    Frequency/Duration: Patient will be followed by occupational therapy 4 times a week to address goals. Recommendation for discharge: (in order for the patient to meet his/her long term goals)  Occupational therapy at least 2 days/week in the home AND ensure assist and/or supervision for safety with ADLs and functional mobility     Equipment recommendations for successful discharge (if) home: TBD pending progress. Shower seat may be beneficial       OBJECTIVE DATA SUMMARY:   HISTORY:   Past Medical History:   Diagnosis Date    Cancer ([facility] [address]     ovaian stomach    CVA (cerebral vascular accident) ([facility] [address]     Heart failure ([facility] [address]     MI    Hypertension      Past Surgical History:   Procedure Laterality Date    ABDOMEN SURGERY PROC UNLISTED      cancer removal    HX GYN      hysterectomy    HX LUMBAR LAMINECTOMY  06/29/2016    L4-S1, Dr. Yesi Lewis       Expanded or extensive additional review of patient history:     Home Situation  Home Environment: Apartment  # Steps to Enter: 1  Rails to Enter: No  One/Two Story Residence: Two story  # of Interior Steps: 15  Interior Rails: Left  Living Alone: Yes  Support Systems: Family member(s)(Daughter lives near by)  Current DME Used/Available at Home: Sharonda Sergio, rolling, Cane, straight  Tub or Shower Type: Shower    Hand dominance: Right    EXAMINATION OF PERFORMANCE DEFICITS:  Cognitive/Behavioral Status:  Neurologic State: Alert  Orientation Level: Oriented to person;Oriented to place;Oriented to situation  Cognition: Appropriate for age attention/concentration; Impaired decision making; Impulsive; Unable to assess (comment)        Safety/Judgement: Decreased awareness of need for assistance;Decreased awareness of need for safety;Decreased insight into deficits      Hearing:       Vision/Perceptual:    Acuity: Within Defined Limits    Corrective Lenses: Reading glasses    Range of Motion:  AROM: Generally decreased, functional(nonfunctional R shoulder and elbow 2/2 h/o CVA)    Strength:  Strength: Generally decreased, functional(nonfunctional R shoulder and elbow 2/2 h/o CVA)    Coordination:  Coordination: Generally decreased, functional(nonfunctional R shoulder and elbow 2/2 h/o CVA)            Tone & Sensation:     Sensation: Impaired(RUE 2/2 h/o of CVA )       Balance:  Sitting: Intact  Standing: Impaired; With support  Standing - Static: Good  Standing - Dynamic : Fair    Functional Mobility and Transfers for ADLs:  Bed Mobility:  Rolling: Contact guard assistance  Supine to Sit: Contact guard assistance  Scooting: Supervision    Transfers:  Sit to Stand: Contact guard assistance  Stand to Sit: Contact guard assistance  Bed to Chair: Contact guard assistance(ambulating with a RW)  Bathroom Mobility: Contact guard assistance(ambulating with a RW.)  Toilet Transfer : Contact guard assistance    ADL Assessment:  Feeding: Modified Independent    Oral Facial Hygiene/Grooming: Contact guard assistance    Bathing: Minimum assistance    Upper Body Dressing: Minimum assistance    Lower Body Dressing: Minimum assistance    Toileting: Minimum assistance                ADL Intervention and task modifications:  Grooming  Position Performed: Standing  Washing Hands: Contact guard assistance  Cues: Verbal cues provided; Tactile cues provided;Visual cues provided    Upper Body [address] with hospital gown: Minimum  assistance  Cues: Tactile cues provided;Verbal cues provided;Visual cues provided    Lower Body Dressing Assistance  Socks: Minimum assistance  Leg Crossed Method Used: Yes  Position Performed: Seated edge of bed  Cues: Verbal cues provided    Toileting  Toileting Assistance: Contact guard assistance  Bladder Hygiene: Contact guard assistance  Clothing Management: Minimum assistance  Cues: Tactile cues provided;Verbal cues provided  Adaptive Equipment: Grab bars    Cognitive Retraining  Safety/Judgement: Decreased awareness of need for assistance;Decreased awareness of need for safety;Decreased insight into deficits    Educated patient on the purpose of energy conservation techniques to be used to increase her ability to participate in desired ADLs, IADLs and leisure activities, while minimizing fatigue. Further reinforced taking rest breaks frequently to avoid becoming SOB, alternating performance of heavy and light activities and sitting PRN to complete activities. Functional Measure:  Barthel Index:    Bathin  Bladder: 5  Bowels: 10  Groomin  Dressin  Feeding: 10  Mobility: 0  Stairs: 0  Toilet Use: 5  Transfer (Bed to Chair and Back): 10  Total: 45/100        Percentage of impairment   0%   1-19%   20-39%   40-59%   60-79%   80-99%   100%   Barthel Score 0-100 100 99-80 79-60 59-40 20-39 1-19   0     The Barthel ADL Index: Guidelines  1. The index should be used as a record of what a patient does, not as a record of what a patient could do. 2. The main aim is to establish degree of independence from any help, physical or verbal, however minor and for whatever reason. 3. The need for supervision renders the patient not independent. 4. A patient's performance should be established using the best available evidence. Asking the patient, friends/relatives and nurses are the usual sources, but direct observation and common sense are also important. However direct testing is not needed. 5. Usually the patient's performance over the preceding 24-48 hours is important, but occasionally longer periods will be relevant.   6. Middle categories imply that the patient supplies over 50 per cent of the effort. 7. Use of aids to be independent is allowed. Frederica Del., Barthel, D.W. (5256). Functional evaluation: the Barthel Index. [address] (14)2. LORENZO Irvin, Myrla Holter.Kory., Jovanny, [address] (1999). Measuring the change indisability after inpatient rehabilitation; comparison of the responsiveness of the Barthel Index and Functional Richardson Measure. Journal of Neurology, Neurosurgery, and Psychiatry, 66(4), 261-012. TIM Drummond, YESSENIA Gómez, & Sarina Forman M.A. (2004.) Assessment of post-stroke quality of life in cost-effectiveness studies: The usefulness of the Barthel Index and the EuroQoL-5D. Quality of Life Research, 13, 216-79            Activity Tolerance:   Poor  Easily SOB with minimal activity. SpO2 94% on 3 L NC with activity and BP stable.     After treatment patient left in no apparent distress:    Sitting in chair and Call bell within reach    COMMUNICATION/EDUCATION:   The patients plan of care was discussed with: Registered Nurse. Home safety education was provided and the patient/caregiver indicated understanding., Patient/family have participated as able in goal setting and plan of care. , and Patient/family agree to work toward stated goals and plan of care. This patients plan of care is appropriate for delegation to [facility] [place].     Thank you for this referral.  Morales Cisneros, OTR/L  Time Calculation: 36 mins

## 2020-04-28 NOTE — PROGRESS NOTES
[facility] INTENSIVIST Consult Service Note  Pulmonary, Critical Care, and Sleep Medicine    Name: Mary Tarango MRN: [id number]   : 1950 Hospital: [address] [facility]   Date: 2020  Admission date: 2020 Hospital Day: 6         IMPRESSION:   1. Acute Respiratory Failure with Hypoxia,   2. COVID 19 suspect; results pending: date of testing ; risk factors? 3. Sepsis and at risk for Cytokine Release Syndrome  4. Hypotension on arrival  5. Possible COVID associated gastroenteritis- Diarrhea past two days and rthat prompted her to come in and had emesis, slept onbathroom floor fot two days for convenience. Pt would not let them in the house. No phone, no food. 6. CAP- Right Middle lobe on CXR;  IV Rocephin,azithromycin   7. CHF, Chronic   8. H/o CVA 2016 right side and fully recovered   9. AFIB RVR d/w Dr. Lonell Cockayne   10. ROJELIO IVF hydration needed/ CKD  11. Additional workup outlined below      RECOMMENDATIONS/PLAN:   1. ABx per protocol; azithromycin, ceftriaxone   2. Hold BP meds  3. Gentle rehydration  4. O2 to keep sats > 93%  5. covid test done  and results still not back???!!!    6. IV Fluids   7. Incentive spirometer   8. Empower and encourage pt to cover mouth and muffle cough and work with nurses to mitigate droplet or aerosol production by giving pt something to cough into( gowns have no sleeves)   9. No systemic steroids   10. No aerosolization producing procedures or devices unless in negative pressure room and even then only with select patients  11. Inhalers- bronchodilators if needed      12. Replete electrolytes as needed  13. Probiotics   14. Antitussives   15. Antiemetics prn  16. Nutritional supplements at a minimum  17. Labs to follow electrolytes, renal function and and blood counts   18. Prescription drug management with home med reconciliation reviewed  19.  DVT prophylaxis   20. pt counseling with enduring education about protection, public health safety topics        Subjective/Initial History:     No acute events overnight   On O2   No severe distress  No acute complaints        ROS:A comprehensive review of systems was negative except for that written in the HPI. No Known Allergies   MEDS:   Current Facility-Administered Medications   Medication    hydrALAZINE (APRESOLINE) 20 mg/mL injection 10 mg    metoprolol (LOPRESSOR) injection 5 mg    dilTIAZem IR (CARDIZEM) IR tablet 30 mg    pantoprazole (PROTONIX) tablet 40 mg    albuterol (PROVENTIL HFA, VENTOLIN HFA, PROAIR HFA) inhaler 2 Puff    enoxaparin (LOVENOX) injection 70 mg    sodium chloride (NS) flush 5-40 mL    sodium chloride (NS) flush 5-40 mL    traZODone (DESYREL) tablet 50 mg    pravastatin (PRAVACHOL) tablet 20 mg    aspirin delayed-release tablet 81 mg    butalbital-acetaminophen-caffeine (FIORICET, ESGIC) -40 mg per tablet 1 Tab    topiramate (TOPAMAX) tablet 50 mg    levothyroxine (SYNTHROID) tablet 50 mcg    cholecalciferol (VITAMIN D3) (1000 Units /25 mcg) tablet 1 Tab    acetaminophen (TYLENOL) tablet 650 mg    Or    acetaminophen (TYLENOL) suppository 650 mg    sodium chloride (NS) flush 5-40 mL    sodium chloride (NS) flush 5-40 mL    guaiFENesin-dextromethorphan (ROBITUSSIN DM) 100-10 mg/5 mL syrup 10 mL    melatonin tablet 3 mg          Objective:     Vital Signs: Telemetry:    AFIB Intake/Output:   Visit Vitals  BP (!) 153/126   Pulse (!) 117   Temp 98.2 °F (36.8 °C)   Resp 17   Ht 4' 11\" (1.499 m)   Wt 75.4 kg (166 lb 2 oz)   SpO2 97%   BMI 33.55 kg/m²       Temp (24hrs), Av.8 °F (36.6 °C), Min:97.6 °F (36.4 °C), Max:98.2 °F (36.8 °C)        O2 Device: Nasal cannula O2 Flow Rate (L/min): 4 l/min         Body mass index is 33.55 kg/m².     Wt Readings from Last 4 Encounters:   20 75.4 kg (166 lb 2 oz)   11/10/19 71.7 kg (158 lb)   10/01/19 71.8 kg (158 lb 4 oz)   19 68 kg (150 lb)          Intake/Output Summary (Last 24 hours) at 4/28/2020 0732  Last data filed at 4/28/2020 0422  Gross per 24 hour   Intake 320 ml   Output 150 ml   Net 170 ml       Last shift:      No intake/output data recorded. Last 3 shifts: 04/26 1901 - 04/28 0700  In: 1070 [P.O.:320; I.V.:750]  Out: 350 [Urine:350]       Physical Exam:    General:  female; on NC; dry cough  HEENT: NCAT, lips and mucosa dry;   Eyes: anicteric; conjunctiva clear  Neck: no nodes, no cuff leak, no accessory MM use. Chest: no deformity, symmetric movement  Cardiac: IR regular; no murmur, tachycardia  Lungs: deferred due to risk and airborne precautions  Abd: soft, NT, hypoactive BS  Ext: no edema; no joint swelling; No clubbing  : no gibbs, clear urine  Neuro: fluent, generalized weakness  Psych- no agitation, oriented to person;   Skin: warm, dry, no cyanosis;   Pulses: 1-2+ Bilateral pedal, radial  Capillary: brisk; pale      Labs:    Recent Labs     04/28/20 0411 04/27/20  0243 04/26/20  0424   WBC 5.8 6.5 6.8   HGB 9.5* 9.6* 9.4*    238 234     Recent Labs     04/28/20 0411 04/27/20  0243 04/26/20  0424    140 140   K 4.1 4.3 4.5   * 113* 114*   CO2 17* 18* 18*   GLU 85 107* 85   BUN 24* 27* 29*   CREA 1.91* 2.16* 2.46*   CA 8.6 9.3 8.5     No results for input(s): PH, PCO2, PO2, HCO3, FIO2 in the last 72 hours. No results for input(s): CPK, CKNDX, TROIQ in the last 72 hours. No lab exists for component: CPKMB  No results found for: BNPP, BNP   Lab Results   Component Value Date/Time    Culture result: ESCHERICHIA COLI 04/22/2012 04:00 PM      Lab Results   Component Value Date/Time     (H) 04/25/2012 06:25 PM     (H) 04/24/2012 04:50 PM       Imaging:  I have personally reviewed the patients radiographs and have reviewed the reports: This care involved high complexity medical decision making to treat acute and unstable vital organ system failure, and to prevent life threatening deterioration of the patients condition.  I personally:  · Reviewed the flowsheet and previous days notes  · Reviewed and summarized records or history from previous days note or discussions with staff, family  · Parenteral controlled substances - Reviewed/ Adjusted / Rebecca Salon / Started  · High Risk Drug therapy requiring intensive monitoring for toxicity: eg steroids, pressors, antibiotics  · Reviewed and/or ordered Clinical lab tests  · Reviewed and/or ordered Radiology tests  · Reviewed and/or ordered of Medicine tests  · Independently visualized radiologic Images  · Reviewed the patients ECG / Telemetry  · discussed my assessment/management with : Consultants, Nursing, Respiratory Therapy, Hospitalist for coordination of care    Pt is critically ill. Time spent with pt and staff actively rendering care, managing pt and coordinating care as stated below;  35 minutes, exclusive of any procedures           Thank you for allowing us to participate in the care of this patient.       Shirlene Branch MD

## 2020-04-29 ENCOUNTER — HOME HEALTH ADMISSION (OUTPATIENT)
Dept: HOME HEALTH SERVICES | Facility: HOME HEALTH | Age: 70
End: 2020-04-29

## 2020-04-29 LAB
ANION GAP SERPL CALC-SCNC: 8 MMOL/L (ref 5–15)
BASOPHILS # BLD: 0.1 K/UL (ref 0–0.1)
BASOPHILS NFR BLD: 2 % (ref 0–1)
BUN SERPL-MCNC: 26 MG/DL (ref 6–20)
BUN/CREAT SERPL: 15 (ref 12–20)
CALCIUM SERPL-MCNC: 8.9 MG/DL (ref 8.5–10.1)
CHLORIDE SERPL-SCNC: 114 MMOL/L (ref 97–108)
CO2 SERPL-SCNC: 18 MMOL/L (ref 21–32)
CREAT SERPL-MCNC: 1.71 MG/DL (ref 0.55–1.02)
DIFFERENTIAL METHOD BLD: ABNORMAL
EOSINOPHIL # BLD: 0.3 K/UL (ref 0–0.4)
EOSINOPHIL NFR BLD: 5 % (ref 0–7)
ERYTHROCYTE [DISTWIDTH] IN BLOOD BY AUTOMATED COUNT: 14.7 % (ref 11.5–14.5)
GLUCOSE BLD STRIP.AUTO-MCNC: 110 MG/DL (ref 65–100)
GLUCOSE SERPL-MCNC: 87 MG/DL (ref 65–100)
HCT VFR BLD AUTO: 30.3 % (ref 35–47)
HGB BLD-MCNC: 9.4 G/DL (ref 11.5–16)
IL6 SERPL-MCNC: 18.9 PG/ML (ref 0–15.5)
IMM GRANULOCYTES # BLD AUTO: 0 K/UL (ref 0–0.04)
IMM GRANULOCYTES NFR BLD AUTO: 1 % (ref 0–0.5)
LYMPHOCYTES # BLD: 0.9 K/UL (ref 0.8–3.5)
LYMPHOCYTES NFR BLD: 16 % (ref 12–49)
MCH RBC QN AUTO: 29.3 PG (ref 26–34)
MCHC RBC AUTO-ENTMCNC: 31 G/DL (ref 30–36.5)
MCV RBC AUTO: 94.4 FL (ref 80–99)
MONOCYTES # BLD: 0.7 K/UL (ref 0–1)
MONOCYTES NFR BLD: 13 % (ref 5–13)
NEUTS SEG # BLD: 3.5 K/UL (ref 1.8–8)
NEUTS SEG NFR BLD: 63 % (ref 32–75)
NRBC # BLD: 0 K/UL (ref 0–0.01)
NRBC BLD-RTO: 0 PER 100 WBC
PLATELET # BLD AUTO: 247 K/UL (ref 150–400)
PMV BLD AUTO: 10.5 FL (ref 8.9–12.9)
POTASSIUM SERPL-SCNC: 3.9 MMOL/L (ref 3.5–5.1)
RBC # BLD AUTO: 3.21 M/UL (ref 3.8–5.2)
SERVICE CMNT-IMP: ABNORMAL
SODIUM SERPL-SCNC: 140 MMOL/L (ref 136–145)
WBC # BLD AUTO: 5.4 K/UL (ref 3.6–11)

## 2020-04-29 PROCEDURE — 97535 SELF CARE MNGMENT TRAINING: CPT

## 2020-04-29 PROCEDURE — 74011250637 HC RX REV CODE- 250/637: Performed by: NURSE PRACTITIONER

## 2020-04-29 PROCEDURE — 74011250637 HC RX REV CODE- 250/637: Performed by: INTERNAL MEDICINE

## 2020-04-29 PROCEDURE — 97162 PT EVAL MOD COMPLEX 30 MIN: CPT

## 2020-04-29 PROCEDURE — 94760 N-INVAS EAR/PLS OXIMETRY 1: CPT

## 2020-04-29 PROCEDURE — 74011250637 HC RX REV CODE- 250/637: Performed by: FAMILY MEDICINE

## 2020-04-29 PROCEDURE — 65660000000 HC RM CCU STEPDOWN

## 2020-04-29 PROCEDURE — 85025 COMPLETE CBC W/AUTO DIFF WBC: CPT

## 2020-04-29 PROCEDURE — 97530 THERAPEUTIC ACTIVITIES: CPT

## 2020-04-29 PROCEDURE — 77010033678 HC OXYGEN DAILY

## 2020-04-29 PROCEDURE — 93005 ELECTROCARDIOGRAM TRACING: CPT

## 2020-04-29 PROCEDURE — 36415 COLL VENOUS BLD VENIPUNCTURE: CPT

## 2020-04-29 PROCEDURE — 80048 BASIC METABOLIC PNL TOTAL CA: CPT

## 2020-04-29 PROCEDURE — 82962 GLUCOSE BLOOD TEST: CPT

## 2020-04-29 RX ORDER — METOPROLOL TARTRATE 25 MG/1
12.5 TABLET, FILM COATED ORAL EVERY 12 HOURS
Status: DISCONTINUED | OUTPATIENT
Start: 2020-04-29 | End: 2020-05-02 | Stop reason: HOSPADM

## 2020-04-29 RX ORDER — DILTIAZEM HYDROCHLORIDE 30 MG/1
60 TABLET, FILM COATED ORAL 2 TIMES DAILY
Status: DISCONTINUED | OUTPATIENT
Start: 2020-04-29 | End: 2020-04-30 | Stop reason: DRUGHIGH

## 2020-04-29 RX ADMIN — ALBUTEROL SULFATE 2 PUFF: 90 AEROSOL, METERED RESPIRATORY (INHALATION) at 21:43

## 2020-04-29 RX ADMIN — METOPROLOL TARTRATE 12.5 MG: 25 TABLET, FILM COATED ORAL at 21:24

## 2020-04-29 RX ADMIN — TOPIRAMATE 50 MG: 25 TABLET, FILM COATED ORAL at 21:24

## 2020-04-29 RX ADMIN — Medication 10 ML: at 05:50

## 2020-04-29 RX ADMIN — Medication 10 ML: at 21:24

## 2020-04-29 RX ADMIN — MELATONIN 1 TABLET: at 08:45

## 2020-04-29 RX ADMIN — Medication 10 ML: at 14:43

## 2020-04-29 RX ADMIN — ASPIRIN 81 MG: 81 TABLET ORAL at 08:45

## 2020-04-29 RX ADMIN — METOPROLOL TARTRATE 12.5 MG: 25 TABLET, FILM COATED ORAL at 08:41

## 2020-04-29 RX ADMIN — TRAZODONE HYDROCHLORIDE 50 MG: 50 TABLET ORAL at 21:24

## 2020-04-29 RX ADMIN — DILTIAZEM HYDROCHLORIDE 60 MG: 30 TABLET, FILM COATED ORAL at 12:41

## 2020-04-29 RX ADMIN — PRAVASTATIN SODIUM 20 MG: 40 TABLET ORAL at 21:24

## 2020-04-29 RX ADMIN — APIXABAN 5 MG: 5 TABLET, FILM COATED ORAL at 08:45

## 2020-04-29 RX ADMIN — DILTIAZEM HYDROCHLORIDE 60 MG: 30 TABLET, FILM COATED ORAL at 08:40

## 2020-04-29 RX ADMIN — PANTOPRAZOLE SODIUM 40 MG: 40 TABLET, DELAYED RELEASE ORAL at 08:45

## 2020-04-29 RX ADMIN — LEVOTHYROXINE SODIUM 50 MCG: 0.05 TABLET ORAL at 08:45

## 2020-04-29 RX ADMIN — APIXABAN 5 MG: 5 TABLET, FILM COATED ORAL at 17:32

## 2020-04-29 NOTE — PROGRESS NOTES
Bedside and Verbal shift change report given to Vaughn Agosto (oncoming nurse) by Elzbieta Barrera (offgoing nurse). Report included the following information SBAR, Kardex and Recent Results.

## 2020-04-29 NOTE — PROGRESS NOTES
Problem: Self Care Deficits Care Plan (Adult)  Goal: *Acute Goals and Plan of Care (Insert Text)  Description:     FUNCTIONAL STATUS PRIOR TO ADMISSION: Patient was independent to mod I and active without use of DME. H/o CVA with RUE hemiparesis. HOME SUPPORT: The patient lived alone, but has a daughter living near by. Daughter does some of patient's house cleaning and buys her groceries. Occupational Therapy Goals  Initiated 4/28/2020  1. Patient will perform grooming standing at sink with modified independence within 7 day(s). 2.  Patient will perform upper body dressing with modified independence within 7 day(s). 3.  Patient will perform lower body dressing with modified independence within 7 day(s). 4.  Patient will perform toilet transfers with modified independence within 7 day(s). 5.  Patient will perform all aspects of toileting with modified independence within 7 day(s). 6.  Patient will perform ADL setup with modified independence within 7 day(s). Outcome: Progressing Towards Goal   OCCUPATIONAL THERAPY TREATMENT  Patient: Bernabe Amador (58 y.o. female)  Date: 4/29/2020  Diagnosis: Atrial fibrillation with RVR ([facility] [address] [I48.91]   <principal problem not specified>       Precautions: Fall(expressive aphasia) Use of O2 is NEW  Chart, occupational therapy assessment, plan of care, and goals were reviewed. ASSESSMENT  Patient continues with skilled OT services and is progressing towards goals. Patient SOB upon arrival on 3L O2, sats 92%; initiated training of PLB. Patient had transferred herself from chair to bed as evidenced by phone and call bell still in chair, with step open at foor of chair; trained patient in need for use of call bell for transfers; Nsg notified. Tolerated chair less than 30 minutes. Able to sit up again briefly with max encouragement and SOB.  Impulsive and perseverating on desire to leave and see her 12 children/distressed at being in hospital. Patient reports she has family that can stay 24/7 and she should have that level of assist while she continues to participate in [facility] OT and PT. Will need O2 for discharge at sats at 88-92% on 3L with very limited activity. Current Level of Function Impacting Discharge (ADLs): set up to CGA; good compensatory skills for R hemiparesis; CGA LE ADLs with increased time and SOB, RR 22-24, HR 88, O2 90 on 3L O2. Other factors to consider for discharge: lived alone PTA         PLAN :  Patient continues to benefit from skilled intervention to address the above impairments. Continue treatment per established plan of care. to address goals. Recommend with staff: PLB, incentive spirometer, walk to bathroom HHA    Recommend next OT session: PLB, energy conservation, ADLs with standing PRN    Recommendation for discharge: (in order for the patient to meet his/her long term goals)  Occupational therapy at least 2 days/week in the home AND ensure assist and/or supervision for safety with 24/7 care     This discharge recommendation:  A follow-up discussion with the attending provider and/or case management is planned    IF patient discharges home will need the following DME: TBA       SUBJECTIVE:   Patient stated My hand has been weak forever    OBJECTIVE DATA SUMMARY:   Cognitive/Behavioral Status:  Neurologic State: Alert  Orientation Level: Oriented X4  Cognition: Impulsive; Impaired decision making  Perception: Appears intact  Perseveration: No perseveration noted  Safety/Judgement: Decreased insight into deficits; Decreased awareness of need for safety;Decreased awareness of need for assistance(put herself back to bed; nsg notified)    Functional Mobility and Transfers for ADLs:  Bed Mobility:  Rolling: Stand-by assistance  Supine to Sit: Stand-by assistance  Scooting: Stand-by assistance    Transfers:  Sit to Stand: Contact guard assistance     Bed to Chair: Contact guard assistance(light hand hold R)    Balance:  Sitting: Intact  Standing: Impaired  Standing - Static: Good  Standing - Dynamic : Fair;Occasional    ADL Intervention:  Feeding  Feeding Assistance: Set-up                        Lower Body Dressing Assistance  Dressing Assistance: Contact guard assistance(inferred for safe standing portion of task)  Socks: Supervision(increased time, SOB on 3L; RR 22-24)  Leg Crossed Method Used: No  Position Performed: Long sitting on bed         Cognitive Retraining  Attention to Task: Single task  Maintains Attention For (Time): Greater than 10 minutes  Following Commands: Follows one step commands/directions  Safety/Judgement: Decreased insight into deficits; Decreased awareness of need for safety;Decreased awareness of need for assistance(put herself back to bed; nsg notified)      Pain:  No pain reported this session    Activity Tolerance:   Fair, desaturates with exertion and requires oxygen, requires frequent rest breaks, and observed SOB with activity  Please refer to the flowsheet for vital signs taken during this treatment. After treatment patient left in no apparent distress:   Call bell within reach and head of bed elevated to 80 degrees, patient eating     COMMUNICATION/COLLABORATION:   The patients plan of care was discussed with: Physical therapist and Registered nurse.      Raimundo Cotton OTR/L  Time Calculation: 22 mins

## 2020-04-29 NOTE — PROGRESS NOTES
Hospitalist Progress Note    NAME: Ashu Garcia   :  1950   MRN:  [id number]       Interim Hospital Summary: 69 y.o. female whom presented on 2020 with acute respiratory failure with hypoxia in presence of A-fib with RVR. Assessment / Plan: improving. Still needing supplemental O2. Asked nursing staff to obtain O2 challenge test. Pt may need supplemental O2 prior to discharge tomorrow. Acute Respiratory Failure with Hypoxia (improving)  CAP  Due to pneumonia, RML Pneumonia on X ray  - COVID 19 ruled out; inflammatory markers minimally elevated. Completed 5 day course of IV ABX    Wean O2 as able while maintaining sats > 92%, pt is currently 4L via n/c    O2 challenge; pt may need supplemental O2 prior to discharge tomorrow    Duoneb PRN      Persistent Afib with RVR  Hypertension  Hyperlipidemia  - continue with cardizem with parameter    Lovenox changed to Eliquis 5mg BID    Continue with lopressor with parameter    Continue with ASA and statin    Echo: Estimated left ventricular ejection fraction is 50 - 55%. Appreciate cardiology input; will have the pt follow up with Dr. Chelsea Hunter as outpatient     ROJELIO on CKD   -  Received IVF hydration     Hold nephrotoxic agents     Creat trending down; 1.9 on  (was 2.6 on admission)      CHF, Chronic  -  NT pro-BNP 6272; no diuretic at this point, it seems like pt is coming down. Will monitor one more day before consider any diuretic therapy     Echo addressed above      Last 3 Recorded Weights in this Encounter    20 0236 20 0500 20 0514   Weight: 79.5 kg (175 lb 4.3 oz) 75.4 kg (166 lb 2 oz) 74.2 kg (163 lb 9.3 oz)        Hypothyroidism  - Continue with synthroid     DVT Prophylaxis: eliquis  GI Prophylaxis:IV protonix  Code status: full code       Recommended Disposition: Home w/Family     Subjective:     Chief Complaint / Reason for Physician Visit  \"I feel pretty tired\". Discussed with RN events overnight.      Review of Systems:  Symptom Y/N Comments  Symptom Y/N Comments   Fever/Chills n   Chest Pain n    Poor Appetite    Edema     Cough n   Abdominal Pain     Sputum    Joint Pain     SOB/RIVERS n   Pruritis/Rash     Nausea/vomit n   Tolerating PT/OT     Diarrhea n   Tolerating Diet     Constipation n   Other       Could NOT obtain due to:      Objective:     VITALS:   Last 24hrs VS reviewed since prior progress note. Most recent are:  Patient Vitals for the past 24 hrs:   Temp Pulse Resp BP SpO2   04/29/20 1059 97.8 °F (36.6 °C) 84 20 122/70 92 %   04/29/20 0715 98.3 °F (36.8 °C)  20 121/87 95 %   04/29/20 0235 98.1 °F (36.7 °C) 78 20 135/77 94 %   04/28/20 2306 98.5 °F (36.9 °C) 92 22 133/67 97 %   04/28/20 1925 98.1 °F (36.7 °C) 90 17 110/64 95 %   04/28/20 1623 97.5 °F (36.4 °C) 94 15 107/60 95 %   04/28/20 1457  100          Intake/Output Summary (Last 24 hours) at 4/29/2020 1331  Last data filed at 4/29/2020 1239  Gross per 24 hour   Intake 50 ml   Output    Net 50 ml        PHYSICAL EXAM:  General: Ill appearing. Alert, cooperative, no acute distress    EENT:  EOMI. Anicteric sclerae. MMM  Resp:  Clear in apex with decreased breath sounds at bases. no wheezing or rales. No accessory muscle use  CV:  irregular  rhythm,  No edema  GI:  Soft, Non distended, Non tender. +Bowel sounds  Neurologic:  Alert and oriented X 3, normal speech,   Psych:   Good insight. Not anxious nor agitated  Skin:  No rashes.   No jaundice    Reviewed most current lab test results and cultures  YES  Reviewed most current radiology test results   YES  Review and summation of old records today    NO  Reviewed patient's current orders and MAR    YES  PMH/SH reviewed - no change compared to H&P  ________________________________________________________________________  Care Plan discussed with:    Comments   Patient y    Family      RN y    Care Manager     Consultant                        Multidiciplinary team rounds were held today with case manager, nursing, pharmacist and clinical coordinator. Patient's plan of care was discussed; medications were reviewed and discharge planning was addressed. ________________________________________________________________________  Zoila Apodaca NP     Procedures: see electronic medical records for all procedures/Xrays and details which were not copied into this note but were reviewed prior to creation of Plan. LABS:  I reviewed today's most current labs and imaging studies.   Pertinent labs include:  Recent Labs     04/29/20  0652 04/28/20 0411 04/27/20  0243   WBC 5.4 5.8 6.5   HGB 9.4* 9.5* 9.6*   HCT 30.3* 30.1* 31.5*    229 238     Recent Labs     04/29/20 0652 04/28/20 0411 04/27/20  0243    141 140   K 3.9 4.1 4.3   * 115* 113*   CO2 18* 17* 18*   GLU 87 85 107*   BUN 26* 24* 27*   CREA 1.71* 1.91* 2.16*   CA 8.9 8.6 9.3       Signed: )Mikayla Arce NP

## 2020-04-29 NOTE — PROGRESS NOTES
TRANSFER - IN REPORT:    Verbal report received from SOFI TERRAZAS [facility]) on Kraig Rogers  being received from PCU (unit) for routine progression of care      Report consisted of patients Situation, Background, Assessment and   Recommendations(SBAR). Information from the following report(s) SBAR, Kardex and Recent Results was reviewed with the receiving nurse. Opportunity for questions and clarification was provided. Assessment completed upon patients arrival to unit and care assumed.

## 2020-04-29 NOTE — PROGRESS NOTES
Bedside and Verbal shift change report given to Gregory Russell (oncoming nurse) by Starla Jiménez (offgoing nurse). Report included the following information SBAR, Kardex, Intake/Output and MAR.

## 2020-04-29 NOTE — PROGRESS NOTES
Problem: Mobility Impaired (Adult and Pediatric)  Goal: *Acute Goals and Plan of Care (Insert Text)  Description: FUNCTIONAL STATUS PRIOR TO ADMISSION: Patient was modified independent using a rolling walker and single point cane for functional mobility. HOME SUPPORT PRIOR TO ADMISSION: The patient lived alone with daughters nearby to provide assistance as needed. Patient does not drive and mostly homebound. Physical Therapy Goals  Initiated 4/29/2020  1. Patient will move from supine to sit and sit to supine , scoot up and down and roll side to side in bed with independence within 7 day(s). 2.  Patient will transfer from bed to chair and chair to bed with modified independence using the least restrictive device within 7 day(s). 3.  Patient will perform sit to stand with independence within 7 day(s). 4.  Patient will ambulate with modified independence for 150 feet with the least restrictive device within 7 day(s). 5.  Patient will ascend/descend 14 stairs with L handrail(s) with minimal assistance/contact guard assist within 7 day(s). Outcome: Progressing Towards Goal   PHYSICAL THERAPY EVALUATION  Patient: Isabelle Van (7 y.o. female)  Date: 4/29/2020  Primary Diagnosis: Atrial fibrillation with RVR ([facility] [address] [I48.91]        Precautions:  Fall(expressive aphasia)      ASSESSMENT  Based on the objective data described below, the patient presents with residual right sided weakness, decreased dynamic standing balance, decreased activity tolerance and impaired mobility skills. She did quite well with bed mobility and supine to sit transfers with sba. She was cg assistance for sit to stand and bed to chair transfers. Gait was limited to 25 feet with hand hold assistance(cg-min a) due to rapid fatigue and increased sob. O2 sats on 2L/min was 90% at rest and dropped to 88% with transfer to sitting. On 3L/min O2 sats increased to 94% and dropped to 90% during gait assessment.  BP was 138/73 sitting in chair post session. She should be able to discharge home with 24/7 supervision/assistance for a short time period,    Current Level of Function Impacting Discharge (mobility/balance):cg to sba    Functional Outcome Measure: The patient scored 40/100 on the Barthel outcome measure which is indicative of 60% functional impairment. Other factors to consider for discharge: lives alone; she says her daughters will provide extra help when she gets home. Patient will benefit from skilled therapy intervention to address the above noted impairments. PLAN :  Recommendations and Planned Interventions: bed mobility training, transfer training, gait training, neuromuscular re-education, patient and family training/education, and therapeutic activities      Frequency/Duration: Patient will be followed by physical therapy:  4 times a week to address goals. Recommendation for discharge: (in order for the patient to meet his/her long term goals)  Physical therapy at least 2 days/week in the home AND ensure assist and/or supervision for safety with ADLs and mobility     This discharge recommendation:  Has not yet been discussed the attending provider and/or case management    IF patient discharges home will need the following DME: patient owns DME required for discharge       SUBJECTIVE:   Patient stated I feel only a little better.     OBJECTIVE DATA SUMMARY:   HISTORY:    Past Medical History:   Diagnosis Date    Cancer ([address]     ovaian stomach    CVA (cerebral vascular accident) ([address]     Heart failure ([address]     MI    Hypertension      Past Surgical History:   Procedure Laterality Date    ABDOMEN SURGERY PROC UNLISTED      cancer removal    HX GYN      hysterectomy    HX LUMBAR LAMINECTOMY  06/29/2016    L4-S1, Dr. Ty Hernandez       Personal factors and/or comorbidities impacting plan of care: old cva with residual R hemiparesis, heart failure, cad    Home Situation  Home Environment: Apartment  # Steps to Enter: 1  Rails to [facility]: No  Wheelchair Ramp: No  One/Two Story Residence: Two story  # of Interior Steps: 15  Interior Rails: Left  Living Alone: Yes  Support Systems: Family member(s)(2 DAUGHTERS LIVE NEARBY)  Current DME Used/Available at Home: Cane, straight, Walker, rolling  Tub or Shower Type: Shower    EXAMINATION/PRESENTATION/DECISION MAKING:   Critical Behavior:  Neurologic State: Alert  Orientation Level: Oriented X4  Cognition: Follows commands  Safety/Judgement: Good awareness of safety precautions, Awareness of environment, Fall prevention(decreased standing balance)  Hearing:     Skin:  no findings  Edema: no findings  Range Of Motion:  AROM: Generally decreased, functional                       Strength:    Strength: Generally decreased, functional                    Tone & Sensation:                                  Coordination:  Coordination: Generally decreased, functional(RLE 3+/5 and LLE 4/5)  Vision:      Functional Mobility:  Bed Mobility:  Rolling: Stand-by assistance  Supine to Sit: Stand-by assistance     Scooting: Stand-by assistance  Transfers:  Sit to Stand: Contact guard assistance  Stand to Sit: Setup        Bed to Chair: Contact guard assistance(light hand hold R)              Balance:   Sitting: Intact  Standing: Impaired  Standing - Static: Good  Standing - Dynamic : Fair;Occasional  Ambulation/Gait Training:  Distance (ft): 25 Feet (ft)  Assistive Device: Gait belt  Ambulation - Level of Assistance: Contact guard assistance     Gait Description (WDL): Exceptions to WDL  Gait Abnormalities: Decreased step clearance; Altered arm swing; Step to gait; Path deviations(lob x 2 needing min to correct)  Right Side Weight Bearing: Full  Left Side Weight Bearing: Full  Base of Support: Widened     Speed/Manjula: Slow  Step Length: Left shortened;Right shortened        Interventions: Safety awareness training;Verbal cues           Functional Measure:  Barthel Index:    Bathin  Bladder: 5  Bowels: 5  Groomin  Dressin  Feeding: 10  Mobility: 0  Stairs: 0  Toilet Use: 5  Transfer (Bed to Chair and Back): 10  Total: 40/100       The Barthel ADL Index: Guidelines  1. The index should be used as a record of what a patient does, not as a record of what a patient could do. 2. The main aim is to establish degree of independence from any help, physical or verbal, however minor and for whatever reason. 3. The need for supervision renders the patient not independent. 4. A patient's performance should be established using the best available evidence. Asking the patient, friends/relatives and nurses are the usual sources, but direct observation and common sense are also important. However direct testing is not needed. 5. Usually the patient's performance over the preceding 24-48 hours is important, but occasionally longer periods will be relevant. 6. Middle categories imply that the patient supplies over 50 per cent of the effort. 7. Use of aids to be independent is allowed. Asad Diaz., Barthel, D.W. (9896). Functional evaluation: the Barthel Index. [address] [facility] (14)2. DIRK VargasF, Alessandro Puri., Jean [facility] [place], [address] [facility] (). Measuring the change indisability after inpatient rehabilitation; comparison of the responsiveness of the Barthel Index and Functional [place] Measure. Journal of Neurology, Neurosurgery, and Psychiatry, 66(4), 145-506. Joleen Mahan, N.J.A, ULISES Gómez.HECTOR, & Leticia Velasco M.A. (2004.) Assessment of post-stroke quality of life in cost-effectiveness studies: The usefulness of the Barthel Index and the EuroQoL-5D.  Quality of Life Research, 15, 579-12        Physical Therapy Evaluation Charge Determination   History Examination Presentation Decision-Making   HIGH Complexity :3+ comorbidities / personal factors will impact the outcome/ POC  HIGH Complexity : 4+ Standardized tests and measures addressing body structure, function, activity limitation and / or participation in recreation  MEDIUM Complexity : Evolving with changing characteristics  Other outcome measures Barthel  MEDIUM      Based on the above components, the patient evaluation is determined to be of the following complexity level: MEDIUM    Pain Rating:  None reported    Activity Tolerance:   Poor, desaturates with exertion and requires oxygen, requires frequent rest breaks, and observed SOB with activity  Please refer to the flowsheet for vital signs taken during this treatment. After treatment patient left in no apparent distress:   Sitting in chair and Call bell within reach    COMMUNICATION/EDUCATION:   The patients plan of care was discussed with: Occupational therapist and Registered nurse. Fall prevention education was provided and the patient/caregiver indicated understanding., Patient/family have participated as able in goal setting and plan of care. , and Patient/family agree to work toward stated goals and plan of care.     Thank you for this referral.  Martha Singh, PT   Time Calculation: 20 mins

## 2020-04-29 NOTE — PROGRESS NOTES
Bedside shift change report given to DANNY Dong (oncoming nurse) by Preet Chavez (offgoing nurse). Report included the following information SBAR, Kardex, Intake/Output, MAR, Recent Results and Cardiac Rhythm AFIB.

## 2020-04-29 NOTE — PROGRESS NOTES
Spiritual Care Assessment/Progress Note  [facility]      NAME: Pro Faulkner      MRN: [id number]  AGE: 69 y.o.  SEX: female  Hinduism Affiliation: Yarsani   Language: English     4/29/2020     Total Time (in minutes): 15     Spiritual Assessment begun in [facility] 3 MED TELE through conversation with:         [x]Patient        [] Family    [] Friend(s)        Reason for Consult: Initial/Spiritual assessment, patient floor     Spiritual beliefs: (Please include comment if needed)     [x] Identifies with a peter tradition:         [] Supported by a peter community:            [] Claims no spiritual orientation:           [] Seeking spiritual identity:                [] Adheres to an individual form of spirituality:           [] Not able to assess:                           Identified resources for coping:      [x] Prayer                               [] Music                  [] Guided Imagery     [x] Family/friends                 [] Pet visits     [] Devotional reading                         [] Unknown     [] Other:                                               Interventions offered during this visit: (See comments for more details)    Patient Interventions: Affirmation of peter, Affirmation of emotions/emotional suffering, Catharsis/review of pertinent events in supportive environment, Coping skills reviewed/reinforced, Iconic (affirming the presence of God/Higher Power), Initial/Spiritual assessment, patient floor, Prayer (assurance of), Normalization of emotional/spiritual concerns, Hinduism beliefs/image of God discussed           Plan of Care:     [] Support spiritual and/or cultural needs    [] Support AMD and/or advance care planning process      [] Support grieving process   [] Coordinate Rites and/or Rituals    [] Coordination with community clergy   [] No spiritual needs identified at this time   [] Detailed Plan of Care below (See Comments)  [] Make referral to Music Therapy  [] Make referral to Pet Therapy     [] Make referral to Addiction services  [] Make referral to [facility]  [] Make referral to Spiritual Care Partner  [] No future visits requested        [x] Follow up visits as needed     Comments: The purpose of the visit was to do a spiritual assessment on the patient. The patient was resting in bed however she welcomed the visit. Patient mentioned that she has two daughters, however she was reluctant in mentioning her third daughter. She expressed feeling fortunate that she is not as ill, considering the conditions. She shared that is does pain her to know that her daughter is not living a more wholesome life. She expressed disappointment when she spoke of her. Patient mentioned that she hopes her daughter will do better and make better lifestyle choices. Patient was pleasant and engaging during the visit. The  provided the ministry of presence, as well as listened empathically and reflectively. [address] [facility] Andrzej Merida.    paging service [phone] ([phone])

## 2020-04-29 NOTE — PROGRESS NOTES
Episode of A-fib with the rate of upper 30's with 3-4 second pause. 12 lead EKG reveled A-fib with of 78. Discussed with Dr. Mignon Altman.   -> Cardizem 60mg BID & metoprolol 12.5mg BID with parameter. Pt was not symptomatic. Pt will be seen by Dr. Mignon Altman prior to discharge tomorrow.  dk

## 2020-04-29 NOTE — PROGRESS NOTES
Problem: Falls - Risk of  Goal: *Absence of Falls  Description: Document Bernard Vazquez Fall Risk and appropriate interventions in the flowsheet.   Outcome: Progressing Towards Goal  Note: Fall Risk Interventions:  Mobility Interventions: Communicate number of staff needed for ambulation/transfer, Bed/chair exit alarm, Patient to call before getting OOB, Utilize walker, cane, or other assistive device    Mentation Interventions: Adequate sleep, hydration, pain control, Bed/chair exit alarm, Evaluate medications/consider consulting pharmacy, Reorient patient, More frequent rounding    Medication Interventions: Bed/chair exit alarm, Patient to call before getting OOB, Teach patient to arise slowly    Elimination Interventions: Call light in reach, Bed/chair exit alarm, Patient to call for help with toileting needs

## 2020-04-29 NOTE — PROGRESS NOTES
Episode of Afib with rate of upper 30's following 3.25 and 4.8 pause. Dr. Neri Lorenzana was notified.

## 2020-04-30 ENCOUNTER — APPOINTMENT (OUTPATIENT)
Dept: NON INVASIVE DIAGNOSTICS | Age: 70
DRG: 193 | End: 2020-04-30
Attending: INTERNAL MEDICINE
Payer: MEDICARE

## 2020-04-30 ENCOUNTER — APPOINTMENT (OUTPATIENT)
Dept: GENERAL RADIOLOGY | Age: 70
DRG: 193 | End: 2020-04-30
Attending: NURSE PRACTITIONER
Payer: MEDICARE

## 2020-04-30 PROBLEM — J18.9 CAP (COMMUNITY ACQUIRED PNEUMONIA): Status: ACTIVE | Noted: 2020-04-30

## 2020-04-30 PROBLEM — E78.5 HYPERLIPIDEMIA: Status: ACTIVE | Noted: 2020-04-30

## 2020-04-30 PROBLEM — I50.32 DIASTOLIC CHF, CHRONIC (HCC): Status: ACTIVE | Noted: 2020-04-30

## 2020-04-30 PROBLEM — I10 HYPERTENSION: Status: ACTIVE | Noted: 2020-04-30

## 2020-04-30 LAB
ANION GAP SERPL CALC-SCNC: 7 MMOL/L (ref 5–15)
ATRIAL RATE: 92 BPM
BASOPHILS # BLD: 0.1 K/UL (ref 0–0.1)
BASOPHILS NFR BLD: 1 % (ref 0–1)
BNP SERPL-MCNC: 3359 PG/ML
BUN SERPL-MCNC: 25 MG/DL (ref 6–20)
BUN/CREAT SERPL: 14 (ref 12–20)
CALCIUM SERPL-MCNC: 8.6 MG/DL (ref 8.5–10.1)
CALCULATED P AXIS, ECG09: 23 DEGREES
CALCULATED R AXIS, ECG10: 18 DEGREES
CALCULATED R AXIS, ECG10: 21 DEGREES
CALCULATED T AXIS, ECG11: -127 DEGREES
CALCULATED T AXIS, ECG11: -31 DEGREES
CHLORIDE SERPL-SCNC: 114 MMOL/L (ref 97–108)
CK SERPL-CCNC: 76 U/L (ref 26–192)
CO2 SERPL-SCNC: 19 MMOL/L (ref 21–32)
CREAT SERPL-MCNC: 1.82 MG/DL (ref 0.55–1.02)
D DIMER PPP FEU-MCNC: 1.73 MG/L FEU (ref 0–0.65)
DIAGNOSIS, 93000: NORMAL
DIAGNOSIS, 93000: NORMAL
DIFFERENTIAL METHOD BLD: ABNORMAL
EOSINOPHIL # BLD: 0.3 K/UL (ref 0–0.4)
EOSINOPHIL NFR BLD: 4 % (ref 0–7)
ERYTHROCYTE [DISTWIDTH] IN BLOOD BY AUTOMATED COUNT: 15.1 % (ref 11.5–14.5)
GLUCOSE SERPL-MCNC: 116 MG/DL (ref 65–100)
HCT VFR BLD AUTO: 30.1 % (ref 35–47)
HGB BLD-MCNC: 9.5 G/DL (ref 11.5–16)
IMM GRANULOCYTES # BLD AUTO: 0 K/UL (ref 0–0.04)
IMM GRANULOCYTES NFR BLD AUTO: 1 % (ref 0–0.5)
LYMPHOCYTES # BLD: 1 K/UL (ref 0.8–3.5)
LYMPHOCYTES NFR BLD: 16 % (ref 12–49)
MAGNESIUM SERPL-MCNC: 1.9 MG/DL (ref 1.6–2.4)
MCH RBC QN AUTO: 29.1 PG (ref 26–34)
MCHC RBC AUTO-ENTMCNC: 31.6 G/DL (ref 30–36.5)
MCV RBC AUTO: 92 FL (ref 80–99)
MONOCYTES # BLD: 0.9 K/UL (ref 0–1)
MONOCYTES NFR BLD: 14 % (ref 5–13)
NEUTS SEG # BLD: 4.1 K/UL (ref 1.8–8)
NEUTS SEG NFR BLD: 64 % (ref 32–75)
NRBC # BLD: 0 K/UL (ref 0–0.01)
NRBC BLD-RTO: 0 PER 100 WBC
PLATELET # BLD AUTO: 242 K/UL (ref 150–400)
PMV BLD AUTO: 10.3 FL (ref 8.9–12.9)
POTASSIUM SERPL-SCNC: 4.1 MMOL/L (ref 3.5–5.1)
PROCALCITONIN SERPL-MCNC: <0.05 NG/ML
Q-T INTERVAL, ECG07: 200 MS
Q-T INTERVAL, ECG07: 396 MS
QRS DURATION, ECG06: 72 MS
QRS DURATION, ECG06: 76 MS
QTC CALCULATION (BEZET), ECG08: 246 MS
QTC CALCULATION (BEZET), ECG08: 451 MS
RBC # BLD AUTO: 3.27 M/UL (ref 3.8–5.2)
SODIUM SERPL-SCNC: 140 MMOL/L (ref 136–145)
TROPONIN I SERPL-MCNC: <0.05 NG/ML
VENTRICULAR RATE, ECG03: 78 BPM
VENTRICULAR RATE, ECG03: 91 BPM
WBC # BLD AUTO: 6.3 K/UL (ref 3.6–11)

## 2020-04-30 PROCEDURE — 82550 ASSAY OF CK (CPK): CPT

## 2020-04-30 PROCEDURE — 93308 TTE F-UP OR LMTD: CPT

## 2020-04-30 PROCEDURE — 84145 PROCALCITONIN (PCT): CPT

## 2020-04-30 PROCEDURE — 74011000250 HC RX REV CODE- 250: Performed by: FAMILY MEDICINE

## 2020-04-30 PROCEDURE — 74011250637 HC RX REV CODE- 250/637: Performed by: FAMILY MEDICINE

## 2020-04-30 PROCEDURE — 93005 ELECTROCARDIOGRAM TRACING: CPT

## 2020-04-30 PROCEDURE — 65660000000 HC RM CCU STEPDOWN

## 2020-04-30 PROCEDURE — 83880 ASSAY OF NATRIURETIC PEPTIDE: CPT

## 2020-04-30 PROCEDURE — 74011250637 HC RX REV CODE- 250/637: Performed by: NURSE PRACTITIONER

## 2020-04-30 PROCEDURE — 74011250637 HC RX REV CODE- 250/637: Performed by: INTERNAL MEDICINE

## 2020-04-30 PROCEDURE — 85025 COMPLETE CBC W/AUTO DIFF WBC: CPT

## 2020-04-30 PROCEDURE — 36415 COLL VENOUS BLD VENIPUNCTURE: CPT

## 2020-04-30 PROCEDURE — 94760 N-INVAS EAR/PLS OXIMETRY 1: CPT

## 2020-04-30 PROCEDURE — 84484 ASSAY OF TROPONIN QUANT: CPT

## 2020-04-30 PROCEDURE — 94640 AIRWAY INHALATION TREATMENT: CPT

## 2020-04-30 PROCEDURE — 80048 BASIC METABOLIC PNL TOTAL CA: CPT

## 2020-04-30 PROCEDURE — 71045 X-RAY EXAM CHEST 1 VIEW: CPT

## 2020-04-30 PROCEDURE — 97530 THERAPEUTIC ACTIVITIES: CPT

## 2020-04-30 PROCEDURE — 83735 ASSAY OF MAGNESIUM: CPT

## 2020-04-30 PROCEDURE — 77010033678 HC OXYGEN DAILY

## 2020-04-30 PROCEDURE — 97116 GAIT TRAINING THERAPY: CPT

## 2020-04-30 PROCEDURE — 74011250636 HC RX REV CODE- 250/636: Performed by: INTERNAL MEDICINE

## 2020-04-30 PROCEDURE — 85379 FIBRIN DEGRADATION QUANT: CPT

## 2020-04-30 RX ORDER — IPRATROPIUM BROMIDE AND ALBUTEROL SULFATE 2.5; .5 MG/3ML; MG/3ML
3 SOLUTION RESPIRATORY (INHALATION)
Status: COMPLETED | OUTPATIENT
Start: 2020-04-30 | End: 2020-04-30

## 2020-04-30 RX ORDER — METOPROLOL TARTRATE 25 MG/1
12.5 TABLET, FILM COATED ORAL EVERY 12 HOURS
Qty: 60 TAB | Refills: 0 | Status: SHIPPED | OUTPATIENT
Start: 2020-04-30 | End: 2020-08-24 | Stop reason: SDUPTHER

## 2020-04-30 RX ORDER — PANTOPRAZOLE SODIUM 40 MG/1
40 TABLET, DELAYED RELEASE ORAL
Qty: 30 TAB | Refills: 0 | Status: SHIPPED | OUTPATIENT
Start: 2020-04-30 | End: 2020-08-24 | Stop reason: SDUPTHER

## 2020-04-30 RX ORDER — IPRATROPIUM BROMIDE AND ALBUTEROL SULFATE 2.5; .5 MG/3ML; MG/3ML
3 SOLUTION RESPIRATORY (INHALATION)
Status: DISCONTINUED | OUTPATIENT
Start: 2020-04-30 | End: 2020-05-02 | Stop reason: HOSPADM

## 2020-04-30 RX ORDER — DILTIAZEM HYDROCHLORIDE 60 MG/1
60 TABLET, FILM COATED ORAL 2 TIMES DAILY
Qty: 60 TAB | Refills: 0 | Status: SHIPPED | OUTPATIENT
Start: 2020-04-30 | End: 2020-08-24 | Stop reason: SDUPTHER

## 2020-04-30 RX ORDER — DILTIAZEM HYDROCHLORIDE 30 MG/1
60 TABLET, FILM COATED ORAL 2 TIMES DAILY
Status: DISCONTINUED | OUTPATIENT
Start: 2020-04-30 | End: 2020-05-02 | Stop reason: HOSPADM

## 2020-04-30 RX ORDER — ONDANSETRON 2 MG/ML
4 INJECTION INTRAMUSCULAR; INTRAVENOUS
Status: DISCONTINUED | OUTPATIENT
Start: 2020-04-30 | End: 2020-05-02 | Stop reason: HOSPADM

## 2020-04-30 RX ORDER — ALBUTEROL SULFATE 90 UG/1
2 AEROSOL, METERED RESPIRATORY (INHALATION)
Qty: 1 INHALER | Refills: 0 | Status: SHIPPED | OUTPATIENT
Start: 2020-04-30 | End: 2020-08-24 | Stop reason: SDUPTHER

## 2020-04-30 RX ADMIN — Medication 10 ML: at 14:00

## 2020-04-30 RX ADMIN — TRAZODONE HYDROCHLORIDE 50 MG: 50 TABLET ORAL at 21:27

## 2020-04-30 RX ADMIN — MELATONIN 3 MG: at 21:27

## 2020-04-30 RX ADMIN — Medication 10 ML: at 05:08

## 2020-04-30 RX ADMIN — Medication 10 ML: at 21:27

## 2020-04-30 RX ADMIN — MELATONIN 1 TABLET: at 09:15

## 2020-04-30 RX ADMIN — PRAVASTATIN SODIUM 20 MG: 40 TABLET ORAL at 21:27

## 2020-04-30 RX ADMIN — APIXABAN 5 MG: 5 TABLET, FILM COATED ORAL at 09:15

## 2020-04-30 RX ADMIN — APIXABAN 5 MG: 5 TABLET, FILM COATED ORAL at 17:07

## 2020-04-30 RX ADMIN — METOPROLOL TARTRATE 12.5 MG: 25 TABLET, FILM COATED ORAL at 21:27

## 2020-04-30 RX ADMIN — LEVOTHYROXINE SODIUM 50 MCG: 0.05 TABLET ORAL at 09:15

## 2020-04-30 RX ADMIN — ONDANSETRON 4 MG: 2 INJECTION INTRAMUSCULAR; INTRAVENOUS at 05:24

## 2020-04-30 RX ADMIN — GUAIFENESIN AND DEXTROMETHORPHAN 10 ML: 100; 10 SYRUP ORAL at 05:07

## 2020-04-30 RX ADMIN — IPRATROPIUM BROMIDE AND ALBUTEROL SULFATE 3 ML: .5; 3 SOLUTION RESPIRATORY (INHALATION) at 05:20

## 2020-04-30 RX ADMIN — ASPIRIN 81 MG: 81 TABLET ORAL at 09:15

## 2020-04-30 RX ADMIN — TOPIRAMATE 50 MG: 25 TABLET, FILM COATED ORAL at 22:00

## 2020-04-30 RX ADMIN — DILTIAZEM HYDROCHLORIDE 60 MG: 30 TABLET, FILM COATED ORAL at 14:23

## 2020-04-30 RX ADMIN — PANTOPRAZOLE SODIUM 40 MG: 40 TABLET, DELAYED RELEASE ORAL at 09:15

## 2020-04-30 RX ADMIN — DILTIAZEM HYDROCHLORIDE 60 MG: 30 TABLET, FILM COATED ORAL at 21:27

## 2020-04-30 NOTE — PROGRESS NOTES
Problem: Mobility Impaired (Adult and Pediatric)  Goal: *Acute Goals and Plan of Care (Insert Text)  Description: FUNCTIONAL STATUS PRIOR TO ADMISSION: Patient was modified independent using a rolling walker and single point cane for functional mobility. HOME SUPPORT PRIOR TO ADMISSION: The patient lived alone with daughters nearby to provide assistance as needed. Patient does not drive and mostly homebound. Physical Therapy Goals  Initiated 4/29/2020  1. Patient will move from supine to sit and sit to supine , scoot up and down and roll side to side in bed with independence within 7 day(s). 2.  Patient will transfer from bed to chair and chair to bed with modified independence using the least restrictive device within 7 day(s). 3.  Patient will perform sit to stand with independence within 7 day(s). 4.  Patient will ambulate with modified independence for 150 feet with the least restrictive device within 7 day(s). 5.  Patient will ascend/descend 14 stairs with L handrail(s) with minimal assistance/contact guard assist within 7 day(s). Outcome: Progressing Towards Goal   PHYSICAL THERAPY TREATMENT  Patient: Ashu Garcia (89 y.o. female)  Date: 4/30/2020  Diagnosis: Atrial fibrillation with RVR ([facility] [address] [I48.91]   Atrial fibrillation with RVR ([facility])       Precautions: Fall(expressive aphasia)  Chart, physical therapy assessment, plan of care and goals were reviewed. ASSESSMENT  Patient continues with skilled PT services and is progressing towards goals. Pt presents with decreased endurance and strength. Pt received on 3LPM. Oxygen challenge performed. Pt performed bed mobility at CGA/SBA. Pt pt performed sit to stand transfer at [facility] 62. Pt ambulated 30ft x2 with RW at min A/CGA. Pt requiring cueing to stay within walker and to slow down for safety. Pt improving with time. Pt very SOB after activity. Pt left on 2LPM and nurse notified.  Pt with improved stability with RW although requiring cueing for safety. Pulse Oximetry Assessment    92% at rest on room air  88% while ambulating on room air  95% at rest on 2 LPM  91% while ambulating on 3LPM     Current Level of Function Impacting Discharge (mobility/balance): bed mobility at [facility], sit to stand transfer at [facility]    Other factors to consider for discharge: needs supervision for safety          PLAN :  Patient continues to benefit from skilled intervention to address the above impairments. Continue treatment per established plan of care. to address goals. Recommendation for discharge: (in order for the patient to meet his/her long term goals)  Therapy up to 5 days/week in SNF setting or intensive home health therapy program with 24/7 supervision (if unable to have supervision pt will need rehab for safety)    This discharge recommendation:  Has been made in collaboration with the attending provider and/or case management    IF patient discharges home will need the following DME: to be determined (TBD)       SUBJECTIVE:   Patient stated  We can try it.     OBJECTIVE DATA SUMMARY:   Critical Behavior:  Neurologic State: Alert  Orientation Level: Oriented X4  Cognition: Follows commands  Safety/Judgement: Decreased insight into deficits, Decreased awareness of need for safety, Decreased awareness of need for assistance(put herself back to bed; nsg notified)  Functional Mobility Training:  Bed Mobility:  Rolling: Contact guard assistance  Supine to Sit: Contact guard assistance;Stand-by assistance     Scooting: Stand-by assistance        Transfers:  Sit to Stand: Contact guard assistance  Stand to Sit: Contact guard assistance        Bed to Chair: Contact guard assistance                    Balance:  Sitting: Intact  Standing: Impaired  Standing - Static: Good;Constant support  Standing - Dynamic : Fair;Constant support  Ambulation/Gait Training:  Distance (ft): 60 Feet (ft)(30ft x2)  Assistive Device: Gait belt;Walker, rolling  Ambulation - Level of Assistance: Contact guard assistance        Gait Abnormalities: Decreased step clearance        Base of Support: Widened     Speed/Manjula: Pace decreased (<100 feet/min)  Step Length: Right shortened;Left shortened        Interventions: Verbal cues; Safety awareness training     Pain Rating:  Pt with no complaints of pain     Activity Tolerance:   Fair, desaturates with exertion and requires oxygen, and observed SOB with activity  Please refer to the flowsheet for vital signs taken during this treatment. After treatment patient left in no apparent distress:   Sitting in chair and Call bell within reach    COMMUNICATION/COLLABORATION:   The patients plan of care was discussed with: Registered nurse.      Arjun Gotti PTA   Time Calculation: 24 mins

## 2020-04-30 NOTE — PROGRESS NOTES
3p  Updates ecin'd to oxygen companies in case we need home oxygen. Awaiting word from [facility] SNF if they can accept. Referral ecin'd to [facility] ambulance for 1pm tentative Saturday transport. 12n  D/C plans discussed with pt who requests [facility] due to its close proximity to [facility]. We will aim for 1pm Saturday [facility] ambulance if stable and all in agreement. Chart cclink'd to them. 08  Pt sleeping. D/C planning discussed with RN who states pt is on oxygen at 3 LPM and is presently not safe to be up alone. Recommend SNF when stable with d/c on oxygen via [facility] transport. Will arrange if appropriate and all in agreement    TANYA:  -order received to arrange for follow up nancy'ts-done  -order to arrange home oxygen if needed. Candido responded \"testing would need to be documented in a note and without prior treatment methods for the CHF coverage would be denied\". Discussed with RN who will do the oxygen challenge(PO 95% on 4L and 94% on 2Lon 4/29/20). -pt sleeping. I called dtr Kun Carlson at [phone] to discuss d/c planning. She was amenable to the discussion. She states: pt lives alone in an apt on [address]; she is in agreement with [facility] for nursing, PT, OT, HHA, and ; pt has keys to apt; has cats; the pt has 2 dtrs, Kun Carlson and Tonya Lockhart(?spelling) neither of which get along with the patient; pt has a felony record for embezzlement which makes it difficult to find her housing; her present apt is $800/month and her monthly income is $700/month; Li pays the difference; pt has been without heat or hot water; she receives food stamps; Scottie Mendoza has contacted  [employer],  Care,  with APS, and has tried several times to get [facility]AKOV Aguilar to assist with housing. We discussed obtaining additional help, applying for full Medicaid, group home, personal care, and/or nursing home. She will reach out to pt's  with Medicaid  -she will call me today to let me know if she can transport pt at at d/c if appropriate

## 2020-04-30 NOTE — PROGRESS NOTES
TELE-HOSPITALIST CROSS COVER NOTE:    Visit Vitals  /65 (BP 1 [facility]: Left arm, BP Patient Position: At rest)   Pulse 82   Temp 98 °F (36.7 °C)   Resp 24   Ht 4' 11\" (1.499 m)   Wt 74.2 kg (163 lb 9.3 oz)   SpO2 91%   BMI 33.04 kg/m²         D/W RN; medical records reviewed; patient wheezing; DuoNeb per RT protocol.       Katherine Lopez

## 2020-04-30 NOTE — ACP (ADVANCE CARE PLANNING)
Primary Decision Janel Cobb - [phone]  Advance Care Planning 4/30/2020   Patient's Healthcare Decision Maker is: Named in scanned ACP document   Primary Decision Maker Name -   Primary Decision Maker Phone Number -   Confirm Advance Directive Yes, on file   Patient Would Like to Complete Advance Directive -       Patient was seen to assess ability to engage in conversation and discuss goals of care. Patient was resting, but woke up and willing to engage in conversation. However, patient was somewhat out of breath, having a hard time talking much. She also was not able to talk much about why she was brought to the hospital, what the doctors had shared with her today. Patient did engage in conversation about medical decision maker. She noted that she has three daughters but she does not get along with them, \"we are not close\". When asked who could make medical decisions for her if she is unable to talk to team, she noted, \"Li, ask Li\". Patient unable to make any other medical decisions at this time, she was clear that Li should be her medical decision maker. LCSW called and had to leave a message for Li to let her know about patient's wishes. LCSW did not get a call back, will try again tomorrow. LCSW would like to have Li's address to place on the AMD and mail her a copy. Will place copy in chart after this is received. 5/1/20 LCSW did not hear back from daughter Shay Liriano, placed AMD in chart and gave patient original. Asked her to share this with Leigha Bronson to mail to Shay Liriano as patient did not know her address.

## 2020-04-30 NOTE — PROGRESS NOTES
Bedside shift change report given to Priscila Lagos (oncoming nurse) by Crystal Welch (offgoing nurse). Report included the following information SBAR, Kardex, Intake/Output, MAR and Recent Results. Attempted to wean pt from O2. Kept desatting on 2L. Had to bump her back up to 3L. Had some scattered wheezing. Relieved once by inhaler, then obtained order for one time nebulizer treatment.

## 2020-04-30 NOTE — PROGRESS NOTES
Hospitalist Progress Note    NAME: Romelia Castleman   :  1950   MRN:  [id number]       Interim Hospital Summary: 69 y.o. female whom presented on 2020 with acute respiratory failure with hypoxia in presence of A-fib with RVR. Assessment / Plan:   930: Severe RIVERS and SOB, informed me that she was having mid sternal chest pain that radiated to her left side to her back. States that her chest pain is not as bad as during the night. Ordered following - CXR, troponin, CK, cbc, bmp, mag, NT pro-BNP, d-dimer,  pro calcitonin, and 12 lead EKG.  -> soft BP; hold all antihypertensive agents      NTG prn for chest pain    Acute Respiratory Failure with Hypoxia  CAP (completed 5 day course of ABX)  Due to pneumonia, RML Pneumonia on X ray  - COVID 19 ruled out ()    Completed 5 day course of IV ABX    Wean O2 as able while maintaining sats > 92%, pt is currently 3L via n/c    Duoneb PRN      Persistent Afib with RVR  Hypertension  Hyperlipidemia  - continue with cardizem and  lopressor with parameter; hold due to soft BP ()    Continue with Eliquis 5mg BID    Continue with ASA and statin    Echo: Estimated left ventricular ejection fraction is 50 - 55%. Appreciate cardiology input; will defer further cardiac work up to cardiology team         Chest pain  - 12 lead EKG; NSR     CK, Troponin, CBC, BMP, D-dimer, NT pro-BNP, procalcitonin ordered    ROJELIO on CKD   -  Received IVF hydration     Hold nephrotoxic agents     Creat trending down; 1.1 on  (was 2.6 on admission)      CHF, Chronic diastolic   -  NT pro-BNP 7806; may need to resume lasix.  Waiting to see CXR and repeat BNP   Echo addressed above      Last 3 Recorded Weights in this Encounter    20 0500 20 0514 20 0739   Weight: 75.4 kg (166 lb 2 oz) 74.2 kg (163 lb 9.3 oz) 74.5 kg (164 lb 3.9 oz)        Hypothyroidism  - Continue with synthroid     DVT Prophylaxis: eliquis  GI Prophylaxis:IV protonix  Code status: full code       Recommended Disposition: SNF     Subjective:     Chief Complaint / Reason for Physician Visit  \"I had chest pain all night and still uncomfortable\". Discussed with RN events overnight. Review of Systems:  Symptom Y/N Comments  Symptom Y/N Comments   Fever/Chills n   Chest Pain y    Poor Appetite    Edema     Cough n   Abdominal Pain     Sputum    Joint Pain     SOB/RIVERS n   Pruritis/Rash     Nausea/vomit n   Tolerating PT/OT     Diarrhea n   Tolerating Diet     Constipation n   Other       Could NOT obtain due to:      Objective:     VITALS:   Last 24hrs VS reviewed since prior progress note. Most recent are:  Patient Vitals for the past 24 hrs:   Temp Pulse Resp BP SpO2   04/30/20 0814 97.7 °F (36.5 °C) 89 19 98/69 91 %   04/30/20 0522     95 %   04/30/20 0311 98 °F (36.7 °C) 82 24 123/65 91 %   04/29/20 2310 97.6 °F (36.4 °C) 83 22 102/70 93 %   04/29/20 2143  85 20 109/66 93 %   04/29/20 1934 98.3 °F (36.8 °C) 86 20 136/84 94 %   04/29/20 1623  88 22  92 %   04/29/20 1441 98.2 °F (36.8 °C) 82 20 136/89 92 %   04/29/20 1059 97.8 °F (36.6 °C) 84 20 122/70 92 %       Intake/Output Summary (Last 24 hours) at 4/30/2020 1013  Last data filed at 4/30/2020 0615  Gross per 24 hour   Intake 410 ml   Output    Net 410 ml        PHYSICAL EXAM:  General: Ill appearing. Alert, cooperative, no acute distress    EENT:  EOMI. Anicteric sclerae. MMM  Resp:  Clear in apex with decreased breath sounds at bases. no wheezing or rales. No accessory muscle use  CV:  regular  Rhythm this morning,  No edema  GI:  Soft, Non distended, Non tender. +Bowel sounds  Neurologic:  Alert and oriented X 3, normal speech,   Psych:   Good insight. Not anxious nor agitated  Skin:  No rashes.   No jaundice    Reviewed most current lab test results and cultures  YES  Reviewed most current radiology test results   YES  Review and summation of old records today    NO  Reviewed patient's current orders and MAR    YES  PMH/SH reviewed - no change compared to H&P  ________________________________________________________________________  Care Plan discussed with:    Comments   Patient y    Family      RN y    Care Manager     Consultant                        Multidiciplinary team rounds were held today with , nursing, pharmacist and clinical coordinator. Patient's plan of care was discussed; medications were reviewed and discharge planning was addressed. ________________________________________________________________________  Yudy Wallace NP     Procedures: see electronic medical records for all procedures/Xrays and details which were not copied into this note but were reviewed prior to creation of Plan. LABS:  I reviewed today's most current labs and imaging studies.   Pertinent labs include:  Recent Labs     04/30/20  0940 04/29/20  0652 04/28/20  0411   WBC 6.3 5.4 5.8   HGB 9.5* 9.4* 9.5*   HCT 30.1* 30.3* 30.1*    247 229     Recent Labs     04/29/20  0652 04/28/20  0411    141   K 3.9 4.1   * 115*   CO2 18* 17*   GLU 87 85   BUN 26* 24*   CREA 1.71* 1.91*   CA 8.9 8.6       Signed: )Mikayla Arce NP

## 2020-04-30 NOTE — PROGRESS NOTES
Cardiology Progress Note      4/30/2020 1:09 PM    Admit Date: 4/23/2020          Subjective: Some chest disc this am. Now better. Troponin neg          Visit Vitals  BP (!) 132/94 (BP 1 [facility]: Left arm, BP Patient Position: At rest)   Pulse 94   Temp 97.8 °F (36.6 °C)   Resp 20   Ht 4' 11\" (1.499 m)   Wt 74.5 kg (164 lb 3.9 oz)   SpO2 91%   BMI 33.17 kg/m²     04/28 1901 - 04/30 0700  In: 410 [P.O.:410]  Out: -         Objective:      Physical Exam:  VS as above   Chest CTA ant  Card Irreg irreg no gallop     Data Review:   Labs:      Hgb 9.5  Trop neg   D dimer 1.73  BUN 25  Creat 1.8     Telemetry: afib R 70-80  12 lead afib , no ischemic changes       Assessment:     1. Persistent atrial fibrillation EF 50-55% by echo   2. intermittant pauses  3. Pneumonia with Hypoxic respiratory failure  4. Hypertension  5. Hyperlipidemia  6. Remote CAD and MI history, details unclear   7. Remote CVA  8. Chronic kidney disease; Stg 3-4  9. Ovarian cancer  10. COVID-19 ruled out  11. Chest pain   12. Anemia     Plan:  Doubt chest pain cardiac. Will do limited echo to ensure no pericardial eff has developed.  Repeat trop in am. I would keep on current rate control meds

## 2020-04-30 NOTE — PROGRESS NOTES
Bedside and Verbal shift change report given to Madelin Obrien (oncoming nurse) by [facility] nurse).  Report included the following information SBAR, Kardex, Intake/Output and MAR

## 2020-04-30 NOTE — PROGRESS NOTES
Initial Nutrition Assessment:    INTERVENTIONS/RECOMMENDATIONS:   · Continue cardiac diet  · Add magic cup and mighty shake daily     ASSESSMENT:   Patient medically noted for AFIB, Pneumonia, CHF, HTN, CKD. PMH CVA. Chart reviewed for length of stay. Patient unavailable at time of attempted visit. Last documented PO 10%. Will add ONS BID to help with kcal/protein intake. Palliative care consulted. Encourage intake of meals/supplements. Diet Order: Cardiac  % Eaten:    Patient Vitals for the past 72 hrs:   % Diet Eaten   04/29/20 1239 10 %       Pertinent Medications: [x]Reviewed []Other: Vitamin D, protonix, Pravastatin, Trazodone   Pertinent Labs: [x]Reviewed []Other:   Food Allergies: [x]None []Yes:    Last BM:    [x]Active     []Hyperactive  []Hypoactive       [] Absent BS  Skin:    [x] Intact   [] Incision  [] Breakdown: [] Edema []Other:    Anthropometrics:   Height: 4' 11\" (149.9 cm) Weight: 74.5 kg (164 lb 3.9 oz)   IBW (%IBW):   ( ) UBW (%UBW):   (  %)   Last Weight Metrics:  Weight Loss Metrics 4/30/2020 11/10/2019 10/1/2019 3/26/2019 5/22/2018 1/12/2018 11/14/2017   Today's Wt 164 lb 3.9 oz 158 lb 158 lb 4 oz 150 lb 160 lb 12.8 oz 161 lb 3.2 oz 157 lb   BMI 33.17 kg/m2 31.91 kg/m2 31.94 kg/m2 30.28 kg/m2 32.46 kg/m2 32.54 kg/m2 31.71 kg/m2       BMI: Body mass index is 33.17 kg/m². This BMI is indicative of:   []Underweight    []Normal    []Overweight    [x] Obesity   [] Extreme Obesity (BMI>40)     Estimated Nutrition Needs (Based on):   1522 Kcals/day(BMR (1171) x 1. 3AF) , 75 g(1.0 g/kg bw) Protein  Carbohydrate:  At Least 130 g/day  Fluids: 1500 mL/day (1ml/kcal)    Pt expected to meet estimated nutrient needs: [x]Yes []No    NUTRITION DIAGNOSES:   Problem:  Inadequate protein-energy intake      Etiology: related to decreased appetite     Signs/Symptoms: as evidenced by PO <50% of meals      NUTRITION INTERVENTIONS:  Meals/Snacks: General/healthful diet   Supplements: Commercial supplement GOAL:   PO intake >50% of meals/supplements next 3-5 days    LEARNING NEEDS (Diet, Food/Nutrient-Drug Interaction):    [x] None Identified   [] Identified and Education Provided/Documented   [] Identified and Pt declined/was not appropriate     Cultural, Protestant, OR Ethnic Dietary Needs:    [x] None Identified   [] Identified and Addressed     [x] Interdisciplinary Care Plan Reviewed/Documented    [x] Discharge Planning: Heart healthy diet, ONS 1-2x/day as needed      MONITORING /EVALUATION:   Food/Nutrient Intake Outcomes:  Total energy intake  Physical Signs/Symptoms Outcomes: Weight/weight change, Electrolyte and renal profile    NUTRITION RISK:    [x] Patient At Nutritional Risk              [] Patient Not at Nutritional Risk    PT SEEN FOR:    []  MD Consult: []Calorie Count      []Diabetic Diet Education        []Diet Education     []Electrolyte Management     []General Nutrition Management and Supplements     []Management of Tube Feeding     []TPN Recommendations    []  RN Referral:  []MST score >=2     []Enteral/Parenteral Nutrition PTA     []Pregnant: Gestational DM or Multigestation     []Pressure Ulcer/Wound Care needs        []  Low BMI  [x]  ZEUS Contreraso Maker [phone]  Pager [phone]    Weekend Pager [phone]

## 2020-04-30 NOTE — PROGRESS NOTES
Occupational Therapy    Patient chart reviewed and cleared for therapy by RN - Pt apologetic but reporting she is \"very tired today\" and that she'll participate tomorrow. Given pt request to wait and chest pain this am, will follow up for OT treatment tomorrow as able. Thank you.   Randee Jauregui, OTR/L

## 2020-05-01 PROBLEM — R53.83 FATIGUE: Status: ACTIVE | Noted: 2020-05-01

## 2020-05-01 PROBLEM — R06.02 SOB (SHORTNESS OF BREATH): Status: ACTIVE | Noted: 2020-05-01

## 2020-05-01 PROBLEM — R05.9 COUGH: Status: ACTIVE | Noted: 2020-05-01

## 2020-05-01 PROBLEM — Z71.89 GOALS OF CARE, COUNSELING/DISCUSSION: Status: ACTIVE | Noted: 2020-05-01

## 2020-05-01 LAB
ANION GAP SERPL CALC-SCNC: 6 MMOL/L (ref 5–15)
BUN SERPL-MCNC: 24 MG/DL (ref 6–20)
BUN/CREAT SERPL: 12 (ref 12–20)
CALCIUM SERPL-MCNC: 9.1 MG/DL (ref 8.5–10.1)
CHLORIDE SERPL-SCNC: 113 MMOL/L (ref 97–108)
CO2 SERPL-SCNC: 21 MMOL/L (ref 21–32)
CREAT SERPL-MCNC: 1.98 MG/DL (ref 0.55–1.02)
ECHO AO ROOT DIAM: 3.66 CM
GLUCOSE SERPL-MCNC: 108 MG/DL (ref 65–100)
MAGNESIUM SERPL-MCNC: 2.1 MG/DL (ref 1.6–2.4)
POTASSIUM SERPL-SCNC: 4.3 MMOL/L (ref 3.5–5.1)
SODIUM SERPL-SCNC: 140 MMOL/L (ref 136–145)
TROPONIN I SERPL-MCNC: <0.05 NG/ML

## 2020-05-01 PROCEDURE — 65660000000 HC RM CCU STEPDOWN

## 2020-05-01 PROCEDURE — 94640 AIRWAY INHALATION TREATMENT: CPT

## 2020-05-01 PROCEDURE — 80048 BASIC METABOLIC PNL TOTAL CA: CPT

## 2020-05-01 PROCEDURE — 83735 ASSAY OF MAGNESIUM: CPT

## 2020-05-01 PROCEDURE — 77010033678 HC OXYGEN DAILY

## 2020-05-01 PROCEDURE — 74011250637 HC RX REV CODE- 250/637: Performed by: NURSE PRACTITIONER

## 2020-05-01 PROCEDURE — 36415 COLL VENOUS BLD VENIPUNCTURE: CPT

## 2020-05-01 PROCEDURE — 84484 ASSAY OF TROPONIN QUANT: CPT

## 2020-05-01 PROCEDURE — 74011000250 HC RX REV CODE- 250: Performed by: NURSE PRACTITIONER

## 2020-05-01 PROCEDURE — 97535 SELF CARE MNGMENT TRAINING: CPT | Performed by: OCCUPATIONAL THERAPIST

## 2020-05-01 PROCEDURE — 74011250637 HC RX REV CODE- 250/637: Performed by: FAMILY MEDICINE

## 2020-05-01 PROCEDURE — 74011250637 HC RX REV CODE- 250/637: Performed by: INTERNAL MEDICINE

## 2020-05-01 PROCEDURE — 94760 N-INVAS EAR/PLS OXIMETRY 1: CPT

## 2020-05-01 RX ADMIN — PANTOPRAZOLE SODIUM 40 MG: 40 TABLET, DELAYED RELEASE ORAL at 09:13

## 2020-05-01 RX ADMIN — IPRATROPIUM BROMIDE AND ALBUTEROL SULFATE 3 ML: .5; 3 SOLUTION RESPIRATORY (INHALATION) at 09:55

## 2020-05-01 RX ADMIN — MELATONIN 1 TABLET: at 09:13

## 2020-05-01 RX ADMIN — TRAZODONE HYDROCHLORIDE 50 MG: 50 TABLET ORAL at 22:35

## 2020-05-01 RX ADMIN — DILTIAZEM HYDROCHLORIDE 60 MG: 30 TABLET, FILM COATED ORAL at 09:12

## 2020-05-01 RX ADMIN — LEVOTHYROXINE SODIUM 50 MCG: 0.05 TABLET ORAL at 09:13

## 2020-05-01 RX ADMIN — Medication 10 ML: at 14:53

## 2020-05-01 RX ADMIN — PRAVASTATIN SODIUM 20 MG: 40 TABLET ORAL at 22:35

## 2020-05-01 RX ADMIN — APIXABAN 5 MG: 5 TABLET, FILM COATED ORAL at 09:13

## 2020-05-01 RX ADMIN — IPRATROPIUM BROMIDE AND ALBUTEROL SULFATE 3 ML: .5; 3 SOLUTION RESPIRATORY (INHALATION) at 21:40

## 2020-05-01 RX ADMIN — Medication 10 ML: at 22:35

## 2020-05-01 RX ADMIN — METOPROLOL TARTRATE 12.5 MG: 25 TABLET, FILM COATED ORAL at 20:43

## 2020-05-01 RX ADMIN — Medication 10 ML: at 05:37

## 2020-05-01 RX ADMIN — ASPIRIN 81 MG: 81 TABLET ORAL at 09:12

## 2020-05-01 RX ADMIN — METOPROLOL TARTRATE 12.5 MG: 25 TABLET, FILM COATED ORAL at 09:13

## 2020-05-01 RX ADMIN — TOPIRAMATE 50 MG: 25 TABLET, FILM COATED ORAL at 22:34

## 2020-05-01 RX ADMIN — DILTIAZEM HYDROCHLORIDE 60 MG: 30 TABLET, FILM COATED ORAL at 20:43

## 2020-05-01 RX ADMIN — APIXABAN 5 MG: 5 TABLET, FILM COATED ORAL at 17:23

## 2020-05-01 NOTE — PROGRESS NOTES
Dr. Jessy Montejo notified by phone of patient's bradycardia in the 40's, Dr. Jessy Montejo stated to not hold medication (Cardizem 60 mg, Metoprolol 12.5 mg)  unless patient's pulse dropped into the 40's and remained for 5 minutes.

## 2020-05-01 NOTE — PROGRESS NOTES
Hospitalist Progress Note    NAME: Jesse So   :  1950   MRN:  [id number]     I reviewed with Dr. Avel Ivan about the medical history and the findings on the physical examination. I discussed with Dr. Avel Ivan the patient's diagnosis and concur with the plan. Interim Hospital Summary: 69 y.o. female whom presented on 2020 with acute respiratory failure with hypoxia in presence of A-fib with RVR. Assessment / Plan:   Pt was resting in bed. Denies any discomfort this morning. No further chest discomfort. Acute Respiratory Failure with Hypoxia  CAP (completed 5 day course of ABX)  Due to pneumonia, RML Pneumonia on X ray  - COVID 19 ruled out ()    Completed 5 day course of IV ABX    Wean O2 as able while maintaining sats > 92%, pt is currently 3L via n/c    Duoneb PRN    CXR (): Cardiomegaly with possible pericardial effusion. Slight increase in right  pericardial effusion      Persistent Afib with RVR with intermittent pauses  Hypertension  Hyperlipidemia  - continue with cardizem and  lopressor with parameter    Continue with Eliquis 5mg BID    Continue with ASA and statin    Echo: Estimated left ventricular ejection fraction is 50 - 55%. Appreciate cardiology input; pericardial effusion to be evaluated by limited Echo by cardiologist, result pending        Chest pain  - 12 lead EKG; NSR     CK, Troponin, CBC, BMP, D-dimer, NT pro-BNP, procalcitonin ordered    ROJELIO on CKD   -  Received IVF hydration     Hold nephrotoxic agents     Creat trending down; 1.9 (was 2.6 on admission)      CHF, Chronic diastolic   -  NT pro-BNP 6670 -> 3359; lasix is on hold. Pt doesn't appear to be fluid overload at this time.    Last 3 Recorded Weights in this Encounter    20 0514 20 0739 20 2587   Weight: 74.2 kg (163 lb 9.3 oz) 74.5 kg (164 lb 3.9 oz) 74.4 kg (164 lb)        Hypothyroidism  - Continue with synthroid     DVT Prophylaxis: eliquis  GI Prophylaxis:IV protonix  Code status: full code       Recommended Disposition: SNF     Subjective:     Chief Complaint / Reason for Physician Visit  \"I had chest pain all night and still uncomfortable\". Discussed with RN events overnight. Review of Systems:  Symptom Y/N Comments  Symptom Y/N Comments   Fever/Chills n   Chest Pain y    Poor Appetite    Edema     Cough n   Abdominal Pain     Sputum    Joint Pain     SOB/RIVERS n   Pruritis/Rash     Nausea/vomit n   Tolerating PT/OT     Diarrhea n   Tolerating Diet     Constipation n   Other       Could NOT obtain due to:      Objective:     VITALS:   Last 24hrs VS reviewed since prior progress note. Most recent are:  Patient Vitals for the past 24 hrs:   Temp Pulse Resp BP SpO2   05/01/20 0756 97.3 °F (36.3 °C) 89 20 130/75 91 %   05/01/20 0332 98.4 °F (36.9 °C) 81 22 142/86 93 %   04/30/20 2356 97.6 °F (36.4 °C) 75 20 113/61 92 %   04/30/20 1951 97.7 °F (36.5 °C) 81 21 111/68 92 %   04/30/20 1517    108/67    04/30/20 1445 97.5 °F (36.4 °C) 93 20 108/67 90 %   04/30/20 1131 97.8 °F (36.6 °C) 94 20 (!) 132/94 91 %       Intake/Output Summary (Last 24 hours) at 5/1/2020 0920  Last data filed at 5/1/2020 0174  Gross per 24 hour   Intake 480 ml   Output    Net 480 ml        PHYSICAL EXAM:  General: Ill appearing. Alert, cooperative, no acute distress    EENT:  EOMI. Anicteric sclerae. MMM  Resp:  Clear in apex with decreased breath sounds at bases. no wheezing or rales. No accessory muscle use  CV:  irregular  Rhythm this morning,  No edema  GI:  Soft, Non distended, Non tender. +Bowel sounds  Neurologic:  Alert and oriented X 3, normal speech,   Psych:   Good insight. Not anxious nor agitated  Skin:  No rashes.   No jaundice    Reviewed most current lab test results and cultures  YES  Reviewed most current radiology test results   YES  Review and summation of old records today    NO  Reviewed patient's current orders and MAR    YES  PMH/SH reviewed - no change compared to H&P  ________________________________________________________________________  Care Plan discussed with:    Comments   Patient y    Family      RN y    Care Manager     Consultant                        Multidiciplinary team rounds were held today with , nursing, pharmacist and clinical coordinator. Patient's plan of care was discussed; medications were reviewed and discharge planning was addressed. ________________________________________________________________________  Angela Barillas NP     Procedures: see electronic medical records for all procedures/Xrays and details which were not copied into this note but were reviewed prior to creation of Plan. LABS:  I reviewed today's most current labs and imaging studies.   Pertinent labs include:  Recent Labs     04/30/20  0940 04/29/20  0652   WBC 6.3 5.4   HGB 9.5* 9.4*   HCT 30.1* 30.3*    247     Recent Labs     05/01/20  0320 04/30/20  0940 04/29/20  0652    140 140   K 4.3 4.1 3.9   * 114* 114*   CO2 21 19* 18*   * 116* 87   BUN 24* 25* 26*   CREA 1.98* 1.82* 1.71*   CA 9.1 8.6 8.9   MG 2.1 1.9  --        Signed: )Mikayla Wallace NP

## 2020-05-01 NOTE — CONSULTS
Palliative Medicine Consult  [place]: [phone] ([phone])    Patient Name: Yunier Sauer  YOB: 1950    Date of Initial Consult: 5/1/2020  Reason for Consult: Please help addressing code status and goals of care  Requesting Provider: Renea Choudhury NP  Primary Care Physician: Taqueria Martinez MD     SUMMARY:   Yunier Sauer is a 69 y.o. with a past history of prior MI, ovarian cancer, lumbar laminectomy, prior CVA with intermittent expressive aphasia, who was admitted on 4/23/2020 from home with a diagnosis of afib with RVR, PNA right middle lung, ROJELIO. Current medical issues leading to Palliative Medicine involvement include: goals of care and code status. PALLIATIVE DIAGNOSES:   1. SOB  2. Cough  3. Fatigue  4. PNA  5. Pericardial effusion  6. Persistent afib with RVR  7. Goals of care   PLAN:   1. Prior to visit, I completed an extensive review of patient's medical records, including medical documentation, vital signs, MARs, and results of various labs and other diagnostics. I also spoke with patient's nurse Sunil Houston. 2. Met with pt, introduced Palliative Medicine to pt, however, she was confused, could not answer many questions, ie, \"how many children do you have\"  pt states \"15\", same answer to number of siblings, unable to state place, date, or president. She did tell me that she did not want CPR if her heart stops, she does not want aggressive medical care, that she does want only comfort care because she is tired. 3. Left vm for Denia Johns to call me to discuss above discussion. 4. Initial consult note routed to primary continuity provider and/or primary health care team members  5.  Communicated plan of care with: Palliative Chandra BULLARD 192 Team     GOALS OF CARE / TREATMENT PREFERENCES:     GOALS OF CARE:  Patient/Health Care Proxy Stated Goals: Prolong life    TREATMENT PREFERENCES:   Code Status: Full Code    Advance Care Planning:  [x] The [facility] Interdisciplinary Team has updated the ACP Navigator with Health Care Decision Maker and Patient Capacity      Primary Decision MakeTommy Cobb - [phone]  Advance Care Planning 4/30/2020   Patient's Healthcare Decision Maker is: Named in scanned ACP document   Primary Decision Maker Name -   Primary Decision Maker Phone Number -   Confirm Advance Directive Yes, on file   Patient Would Like to Complete Advance Directive -             Other Instructions: Other:    As far as possible, the palliative care team has discussed with patient / health care proxy about goals of care / treatment preferences for patient. HISTORY:     History obtained from: chart    CHIEF COMPLAINT: SOB    HPI/SUBJECTIVE:    The patient is:   [] Verbal and participatory   [x] Non-participatory due to: confusion    4/23: BIBA with c/o SOB x 2 weeks that has progressively worsened, barky cough, palpitations, fatigue and occasional n/v, and diarrhea x 2 days causing pt to sleep on bathroom floor for 2 days for convenience. CXR revealed right middle lobe infiltrate suspicious of PNA. EKG revealed afib with RVR, irreg.     Clinical Pain Assessment (nonverbal scale for severity on nonverbal patients):   Clinical Pain Assessment  Severity: 0     Activity (Movement): Lying quietly, normal position    Duration: for how long has pt been experiencing pain (e.g., 2 days, 1 month, years)  Frequency: how often pain is an issue (e.g., several times per day, once every few days, constant)     FUNCTIONAL ASSESSMENT:     Palliative Performance Scale (PPS):  PPS: 30       PSYCHOSOCIAL/SPIRITUAL SCREENING:     Palliative IDT has assessed this patient for cultural preferences / practices and a referral made as appropriate to needs (Cultural Services, Patient Advocacy, Ethics, etc.)    Any spiritual / Roman Catholic concerns:  [] Yes /  [x] No    Caregiver Burnout:  [] Yes /  [x] No /  [] No Caregiver Present      Anticipatory grief assessment:   [x] Normal  / [] Maladaptive       ESAS Anxiety: Anxiety: 0    ESAS Depression: Depression: 0        REVIEW OF SYSTEMS:     Positive and pertinent negative findings in ROS are noted above in HPI. The following systems were [x] reviewed / [] unable to be reviewed as noted in HPI  Other findings are noted below. Systems: constitutional, ears/nose/mouth/throat, respiratory, gastrointestinal, genitourinary, musculoskeletal, integumentary, neurologic, psychiatric, endocrine. Positive findings noted below. Modified ESAS Completed by: provider   Fatigue: 8 Drowsiness: 0   Depression: 0 Pain: 0   Anxiety: 0 Nausea: 0     Dyspnea: 3           Stool Occurrence(s): 1        PHYSICAL EXAM:     From RN flowsheet:  Wt Readings from Last 3 Encounters:   05/01/20 162 lb (73.5 kg)   11/10/19 158 lb (71.7 kg)   10/01/19 158 lb 4 oz (71.8 kg)     Blood pressure 118/67, pulse 81, temperature 97.6 °F (36.4 °C), resp. rate 22, height 4' 11\" (1.499 m), weight 162 lb (73.5 kg), SpO2 94 %.     Pain Scale 1: Numeric (0 - 10)  Pain Intensity 1: 0                 Last bowel movement, if known:     Constitutional: WD, WN, NAD, oriented to self only, fatigued, out of breath with little movement  Eyes: pupils equal, anicteric  ENMT: no nasal discharge, moist mucous membranes  Cardiovascular: regular rhythm, distal pulses intact  Respiratory: breathing not labored, symmetric, coarse bilat, deep cough, quick to SOB  Gastrointestinal: soft non-tender, +bowel sounds  Musculoskeletal: no deformity, no tenderness to palpation  Skin: warm, dry  Neurologic: following simple commands, moving all extremities  Psychiatric: full affect, no hallucinations         HISTORY:     Principal Problem:    Atrial fibrillation with RVR ([facility] [address] (4/23/2020)    Active Problems:    CKD (chronic kidney disease) (7/11/2017)      Hyperparathyroidism, secondary ([facility] [address] (7/11/2017)      CAP (community acquired pneumonia) (3/57/6912)      Diastolic CHF, chronic ([facility] [address] (4/30/2020)      Hypertension (4/30/2020)      Hyperlipidemia (4/30/2020)      Past Medical History:   Diagnosis Date    Cancer ([facility] [address]     ovaian stomach    CVA (cerebral vascular accident) ([facility] [facility]     Heart failure ([facility] [address]     MI    Hypertension       Past Surgical History:   Procedure Laterality Date    ABDOMEN SURGERY PROC UNLISTED      cancer removal    HX GYN      hysterectomy    HX LUMBAR LAMINECTOMY  06/29/2016    L4-S1, Dr. Booth [place]      Family History   Problem Relation Age of Onset    Hypertension Mother     Hypertension Sister     Heart Disease Brother     Suicide Brother     Diabetes Brother     Diabetes Brother     Suicide Brother     Cancer Brother     Hypertension Sister     Hypertension Sister     Hypertension Sister     Hypertension Sister       History reviewed, no pertinent family history.   Social History     Tobacco Use    Smoking status: Never Smoker    Smokeless tobacco: Never Used   Substance Use Topics    Alcohol use: Not Currently     Frequency: Never     Comment: occ     No Known Allergies   Current Facility-Administered Medications   Medication Dose Route Frequency    ondansetron (ZOFRAN) injection 4 mg  4 mg IntraVENous Q8H PRN    albuterol-ipratropium (DUO-NEB) 2.5 MG-0.5 MG/3 ML  3 mL Nebulization Q6H PRN    dilTIAZem IR (CARDIZEM) tablet 60 mg  60 mg Oral BID    metoprolol tartrate (LOPRESSOR) tablet 12.5 mg  12.5 mg Oral Q12H    hydrALAZINE (APRESOLINE) 20 mg/mL injection 10 mg  10 mg IntraVENous Q6H PRN    apixaban (ELIQUIS) tablet 5 mg  5 mg Oral BID    pantoprazole (PROTONIX) tablet 40 mg  40 mg Oral ACB    sodium chloride (NS) flush 5-40 mL  5-40 mL IntraVENous Q8H    sodium chloride (NS) flush 5-40 mL  5-40 mL IntraVENous PRN    traZODone (DESYREL) tablet 50 mg  50 mg Oral QHS    pravastatin (PRAVACHOL) tablet 20 mg  20 mg Oral QHS    aspirin delayed-release tablet 81 mg  81 mg Oral DAILY    butalbital-acetaminophen-caffeine (FIORICET, ESGIC) -40 mg per tablet 1 Tab  1 Tab Oral Q6H PRN    topiramate (TOPAMAX) tablet 50 mg  50 mg Oral QHS    levothyroxine (SYNTHROID) tablet 50 mcg  50 mcg Oral ACB    cholecalciferol (VITAMIN D3) (1000 Units /25 mcg) tablet 1 Tab  1,000 Units Oral DAILY    acetaminophen (TYLENOL) tablet 650 mg  650 mg Oral Q6H PRN    Or    acetaminophen (TYLENOL) suppository 650 mg  650 mg Rectal Q6H PRN    sodium chloride (NS) flush 5-40 mL  5-40 mL IntraVENous Q8H    sodium chloride (NS) flush 5-40 mL  5-40 mL IntraVENous PRN    guaiFENesin-dextromethorphan (ROBITUSSIN DM) 100-10 mg/5 mL syrup 10 mL  10 mL Oral Q6H PRN    melatonin tablet 3 mg  3 mg Oral QHS PRN          LAB AND IMAGING FINDINGS:     Lab Results   Component Value Date/Time    WBC 6.3 04/30/2020 09:40 AM    HGB 9.5 (L) 04/30/2020 09:40 AM    PLATELET 145 49/32/6359 09:40 AM     Lab Results   Component Value Date/Time    Sodium 140 05/01/2020 03:20 AM    Potassium 4.3 05/01/2020 03:20 AM    Chloride 113 (H) 05/01/2020 03:20 AM    CO2 21 05/01/2020 03:20 AM    BUN 24 (H) 05/01/2020 03:20 AM    Creatinine 1.98 (H) 05/01/2020 03:20 AM    Calcium 9.1 05/01/2020 03:20 AM    Magnesium 2.1 05/01/2020 03:20 AM      Lab Results   Component Value Date/Time    AST (SGOT) 12 (L) 04/24/2020 03:34 AM    Alk.  phosphatase 92 04/24/2020 03:34 AM    Protein, total 6.6 04/24/2020 03:34 AM    Albumin 3.3 (L) 04/24/2020 03:34 AM    Globulin 3.3 04/24/2020 03:34 AM     Lab Results   Component Value Date/Time    INR 1.0 09/28/2016 07:22 AM    Prothrombin time 9.7 09/28/2016 07:22 AM    aPTT 27.6 04/25/2020 02:46 AM      Lab Results   Component Value Date/Time    Ferritin 30 04/23/2020 09:15 PM      No results found for: PH, PCO2, PO2  No components found for: [place]   Lab Results   Component Value Date/Time     (H) 04/25/2012 06:25 PM    CK - MB 2.2 04/25/2012 06:25 PM                Total time:   Counseling / coordination time, spent as noted above:   > 50% counseling / coordination?:     Prolonged service was provided for  []30 min   []75 min in face to face time in the presence of the patient, spent as noted above. Time Start:   Time End:   Note: this can only be billed with 15545 (initial) or 28323 (follow up). If multiple start / stop times, list each separately.

## 2020-05-01 NOTE — PROGRESS NOTES
Problem: Self Care Deficits Care Plan (Adult)  Goal: *Acute Goals and Plan of Care (Insert Text)  Description:     FUNCTIONAL STATUS PRIOR TO ADMISSION: Patient was independent to mod I and active without use of DME. H/o CVA with RUE hemiparesis. HOME SUPPORT: The patient lived alone, but has a daughter living near by. Daughter does some of patient's house cleaning and buys her groceries. Occupational Therapy Goals  Initiated 4/28/2020  1. Patient will perform grooming standing at sink with modified independence within 7 day(s). 2.  Patient will perform upper body dressing with modified independence within 7 day(s). 3.  Patient will perform lower body dressing with modified independence within 7 day(s). 4.  Patient will perform toilet transfers with modified independence within 7 day(s). 5.  Patient will perform all aspects of toileting with modified independence within 7 day(s). 6.  Patient will perform ADL setup with modified independence within 7 day(s). Outcome: Progressing Towards Goal    OCCUPATIONAL THERAPY TREATMENT  Patient: Gianfranco May (68 y.o. female)  Date: 5/1/2020  Diagnosis: Atrial fibrillation with RVR ([facility] [address] [I48.91]   Atrial fibrillation with RVR ([facility] [address]       Precautions: Fall(expressive aphasia)  Chart, occupational therapy assessment, plan of care, and goals were reviewed. ASSESSMENT  Patient willing to participate in OT with some coaxing, but as session progressed she became less agreeable to participation in functionally challenging activities. She was supervision for bed mobility, transferred to her bedside chair with CGA ambulating with a RW, and she donned her socks at a supervision to mod A level. Patient easily SOB, but VSS on 2 L NC t/o this limited session. Patient clearly weaker and with a decline in her endurance today when compared to her OT and PT  sessions yesterday, having ambulated 60 feet and performing LB dressing at a supervision to CGA level.  Patient continues to benefit from acute OT and will need SNF rehab at discharge. PLAN :  Patient continues to benefit from skilled intervention to address the above impairments. Continue treatment per established plan of care. to address goals. Recommendation for discharge: (in order for the patient to meet his/her long term goals)  Therapy up to 5 days/week in SNF setting           OBJECTIVE DATA SUMMARY:   Cognitive/Behavioral Status:  Neurologic State: Alert  Orientation Level: Oriented X4  Cognition: Decreased attention/concentration; Follows commands; Impaired decision making;Poor safety awareness        Safety/Judgement: Decreased awareness of need for safety    Functional Mobility and Transfers for ADLs:  Bed Mobility:  Rolling: Supervision  Supine to Sit: Supervision; Additional time  Scooting: Supervision    Transfers:  Sit to Stand: Contact guard assistance     Bed to Chair: Contact guard assistance    Balance:  Sitting: Intact  Standing: Impaired; With support  Standing - Static: Fair  Standing - Dynamic : Fair    ADL Intervention:    Lower Body Dressing Assistance  Socks: Set-up; Supervision; Compensatory technique training(for R sock, mod A for L )  Leg Crossed Method Used: Yes  Position Performed: Seated edge of bed;Bending forward method  Cues: Verbal cues provided;Visual cues provided      Cognitive Retraining  Safety/Judgement: Decreased awareness of need for safety      Activity Tolerance:   Poor  Easily SOB, but SpO2 stable on 2 L NC.  HR also WNL for activity. Please refer to the flowsheet for vital signs taken during this treatment.     After treatment patient left in no apparent distress:   Sitting in chair and Call bell within reach    COMMUNICATION/COLLABORATION:   The patients plan of care was discussed with: Registered Nurse    DELIO Muse/L  Time Calculation: 15 mins

## 2020-05-01 NOTE — PROGRESS NOTES
PT visit attempted pt politely refused stating she been up a few times today. Will defer at this and continue to follow.

## 2020-05-01 NOTE — PROGRESS NOTES
TANYA: 3p  -If stable and all in agreement, anticipate d/c to [facility] on 1pm Saturday [facility] ambulance on oxygen. Call report to [phone], send emar, d/c instructions, facesheet//[facility] form, Rx for narcotics if any. If no d/c, please cancel ambulance by 1230. Please remind pt to contact dtr. TANYA:  -order received to arrange for follow up nancy'ts-done  -order to arrange home oxygen if needed.  Candido responded \"testing would need to be documented in a note and without prior treatment methods for the CHF coverage would be denied\".  Staff did oxygen testing 4/30  - I called dtr Kun Carlson at [phone] to discuss d/c planning.  She is in agreement with d/c tomorrow to [facility] via [facility] at 1pm if stable. She is aware of 2nd IM letter. The dtr and I spoke of options after SNF such as hiring help at home, pursuing personal care, and NHP. She will discuss pt's wishes for life support.       The dtrs Li's address is:  [address] [place], [facility] [place], [address]

## 2020-05-01 NOTE — PROGRESS NOTES
Bedside shift change report given to [facility] (oncoming nurse) by Jaun Burger (offgoing nurse). Report included the following information SBAR, Kardex, Intake/Output, MAR and Recent Results.

## 2020-05-01 NOTE — PROGRESS NOTES
Cardiology Progress Note      5/1/2020 10:31 AM    Admit Date: 4/23/2020          Subjective:  Doing ok. No issues overnight. Repeat echo pending          Visit Vitals  /75 (BP 1 [facility]: Left arm, BP Patient Position: Lying right side)   Pulse 89   Temp 97.3 °F (36.3 °C)   Resp 20   Ht 4' 11\" (1.499 m)   Wt 74.4 kg (164 lb)   SpO2 91%   BMI 33.12 kg/m²     04/29 1901 - 05/01 0700  In: 840 [P.O.:840]  Out: -         Objective:      Physical Exam:  VS as above     Data Review:   Labs:    BUN 24  Creat 2.0  Trop this am neg     Telemetry: afib R 70-80, no prolonged pauses       Assessment:             1. Persistent atrial fibrillation EF 50-55% by echo   2. intermittant pauses  3. Pneumonia with Hypoxic respiratory failure  4. Hypertension  5. Hyperlipidemia  6. Remote CAD and MI history, details unclear   7. Remote CVA  8. Chronic kidney disease; Stg 3-4  9. Ovarian cancer  10. COVID-19 ruled out  11. Chest pain   12. Anemia     Plan:  Cont current Rx.   If echo shows no signif pericardial eff can go home from my standpoint

## 2020-05-02 VITALS
DIASTOLIC BLOOD PRESSURE: 82 MMHG | SYSTOLIC BLOOD PRESSURE: 127 MMHG | BODY MASS INDEX: 32.66 KG/M2 | TEMPERATURE: 97.5 F | WEIGHT: 161.99 LBS | HEART RATE: 94 BPM | RESPIRATION RATE: 19 BRPM | OXYGEN SATURATION: 94 % | HEIGHT: 59 IN

## 2020-05-02 LAB
ANION GAP SERPL CALC-SCNC: 6 MMOL/L (ref 5–15)
BUN SERPL-MCNC: 26 MG/DL (ref 6–20)
BUN/CREAT SERPL: 14 (ref 12–20)
CALCIUM SERPL-MCNC: 9.1 MG/DL (ref 8.5–10.1)
CHLORIDE SERPL-SCNC: 113 MMOL/L (ref 97–108)
CO2 SERPL-SCNC: 22 MMOL/L (ref 21–32)
CREAT SERPL-MCNC: 1.89 MG/DL (ref 0.55–1.02)
GLUCOSE SERPL-MCNC: 91 MG/DL (ref 65–100)
POTASSIUM SERPL-SCNC: 4 MMOL/L (ref 3.5–5.1)
SODIUM SERPL-SCNC: 141 MMOL/L (ref 136–145)

## 2020-05-02 PROCEDURE — 36415 COLL VENOUS BLD VENIPUNCTURE: CPT

## 2020-05-02 PROCEDURE — 74011250637 HC RX REV CODE- 250/637: Performed by: NURSE PRACTITIONER

## 2020-05-02 PROCEDURE — 74011250637 HC RX REV CODE- 250/637: Performed by: INTERNAL MEDICINE

## 2020-05-02 PROCEDURE — 74011250637 HC RX REV CODE- 250/637: Performed by: FAMILY MEDICINE

## 2020-05-02 PROCEDURE — 77010033678 HC OXYGEN DAILY

## 2020-05-02 PROCEDURE — 80048 BASIC METABOLIC PNL TOTAL CA: CPT

## 2020-05-02 PROCEDURE — 94760 N-INVAS EAR/PLS OXIMETRY 1: CPT

## 2020-05-02 RX ORDER — BUTALBITAL, ACETAMINOPHEN AND CAFFEINE 50; 325; 40 MG/1; MG/1; MG/1
1 TABLET ORAL
Qty: 4 TAB | Refills: 0 | Status: SHIPPED | OUTPATIENT
Start: 2020-05-02 | End: 2020-08-24 | Stop reason: SDUPTHER

## 2020-05-02 RX ADMIN — PANTOPRAZOLE SODIUM 40 MG: 40 TABLET, DELAYED RELEASE ORAL at 07:48

## 2020-05-02 RX ADMIN — LEVOTHYROXINE SODIUM 50 MCG: 0.05 TABLET ORAL at 07:48

## 2020-05-02 RX ADMIN — ASPIRIN 81 MG: 81 TABLET ORAL at 08:11

## 2020-05-02 RX ADMIN — MELATONIN 1 TABLET: at 08:11

## 2020-05-02 RX ADMIN — Medication 10 ML: at 06:18

## 2020-05-02 RX ADMIN — APIXABAN 5 MG: 5 TABLET, FILM COATED ORAL at 08:11

## 2020-05-02 RX ADMIN — METOPROLOL TARTRATE 12.5 MG: 25 TABLET, FILM COATED ORAL at 08:11

## 2020-05-02 RX ADMIN — DILTIAZEM HYDROCHLORIDE 60 MG: 30 TABLET, FILM COATED ORAL at 08:11

## 2020-05-02 NOTE — PROGRESS NOTES
ADULT PROTOCOL: JET AEROSOL ASSESSMENT    Patient  Romelia Castleman     69 y.o.   female     5/1/2020  9:57 PM    Breath Sounds Pre Procedure: Right Breath Sounds: Coarse, Expiratory wheezing                               Left Breath Sounds: Coarse, Expiratory wheezing    Breath Sounds Post Procedure: Right Breath Sounds: Coarse, Diminished                                 Left Breath Sounds: Coarse, Diminished    Breathing pattern: Pre procedure Breathing Pattern: Dyspnea at rest          Post procedure Breathing Pattern: Dyspnea at rest    Heart Rate: Pre procedure Pulse: 100           Post procedure Pulse: 88    Resp Rate: Pre procedure Respirations: 23           Post procedure Respirations: 24        Cough: Pre procedure Cough: Non-productive, Congested               Post procedure Cough: Non-productive, Congested    Oxygen: O2 Device: Nasal cannula   Flow rate (L/min) 2     Changed: NO    SpO2: Pre procedure SpO2: 94 %   with oxygen              Post procedure SpO2: 98 %  with oxygen    Nebulizer Therapy: Current medications Aerosolized Medications: DuoNeb      Changed: NO    Smoking History: Never Smoked    Problem List:   Patient Active Problem List   Diagnosis Code    Lumbar stenosis M48.061    CKD (chronic kidney disease) N18.9    Proteinuria R80.9    Secondary hyperparathyroidism, renal (HCC) N25.81    Chronic intractable headache R51    History of CVA (cerebrovascular accident) Z86.73    Hyperparathyroidism, secondary ([facility] [address] N25.81    Pneumococcal vaccination declined Z28.21    Mammogram declined Z53.20    Colonoscopy refused Z53.20    Atrial fibrillation with RVR (HCC) I48.91    CAP (community acquired pneumonia) L68.3    Diastolic CHF, chronic ([facility]) I50.32    Hypertension I10    Hyperlipidemia E78.5    SOB (shortness of breath) R06.02    Cough R05    Fatigue R53.83    Goals of care, counseling/discussion Z71.89       Respiratory Therapist: Tez Boo RT

## 2020-05-02 NOTE — PROGRESS NOTES
Cardiology Progress Note  5/2/2020     Admit Date: 4/23/2020  Admit Diagnosis: Atrial fibrillation with RVR (HCC) [I48.91]  CC: none currently  Cardiac Assessment/Plan (per Dr Kali Shrestha):     1. Persistent atrial fibrillation EF 50-55% by echo   2. intermittant pauses  3. Pneumonia with Hypoxic respiratory failure  4. Hypertension  5. Hyperlipidemia  6. Remote CAD and MI history, details unclear   7. Remote CVA  8. Chronic kidney disease; Stg 3-4 (Cr 1.9-2.2.)  9. Ovarian cancer  10. COVID-19 ruled out  11. Chest pain   12. Anemia   Cont current Rx. If echo shows no signif pericardial eff can go home from my standpoint     F/U echo 4/30: Nl EF; small effusion. 5/2: Cont afib with controlled VR. No CP, cont dyspnea. Remains on eliquis, asas, dilt 60 bid, metoprolol 12.5 bid. To SNF today; f/u with Dr Kali Shrestha. For other plans, see orders.   Hospital problem list     Active Hospital Problems    Diagnosis Date Noted    SOB (shortness of breath) 05/01/2020    Cough 05/01/2020    Fatigue 05/01/2020    Goals of care, counseling/discussion 05/01/2020    CAP (community acquired pneumonia) 07/76/4190    Diastolic CHF, chronic ([facility] 04/30/2020    Hypertension 04/30/2020    Hyperlipidemia 04/30/2020    Atrial fibrillation with RVR ([facility] 04/23/2020    Hyperparathyroidism, secondary ([facility] 07/11/2017    CKD (chronic kidney disease) 07/11/2017        Subjective: Pedro Pablo Osler reports       Chest pain X none  consistent with:  Non-cardiac CP         Atypical CP     None now  On going  Anginal CP     Dyspnea  none  at rest  with exertion         improved x unchanged  worse              PND X none  overnight       Orthopnea X none  improved  unchanged  worse   Presyncope X none  improved  unchanged  worse     Ambulated in hallway without symptoms   Yes   Ambulated in room without symptoms  Yes   Objective:     Physical Exam:  Overall VSSAF;    Visit Vitals  /82 (BP 1 [facility]: Left arm, BP Patient Position: Sitting)   Pulse 94   Temp 97.5 °F (36.4 °C)   Resp 19   Ht 4' 11\" (1.499 m)   Wt 73.5 kg (161 lb 15.9 oz)   SpO2 94%   BMI 32.72 kg/m²     Temp (24hrs), Av.7 °F (36.5 °C), Min:97.3 °F (36.3 °C), Max:98.2 °F (36.8 °C)    Patient Vitals for the past 8 hrs:   Pulse   20 1032 94   20 0709 99     Patient Vitals for the past 8 hrs:   Resp   20 1032 19   20 0709 20     Patient Vitals for the past 8 hrs:   BP   20 1032 127/82   20 0709 (!) 147/94     No intake or output data in the 24 hours ending 20 1146  General Appearance: Well developed, chronically ill, no acute distress. Ears/Nose/Mouth/Throat:   Normal MM; anicteric. JVP: WNL   Resp:   Lungs clear to auscultation bilaterally. Nl resp effort. Cardiovascular:  IRRR, S1, S2 normal, no new murmur. No gallop or rub. Abdomen:   Soft, non-tender, bowel sounds are present. Extremities: No edema bilaterally. Skin:  Neuro: Warm and dry. A/O x3, grossly nonfocal       cath site intact w/o hematoma or new bruit; distal pulse unchanged  Yes   Data Review:     Telemetry independently reviewed  sinus x chronic afib  parox afib  NSVT     ECG independently reviewed  NSR  afib  no significant changes  NSST-Tw chgs     x no new ECG provided for review   Lab results reviewed as noted below. Current medications reviewed as noted below. No results for input(s): PH, PCO2, PO2 in the last 72 hours.   Recent Labs     20  0320 20  0940   TROIQ <0.05 <0.05     Recent Labs     20  0754 20  0320 20  0940    140 140   K 4.0 4.3 4.1   * 113* 114*   CO2 22 21 19*   BUN 26* 24* 25*   CREA 1.89* 1.98* 1.82*   GFRAA 32* 30* 33*   GLU 91 108* 116*   CA 9.1 9.1 8.6   WBC  --   --  6.3   HGB  --   --  9.5*   HCT  --   --  30.1*   PLT  --   --  242     Lab Results   Component Value Date/Time    Cholesterol, total 238 (H) 2019 03:51 PM    HDL Cholesterol 61 2019 03:51 PM    LDL, calculated 151 (H) 03/26/2019 03:51 PM    Triglyceride 130 03/26/2019 03:51 PM    CHOL/HDL Ratio 3.0 11/06/2012 11:06 PM     No results for input(s): SGOT, GPT, AP, TBIL, TP, ALB, GLOB, GGT, AML, LPSE in the last 72 hours. No lab exists for component: AMYP, HLPSE  No results for input(s): INR, PTP, APTT, INREXT in the last 72 hours.    No components found for: [place]    Current Facility-Administered Medications   Medication Dose Route Frequency    ondansetron (ZOFRAN) injection 4 mg  4 mg IntraVENous Q8H PRN    albuterol-ipratropium (DUO-NEB) 2.5 MG-0.5 MG/3 ML  3 mL Nebulization Q6H PRN    dilTIAZem IR (CARDIZEM) tablet 60 mg  60 mg Oral BID    metoprolol tartrate (LOPRESSOR) tablet 12.5 mg  12.5 mg Oral Q12H    hydrALAZINE (APRESOLINE) 20 mg/mL injection 10 mg  10 mg IntraVENous Q6H PRN    apixaban (ELIQUIS) tablet 5 mg  5 mg Oral BID    pantoprazole (PROTONIX) tablet 40 mg  40 mg Oral ACB    sodium chloride (NS) flush 5-40 mL  5-40 mL IntraVENous Q8H    sodium chloride (NS) flush 5-40 mL  5-40 mL IntraVENous PRN    traZODone (DESYREL) tablet 50 mg  50 mg Oral QHS    pravastatin (PRAVACHOL) tablet 20 mg  20 mg Oral QHS    aspirin delayed-release tablet 81 mg  81 mg Oral DAILY    butalbital-acetaminophen-caffeine (FIORICET, ESGIC) -40 mg per tablet 1 Tab  1 Tab Oral Q6H PRN    topiramate (TOPAMAX) tablet 50 mg  50 mg Oral QHS    levothyroxine (SYNTHROID) tablet 50 mcg  50 mcg Oral ACB    cholecalciferol (VITAMIN D3) (1000 Units /25 mcg) tablet 1 Tab  1,000 Units Oral DAILY    acetaminophen (TYLENOL) tablet 650 mg  650 mg Oral Q6H PRN    Or    acetaminophen (TYLENOL) suppository 650 mg  650 mg Rectal Q6H PRN    sodium chloride (NS) flush 5-40 mL  5-40 mL IntraVENous Q8H    sodium chloride (NS) flush 5-40 mL  5-40 mL IntraVENous PRN    guaiFENesin-dextromethorphan (ROBITUSSIN DM) 100-10 mg/5 mL syrup 10 mL  10 mL Oral Q6H PRN    melatonin tablet 3 mg  3 mg Oral QHS PRN Bhavin Roger MD

## 2020-05-02 NOTE — PROGRESS NOTES
TANYA:  Ignacio  [facility] to transport at 1:00 p.m.      CM called [facility] re: discharge. [facility] is able to accept pt for today, 5/2/2020. [facility] to transport at 1:00 p.m. Nursing call report to [phone]. Pt will go to room 420B. Please send discharge paperwork, MAR and hard scripts with pt. No further questions or needs identified at this time. CM will continue to remain available for support, discharge planning as needed.      Catracho Cruz, MSW, LSW  Supervisee in Social Work  [facility]  [phone]

## 2020-05-02 NOTE — DISCHARGE INSTRUCTIONS
Patient Discharge Instructions     Pt Name  Pro Faulkner   Date of Birth 1950   Age  69 y.o. Medical Record Number  [id number]   PCP Toño Machuca MD    Admit date:  4/23/2020 @    [address] [place] [address]    Room Number  4210/98   Date of Discharge 5/2/2020     Admission Diagnoses:     Atrial fibrillation with RVR ([address]        No Known Allergies     You were admitted to [address] [place] [address] for  Atrial fibrillation with RVR ([address]    YOUR OTHER MEDICAL DIAGNOSES INCLUDE (BUT NOT LIMITED TO ):  Present on Admission:   Atrial fibrillation with RVR ([address]   Hyperparathyroidism, secondary ([address]   CKD (chronic kidney disease)   CAP (community acquired pneumonia)   Diastolic CHF, chronic (HCC)   Hypertension   Hyperlipidemia   SOB (shortness of breath)   Cough   Fatigue      DIET:  Cardiac Diet       Recommended activity: Activity as tolerated  Follow up : Follow-up Information     Follow up With Specialties Details Why Contact Info    Monserrat De Guzman MD Infectious Diseases Go on 5/6/2020 For [facility] follow up appointment at [address] [place] [address]  [phone]      Janette Lozano MD Cardiology  The nurse will contact you with Cardiology appointment date and time [address]  [id number]  [address]  [phone]        Please wean O2 as long as O2 sat is greater than or equal to 92%     Skilled nursing facility MD responsible for above upon discharge. · It is important that you take the medication exactly as they are prescribed. · Keep your medication in the bottles provided by the pharmacist and keep a list of the medication names, dosages, and times to be taken in your wallet. · Do not take other medications without consulting your doctor.        ADDITIONAL INFORMATION: If you experience any of the following symptoms or have any health problem not listed below, then please call your primary care physician or return to the emergency room if you cannot get hold of your doctor: Fever, chills, nausea, vomiting, diarrhea, change in mentation, falling, bleeding, shortness of breath. I understand that if any problems occur once I am discharged, I am supposed to call my Primary care physician for further care or seek help in the Emergency Department at the nearest Healthcare facility. I have had an opportunity to discuss my clinical issues with my doctor and nursing staff. I understand and acknowledge receipt of the above instructions.                                                                                                                                            Physician's or R.N.'s Signature                                                            Date/Time                                                                                                                                              Patient or Representative Signature                                                 Date/Time

## 2020-05-02 NOTE — PROGRESS NOTES
2055: Pt  Wheezing. Paged  respiratory staff. 2140: Pt  received  Albuterol - ipratropium  for wheezing    Bedside and Verbal shift change report given to [place]  (oncoming nurse) by Gregor Rowley RN (offgoing nurse). Report included the following information SBAR, Kardex, Intake/Output, MAR, Accordion, Recent Results and Med Rec Status.

## 2020-05-02 NOTE — DISCHARGE SUMMARY
Hospitalist Discharge Summary     Patient ID:  Alanis Remy  [id number]  69 y.o.  1950    PCP on record: Devi Méndez MD    Admit date: 4/23/2020  Discharge date and time: 5/2/2020      DISCHARGE DIAGNOSIS:  Acute Respiratory Failure with Hypoxia  CAP (completed 5 day course of ABX)  Due to pneumonia, RML Pneumonia on X ray  COVID 19 ruled out (4/23)  Persistent Afib with RVR with intermittent pauses  Hypertension  Hyperlipidemia   Chest pain  ROJELIO on CKD   CHF, Chronic diastolic   Hypothyroidism        CONSULTATIONS:  IP CONSULT TO PULMONOLOGY  IP CONSULT TO CARDIOLOGY  IP CONSULT TO PALLIATIVE CARE - PROVIDER    Excerpted HPI from H&P of Nicolás Roberts MD:  69 y.o.  female who is admitted with AFIB RVR. Ms. Griselda Ponce presented to the Emergency Department today complaining of shortness of breath, coughing, has phlegm, also nausea,vomiting,diarrhea, worsening breathing for the past three weeks. Patient denies any chest pain or abdominal pain. Patient had low grade fever at home, generalized weakness, fatigue. Has sick contacts. No swelling of her legs. Patient has a hx of CVA, overall a poor historian. Patient admitted for pneumonia, AFIB RVR, cardizem drip, IV abx. Pulmonary and Cardio consulted.        ______________________________________________________________________  DISCHARGE SUMMARY/HOSPITAL COURSE:  for full details see H&P, daily progress notes, labs, consult notes. 69year old female admitted with acute respiratory failure with hypoxia secondary to CAP. COVID 19 test was negative. Pt completed 5 day course of ABX. Pt was seen by cardiology due to A-fib with intermittent pauses and chest pain.     Below are the detail medical illnesses that she was treated it during this hospitalization;    Acute Respiratory Failure with Hypoxia  CAP (completed 5 day course of ABX)  Due to pneumonia, RML Pneumonia on X ray  - COVID 19 ruled out (4/23)    Completed 5 day course of IV ABX    Wean O2 as able while maintaining sats > 92%, pt is currently 3L via n/c    Duoneb PRN    CXR (4/30): Cardiomegaly with possible pericardial effusion. Slight increase in right  pericardial effusion      Persistent Afib with RVR with intermittent pauses  Hypertension  Hyperlipidemia  Chest pain  - continue with cardizem 60mg BID and  lopressor 12.5mg BID with parameter    Continue with Eliquis 5mg BID    Continue with ASA and statin    Echo: Estimated left ventricular ejection fraction is 50 - 55%. Appreciate cardiology input; pericardial effusion was evaluated by limited Echo which revealed small effusion with normal EF. No further recommendation. Continue with above medical therapy and follow up with Dr. Baylee John as outpatient    Troponin (-), CK 76; no further evaluation for chest pain    12 lead EKG; NSR     ROJELIO on CKD   -  Received IVF hydration     Hold nephrotoxic agents     Creat trending down; 1.8 upon discharge (was 2.6 on admission)      CHF, Chronic diastolic   -  NT pro-BNP 4424 -> 3355     Pt doesn't appear to be fluid overload at this time.      Hypothyroidism  - Continue with synthroid                   _______________________________________________________________________  Patient seen and examined by me on discharge day. Pertinent Findings:  Gen:    Not in distress  Chest: Clear in apex with decreased breath sounds at bases  CVS:   Regular rhythm. No edema  Abd:  Soft, not distended, not tender  Neuro:  Alert, orient x 4  _______________________________________________________________________  DISCHARGE MEDICATIONS:   Discharge Medication List as of 5/2/2020  2:52 PM      START taking these medications    Details   ipratropium-albuteroL (Combivent Respimat)  mcg/actuation inhaler Take 1 Puff by inhalation every six (6) hours. , Print, Disp-1 Inhaler, R-0      albuterol (PROVENTIL HFA, VENTOLIN HFA, PROAIR HFA) 90 mcg/actuation inhaler Take 2 Puffs by inhalation every four (4) hours as needed for Shortness of Breath or Respiratory Distress. , Print, Disp-1 Inhaler, R-0      pantoprazole (PROTONIX) 40 mg tablet Take 1 Tab by mouth Daily (before breakfast). , Print, Disp-30 Tab, R-0      apixaban (ELIQUIS) 5 mg tablet Take 1 Tab by mouth two (2) times a day., Print, Disp-60 Tab, R-0      dilTIAZem IR (CARDIZEM) 60 mg tablet Take 1 Tab by mouth two (2) times a day., Print, Disp-60 Tab, R-0      metoprolol tartrate (LOPRESSOR) 25 mg tablet Take 0.5 Tabs by mouth every twelve (12) hours. , Print, Disp-60 Tab, R-0         CONTINUE these medications which have CHANGED    Details   butalbital-acetaminophen-caffeine (FIORICET, ESGIC) -40 mg per tablet Take 1 Tab by mouth every six (6) hours as needed for Headache., Print, Disp-4 Tab, R-0         CONTINUE these medications which have NOT CHANGED    Details   levothyroxine (SYNTHROID) 50 mcg tablet Take 1 Tab by mouth Daily (before breakfast). , Normal, Disp-90 Tab, R-1      cholecalciferol (VITAMIN D3) 1,000 unit tablet Take 1 Tab by mouth daily. , Normal, Disp-90 Tab, R-3      pravastatin (PRAVACHOL) 20 mg tablet Take 1 Tab by mouth nightly., Normal, Disp-90 Tab, R-1      aspirin delayed-release (ADULT LOW DOSE ASPIRIN) 81 mg tablet Take 1 Tab by mouth daily. , Normal, Disp-90 Tab, R-3      topiramate (TOPAMAX) 50 mg tablet Take 1 Tab by mouth nightly., Normal, Disp-90 Tab, R-1      omeprazole (PRILOSEC) 20 mg capsule Take 1 Cap by mouth daily. , Normal, Disp-90 Cap, R-1      traZODone (DESYREL) 50 mg tablet take 1 tablet by mouth NIGHTLY, Normal, Disp-30 Tab, R-1         STOP taking these medications       BYSTOLIC 20 mg tablet Comments:   Reason for Stopping:         amLODIPine (NORVASC) 10 mg tablet Comments:   Reason for Stopping:         losartan (COZAAR) 100 mg tablet Comments:   Reason for Stopping:               My Recommended Diet, Activity, Wound Care, and follow-up labs are listed in the patient's Discharge Insturctions which I have personally completed and reviewed.     _______________________________________________________________________  DISPOSITION:    Home with Family:    Home with HH/PT/OT/RN:    SNF/LTC: y   MEI:    OTHER:        Condition at Discharge:  Stable  _______________________________________________________________________  Follow up with:   PCP : Bairon Sauer MD  Follow-up Information     Follow up With Specialties Details Why Contact Info    Fab Ward MD Infectious Diseases Go on 5/6/2020 For [facility] follow up appointment at [address] [place] [address]  [phone]      Romeo Singletary MD Cardiology  The nurse will contact you with Cardiology appointment date and time [address] Eric Nieves 36 R Carlota Hdez 53    Bairon Sauer MD Pediatrics, Internal Medicine   [id number] [place] [address],[address] [facility]  [phone]                Total time in minutes spent coordinating this discharge (includes going over instructions, follow-up, prescriptions, and preparing report for sign off to her PCP) : 40 minutes    Signed:  Mikayla Miller NP

## 2020-05-02 NOTE — PROGRESS NOTES
Report called to [facility] 20 at [facility]. Report consisted of SBAR, MAR and recent results. An opportunity for questions and clarification was provided for.

## 2020-05-03 NOTE — PROGRESS NOTES
Received a call from a nursing staff from SNF who was confused with following medication which I clarified;  Metoprolol 12.5mg BID  Cardizem 60mg BID  Pt may take nexium 20mg or protonix 40mg daily which ever the SNF currently has  Combivent 1 puff QID along with albuterol 2 puffs every 4 hours as need for shortness of breath, wheezing, or cough    Nurse, Dayna Shepherd was able to verbalized her understanding prior to disconnecting the call.  dk

## 2020-05-11 NOTE — PROGRESS NOTES
LTSS submitted and processed. Copy sent to [employer], [address] and copy to be scanned to chart.     Dimple Page RN, BSN, [address] [place] [address]    Coordinator  [phone]

## 2020-05-29 ENCOUNTER — TELEPHONE (OUTPATIENT)
Dept: FAMILY MEDICINE CLINIC | Age: 70
End: 2020-05-29

## 2020-05-29 ENCOUNTER — VIRTUAL VISIT (OUTPATIENT)
Dept: INTERNAL MEDICINE CLINIC | Age: 70
End: 2020-05-29

## 2020-05-29 ENCOUNTER — TELEPHONE (OUTPATIENT)
Dept: INTERNAL MEDICINE CLINIC | Age: 70
End: 2020-05-29

## 2020-05-29 ENCOUNTER — DOCUMENTATION ONLY (OUTPATIENT)
Dept: INTERNAL MEDICINE CLINIC | Age: 70
End: 2020-05-29

## 2020-05-29 DIAGNOSIS — I10 ESSENTIAL HYPERTENSION: ICD-10-CM

## 2020-05-29 DIAGNOSIS — N18.4 STAGE 4 CHRONIC KIDNEY DISEASE (HCC): ICD-10-CM

## 2020-05-29 DIAGNOSIS — Z86.73 HISTORY OF CVA (CEREBROVASCULAR ACCIDENT): ICD-10-CM

## 2020-05-29 DIAGNOSIS — I48.20 CHRONIC A-FIB (HCC): ICD-10-CM

## 2020-05-29 DIAGNOSIS — I48.91 ATRIAL FIBRILLATION WITH RVR (HCC): ICD-10-CM

## 2020-05-29 DIAGNOSIS — Z65.9 SOCIAL PROBLEM: ICD-10-CM

## 2020-05-29 DIAGNOSIS — E78.2 MIXED HYPERLIPIDEMIA: ICD-10-CM

## 2020-05-29 DIAGNOSIS — Z71.89 GOALS OF CARE, COUNSELING/DISCUSSION: ICD-10-CM

## 2020-05-29 DIAGNOSIS — I50.32 DIASTOLIC CHF, CHRONIC (HCC): ICD-10-CM

## 2020-05-29 DIAGNOSIS — J18.9 COMMUNITY ACQUIRED PNEUMONIA, UNSPECIFIED LATERALITY: Primary | ICD-10-CM

## 2020-05-29 NOTE — PROGRESS NOTES
[phone]- phone call only today     Pt is unable to tell me what medications she is taking, she cannot read the names on the bottles. She states she lives alone, and nobody is present to help pt with visit today. Got home from rehab 5-22-20    Chief Complaint   Patient presents with   [address] and rehab f/u ED [facility], AFIB, 4-23-20 through 5-2-20     Back Pain     middle back pain level 8      1. Have you been to the ER, urgent care clinic since your last visit? Hospitalized since your last visit? Yes [facility], 4-23-20 through 5-2-20, AFIB    2. Have you seen or consulted any other health care providers outside of the [address] [place] [address] since your last visit? Include any pap smears or colon screening. No     Health Maintenance Due   Topic Date Due    Hepatitis C Screening  1950    DTaP/Tdap/Td series (1 - Tdap) 02/25/1970    Shingrix Vaccine Age 50> (1 of 2) 02/25/1999    Breast Cancer Screen Mammogram  02/25/1999    FOBT Q1Y Age 50-75  02/25/1999    GLAUCOMA SCREENING Q2Y  02/25/2014    Bone Densitometry (Dexa) Screening  02/25/2014    Pneumococcal 65+ years (1 of 1 - PPSV23) 02/25/2014    Medicare Yearly Exam  05/23/2019    Lipid Screen  03/26/2020     3 most recent PHQ Screens 5/29/2020   Little interest or pleasure in doing things Not at all   Feeling down, depressed, irritable, or hopeless Not at all   Total Score PHQ 2 0     Recent Travel Screening and Travel History documentation     Travel Screening       Question Response     In the last month, have you been in contact with someone who was confirmed or suspected to have Coronavirus / COVID-19? No / Unsure     Do you have any of the following symptoms? None of these     Have you traveled internationally in the last month?  No      Travel History   Travel since 04/29/20     No documented travel since 04/29/20

## 2020-05-29 NOTE — PROGRESS NOTES
Forms faxed to [facility]. Confirmation received. Document placed in bin for centralized scanning.       Fax # [phone]

## 2020-05-29 NOTE — PROGRESS NOTES
I was in the office while conducting this encounter. Consent:  She and/or her healthcare decision maker is aware that this patient-initiated Telehealth encounter is a billable service, with coverage as determined by her insurance carrier. She is aware that she may receive a bill and has provided verbal consent to proceed: Yes    This virtual visit was conducted telephone encounter only. -  I affirm this is a Patient Initiated Episode with an Established Patient who has not had a related appointment within my department in the past 7 days or scheduled within the next 24 hours. Note: this encounter is not billable if this call serves to triage the patient into an appointment for the relevant concern. Total Time: minutes: 21-30 minutes. Pt on call alone today    Has hot water now. Electricity is on. Pt lives alone. Daughter not present but monthly per pt  [employer] members and neighbors help some    HH is involved and they reported they would be contacting APS    Pt acknowledges her primary concern is medication management    Pt reports feeling well and stable    Past medical, Social, and Family history reviewed  Medications reviewed and updated. A complete ROS was performed and negative except as noted in HPI     Reviewed Rehab D/c summary - creat stable around 1.8  Med regimen was stable and pt doing well on Rx'd regimen. Reported to be doing well with PT/OT    A/P:  I am concerned that pt does not have a safe living situation and adequate support system for her daily needs and medical care. ICD-10-CM ICD-9-CM    1. Community acquired pneumonia, unspecified laterality J18.9 486    2. Diastolic CHF, chronic (HCC) I50.32 428.32      428.0    3. Essential hypertension I10 401.9    4. Stage 4 chronic kidney disease (HCC) N18.4 585.4    5. History of CVA (cerebrovascular accident) Z86.73 V12.54    6. Mixed hyperlipidemia E78.2 272.2    7. Goals of care, counseling/discussion Z71.89 V65.49    8. Atrial fibrillation with RVR ([facility]) I48.91 427.31    9. Chronic a-fib ([facility]) I48.20 427.31    10. Social problem Z65.9 V62.9      Follow-up and Dispositions    · Return in about 2 months (around 7/29/2020) for blood pressure, other. results and schedule of future studies reviewed with patient  reviewed diet and weight   cardiovascular risk and specific lipid/LDL goals reviewed  reviewed medications and side effects in detail     [facility] med management eval - spoke with At  [employer] about med management and SW via [facility]. [facility]PS eval - could not confirm that report had been made when speaking with [facility] [employer] today. SW eval as above  Look into home care primary care program - call made and referral message left with staff at [address]. Continue current medications . Addendum:  Spoke with intake nurse with home care program.  She notes that since pt in not technically home bound, they may not accept her. Await provider review. Also, spoke with ROSA Damon for the medical gorup. She will follow up with the [facility] SW to help coordinate care and resources.

## 2020-05-29 NOTE — TELEPHONE ENCOUNTER
[facility]   (previously: At Home)  [phone] [address] [facility], [address] [place] [address], [address]    Name:  Elinor Kanner  YOB: 1950    Dr. Verónica Olmedo called and requested [facility] referral nurse call him regarding Elinor Kanner. This nurse returned call, no answer, left message.       Inna Alas, RN  Referral Navigator, [facility]

## 2020-06-01 NOTE — TELEPHONE ENCOUNTER
Home Based Primary Care & Supportive Services   (previously: At Home)  [phone] 4101 [facility], [address] [place] [address], [address] [facility]    Name:  Cristin Rolle  YOB: 1950    Dr. Kaya Gaming called inquiring about  HBPC for Cristin Rolle. Dr. Mir Parisi reports that Ms. uRbia Ward lives alone, she has had her electricity and hot water turned off at times. She has a daughter that lives locally but her relationship with pt is strained. Pt is physically able to leave her home but she has transportation issues and has not been able to coordinate Medicaid transportation on her own. It does not appear that pt meets criteria for HBPC- her insurance does not cover HBPC and she is not physically homebound. Dr. Mir Parisi states that he has referred pt to [address] [place] [address]  for help. This nurse called JET Kat,  for [facility] Management Team [phone]. Ms. Shruthi Burns states she will call Dr. Mir Parisi to discuss help for Ms. Rubia Ward. This nurse received in-basket message from Dr. Mir Parisi stating he spoke with ROSA Kat. She will work alongside the home health  to maximize Ms. Smith's resources.     Jaylen Dunn RN  Referral Navigator, Home Based Primary Care & Supportive Services

## 2020-06-02 ENCOUNTER — DOCUMENTATION ONLY (OUTPATIENT)
Dept: INTERNAL MEDICINE CLINIC | Age: 70
End: 2020-06-02

## 2020-06-02 ENCOUNTER — PATIENT OUTREACH (OUTPATIENT)
Dept: INTERNAL MEDICINE CLINIC | Age: 70
End: 2020-06-02

## 2020-06-02 NOTE — PROGRESS NOTES
Order# [id number] signed 6/1/20 by provider, faxed 6/2/20 to At  [place], fax# [phone]; signed order and confirmation placed in bin for central scanning.

## 2020-06-02 NOTE — PROGRESS NOTES
Social Work Note  6/2/2020    Referral received from Marga Mireles MT. [facility] and Dr. Mu Diaz for assistance with patient. Patient status discussed with both and SW role with Ambulatory Care Management team explained. Chart reviewed. At [facility] has received SW referral to evaluate patient. Contacted At [facility] to speak with Zechariah LEE.  Initial SW evaluation scheduled in home on 06/04/20. This SW will follow up with [place] LEE after visit and assist as indicated. Once [place] is no longer seeing patient, this SW will continue to follow and assist patient as needed through ambulatory care management.      Tammie Schwartz, MSW, ACSW, CCM    [facility] Management Team  [phone]

## 2020-06-04 ENCOUNTER — DOCUMENTATION ONLY (OUTPATIENT)
Dept: INTERNAL MEDICINE CLINIC | Age: 70
End: 2020-06-04

## 2020-06-04 NOTE — PROGRESS NOTES
At [facility] # [id number] signed 6/3/20 by provider, faxed 6/4/20 to [phone]; order and confirmation placed in central scan bin.

## 2020-06-11 ENCOUNTER — DOCUMENTATION ONLY (OUTPATIENT)
Dept: INTERNAL MEDICINE CLINIC | Age: 70
End: 2020-06-11

## 2020-06-11 NOTE — PROGRESS NOTES
Order #  [id number] for [facility] signed 6/9/20 by provider, faxed 6/10/20 to [phone]; order and confirmation placed in bin for central scan.

## 2020-06-17 ENCOUNTER — DOCUMENTATION ONLY (OUTPATIENT)
Dept: INTERNAL MEDICINE CLINIC | Age: 70
End: 2020-06-17

## 2020-06-17 ENCOUNTER — PATIENT OUTREACH (OUTPATIENT)
Dept: INTERNAL MEDICINE CLINIC | Age: 70
End: 2020-06-17

## 2020-06-17 NOTE — PROGRESS NOTES
Social Work Note  6/17/2020      10:40 am  Call to patient's number ([phone]) for SW evaluation. Message left on voicemail requesting return call. Contacted number listed as \"mobile\" for patient ([phone]). Message left requesting return call. Received return call form [phone] number. This number is for Griselda Canton, physical therapist with AT [address]. It is not patient's number. Spoke with Griselda Canton re: patient and status of Home Health social work visit. Per Griselda Canton, the  has been trying to see her, but she has been unable to reach her via phone and the patient has not been at home. She also reported difficulty reaching patient's daughter. Requested call from Griselda Canton if she is unable to reach patient or if home health is going to discharge patient. Heat ticket placed to have erroneous mobile number removed from chart. Received confirmation that information was updated in [facility]. 11:30 am  Attempted to reach patient's daughter, Syd Dominguez ([phone]). Message left requesting return call.      Shimon Beckman, MSW, ACSW, CCM    [facility] Management Team  [phone]

## 2020-06-17 NOTE — PROGRESS NOTES
At [address] [facility] # [id number] signed 6/16/20 by provider; faaxed 6/17/20 to [phone]. Order and confirmation placed in bin for central scan.

## 2020-06-18 ENCOUNTER — DOCUMENTATION ONLY (OUTPATIENT)
Dept: INTERNAL MEDICINE CLINIC | Age: 70
End: 2020-06-18

## 2020-06-18 NOTE — PROGRESS NOTES
Forms faxed to [facility]. Confirmation received. Document placed in bin for centralized scanning.     Fax # [phone]

## 2020-06-19 ENCOUNTER — DOCUMENTATION ONLY (OUTPATIENT)
Dept: INTERNAL MEDICINE CLINIC | Age: 70
End: 2020-06-19

## 2020-06-19 NOTE — PROGRESS NOTES
Forms faxed to [facility]. Confirmation received. Document placed in bin for centralized scanning.     Fax # [phone]

## 2020-06-25 ENCOUNTER — PATIENT OUTREACH (OUTPATIENT)
Dept: INTERNAL MEDICINE CLINIC | Age: 70
End: 2020-06-25

## 2020-06-25 NOTE — PROGRESS NOTES
Social Work Note  6/25/2020      11:40 am  SW attempted to reach patient at home number. Voicemail left requesting return call. SW attempted to reach patient's daughter, Clive Jefferson ([phone]). Voicemail left requesting return call.      Maureen Sinclair, MSW, ACSW, [facility]    [facility] Management Team  [phone]

## 2020-06-26 ENCOUNTER — PATIENT OUTREACH (OUTPATIENT)
Dept: INTERNAL MEDICINE CLINIC | Age: 70
End: 2020-06-26

## 2020-06-26 NOTE — PROGRESS NOTES
Social Work Note  6/26/2020    Received message from patient's daughter, Ms. Sanchez on 06/25/20.      4:20 pm  Attempted to reach patient's daughter. Message left on  requesting return call.     5:07 pm  Received return call from patient's daughter, Kun Carlson. SW explained  care management and reason for call. Initial assessment completed. Date of referral: 05/29/20  Referral received from:  David Yu RN for Dr. Nimesh Byrnes  Reason for referral:  Connection with community resources; potential need for assistance in the home    Previous SW Referral:  no     Support System: Per daughter, Kun Carlson, patient lives alone. Ms. Virgen Covarrubias stated that she sees her mother approximately once/month and her two sisters do not have contact with patient. Community Providers:    1. [facility], home health services;  PT, Nursing  2. [place], [facility] Adult R.FREDBao Harlanjosé (last evaluation in 2019 per daughter)  3. Ms. Arellano Kym:  Medicaid  through [facility] (Ms. Virgen Covarrubias stated that she is \"authorized user\" on patient's Medicaid account so she can assist patient with renewals/issues)    Transportation: Patient does not drive or own car. Per daughter, she visits once/month and takes patient to grocery story    Medication:  Insurance coverage for prescriptions. Per daughter, patient is unable to manage medications independently. Financial Concern:  Patient has managed Medicare and Medicaid plans. Per daughter, patient receives $782/month and her rent is $795/month. Daughter stated that she pays the extra cost for her rent. Per daughter's report, patient did not have gas/heat for one year due to inability to pay for bill. Ms. Virgen Covarrubias stated that '' has been assisting with cost of utilities and she did receive some help through the heating/cooling program at [facility] (contact is Samantha Monae). Per report, patient still owes money on utility bills.      Advance Care Plan:  not on file; education provided Per patient's daughter, patient does not have an ACP. She stated that the \"facility\" was to send her one to fill out with her mother. SW provided education re: ACP and designation of agent for health care decisions. SW advised patient's daughter that if patient does not identify an agent, then she and her sisters would be legal next of kin and responsible for medical decisions in the event that the patient was unable to speak for herself. Other:   Spoke with Ms. Elda Martinez about her mother's status. She stated that she does not believe that her mother should be living alone, but she has tried with assistance of others (including APS) to help her move and patient has declined. Ms. Elda Martinez stated that she cannot provide care for her mother. SW asked about patient's ability to manage activities of daily living. Ms. Elda Martinez stated that she is uncertain of her ability to complete ADLs but stated that patient's town home is 2 stories. SW discussed option of adult home with patient's daughter. She stated that APS provided a list of facilities for daughter/patient to call, but that she was unable to locate a facility and did not think she needed nursing home care. Daughter stated that she believes it would be better if her mother had additional support. Confirmed patient's phone number with daughter. Will add to chart.   Patient's phone number:  [phone]     Identified Needs:      Possible assistance in home or move to facility with higher level of care   Medication management    Social Work Plan:     Contact patient for assessment   Follow up with AT [address] [place] [address] Follow up with LAURENCE Mariano worker   Update phone number in chart      Goals Addressed                 This Visit's Progress       Chronic Disease     Patient will receive supportive services in appropriate living environment        06/26/20   Assessment:  Initial SW evaluation completed with patient's daughter, Sue Hernández. She reported that patient requires assistance in the home to remain independent and has been open to Adult Protective Services in past.     SW Plan:  Complete SW evaluation with patient to determine needs and plan of care.    DIPAK Rodney, JET, ACSW, [facility]    [facility] Management Team  [phone]

## 2020-07-14 ENCOUNTER — DOCUMENTATION ONLY (OUTPATIENT)
Dept: INTERNAL MEDICINE CLINIC | Age: 70
End: 2020-07-14

## 2020-07-14 NOTE — PROGRESS NOTES
Forms faxed to [facility]. Confirmation received. Document placed in bin for centralized scanning.     Fax # [phone]

## 2020-07-23 ENCOUNTER — DOCUMENTATION ONLY (OUTPATIENT)
Dept: INTERNAL MEDICINE CLINIC | Age: 70
End: 2020-07-23

## 2020-07-23 NOTE — PROGRESS NOTES
At [facility] Care order # [id number] signed 7/22/20 by provider, faxed 7/23/20 to [phone]; order & confirmation scanned into cc

## 2020-07-27 ENCOUNTER — PATIENT OUTREACH (OUTPATIENT)
Dept: CASE MANAGEMENT | Age: 70
End: 2020-07-27

## 2020-07-27 NOTE — PROGRESS NOTES
Social Work Note  7/27/2020      Chart reviewed re: events of past week. 12:45 pm  Attempted to reach patient's daughter at both numbers:  [phone] and . Messages left requesting return call. Attempted to reach patient at cell number:  [phone]. Message left requesting return call. 12:55 pm  Spoke with Dolly Paez, RN Care Coordinator working with patient at [facility] [address] ([phone]). She advised that she normally sees patient, but was out of town last week and coworker provided care. She stated that in her observation, patient is independent with ADLs, home is clean, but patient has difficulty getting medications due to lack of transportation. Per report, patient has food, but does not eat much. Ms. Gopal Mack stated that patient appears \"extremely depressed\" and \"lonely\" and patient communicated to her that she has no will to live, but denied plan for self-harm. She reported that patient is always happy to see nurses. Ms. Gopal Mack reported that patient's speech is garbled and she appears to have difficulty with thoughts at times. She stated that medications will now be delivered via mail. Next scheduled visit is tomorrow. 1:00 pm  Contacted Clayton Shayna Lawler ([phone], option 1) and left message requesting return call with update on status of referral.  Advised that SW is following up prior to contacting police for welfare check due to concern for patient's well-being. Awaiting return call with updated information. 4:00 pm   Attempted to reach patient - second message left.     4:09 pm   [address] Non-Emergency Number ([phone]) and spoke with Ms. Cade. Provided information re: PCP concern with patient's health, mental health and refusal to go to ED on Friday when recommended by [facility]. Confirmed that patient has not been seen by [facility] today and that their next visit is scheduled for tomorrow.   SW advised that patient and family have been unreachable today. She stated that SW will be called with update after visit from officers.      Leroy Rockwell, MSW, ACSW, [facility]    [facility] Management Team  [phone]

## 2020-07-28 ENCOUNTER — PATIENT OUTREACH (OUTPATIENT)
Dept: CASE MANAGEMENT | Age: 70
End: 2020-07-28

## 2020-07-28 NOTE — TELEPHONE ENCOUNTER
Sukhjinder called from [facility]. Pt med list has Benzonatate listed on it. Dont see listed on our medication list. Nurse calling to see if she can get it refilled or if its something that the pt should be one.        Please advise Brianne Musa [phone]

## 2020-07-28 NOTE — PROGRESS NOTES
Social Work Note  7/28/2020      Call from Rochelle Soliman ([facility]) - [birth year]  5:21 pm  Received call from [address] [place] [address] following welfare check with patient. SW explained relationship and purpose of referral for welfare check. Officer Lavell Moore stated that patient is well-known to her as she is the community  in patient's neighborhood. She stated that patient was doing well, a little lonely, and denied any SI/HI. Officer Lavell Moore stated that she spoke with patient re: medications and patient communicated that they were to be delivered Monday. She stated that patient shared that her favorite cat disappeared last week and patient expressed sadness/grief over loss of pet. Officer Lavell Moore stated that she encouraged Ms. Victor Manuel Ward to get exercise/walk in the neighborhood and confirmed that patient has the number for [address] if needed. Per Officer Lavell Moore, patient receives Meals on Wheels and has plenty of food in her refrigerator. SW provided name/contact number if future interactions find that patient has increased needs.        SW Plan:   · Continue attempts to reach patient, complete assessment, establish rapport  · Discuss with patient:  Telephone reassurance/friendly visitors, transportation needs    JET Siegel, ACSW, CCM    [facility] Management Team  [phone]

## 2020-07-29 ENCOUNTER — PATIENT OUTREACH (OUTPATIENT)
Dept: CASE MANAGEMENT | Age: 70
End: 2020-07-29

## 2020-07-29 RX ORDER — BENZONATATE 200 MG/1
200 CAPSULE ORAL
Qty: 60 CAP | Refills: 2 | Status: SHIPPED | OUTPATIENT
Start: 2020-07-29 | End: 2020-08-24 | Stop reason: SDUPTHER

## 2020-07-29 NOTE — TELEPHONE ENCOUNTER
Called lizeth w/ [facility] this morning. She states that pt is a former smoker and has a \"chronic smokers cough\" non productive. She states that pt would benefit from a refill. Please send in rx for pt.

## 2020-07-29 NOTE — PROGRESS NOTES
Social Work Note  7/29/2020      Attempted to reach Derrick Aguirre [address] worker with [employer] ([phone]). Message left requesting return call.      JET Frederick, ACSW, [facility]    [facility] Management Team  [phone]

## 2020-08-04 ENCOUNTER — PATIENT OUTREACH (OUTPATIENT)
Dept: CASE MANAGEMENT | Age: 70
End: 2020-08-04

## 2020-08-04 NOTE — PROGRESS NOTES
Social Work Note  8/4/2020      Call to patient  Spoke with patient today. SW introduced self and explained care management. Patient agreeable to continued check-in calls and assistance as needed from [address] observed patient coughing and SOB during conversation and patient reported that this was normal for her. Patient reported that she had a \"cold\" that she has been fighting. She denied using any medications for relief. Discussed medications. Patient mentioned that she is missing three medications - bumex, benzonatate, and diltiazem and that nurse comes to help her sort them. SW reviewed patient's plan for emergency situation if she were to have increased difficulty breathing. Patient stated that she would call 911. Asked patient about support at home. Patient stated that her daughter Kalyn Yoon visits once a month and takes her to the grocery store. She mentioned that there is another individual who assists with groceries and purchasing her \"kiesha cat stuff\". Patient stated that she has cats. SW spoke with patient about options for telephone reassurance and friendly volunteer calls to check on patient. Patient declined referral to telephone reassurance program.     Patient denied any current needs. Call to Opal Schreiber RN Care Coordinator with [facility] [address] ([phone])  Opal Bojorquez RN seeing patient for home health. Advised of comments by patient that she is missing medications. Ms. Natacha Sykes stated that patient has plenty of diltiazem, but appeared fixated on that particular medication during the last visit. She also stated that she reordered patient's bumex last week and the pharmacy indicated that it would be a few days before it was received. Ms. Natacha Sykes will check on Benzonatate. Discussed patient's baseline status for SOB and coughing. Ms. Natacha Sykes will see patient tomorrow. Per review, it appears Benzonatate was sent to local pharmacy.   SW to locate mail order pharmacy information.      SW Plan:    Contact mail order pharmacy  Follow up with patient to monitor status    JET Costello, ACSW, CCM    [facility] Management Team  [phone]

## 2020-08-06 ENCOUNTER — PATIENT OUTREACH (OUTPATIENT)
Dept: CASE MANAGEMENT | Age: 70
End: 2020-08-06

## 2020-08-06 NOTE — PROGRESS NOTES
Social Work Note  8/6/2020      Received call from [address] from [address] seeing patient. She called from patient's home to advise that she is out of medications. Reviewed medications that patient is missing. Upon chart review, it appears that medications were sent to incorrect pharmacy ([facility]) and patient has never received them. Jackolyn Bumpers stated that she has never been able to set up mail order pharmacy services for patient. SW asked patient and RN if bubble pack medications via mail would work for patient. Both Dana and patient voiced agreement. SW to contact insurance company to determine mail order company requirements and set up bubble pack and delivery. Patient currently out of:  Benzonatate  Albuterol  Ipratropium-albuterol  Pantoprazole    Dana also mentioned that patient needs appointment with PCP but has no transportation. SW to schedule appointment and secure transportation through [employer].      ROSA Plan:   · Contact insurance company re: bubble packaging of medications  · Clarify medication list  · Schedule appointment and arrange transportation    Vern Amador, MSW, ACSW, CCM    [facility] Management Team  [phone]

## 2020-08-07 ENCOUNTER — PATIENT OUTREACH (OUTPATIENT)
Dept: CASE MANAGEMENT | Age: 70
End: 2020-08-07

## 2020-08-07 NOTE — PROGRESS NOTES
Social Work Note  8/7/2020      Message received from Dr. Bryan Reed. He would like to see patient in 2 months for evaluation after patient has been consisently on medications. Appointment scheduled for 10/5/20 at 1:30 pm.  SW to coordinate transportation through [address] [place] [address]. [address] Medicaid to discuss mail order/local pharmacy coverage for medication delivery and bubble packing. Representative indicated that [facility] was one of network pharmacies. Contacted [facility] re: mail order and they referred SW back to [facility] unit. Message left with Tim Musa ([phone]), patient's assigned  through [facility]. Requested return call to discuss medication delivery and bubble packs. Spoke to [facility] [employer] pharmacist and confirmed that they cannot provide bubble packs. Awaiting return call from [place] .      Rikki Castro, MSW, ACSW, CCM    [facility] Management Team  [phone]

## 2020-08-10 ENCOUNTER — PATIENT OUTREACH (OUTPATIENT)
Dept: CASE MANAGEMENT | Age: 70
End: 2020-08-10

## 2020-08-10 NOTE — PROGRESS NOTES
Social Work Note  8/10/2020      9:20 am  Received return call from Mary Hernandez,  with [address] Medicaid ([phone]). Explained  services through [facility].  was unaware that patient had returned home from [facility]. Explained that patient needs bubble pack medications to simply medication administration and home delivery of medications due to transportation concerns. She advised that patient needs to call [facility] at [phone] re: medications. SW to follow up with [facility].       Tee Duarte, MSW, ACSW, CCM    [facility] Management Team  [phone]

## 2020-08-12 ENCOUNTER — DOCUMENTATION ONLY (OUTPATIENT)
Dept: INTERNAL MEDICINE CLINIC | Age: 70
End: 2020-08-12

## 2020-08-12 NOTE — PROGRESS NOTES
Signed order faxed to [facility] on 8/3/20 by [facility] with confirmation. Forms sent to central scanning.

## 2020-08-18 ENCOUNTER — PATIENT OUTREACH (OUTPATIENT)
Dept: CASE MANAGEMENT | Age: 70
End: 2020-08-18

## 2020-08-18 NOTE — PROGRESS NOTES
Social Work Note  8/18/2020      Received call from [facility] with  [address] patient. She asked about status of bubble packed medications. Advised that SW is still working on home delivery of medications. Spoke with [address] ([phone]). Per representative, patient must register online or print form and mail it in to [facility] to set up mail order pharmacy services. After patient has registered, then physician can electronically send medications to [facility]. Message left on voicemail of patient's daughter requesting return call. Advised SW needs assistance helping mother set up account online for mail order pharmacy services. Message left on voicemail of Nate Castrejon,  with [address]. Requested return call re: possibility of insurance covering delivery fee for medications if meds were switched to local [facility] pharmacy.      ROSA Plan:  · Coordinate home delivery from local [facility] of medications until PCP appointment on 10/05/20  · Coordinate bubble pack and home delivery of medications after medications updated in 10/20  · Follow-up with home health nurse, Samuel Ontiveros MSW, ACSW, CCM    [facility] Management Team  [phone]

## 2020-08-24 ENCOUNTER — PATIENT OUTREACH (OUTPATIENT)
Dept: CASE MANAGEMENT | Age: 70
End: 2020-08-24

## 2020-08-24 DIAGNOSIS — E03.9 ACQUIRED HYPOTHYROIDISM: ICD-10-CM

## 2020-08-24 DIAGNOSIS — E78.00 PURE HYPERCHOLESTEROLEMIA: ICD-10-CM

## 2020-08-24 DIAGNOSIS — G47.00 INSOMNIA, UNSPECIFIED TYPE: ICD-10-CM

## 2020-08-24 DIAGNOSIS — E55.9 VITAMIN D DEFICIENCY: ICD-10-CM

## 2020-08-24 DIAGNOSIS — R51.9 NONINTRACTABLE HEADACHE, UNSPECIFIED CHRONICITY PATTERN, UNSPECIFIED HEADACHE TYPE: ICD-10-CM

## 2020-08-24 DIAGNOSIS — K21.9 GASTROESOPHAGEAL REFLUX DISEASE, ESOPHAGITIS PRESENCE NOT SPECIFIED: ICD-10-CM

## 2020-08-24 RX ORDER — PANTOPRAZOLE SODIUM 40 MG/1
40 TABLET, DELAYED RELEASE ORAL
Qty: 90 TAB | Refills: 1 | Status: SHIPPED | OUTPATIENT
Start: 2020-08-24 | End: 2021-03-10 | Stop reason: SDUPTHER

## 2020-08-24 RX ORDER — DILTIAZEM HYDROCHLORIDE 60 MG/1
60 TABLET, FILM COATED ORAL 2 TIMES DAILY
Qty: 180 TAB | Refills: 1 | Status: SHIPPED | OUTPATIENT
Start: 2020-08-24 | End: 2020-10-26 | Stop reason: ALTCHOICE

## 2020-08-24 RX ORDER — LEVOTHYROXINE SODIUM 50 UG/1
50 TABLET ORAL
Qty: 90 TAB | Refills: 1 | Status: SHIPPED | OUTPATIENT
Start: 2020-08-24 | End: 2020-10-28 | Stop reason: SDUPTHER

## 2020-08-24 RX ORDER — TOPIRAMATE 50 MG/1
50 TABLET, FILM COATED ORAL
Qty: 90 TAB | Refills: 1 | Status: SHIPPED | OUTPATIENT
Start: 2020-08-24 | End: 2021-03-10 | Stop reason: SDUPTHER

## 2020-08-24 RX ORDER — ALBUTEROL SULFATE 90 UG/1
2 AEROSOL, METERED RESPIRATORY (INHALATION)
Qty: 1 INHALER | Refills: 2 | Status: SHIPPED | OUTPATIENT
Start: 2020-08-24 | End: 2021-03-10 | Stop reason: SDUPTHER

## 2020-08-24 RX ORDER — BENZONATATE 200 MG/1
200 CAPSULE ORAL
Qty: 60 CAP | Refills: 2 | Status: SHIPPED | OUTPATIENT
Start: 2020-08-24 | End: 2020-10-26

## 2020-08-24 RX ORDER — METOPROLOL TARTRATE 25 MG/1
12.5 TABLET, FILM COATED ORAL EVERY 12 HOURS
Qty: 180 TAB | Refills: 1 | Status: SHIPPED | OUTPATIENT
Start: 2020-08-24 | End: 2020-10-26 | Stop reason: ALTCHOICE

## 2020-08-24 RX ORDER — MELATONIN
1000 DAILY
Qty: 90 TAB | Refills: 3 | Status: SHIPPED | OUTPATIENT
Start: 2020-08-24 | End: 2021-03-10 | Stop reason: SDUPTHER

## 2020-08-24 RX ORDER — BUTALBITAL, ACETAMINOPHEN AND CAFFEINE 50; 325; 40 MG/1; MG/1; MG/1
1 TABLET ORAL
Qty: 20 TAB | Refills: 1 | Status: SHIPPED | OUTPATIENT
Start: 2020-08-24 | End: 2021-03-10 | Stop reason: SDUPTHER

## 2020-08-24 RX ORDER — PRAVASTATIN SODIUM 20 MG/1
20 TABLET ORAL
Qty: 90 TAB | Refills: 1 | Status: SHIPPED | OUTPATIENT
Start: 2020-08-24 | End: 2020-10-26 | Stop reason: SDUPTHER

## 2020-08-24 RX ORDER — TRAZODONE HYDROCHLORIDE 50 MG/1
TABLET ORAL
Qty: 90 TAB | Refills: 1 | Status: SHIPPED | OUTPATIENT
Start: 2020-08-24 | End: 2021-03-10 | Stop reason: SDUPTHER

## 2020-08-24 NOTE — TELEPHONE ENCOUNTER
Last visit 05/29/2020 MD Gloria Linda   Next appointment 10/05/2020 MD Gloria Linda   Previous refill encounter(s) 04/30/2020 Lopressor #60     Requested Prescriptions     Pending Prescriptions Disp Refills    metoprolol tartrate (LOPRESSOR) 25 mg tablet 180 Tab 0     Sig: Take 0.5 Tabs by mouth every twelve (12) hours.

## 2020-08-24 NOTE — PROGRESS NOTES
Social Work Note  8/24/2020      Received message from Gretel Gardner, patient's  through [facility]. Call with patient's daughter - Myareinaldo Hameed return call from patient's daughter, Ivy Daniel. She stated that she picked up medications for patient at [facility] and delivered them to her home yesterday. She also stated that she had previously set up mail order services through [facility], but they only delivered once. Discussed mail order enrollment process with daughter. Daughter will set up online account for patient to start home delivery/mail order of medications. Ms. Ortega Vazquez will contact  with information re: whether hard copy or electronic prescriptions are needed to set up patient's medication delivery. SW to contact [facility] to determine which medications are filled through them. Some medications in patient med list appear to have been sent to [facility], not [facility].  asked Ms. Ortega Vazquez how patient could get medications if something new was called in. Daughter agreed to phone call/message from  or staff member to advise that medication has been sent and needs to be picked up. Ms. Ortega Vazquez stated that her mother is now getting her groceries through the [employer] delivery service. She stated that her sister set it up and patient has received her first shipment. Ms. Ortega Vazquez also stated that she will cancel [employer] Whe[employer] as patient is not eating the meals because she does not like them. Reminded Ms. Ortega Vazquez that patient has transportation through [facility] for appointments. Advised of next appointment on 10/05/20 and encouraged patient's daughter to attend appointment. She stated that she works during the day caring for children and was uncertain as to whether she could attend. Call to [facility] to pharmacist at [facility] to confirm medications on file. He also confirmed that they can do delivery.      The pharmacist stated that Lucy does not have prescriptions on file for:  Benzonatate (Tessalon)  Ipratropium-Albuterol Inhaler (Combivent)  Butalbital-acetaminophen-caffeine  Pantoprazole  Diltiazem    The pharmacy needs updated prescriptions for the following:  Eliquis  Trazadone 50 mg  Topamax 50 mg  Metoprolol Tartrate 25 mg  Omeprazole 20 mg  Levothyroxine (new prescription needed - previously denied)  Pravastatin (new prescription needed - previously denied)  Albuterol (old prescription on file, no refills)    Message to Akua Choudhary, patient's daughter  Message left on voicemail of patient's daughter advising that pharmacist confirmed that they can do delivery. Encouraged daughter to speak with [facility] re: setting up home delivery or mail order and to advise SW as to which program has been chosen. Call to Zara Banda, home health nurse with [facility] with Zara Banda and advised that daughter delivered medications yesterday. Also advised that daughter will be setting up home delivery of medications [facility] either through local delivery or through mail order. Zara Banda stated that she will see patient tomorrow and assess medications. SW Plan:  · Follow up with daughter re: medication delivery  · Schedule transportation for PCP appointment on 10/5/20  · Follow up on transfer/updating of medications at [facility]   · Follow up with Zara Banda, home health nurse as indicated    Goals        Chronic Disease     Patient will receive supportive services in appropriate living environment      08/24/20  Assessment:  Patient is continuing to receive home health nursing through [facility] [employer]. Daughters have arranged grocery delivery through [facility]. Daughter Akua Choudhary will be setting up home delivery of medications through [place]. Patient has transportation through [place] for medical appointments.      SW Plan:    Follow up with daughter re: medication delivery  Schedule transportation for appointment on 10/5/20  Send directions to patient's daughters to for scheduling transportation. AML      06/26/20   Assessment:  Initial SW evaluation completed with patient's daughter, Tracy Ramirez. She reported that patient requires assistance in the home to remain independent and has been open to Adult Protective Services in past.     SW Plan:  Complete SW evaluation with patient to determine needs and plan of care.    AML          Esperanza Mattson, MSW, ACSW, CCM    [facility] Management Team  [phone]

## 2020-08-24 NOTE — TELEPHONE ENCOUNTER
Last visit 05/29/2020 Virtual visit Md Justo Goldberg   Next appointment 10/05/2020 MD Justo Goldberg   Previous refill encounter(s) 04/30/2020 Eliquis #60     Requested Prescriptions     Pending Prescriptions Disp Refills    apixaban (ELIQUIS) 5 mg tablet 180 Tab 0     Sig: Take 1 Tab by mouth two (2) times a day.

## 2020-08-28 ENCOUNTER — PATIENT OUTREACH (OUTPATIENT)
Dept: CASE MANAGEMENT | Age: 70
End: 2020-08-28

## 2020-08-28 NOTE — PROGRESS NOTES
Social Work Note  8/28/2020      Message left on daughter's voicemail (Yelena Rhodes [phone]). Requested return call with status of home or mail order medication delivery through [facility]. Advised that all medications have been updated and sent to [facility] and that it is important for patient to have medications. All prescriptions updated by Dr. Juan Whiting and sent to [address] [place] [address] ([phone]) on 08/24/20. Spoke with [address] nurse following patient. Advised that Dr. Juan Whiting sent updated script for pantoprazole, but not omeprazole. She is continuing to visit patient weekly. Goals Addressed                 This Visit's Progress       Chronic Disease     Patient will receive supportive services in appropriate living environment   On track     08/28/20  Assessment:  Call to patient's daughter for update on status of home delivery of medications. Transportation for appointment cannot be scheduled until 30 days out from appointment - will schedule after 09/05/20. AML    08/24/20  Assessment:  Patient is continuing to receive home health nursing through [facility] [employer]. Daughters have arranged grocery delivery through [employer]. Daughter Yelena Rhodes will be setting up home delivery of medications through [employer]. Patient has transportation through [facility] for medical appointments. SW Plan:    Follow up with daughter re: medication delivery  Schedule transportation for appointment on 10/5/20  Send directions to patient's daughters to for scheduling transportation. AML      06/26/20   Assessment:  Initial SW evaluation completed with patient's daughter, Yelena Rhodes. She reported that patient requires assistance in the home to remain independent and has been open to Adult Protective Services in past.     SW Plan:  Complete SW evaluation with patient to determine needs and plan of care.    AML          Laura Patel, MSW, ACSW, [address] [facility] [address] [facility] Management Team  [phone]

## 2020-09-11 ENCOUNTER — PATIENT OUTREACH (OUTPATIENT)
Dept: CASE MANAGEMENT | Age: 70
End: 2020-09-11

## 2020-09-14 DIAGNOSIS — K21.9 GASTROESOPHAGEAL REFLUX DISEASE, ESOPHAGITIS PRESENCE NOT SPECIFIED: ICD-10-CM

## 2020-09-14 RX ORDER — OMEPRAZOLE 20 MG/1
20 CAPSULE, DELAYED RELEASE ORAL DAILY
Qty: 90 CAP | Refills: 1 | OUTPATIENT
Start: 2020-09-14

## 2020-09-14 NOTE — TELEPHONE ENCOUNTER
Lucy is requesting a refill on behalf of patient for Prilosec 20 mg. Patent is also prescribed Protonix 40 mg. Please review and sign if appropriate,. Thank you. Last visit 05/29/2020 Virtual visit MD Gloria Linda   Next appointment 10/05/2020 MD Gloria Linda   Previous refill encounter(s) 03/26/2019 Prilosec #90 with 1 refill. Requested Prescriptions     Pending Prescriptions Disp Refills    omeprazole (PRILOSEC) 20 mg capsule 90 Cap 1     Sig: Take 1 Cap by mouth daily.

## 2020-10-02 ENCOUNTER — PATIENT OUTREACH (OUTPATIENT)
Dept: CASE MANAGEMENT | Age: 70
End: 2020-10-02

## 2020-10-05 ENCOUNTER — PATIENT OUTREACH (OUTPATIENT)
Dept: CASE MANAGEMENT | Age: 70
End: 2020-10-05

## 2020-10-05 NOTE — PROGRESS NOTES
Social Work Note  10/5/2020    Call to patient to remind about PCP appointment. Reviewed Medicaid transportation plan: She will be picked up at her home at 12:45 pm and then picked up at the doctor's office at 3:00 pm.     Patient voiced understanding. 2:00 pm  Received message from patient that transportation never arrived. SW contacted patient and advised that appointment will be rescheduled along with transportation. 2:35 pm  Contacted [facility] ([phone]) and spoke with Vasquez Later. Advised that patient was not picked up today. Trip canceled in system. SW to reschedule.      Carlos Alberto Rowe, MSW, ACSW, CCM    [facility] Management Team  [phone]

## 2020-10-13 ENCOUNTER — PATIENT OUTREACH (OUTPATIENT)
Dept: CASE MANAGEMENT | Age: 70
End: 2020-10-13

## 2020-10-13 NOTE — PROGRESS NOTES
Social Work Note  10/13/2020      Rescheduled patient's missed follow up appointment for 10/26/20 at 2:30 pm    Transportation scheduled with [address] Medicaid :    10/26/20    time at home:  1:15 pm - 1:45 pm   Patient will need to call [phone] after appointment to schedule ride home   Confirmation #[id number]    Contacted patient and advised of appointment and transportation arrangements.        Elena Benjamin, MSW, ACSW, CCM    [facility] Management Team  [phone]

## 2020-10-16 ENCOUNTER — PATIENT OUTREACH (OUTPATIENT)
Dept: CASE MANAGEMENT | Age: 70
End: 2020-10-16

## 2020-10-16 NOTE — PROGRESS NOTES
Social Work Note  10/16/2020      Received message from patient asking to confirm appointment time for PCP follow-up. Returned call and provided appointment information to patient:  10/26/20  2:30 pm    Contacted [address] and scheduled  time from 10/26/20 appointment.    time scheduled for 4:00 pm.    JET Claire, ACSW, CCM    [facility] Management Team  [phone]

## 2020-10-26 ENCOUNTER — PATIENT OUTREACH (OUTPATIENT)
Dept: CASE MANAGEMENT | Age: 70
End: 2020-10-26

## 2020-10-26 ENCOUNTER — OFFICE VISIT (OUTPATIENT)
Dept: INTERNAL MEDICINE CLINIC | Age: 70
End: 2020-10-26
Payer: COMMERCIAL

## 2020-10-26 VITALS
HEART RATE: 100 BPM | OXYGEN SATURATION: 97 % | HEIGHT: 59 IN | TEMPERATURE: 98.5 F | WEIGHT: 136.8 LBS | SYSTOLIC BLOOD PRESSURE: 168 MMHG | RESPIRATION RATE: 18 BRPM | DIASTOLIC BLOOD PRESSURE: 100 MMHG | BODY MASS INDEX: 27.58 KG/M2

## 2020-10-26 DIAGNOSIS — R47.01 EXPRESSIVE APHASIA: ICD-10-CM

## 2020-10-26 DIAGNOSIS — Z00.00 MEDICARE ANNUAL WELLNESS VISIT, SUBSEQUENT: Primary | ICD-10-CM

## 2020-10-26 DIAGNOSIS — E53.8 B12 DEFICIENCY: ICD-10-CM

## 2020-10-26 DIAGNOSIS — Z86.73 HISTORY OF CVA (CEREBROVASCULAR ACCIDENT): ICD-10-CM

## 2020-10-26 DIAGNOSIS — I69.322 DYSARTHRIA AS LATE EFFECT OF CEREBROVASCULAR ACCIDENT (CVA): ICD-10-CM

## 2020-10-26 DIAGNOSIS — E78.00 PURE HYPERCHOLESTEROLEMIA: ICD-10-CM

## 2020-10-26 DIAGNOSIS — R51.9 NONINTRACTABLE HEADACHE, UNSPECIFIED CHRONICITY PATTERN, UNSPECIFIED HEADACHE TYPE: ICD-10-CM

## 2020-10-26 DIAGNOSIS — I10 ESSENTIAL HYPERTENSION: ICD-10-CM

## 2020-10-26 DIAGNOSIS — Z23 NEEDS FLU SHOT: ICD-10-CM

## 2020-10-26 DIAGNOSIS — Z11.59 NEED FOR HEPATITIS C SCREENING TEST: ICD-10-CM

## 2020-10-26 DIAGNOSIS — I48.20 CHRONIC A-FIB (HCC): ICD-10-CM

## 2020-10-26 DIAGNOSIS — E55.9 VITAMIN D DEFICIENCY: ICD-10-CM

## 2020-10-26 DIAGNOSIS — E03.9 ACQUIRED HYPOTHYROIDISM: ICD-10-CM

## 2020-10-26 DIAGNOSIS — E78.2 MIXED HYPERLIPIDEMIA: ICD-10-CM

## 2020-10-26 DIAGNOSIS — R73.9 HYPERGLYCEMIA: ICD-10-CM

## 2020-10-26 LAB
25(OH)D3 SERPL-MCNC: 35.7 NG/ML (ref 30–100)
ALBUMIN SERPL-MCNC: 4.4 G/DL (ref 3.5–5)
ALBUMIN/GLOB SERPL: 1.2 {RATIO} (ref 1.1–2.2)
ALP SERPL-CCNC: 160 U/L (ref 45–117)
ALT SERPL-CCNC: 20 U/L (ref 12–78)
ANION GAP SERPL CALC-SCNC: 7 MMOL/L (ref 5–15)
AST SERPL-CCNC: 22 U/L (ref 15–37)
BASOPHILS # BLD: 0.1 K/UL (ref 0–0.1)
BASOPHILS NFR BLD: 1 % (ref 0–1)
BILIRUB SERPL-MCNC: 0.6 MG/DL (ref 0.2–1)
BUN SERPL-MCNC: 19 MG/DL (ref 6–20)
BUN/CREAT SERPL: 11 (ref 12–20)
CALCIUM SERPL-MCNC: 9.8 MG/DL (ref 8.5–10.1)
CHLORIDE SERPL-SCNC: 112 MMOL/L (ref 97–108)
CHOLEST SERPL-MCNC: 244 MG/DL
CK SERPL-CCNC: 207 U/L (ref 26–192)
CO2 SERPL-SCNC: 20 MMOL/L (ref 21–32)
CREAT SERPL-MCNC: 1.74 MG/DL (ref 0.55–1.02)
DIFFERENTIAL METHOD BLD: ABNORMAL
EOSINOPHIL # BLD: 0.2 K/UL (ref 0–0.4)
EOSINOPHIL NFR BLD: 2 % (ref 0–7)
ERYTHROCYTE [DISTWIDTH] IN BLOOD BY AUTOMATED COUNT: 14.6 % (ref 11.5–14.5)
EST. AVERAGE GLUCOSE BLD GHB EST-MCNC: 108 MG/DL
GLOBULIN SER CALC-MCNC: 3.7 G/DL (ref 2–4)
GLUCOSE SERPL-MCNC: 88 MG/DL (ref 65–100)
HBA1C MFR BLD: 5.4 % (ref 4–5.6)
HCT VFR BLD AUTO: 39.9 % (ref 35–47)
HDLC SERPL-MCNC: 79 MG/DL
HDLC SERPL: 3.1 {RATIO} (ref 0–5)
HGB BLD-MCNC: 12.9 G/DL (ref 11.5–16)
IMM GRANULOCYTES # BLD AUTO: 0 K/UL (ref 0–0.04)
IMM GRANULOCYTES NFR BLD AUTO: 1 % (ref 0–0.5)
LDLC SERPL CALC-MCNC: 146.2 MG/DL (ref 0–100)
LIPID PROFILE,FLP: ABNORMAL
LYMPHOCYTES # BLD: 1.8 K/UL (ref 0.8–3.5)
LYMPHOCYTES NFR BLD: 25 % (ref 12–49)
MCH RBC QN AUTO: 29.5 PG (ref 26–34)
MCHC RBC AUTO-ENTMCNC: 32.3 G/DL (ref 30–36.5)
MCV RBC AUTO: 91.3 FL (ref 80–99)
MONOCYTES # BLD: 0.7 K/UL (ref 0–1)
MONOCYTES NFR BLD: 9 % (ref 5–13)
NEUTS SEG # BLD: 4.7 K/UL (ref 1.8–8)
NEUTS SEG NFR BLD: 63 % (ref 32–75)
NRBC # BLD: 0 K/UL (ref 0–0.01)
NRBC BLD-RTO: 0 PER 100 WBC
PLATELET # BLD AUTO: 281 K/UL (ref 150–400)
PMV BLD AUTO: 10.3 FL (ref 8.9–12.9)
POTASSIUM SERPL-SCNC: 4.3 MMOL/L (ref 3.5–5.1)
PROT SERPL-MCNC: 8.1 G/DL (ref 6.4–8.2)
RBC # BLD AUTO: 4.37 M/UL (ref 3.8–5.2)
SODIUM SERPL-SCNC: 139 MMOL/L (ref 136–145)
T4 FREE SERPL-MCNC: 0.8 NG/DL (ref 0.8–1.5)
TRIGL SERPL-MCNC: 94 MG/DL (ref ?–150)
TSH SERPL DL<=0.05 MIU/L-ACNC: 4.87 UIU/ML (ref 0.36–3.74)
VIT B12 SERPL-MCNC: 634 PG/ML (ref 193–986)
VLDLC SERPL CALC-MCNC: 18.8 MG/DL
WBC # BLD AUTO: 7.5 K/UL (ref 3.6–11)

## 2020-10-26 PROCEDURE — G8400 PT W/DXA NO RESULTS DOC: HCPCS | Performed by: INTERNAL MEDICINE

## 2020-10-26 PROCEDURE — 1101F PT FALLS ASSESS-DOCD LE1/YR: CPT | Performed by: INTERNAL MEDICINE

## 2020-10-26 PROCEDURE — G8427 DOCREV CUR MEDS BY ELIG CLIN: HCPCS | Performed by: INTERNAL MEDICINE

## 2020-10-26 PROCEDURE — G8510 SCR DEP NEG, NO PLAN REQD: HCPCS | Performed by: INTERNAL MEDICINE

## 2020-10-26 PROCEDURE — 1090F PRES/ABSN URINE INCON ASSESS: CPT | Performed by: INTERNAL MEDICINE

## 2020-10-26 PROCEDURE — G8419 CALC BMI OUT NRM PARAM NOF/U: HCPCS | Performed by: INTERNAL MEDICINE

## 2020-10-26 PROCEDURE — G8753 SYS BP > OR = 140: HCPCS | Performed by: INTERNAL MEDICINE

## 2020-10-26 PROCEDURE — 3017F COLORECTAL CA SCREEN DOC REV: CPT | Performed by: INTERNAL MEDICINE

## 2020-10-26 PROCEDURE — G8536 NO DOC ELDER MAL SCRN: HCPCS | Performed by: INTERNAL MEDICINE

## 2020-10-26 PROCEDURE — G0439 PPPS, SUBSEQ VISIT: HCPCS | Performed by: INTERNAL MEDICINE

## 2020-10-26 PROCEDURE — 99215 OFFICE O/P EST HI 40 MIN: CPT | Performed by: INTERNAL MEDICINE

## 2020-10-26 PROCEDURE — G8755 DIAS BP > OR = 90: HCPCS | Performed by: INTERNAL MEDICINE

## 2020-10-26 RX ORDER — DILTIAZEM HYDROCHLORIDE 120 MG/1
120 CAPSULE, EXTENDED RELEASE ORAL DAILY
Qty: 90 CAP | Refills: 1 | Status: SHIPPED | OUTPATIENT
Start: 2020-10-26 | End: 2021-03-10 | Stop reason: DRUGHIGH

## 2020-10-26 RX ORDER — METOPROLOL SUCCINATE 50 MG/1
50 TABLET, EXTENDED RELEASE ORAL DAILY
Qty: 90 TAB | Refills: 1 | Status: SHIPPED | OUTPATIENT
Start: 2020-10-26 | End: 2021-03-10 | Stop reason: SDUPTHER

## 2020-10-26 RX ORDER — BUMETANIDE 0.5 MG/1
TABLET ORAL
COMMUNITY
Start: 2020-08-02 | End: 2021-03-10 | Stop reason: ALTCHOICE

## 2020-10-26 RX ORDER — PRAVASTATIN SODIUM 20 MG/1
20 TABLET ORAL DAILY
Qty: 90 TAB | Refills: 1 | Status: SHIPPED | OUTPATIENT
Start: 2020-10-26 | End: 2020-10-28

## 2020-10-26 NOTE — PATIENT INSTRUCTIONS
Vaccine Information Statement Influenza (Flu) Vaccine (Inactivated or Recombinant): What You Need to Know Many Vaccine Information Statements are available in [place]ak and other languages. See www.immunize.org/vis Hojas de información sobre vacunas están disponibles en español y en muchos otros idiomas. Visite www.immunize.org/vis 1. Why get vaccinated? Influenza vaccine can prevent influenza (flu). Flu is a contagious disease that spreads around the [facility] every year, usually between October and May. Anyone can get the flu, but it is more dangerous for some people. Infants and young children, people 72years of age and older, pregnant women, and people with certain health conditions or a weakened immune system are at greatest risk of flu complications. Pneumonia, bronchitis, sinus infections and ear infections are examples of flu-related complications. If you have a medical condition, such as heart disease, cancer or diabetes, flu can make it worse. Flu can cause fever and chills, sore throat, muscle aches, fatigue, cough, headache, and runny or stuffy nose. Some people may have vomiting and diarrhea, though this is more common in children than adults. Each year thousands of people in the [facility] die from flu, and many more are hospitalized. Flu vaccine prevents millions of illnesses and flu-related visits to the doctor each year. 2. Influenza vaccines CDC recommends everyone 10months of age and older get vaccinated every flu season. Children 6 months through 6years of age may need 2 doses during a single flu season. Everyone else needs only 1 dose each flu season. It takes about 2 weeks for protection to develop after vaccination. There are many flu viruses, and they are always changing. Each year a new flu vaccine is made to protect against three or four viruses that are likely to cause disease in the upcoming flu season.  Even when the vaccine doesnt exactly match these viruses, it may still provide some protection. Influenza vaccine does not cause flu. Influenza vaccine may be given at the same time as other vaccines. 3. Talk with your health care provider Tell your vaccine provider if the person getting the vaccine: 
 Has had an allergic reaction after a previous dose of influenza vaccine, or has any severe, life-threatening allergies.  Has ever had Guillain-Barré Syndrome (also called GBS). In some cases, your health care provider may decide to postpone influenza vaccination to a future visit. People with minor illnesses, such as a cold, may be vaccinated. People who are moderately or severely ill should usually wait until they recover before getting influenza vaccine. Your health care provider can give you more information. 4. Risks of a reaction  Soreness, redness, and swelling where shot is given, fever, muscle aches, and headache can happen after influenza vaccine.  There may be a very small increased risk of Guillain-Barré Syndrome (GBS) after inactivated influenza vaccine (the flu shot). Natali Hylton children who get the flu shot along with pneumococcal vaccine (PCV13), and/or DTaP vaccine at the same time might be slightly more likely to have a seizure caused by fever. Tell your health care provider if a child who is getting flu vaccine has ever had a seizure. People sometimes faint after medical procedures, including vaccination. Tell your provider if you feel dizzy or have vision changes or ringing in the ears. As with any medicine, there is a very remote chance of a vaccine causing a severe allergic reaction, other serious injury, or death. 5. What if there is a serious problem? An allergic reaction could occur after the vaccinated person leaves the clinic.  If you see signs of a severe allergic reaction (hives, swelling of the face and throat, difficulty breathing, a fast heartbeat, dizziness, or weakness), call 9-1-1 and get the person to the nearest hospital. 
 
For other signs that concern you, call your health care provider. Adverse reactions should be reported to the Vaccine Adverse Event Reporting System (VAERS). Your health care provider will usually file this report, or you can do it yourself. Visit the VAERS website at www.vaers. hhs.gov or call 5-182.953.2609. VAERS is only for reporting reactions, and VAERS staff do not give medical advice. 6. The National Vaccine Injury Compensation Program 
 
The [facility] Vaccine Injury Compensation Program (VICP) is a federal program that was created to compensate people who may have been injured by certain vaccines. Visit the VICP website at www.[facility].gov/vaccinecompensation or call 3-237.369.1053 to learn about the program and about filing a claim. There is a time limit to file a claim for compensation. 7. How can I learn more?  Ask your health care provider.  Call your local or state health department.  Contact the Centers for Disease Control and Prevention (CDC): 
- Call 0-779.845.6897 (0-644-FTF-INFO) or 
- Visit CDCs influenza website at www.cdc.gov/flu Vaccine Information Statement (Interim) Inactivated Influenza Vaccine 8/15/2019 
42 DESTINEE Silva [id number]-84 Department of [employer] Centers for Disease Control and Prevention Office Use Only Medicare Wellness Visit, Female The best way to live healthy is to have a lifestyle where you eat a well-balanced diet, exercise regularly, limit alcohol use, and quit all forms of tobacco/nicotine, if applicable. Regular preventive services are another way to keep healthy. Preventive services (vaccines, screening tests, monitoring & exams) can help personalize your care plan, which helps you manage your own care. Screening tests can find health problems at the earliest stages, when they are easiest to treat. [facility] follows the current, evidence-based guidelines published by the [facility] ([facility]) when recommending preventive services for our patients. Because we follow these guidelines, sometimes recommendations change over time as research supports it. (For example, mammograms used to be recommended annually. Even though Medicare will still pay for an annual mammogram, the newer guidelines recommend a mammogram every two years for women of average risk). Of course, you and your doctor may decide to screen more often for some diseases, based on your risk and your co-morbidities (chronic disease you are already diagnosed with). Preventive services for you include: - Medicare offers their members a free annual wellness visit, which is time for you and your primary care provider to discuss and plan for your preventive service needs. Take advantage of this benefit every year! 
-All adults over the age of 72 should receive the recommended pneumonia vaccines. Current USPSTF guidelines recommend a series of two vaccines for the best pneumonia protection.  
-All adults should have a flu vaccine yearly and a tetanus vaccine every 10 years.  
-All adults age 48 and older should receive the shingles vaccines (series of two vaccines).      
-All adults age 38-68 who are overweight should have a diabetes screening test once every three years.  
-All adults born between [birth year] and 1966 should be screened once for Hepatitis C. 
-Other screening tests and preventive services for persons with diabetes include: an eye exam to screen for diabetic retinopathy, a kidney function test, a foot exam, and stricter control over your cholesterol.  
-Cardiovascular screening for adults with routine risk involves an electrocardiogram (ECG) at intervals determined by your doctor.  
-Colorectal cancer screenings should be done for adults age 54-65 with no increased risk factors for colorectal cancer. There are a number of acceptable methods of screening for this type of cancer. Each test has its own benefits and drawbacks. Discuss with your doctor what is most appropriate for you during your annual wellness visit. The different tests include: colonoscopy (considered the best screening method), a fecal occult blood test, a fecal DNA test, and sigmoidoscopy. 
 
-A bone mass density test is recommended when a woman turns 65 to screen for osteoporosis. This test is only recommended one time, as a screening. Some providers will use this same test as a disease monitoring tool if you already have osteoporosis. -Breast cancer screenings are recommended every other year for women of normal risk, age 54-69. 
-Cervical cancer screenings for women over age 72 are only recommended with certain risk factors. Here is a list of your current Health Maintenance items (your personalized list of preventive services) with a due date: 
Health Maintenance Due Topic Date Due  
 Hepatitis C Test  1950  
 DTaP/Tdap/Td  (1 - Tdap) 02/25/1970  Shingles Vaccine (1 of 2) 02/25/1999  Colorectal Screening  02/25/1999  Mammogram  02/25/1999  Glaucoma Screening   02/25/2014  Bone Mineral Density   02/25/2014  Pneumococcal Vaccine (1 of 1 - PPSV23) 02/25/2014  Pneumococcal Vaccine 65+ years at Risk (1 of 2 - PCV13) 02/25/2014 Nicolasa Guajardo Annual Well Visit  05/23/2019  Cholesterol Test   03/26/2020

## 2020-10-26 NOTE — PROGRESS NOTES
HPI:  Presents for f/u Afib, HTN, lipids, etc    Pt alone today  Transportation provided today - arranged by SW    C/o HAs - chronic, recurrent  Pt cannot relate whether worse when misses med dosing    C/o right ear pain, muffled sound    BP markedly elevated today  Pt reports missing medications this AM as well  Though, pt reports overall med compliance is better since home delivery set up  But, pt acknowledges missing doses on occasion despite home delivery and pre-packaging. Most commonly missed dosing is midday    Pt reports SBP around 160 at home routinely    Pt reports she does well with cooking and cleaning  Again, pt's only acknowledged limitation is with med management. No new neuro sx  Chronic dysarthria, relative aphasia - trouble with word finding. Past medical, Social, and Family history reviewed    Prior to Admission medications    Medication Sig Start Date End Date Taking? Authorizing Provider   apixaban (ELIQUIS) 5 mg tablet Take 1 Tab by mouth two (2) times a day. 8/24/20  Yes Neela Santiago MD   metoprolol tartrate (LOPRESSOR) 25 mg tablet Take 0.5 Tabs by mouth every twelve (12) hours. Patient taking differently: Take 25 mg by mouth every twelve (12) hours. 8/24/20  Yes Neela Santiago MD   butalbital-acetaminophen-caffeine (FIORIC, [facility]) -40 mg per tablet Take 1 Tab by mouth every six (6) hours as needed for Headache. 8/24/20  Yes Neela Santiago MD   pantoprazole (PROTONIX) 40 mg tablet Take 1 Tab by mouth Daily (before breakfast). 8/24/20  Yes Neela Santiago MD   dilTIAZem IR (CARDIZEM) 60 mg tablet Take 1 Tab by mouth two (2) times a day. 8/24/20  Yes Neela Santiago MD   levothyroxine (SYNTHROID) 50 mcg tablet Take 1 Tab by mouth Daily (before breakfast). 8/24/20  Yes Neela Santiago MD   cholecalciferol (VITAMIN D3) (1000 Units /25 mcg) tablet Take 1 Tab by mouth daily.  8/24/20  Yes Neela Santiago MD   pravastatin (PRAVACHOL) 20 mg tablet Take 1 Tab by mouth nightly. 8/24/20  Yes Fredy Haas MD   albuterol (PROVENTIL HFA, VENTOLIN HFA, PROAIR HFA) 90 mcg/actuation inhaler Take 2 Puffs by inhalation every four (4) hours as needed for Shortness of Breath or Respiratory Distress. 8/24/20  Yes Fredy Haas MD   topiramate (TOPAMAX) 50 mg tablet Take 1 Tab by mouth nightly. 8/24/20  Yes Fredy Haas MD   traZODone (DESYREL) 50 mg tablet take 1 tablet by mouth NIGHTLY 8/24/20  Yes Fredy Haas MD   aspirin delayed-release (ADULT LOW DOSE ASPIRIN) 81 mg tablet Take 1 Tab by mouth daily. 3/26/19  Yes Fredy Haas MD   omeprazole (PRILOSEC) 20 mg capsule Take 1 Cap by mouth daily. 3/26/19  Yes Fredy Haas MD   bumetanide (BUMEX) 0.5 mg tablet TAKE 1 T PO ONE TIME A DAY EVERY OTHER DAY FOR CHF 8/2/20   Provider, Historical   ipratropium-albuteroL (Combivent Respimat)  mcg/actuation inhaler Take 1 Puff by inhalation every six (6) hours. 8/24/20   Fredy Haas MD          ROS  Complete ROS reviewed and negative or stable except as noted in HPI. Physical Exam  Vitals signs and nursing note reviewed. Constitutional:       General: She is not in acute distress. HENT:      Head: Normocephalic and atraumatic. Right Ear: There is impacted cerumen. Left Ear: There is impacted cerumen. Eyes:      General: No scleral icterus. Pupils: Pupils are equal, round, and reactive to light. Neck:      Musculoskeletal: Normal range of motion and neck supple. Comments: No bruits  Cardiovascular:      Rate and Rhythm: Normal rate and regular rhythm. Heart sounds: Normal heart sounds. No murmur. No friction rub. No gallop. Pulmonary:      Effort: Pulmonary effort is normal. No respiratory distress. Breath sounds: Normal breath sounds. No wheezing or rales. Abdominal:      General: There is no distension. Palpations: Abdomen is soft. Tenderness: There is no abdominal tenderness. Musculoskeletal: Normal range of motion. Skin:     General: Skin is warm. Findings: No rash. Neurological:      Mental Status: She is alert and oriented to person, place, and time. Motor: No abnormal muscle tone. Comments: Mild facial asymmetry. +dysarthria. At baseline. Prior labs reviewed. Reviewed prior imaging reports  Reviewed prior hospital notes      Assessment/Plan:    ICD-10-CM ICD-9-CM    1. Essential hypertension  I10 401.9 CBC WITH AUTOMATED DIFF      metoprolol succinate (TOPROL-XL) 50 mg XL tablet      dilTIAZem ER (DILACOR XR) 120 mg capsule      CBC WITH AUTOMATED DIFF   2. Needs flu shot  Z23 V04.81    3. Medicare annual wellness visit, subsequent  Z00.00 V70.0    4. Acquired hypothyroidism  E03.9 244.9 T4, FREE      TSH 3RD GENERATION      TSH 3RD GENERATION      T4, FREE   5. Vitamin D deficiency  E55.9 268.9 VITAMIN D, 25 HYDROXY      VITAMIN D, 25 HYDROXY   6. Pure hypercholesterolemia  E78.00 272.0 pravastatin (PRAVACHOL) 20 mg tablet   7. History of CVA (cerebrovascular accident)  Z86.73 V12.54    8. Chronic a-fib (HCC)  I48.20 427.31 metoprolol succinate (TOPROL-XL) 50 mg XL tablet      dilTIAZem ER (DILACOR XR) 120 mg capsule   9. B12 deficiency  E53.8 266.2 VITAMIN B12      VITAMIN B12   10. Hyperglycemia  R73.9 790.29 HEMOGLOBIN A1C WITH EAG      HEMOGLOBIN A1C WITH EAG   11. Mixed hyperlipidemia  E78.2 272.2 CK      LIPID PANEL      METABOLIC PANEL, COMPREHENSIVE      METABOLIC PANEL, COMPREHENSIVE      LIPID PANEL      CK   12. Need for hepatitis C screening test  Z11.59 V73.89 HCV AB W/RFLX TO PATRICIA      HCV AB W/RFLX TO PATRICIA   13. Nonintractable headache, unspecified chronicity pattern, unspecified headache type  R51.9 784.0    14. Expressive aphasia  R47.01 784.3    15.  Dysarthria as late effect of cerebrovascular accident (CVA)  P77.128 438.13      Follow-up and Dispositions    · Return in about 2 months (around 12/26/2020), or if symptoms worsen or fail to improve, for blood pressure, thyroid, cholesterol. results and schedule of future studies reviewed with patient  reviewed diet, exercise and weight  cardiovascular risk and specific lipid/LDL goals reviewed  reviewed medications and side effects in detail     Needs to have med management revisited  Try to simplify med regimen. Pt declines assisted living. Change metoprolol and diltiazem to once daily dosing.   Send report to SW to help arrange f/u, etc  Labs today  Pt deferred ENT referral

## 2020-10-26 NOTE — PROGRESS NOTES
Room: 14     Identified pt with two pt identifiers(name and ). Reviewed record in preparation for visit and have obtained necessary documentation. All patient medications has been reviewed. Chief Complaint   Patient presents with    Hypertension     Elevated reading.  Cholesterol Problem    Headache     x 5-6 months     Medication Evaluation     Metoporol 25 mg        Health Maintenance Due   Topic    Hepatitis C Screening     DTaP/Tdap/Td series (1 - Tdap)    Shingrix Vaccine Age 49> (1 of 2)    Colorectal Cancer Screening Combo     Breast Cancer Screen Mammogram     GLAUCOMA SCREENING Q2Y     Bone Densitometry (Dexa) Screening     Pneumococcal 65+ years (1 of 1 - PPSV23)    Pneumococcal 65+ yrs at Risk Vaccine (1 of 2 - PCV13)    Medicare Yearly Exam     Lipid Screen      Eye exam: overdue     Mammo: overudue     Dexa: overdue     Vitals:    10/26/20 1418 10/26/20 1433   BP: (!) 205/121 (!) 202/126   Pulse: 100    Resp: 18    Temp: 98.5 °F (36.9 °C)    TempSrc: Oral    SpO2: 97%    Weight: 136 lb 12.8 oz (62.1 kg)    Height: 4' 11\" (1.499 m)    PainSc:   0 - No pain        4. Have you been to the ER, urgent care clinic since your last visit? Hospitalized since your last visit? No    5. Have you seen or consulted any other health care providers outside of the [address] [place] [address] since your last visit? Include any pap smears or colon screening. No    6. Would you like to receive your flu shot today? NO    8. Do you have an Advanced Directive/ Living Will in place? YES  If yes, do we have a copy on file YES  If no, would you like information NO    Patient is accompanied by self I have received verbal consent from Elena Nava to discuss any/all medical information while they are present in the room.

## 2020-10-26 NOTE — PROGRESS NOTES
Social Work Note  10/26/2020      9:10 am  Message left on patient's voicemail with reminder of PCP appointment today and Medicaid transportation arrangements. 4:35 pm  Received call from patient. Transportation home has not arrived. Contacted [employer] ([phone]). They advised that ride will arrive by 4:50 pm.   Advised patient of information from [address] Patient stated that she contacted [employer] 20 minutes prior to SW call and has been disappointed in this particular transportation company.      Avel Weathers, MSW, ACSW, CCM    [facility] Management Team  [phone]

## 2020-10-26 NOTE — PROGRESS NOTES
This is the Subsequent Medicare Annual Wellness Exam, performed 12 months or more after the Initial AWV or the last Subsequent AWV    I have reviewed the patient's medical history in detail and updated the computerized patient record. History     Patient Active Problem List   Diagnosis Code    Lumbar stenosis M48.061    CKD (chronic kidney disease) N18.9    Proteinuria R80.9    Secondary hyperparathyroidism, renal (HCC) N25.81    Chronic intractable headache R51.9, G89.29    History of CVA (cerebrovascular accident) Z86.73    Hyperparathyroidism, secondary ([place]) N25.81    Pneumococcal vaccination declined Z35.24    Mammogram declined Z53.20    Colonoscopy refused Z53.20    Atrial fibrillation with RVR ([place]) I48.91    CAP (community acquired pneumonia) C30.0    Diastolic CHF, chronic (HCC) I50.32    Hypertension I10    Hyperlipidemia E78.5    SOB (shortness of breath) R06.02    Cough R05    Fatigue R53.83    Goals of care, counseling/discussion Z71.89    Chronic a-fib (HCC) I48.20     Past Medical History:   Diagnosis Date    Cancer ([place])     ovaian stomach    CVA (cerebral vascular accident) ([place])     Heart failure ([place])     MI    Hypertension       Past Surgical History:   Procedure Laterality Date    ABDOMEN SURGERY PROC UNLISTED      cancer removal    HX GYN      hysterectomy    HX LUMBAR LAMINECTOMY  06/29/2016    L4-S1, Dr. Cande Arrington     Current Outpatient Medications   Medication Sig Dispense Refill    apixaban (ELIQUIS) 5 mg tablet Take 1 Tab by mouth two (2) times a day. 180 Tab 1    metoprolol tartrate (LOPRESSOR) 25 mg tablet Take 0.5 Tabs by mouth every twelve (12) hours. (Patient taking differently: Take 25 mg by mouth every twelve (12) hours. ) 180 Tab 1    butalbital-acetaminophen-caffeine (FIORICET, ESGIC) -40 mg per tablet Take 1 Tab by mouth every six (6) hours as needed for Headache.  20 Tab 1    pantoprazole (PROTONIX) 40 mg tablet Take 1 Tab by mouth Daily (before breakfast). 90 Tab 1    dilTIAZem IR (CARDIZEM) 60 mg tablet Take 1 Tab by mouth two (2) times a day. 180 Tab 1    levothyroxine (SYNTHROID) 50 mcg tablet Take 1 Tab by mouth Daily (before breakfast). 90 Tab 1    cholecalciferol (VITAMIN D3) (1000 Units /25 mcg) tablet Take 1 Tab by mouth daily. 90 Tab 3    pravastatin (PRAVACHOL) 20 mg tablet Take 1 Tab by mouth nightly. 90 Tab 1    albuterol (PROVENTIL HFA, VENTOLIN HFA, PROAIR HFA) 90 mcg/actuation inhaler Take 2 Puffs by inhalation every four (4) hours as needed for Shortness of Breath or Respiratory Distress. 1 Inhaler 2    topiramate (TOPAMAX) 50 mg tablet Take 1 Tab by mouth nightly. 90 Tab 1    traZODone (DESYREL) 50 mg tablet take 1 tablet by mouth NIGHTLY 90 Tab 1    aspirin delayed-release (ADULT LOW DOSE ASPIRIN) 81 mg tablet Take 1 Tab by mouth daily. 90 Tab 3    omeprazole (PRILOSEC) 20 mg capsule Take 1 Cap by mouth daily. 90 Cap 1    bumetanide (BUMEX) 0.5 mg tablet TAKE 1 T PO ONE TIME A DAY EVERY OTHER DAY FOR CHF      ipratropium-albuteroL (Combivent Respimat)  mcg/actuation inhaler Take 1 Puff by inhalation every six (6) hours.  3 Inhaler 2     No Known Allergies    Family History   Problem Relation Age of Onset   [place] Hypertension Mother     Hypertension Sister     Heart Disease Brother     Suicide Brother     Diabetes Brother     Diabetes Brother     Suicide Brother     Cancer Brother     Hypertension Sister     Hypertension Sister     Hypertension Sister     Hypertension Sister      Social History     Tobacco Use    Smoking status: Never Smoker    Smokeless tobacco: Never Used   Substance Use Topics    Alcohol use: Not Currently     Frequency: Never       Depression Risk Factor Screening:     3 most recent PHQ Screens 10/26/2020   Little interest or pleasure in doing things Not at all   Feeling down, depressed, irritable, or hopeless Not at all   Total Score PHQ 2 0       Alcohol Risk Screen   Do you average more than 1 drink per night or more than 7 drinks a week:  No    On any one occasion in the past three months have you have had more than 3 drinks containing alcohol:  No        Functional Ability and Level of Safety:   Hearing: The patient needs further evaluation. Activities of Daily Living: The home contains: no safety equipment. Patient needs help with:  transportation, shopping, managing medications and managing money     Ambulation: with no difficulty     Fall Risk:  Fall Risk Assessment, last 12 mths 10/26/2020   Able to walk? Yes   Fall in past 12 months? No   Fall with injury? -   Number of falls in past 12 months -   Fall Risk Score -     Abuse Screen:  pt reports her daughter called her stupid related to her expressive aphasia       Cognitive Screening   Has your family/caregiver stated any concerns about your memory: yes - s/p CVA         Patient Care Team   Patient Care Team:  Abhay Guo MD as PCP - General (Pediatric Medicine)  Abhay Guo MD as PCP - [address] [place] [address] [facility]  Magali Boast, MSW as     Assessment/Plan   Education and counseling provided:  Are appropriate based on today's review and evaluation      ICD-10-CM ICD-9-CM    1. Medicare annual wellness visit, subsequent  Z00.00 V70.0    2. Needs flu shot  Z23 V04.81    3. Acquired hypothyroidism  E03.9 244.9 T4, FREE      TSH 3RD GENERATION      TSH 3RD GENERATION      T4, FREE   4. Vitamin D deficiency  E55.9 268.9 VITAMIN D, 25 HYDROXY      VITAMIN D, 25 HYDROXY   5. Pure hypercholesterolemia  E78.00 272.0 pravastatin (PRAVACHOL) 20 mg tablet   6. Essential hypertension  I10 401.9 CBC WITH AUTOMATED DIFF      metoprolol succinate (TOPROL-XL) 50 mg XL tablet      dilTIAZem ER (DILACOR XR) 120 mg capsule      CBC WITH AUTOMATED DIFF   7. History of CVA (cerebrovascular accident)  Z86.73 V12.54    8.  Chronic a-fib (HCC)  I48.20 427.31 metoprolol succinate (TOPROL-XL) 50 mg XL tablet      dilTIAZem ER (DILACOR XR) 120 mg capsule   9. B12 deficiency  E53.8 266.2 VITAMIN B12      VITAMIN B12   10. Hyperglycemia  R73.9 790.29 HEMOGLOBIN A1C WITH EAG      HEMOGLOBIN A1C WITH EAG   11. Mixed hyperlipidemia  E78.2 272.2 CK      LIPID PANEL      METABOLIC PANEL, COMPREHENSIVE      METABOLIC PANEL, COMPREHENSIVE      LIPID PANEL      CK   12. Need for hepatitis C screening test  Z11.59 V73.89 HCV AB W/RFLX TO PATRICIA      HCV AB W/RFLX TO PATRICIA   13. Nonintractable headache, unspecified chronicity pattern, unspecified headache type  R51.9 784.0    14. Expressive aphasia  R47.01 784.3    15. Dysarthria as late effect of cerebrovascular accident (CVA)  D66.840 438.13      Follow-up and Dispositions    · Return in about 2 months (around 12/26/2020), or if symptoms worsen or fail to improve, for blood pressure, thyroid, cholesterol.         results and schedule of future studies reviewed with patient  reviewed diet, exercise and weight   cardiovascular risk and specific lipid/LDL goals reviewed  reviewed medications and side effects in detail     Pt declines HM screenings, etc

## 2020-10-27 LAB
HCV AB S/CO SERPL IA: <0.1 S/CO RATIO (ref 0–0.9)
HCV AB SERPL QL IA: NORMAL

## 2020-10-28 DIAGNOSIS — E78.00 PURE HYPERCHOLESTEROLEMIA: ICD-10-CM

## 2020-10-28 DIAGNOSIS — E03.9 ACQUIRED HYPOTHYROIDISM: ICD-10-CM

## 2020-10-28 RX ORDER — PRAVASTATIN SODIUM 80 MG/1
80 TABLET ORAL DAILY
Qty: 90 TAB | Refills: 1 | Status: SHIPPED | OUTPATIENT
Start: 2020-10-28 | End: 2021-03-10 | Stop reason: SDUPTHER

## 2020-10-28 RX ORDER — LEVOTHYROXINE SODIUM 75 UG/1
75 TABLET ORAL
Qty: 90 TAB | Refills: 1 | Status: SHIPPED | OUTPATIENT
Start: 2020-10-28 | End: 2021-05-12

## 2020-10-28 NOTE — PROGRESS NOTES
Thyroid dose is low - increase to levothyroxine 75 mcg daily. LDL cholesterol is high - increase pravastatin to 80 mg daily  New Rx's sent to pharmacy. Other labs are either normal or stable and at goal.  Repeat testing along with follow up in 2 months.

## 2020-11-03 NOTE — PROGRESS NOTES
Called, left vm in regards to lab results for pt to return call to office. breastfeeding exclusively

## 2020-11-04 NOTE — PROGRESS NOTES
3rd attempt made to contact patient about results. Left voice mail for return call.  Results letter with recommendations sent in mail to

## 2020-11-24 ENCOUNTER — PATIENT OUTREACH (OUTPATIENT)
Dept: CASE MANAGEMENT | Age: 70
End: 2020-11-24

## 2020-11-24 NOTE — PROGRESS NOTES
Social Work Note  11/24/2020      Received message from Beto Medley advising that patient needs assistance with scheduling transportation for January 2021 appointment. Attempted to reach office - unable to reach staff. Staff message sent to Beto Medley advising that SW will set up transportation 30 days prior to appointment. Insurance will not allow scheduling any earlier than that date. Contacted patient and left message advising that SW will contact her the first part of December to help schedule transportation. Advised that insurance company requires that transportation be scheduled within 30 days of appointment.      Dilcia Preston, JET, ACSW, [facility]    [facility] Management Team  [phone]

## 2020-12-23 ENCOUNTER — PATIENT OUTREACH (OUTPATIENT)
Dept: CASE MANAGEMENT | Age: 70
End: 2020-12-23

## 2020-12-23 NOTE — PROGRESS NOTES
Social Work Note  12/23/2020         Call - [address] ([phone])  Transportation for 01/04/21 3:30 pm appointment:  Keshia Aguliar up time (home):  2:45 pm (may arrive 15 min prior or after scheduled time)   time (office):  5:00 pm  Reference number:  [id number]    Call - patient  9:15 am  Message left on voicemail requesting return call to provide transportation arrangements. 12:39 pm  Message left on patient's voicemail. Provided transportation arrangements including  time at home and office. Provided transportation 800 number and requested return call to SW.     3:45 pm   Spoke with patient. Advised of transportation arrangements. She stated that she may have a friend to take her, but isn't sure. SW advised that patient will be contacted next week to confirm transportation arrangements and that [address] can be canceled later if needed. SW observed patient on phone with SOB, cough. She reported that she has had a \"cold\" for a few weeks and her hair is falling out. [email or web address] [address] with patient on phone. No appointments available today. Patient provided information re: symptoms and representative consulted with [facility] provider re: symptoms and ability of patient to wait until tomorrow for appointment. Symptoms reported include SOB, cough, headache, high blood pressure. Patient scheduled for appointment tomorrow. Representative advised that patient will be called tomorrow with appointment time. During call, SW observed that patient was unable to provide zip code or phone number. 4:40 pm  Patient left call with [facility] early. Follow up call made to patient. Advised patient that they will be calling and she needs to answer all phone calls. Asked patient if she has checked BP today. She stated that she needs to put her machine back together so she has not taken reading.   Linda Manuel discussed action plan with patient should SOB or other symptoms worsen. Patient stated that she will call [phone] if symptoms worsen. Patient status discussed with Sahara Peterson RN. SW continuing to follow.      JET Torres, ACSW, [facility]    [facility] Management Team  [phone]

## 2020-12-24 ENCOUNTER — PATIENT OUTREACH (OUTPATIENT)
Dept: CASE MANAGEMENT | Age: 70
End: 2020-12-24

## 2020-12-24 NOTE — PROGRESS NOTES
12/24/2020 [facility] spoke with patient on phone, follow up from [phone] [address] called [facility] to confirm appointment and spoke with Mohit.  has not scheduled an appointment they have been unable to reach patient. Patient had not returned messages left by their service. Patient reports she is feeling much better and denies increased SOB or increased coughing. No coughing or noticeable SOB while talking on phone. Patient does not feel like [facility] is needed and refused for [facility] to schedule [facility] visit. Patient has [facility] contact information and will call me if she changes her mind and has [address] number.

## 2020-12-30 ENCOUNTER — PATIENT OUTREACH (OUTPATIENT)
Dept: CASE MANAGEMENT | Age: 70
End: 2020-12-30

## 2020-12-30 NOTE — PROGRESS NOTES
Social Work Note      12/31/20  Spoke with patient to confirm scheduled transportation time (2:45 pm) through [employer] for Jan 4, 2021. Patient verbalized understanding. No further needs identified at this time. 12/30/20    Care Transition SW attempted to reach patient via telephone at [phone] and [phone] to confirm scheduled medical appointment on Jan 4th, 2021. No answer. Left a detailed voice message. SW will follow-up a later time.      JET Alegria  Care Transitions Team    [address] [place] [address] Ambulatory Care CoordinationTeam  [phone]

## 2021-01-04 ENCOUNTER — PATIENT OUTREACH (OUTPATIENT)
Dept: CASE MANAGEMENT | Age: 71
End: 2021-01-04

## 2021-01-04 NOTE — PROGRESS NOTES
Social Work Note  1/4/2021      12:55 pm  Call to patient for follow-up. She voiced understanding of transportation arrangements for PCP appointment today. Patient stated that she is feeling much better since last call with SW on 12/23/20.      2:05 pm  Received message from patient who stated that transportation has not arrived. Returned call to patient and advised that they will arrive at 2:45 pm (with window of 15 minutes before/15 minutes after). Patient voiced understanding. 3:26 pm  Received call from patient who stated that she was at the doctor's office. SW continuing to follow.      Yeni King, MSW, ACSW, CCM    [facility] Management Team  [phone]

## 2021-01-05 ENCOUNTER — PATIENT OUTREACH (OUTPATIENT)
Dept: CASE MANAGEMENT | Age: 71
End: 2021-01-05

## 2021-01-05 NOTE — PROGRESS NOTES
Social Work Note  1/5/2021      10:17 am  Attempted to reach patient for follow-up and assistance with [facility] appointment if needed. Call went straight to voicemail. Message left requesting return call. 1:57 pm  Attempted to reach patient. Phone call went straight to voicemail. Message left. Attempted to reach patient's daughter, Rafa Fatima at [phone]. .  Message left requesting return call. Also tried daughter's number ([phone]). Number not working.     Guzman Garcia, MSW, ACSW, [facility]    [facility] Management Team  [phone]

## 2021-01-06 ENCOUNTER — PATIENT OUTREACH (OUTPATIENT)
Dept: CASE MANAGEMENT | Age: 71
End: 2021-01-06

## 2021-01-06 NOTE — PROGRESS NOTES
Social Work Note  1/6/2021      9:35 am  Message left on patient's voicemail requesting return call.      JET Padron, ACSW, [facility]    Ambulatory Care Management   [phone]

## 2021-02-10 ENCOUNTER — PATIENT OUTREACH (OUTPATIENT)
Dept: CASE MANAGEMENT | Age: 71
End: 2021-02-10

## 2021-03-02 ENCOUNTER — PATIENT OUTREACH (OUTPATIENT)
Dept: CASE MANAGEMENT | Age: 71
End: 2021-03-02

## 2021-03-02 NOTE — PROGRESS NOTES
Social Work Note  3/2/2021      Message left on patient's voicemail ([phone]) requesting return call. Will check status and assist patient in scheduling follow-up appointment with PCP.      JET Esquivel, ACSW, [facility]    Ambulatory Care Management   [phone]

## 2021-03-05 ENCOUNTER — PATIENT OUTREACH (OUTPATIENT)
Dept: CASE MANAGEMENT | Age: 71
End: 2021-03-05

## 2021-03-05 NOTE — PROGRESS NOTES
Social Work Note  3/5/2021    9:20 am   Messages left on voicemail of patient ([phone]) and daughter, Camilo Fitzgerald ([phone]). Requested return call.      JET Lynne, ACSW, [facility]    Ambulatory Care Management   [phone]

## 2021-03-08 ENCOUNTER — PATIENT OUTREACH (OUTPATIENT)
Dept: CASE MANAGEMENT | Age: 71
End: 2021-03-08

## 2021-03-08 NOTE — PROGRESS NOTES
Social Work Note  3/8/2021      Received call from [facility] at Dr. Sharon Rajan' office. Patient contacted office. Appointment scheduled for 3/10/21 at 10:00 am and transportation needed. Scheduled transportation through [employer] ([phone])  Date:  3/10/21  Home  time:  9:15 am  Appointment time:  10:00 am   time at office for return home:  11:30 am  Trip number: [id number]    Contacted patient re: transportation arrangements. She stated that she has been feeling \"sluggish\" and has been \"sleeping every 15 minutes\". She also stated that she is out of all medications. Encouraged her to bring empty bottles to appointment for review.      SW Plan:  · Contact patient for additional transportation reminder  · Follow-up with patient after PCP appointment    Tiffanie Arce MSW, ACSW, [facility]    Ambulatory Care Management   [phone]

## 2021-03-10 ENCOUNTER — PATIENT OUTREACH (OUTPATIENT)
Dept: CASE MANAGEMENT | Age: 71
End: 2021-03-10

## 2021-03-10 ENCOUNTER — OFFICE VISIT (OUTPATIENT)
Dept: INTERNAL MEDICINE CLINIC | Age: 71
End: 2021-03-10
Payer: MEDICARE

## 2021-03-10 VITALS
RESPIRATION RATE: 16 BRPM | DIASTOLIC BLOOD PRESSURE: 73 MMHG | OXYGEN SATURATION: 97 % | WEIGHT: 141 LBS | BODY MASS INDEX: 28.43 KG/M2 | TEMPERATURE: 97.5 F | HEART RATE: 57 BPM | SYSTOLIC BLOOD PRESSURE: 178 MMHG | HEIGHT: 59 IN

## 2021-03-10 DIAGNOSIS — N18.4 STAGE 4 CHRONIC KIDNEY DISEASE (HCC): ICD-10-CM

## 2021-03-10 DIAGNOSIS — I69.322 DYSARTHRIA AS LATE EFFECT OF CEREBROVASCULAR ACCIDENT (CVA): ICD-10-CM

## 2021-03-10 DIAGNOSIS — I48.20 CHRONIC A-FIB (HCC): ICD-10-CM

## 2021-03-10 DIAGNOSIS — E03.9 ACQUIRED HYPOTHYROIDISM: Primary | ICD-10-CM

## 2021-03-10 DIAGNOSIS — I10 ESSENTIAL HYPERTENSION: ICD-10-CM

## 2021-03-10 DIAGNOSIS — E53.8 B12 DEFICIENCY: ICD-10-CM

## 2021-03-10 DIAGNOSIS — R51.9 NONINTRACTABLE HEADACHE, UNSPECIFIED CHRONICITY PATTERN, UNSPECIFIED HEADACHE TYPE: ICD-10-CM

## 2021-03-10 DIAGNOSIS — R41.3 MEMORY CHANGES: ICD-10-CM

## 2021-03-10 DIAGNOSIS — I50.32 DIASTOLIC CHF, CHRONIC (HCC): ICD-10-CM

## 2021-03-10 DIAGNOSIS — R41.0 DISORIENTED: ICD-10-CM

## 2021-03-10 DIAGNOSIS — E78.2 MIXED HYPERLIPIDEMIA: ICD-10-CM

## 2021-03-10 DIAGNOSIS — E78.00 PURE HYPERCHOLESTEROLEMIA: ICD-10-CM

## 2021-03-10 DIAGNOSIS — E55.9 VITAMIN D DEFICIENCY: ICD-10-CM

## 2021-03-10 DIAGNOSIS — G47.00 INSOMNIA, UNSPECIFIED TYPE: ICD-10-CM

## 2021-03-10 PROCEDURE — G8427 DOCREV CUR MEDS BY ELIG CLIN: HCPCS | Performed by: INTERNAL MEDICINE

## 2021-03-10 PROCEDURE — G8753 SYS BP > OR = 140: HCPCS | Performed by: INTERNAL MEDICINE

## 2021-03-10 PROCEDURE — G8754 DIAS BP LESS 90: HCPCS | Performed by: INTERNAL MEDICINE

## 2021-03-10 PROCEDURE — G8536 NO DOC ELDER MAL SCRN: HCPCS | Performed by: INTERNAL MEDICINE

## 2021-03-10 PROCEDURE — G9711 PT HX TOT COL OR COLON CA: HCPCS | Performed by: INTERNAL MEDICINE

## 2021-03-10 PROCEDURE — 99215 OFFICE O/P EST HI 40 MIN: CPT | Performed by: INTERNAL MEDICINE

## 2021-03-10 PROCEDURE — G8510 SCR DEP NEG, NO PLAN REQD: HCPCS | Performed by: INTERNAL MEDICINE

## 2021-03-10 PROCEDURE — G8400 PT W/DXA NO RESULTS DOC: HCPCS | Performed by: INTERNAL MEDICINE

## 2021-03-10 PROCEDURE — 1090F PRES/ABSN URINE INCON ASSESS: CPT | Performed by: INTERNAL MEDICINE

## 2021-03-10 PROCEDURE — 1101F PT FALLS ASSESS-DOCD LE1/YR: CPT | Performed by: INTERNAL MEDICINE

## 2021-03-10 PROCEDURE — G8419 CALC BMI OUT NRM PARAM NOF/U: HCPCS | Performed by: INTERNAL MEDICINE

## 2021-03-10 RX ORDER — BUTALBITAL, ACETAMINOPHEN AND CAFFEINE 50; 325; 40 MG/1; MG/1; MG/1
1 TABLET ORAL
Qty: 20 TAB | Refills: 1 | Status: SHIPPED | OUTPATIENT
Start: 2021-03-10 | End: 2021-09-22

## 2021-03-10 RX ORDER — DILTIAZEM HYDROCHLORIDE 180 MG/1
180 CAPSULE, EXTENDED RELEASE ORAL DAILY
Qty: 90 CAP | Refills: 1 | Status: SHIPPED | OUTPATIENT
Start: 2021-03-10 | End: 2022-03-19

## 2021-03-10 RX ORDER — TOPIRAMATE 50 MG/1
50 TABLET, FILM COATED ORAL
Qty: 90 TAB | Refills: 1 | Status: SHIPPED | OUTPATIENT
Start: 2021-03-10 | End: 2021-09-22

## 2021-03-10 RX ORDER — PRAVASTATIN SODIUM 80 MG/1
80 TABLET ORAL DAILY
Qty: 90 TAB | Refills: 1 | Status: SHIPPED | OUTPATIENT
Start: 2021-03-10 | End: 2021-09-16

## 2021-03-10 RX ORDER — METOPROLOL SUCCINATE 50 MG/1
50 TABLET, EXTENDED RELEASE ORAL DAILY
Qty: 90 TAB | Refills: 1 | Status: SHIPPED | OUTPATIENT
Start: 2021-03-10 | End: 2021-05-11

## 2021-03-10 RX ORDER — TRAZODONE HYDROCHLORIDE 50 MG/1
TABLET ORAL
Qty: 90 TAB | Refills: 1 | Status: SHIPPED | OUTPATIENT
Start: 2021-03-10 | End: 2021-09-22

## 2021-03-10 RX ORDER — ALBUTEROL SULFATE 90 UG/1
2 AEROSOL, METERED RESPIRATORY (INHALATION)
Qty: 1 INHALER | Refills: 2 | Status: SHIPPED | OUTPATIENT
Start: 2021-03-10

## 2021-03-10 RX ORDER — MELATONIN
1000 DAILY
Qty: 90 TAB | Refills: 3 | Status: SHIPPED | OUTPATIENT
Start: 2021-03-10 | End: 2021-05-12

## 2021-03-10 RX ORDER — PANTOPRAZOLE SODIUM 40 MG/1
40 TABLET, DELAYED RELEASE ORAL
Qty: 90 TAB | Refills: 1 | Status: SHIPPED | OUTPATIENT
Start: 2021-03-10 | End: 2021-09-16

## 2021-03-10 NOTE — PROGRESS NOTES
RM: 14  Patient is fasting. Noted that pt presents with hearing impairment. Pt has had a hard time hearing speak. Chief Complaint   Patient presents with    Thyroid Problem     f/u    Blood Pressure Check     f/u    Cholesterol Problem     f/u      Visit Vitals  Ht 4' 11\" (1.499 m)   Wt 141 lb (64 kg)   BMI 28.48 kg/m²     Recent Travel Screening and Travel History documentation     Travel Screening     Question   Response    In the last month, have you been in contact with someone who was confirmed or suspected to have Coronavirus / COVID-19? No / Unsure    Have you had a COVID-19 viral test in the last 14 days? No    Do you have any of the following new or worsening symptoms? None of these    Have you traveled internationally or domestically in the last month? No      Travel History   Travel since 02/10/21     No documented travel since 02/10/21        3 most recent [facility] Screens 3/10/2021   Little interest or pleasure in doing things Not at all   Feeling down, depressed, irritable, or hopeless Not at all   Total Score PHQ 2 0            1. Have you been to the ER, urgent care clinic since your last visit? Hospitalized since your last visit? No    2. Have you seen or consulted any other health care providers outside of the [address] [place] [address] since your last visit? Include any pap smears or colon screening.  No     Health Maintenance Due   Topic Date Due    COVID-19 Vaccine (1 of 2) Never done    Shingrix Vaccine Age 50> (1 of 2) Never done    Colorectal Cancer Screening Combo  Never done    Breast Cancer Screen Mammogram  Never done    GLAUCOMA SCREENING Q2Y  Never done        Learning Assessment 5/22/2018   PRIMARY LEARNER Patient   HIGHEST LEVEL OF EDUCATION - PRIMARY LEARNER  GRADUATED HIGH SCHOOL OR GED   BARRIERS PRIMARY LEARNER COGNITIVE   PRIMARY LANGUAGE ENGLISH   LEARNER PREFERENCE PRIMARY PICTURES   ANSWERED BY Edsel Osler   RELATIONSHIP SELF

## 2021-03-10 NOTE — PROGRESS NOTES
Reviewed SW notes    Pt is disoriented. Consider neuropsych testing if can get it done. Referral placed. Likely needs APS involvement and re-evaluation of her safety in her current situation. She, as previously stated, is unfit to care for herself.

## 2021-03-10 NOTE — PROGRESS NOTES
HPI:  established patient  Presents for f/u HTN, thyroid, lipids, etc    Pt ran out of at least 2 meds - levothyroxine out x 1 week    Pt overall doing fairly well per her report    Mild low back pain  Walking helps   Trying to walk daily    No new complaints    BP at home reports as SBP around 160        Past medical, Social, and Family history reviewed    Prior to Admission medications    Medication Sig Start Date End Date Taking? Authorizing Provider   levothyroxine (SYNTHROID) 75 mcg tablet Take 1 Tab by mouth Daily (before breakfast). 10/28/20  Yes Yoshi House MD   pravastatin (PRAVACHOL) 80 mg tablet Take 1 Tab by mouth daily. 10/28/20  Yes Yoshi House MD   metoprolol succinate (TOPROL-XL) 50 mg XL tablet Take 1 Tab by mouth daily. 10/26/20  Yes Yoshi House MD   dilTIAZem ER (DILACOR XR) 120 mg capsule Take 1 Cap by mouth daily. 10/26/20  Yes Yoshi House MD   apixaban (ELIQUIS) 5 mg tablet Take 1 Tab by mouth two (2) times a day. 8/24/20  Yes Yoshi House MD   ipratropium-albuteroL (Combivent Respimat)  mcg/actuation inhaler Take 1 Puff by inhalation every six (6) hours. 8/24/20  Yes Yoshi House MD   pantoprazole (PROTONIX) 40 mg tablet Take 1 Tab by mouth Daily (before breakfast). 8/24/20  Yes Yoshi House MD   cholecalciferol (VITAMIN D3) (1000 Units /25 mcg) tablet Take 1 Tab by mouth daily. 8/24/20  Yes Yoshi House MD   albuterol (PROVENTIL HFA, VENTOLIN HFA, PROAIR HFA) 90 mcg/actuation inhaler Take 2 Puffs by inhalation every four (4) hours as needed for Shortness of Breath or Respiratory Distress. 8/24/20  Yes Yoshi House MD   topiramate (TOPAMAX) 50 mg tablet Take 1 Tab by mouth nightly. 8/24/20  Yes Yoshi House MD   traZODone (DESYREL) 50 mg tablet take 1 tablet by mouth NIGHTLY 8/24/20  Yes Yoshi House MD   aspirin delayed-release (ADULT LOW DOSE ASPIRIN) 81 mg tablet Take 1 Tab by mouth daily.  3/26/19  Yes Yue Copeland, Walker VASQUEZ MD   bumetanide (BUMEX) 0.5 mg tablet TAKE 1 T PO ONE TIME A DAY EVERY OTHER DAY FOR CHF 8/2/20   Provider, Historical   butalbital-acetaminophen-caffeine (FIORICET, ESGIC) -40 mg per tablet Take 1 Tab by mouth every six (6) hours as needed for Headache. 8/24/20   Walker Doll MD   omeprazole (PRILOSEC) 20 mg capsule Take 1 Cap by mouth daily. 3/26/19   Walker Doll MD          ROS  Complete ROS reviewed and negative or stable except as noted in HPI.      Physical Exam  Vitals signs and nursing note reviewed.   Constitutional:       General: She is not in acute distress.  HENT:      Head: Normocephalic and atraumatic.   Eyes:      General: No scleral icterus.     Pupils: Pupils are equal, round, and reactive to light.   Neck:      Musculoskeletal: Normal range of motion and neck supple.      Comments: No bruits  Cardiovascular:      Rate and Rhythm: Normal rate and regular rhythm.      Heart sounds: Normal heart sounds. No murmur. No friction rub. No gallop.    Pulmonary:      Effort: Pulmonary effort is normal. No respiratory distress.      Breath sounds: Normal breath sounds. No wheezing or rales.   Abdominal:      General: There is no distension.      Palpations: Abdomen is soft.      Tenderness: There is no abdominal tenderness.   Musculoskeletal: Normal range of motion.   Skin:     General: Skin is warm.      Findings: No rash.   Neurological:      Mental Status: She is alert and oriented to person, place, and time.      Motor: No abnormal muscle tone.      Comments:   +dysarthria.             Prior labs reviewed.  Reviewed prior imaging report  Reviewed SW notes      Assessment/Plan:    ICD-10-CM ICD-9-CM    1. Acquired hypothyroidism  E03.9 244.9    2. Essential hypertension  I10 401.9 dilTIAZem ER (DILACOR XR) 180 mg capsule      metoprolol succinate (TOPROL-XL) 50 mg XL tablet   3. Pure hypercholesterolemia  E78.00 272.0 pravastatin (PRAVACHOL) 80 mg tablet   4. Vitamin D  deficiency  E55.9 268.9 cholecalciferol (VITAMIN D3) (1000 Units /25 mcg) tablet   5. B12 deficiency  E53.8 266.2    6. Chronic a-fib (HCC)  I48.20 427.31 dilTIAZem ER (DILACOR XR) 180 mg capsule      metoprolol succinate (TOPROL-XL) 50 mg XL tablet   7. Mixed hyperlipidemia  E78.2 272.2    8. Dysarthria as late effect of cerebrovascular accident (CVA)  U55.941 438.13    9. Diastolic CHF, chronic (HCC)  I50.32 428.32      428.0    10. Stage 4 chronic kidney disease (HCC)  N18.4 585.4    11. Nonintractable headache, unspecified chronicity pattern, unspecified headache type  R51.9 784.0 butalbital-acetaminophen-caffeine (FIORICET, ESGIC) -40 mg per tablet      topiramate (TOPAMAX) 50 mg tablet   12. Insomnia, unspecified type  G47.00 780.52 traZODone (DESYREL) 50 mg tablet   13. Disoriented  R41.0 780.99 REFERRAL TO NEUROPSYCHOLOGY   14. Memory changes  R41.3 780.93 REFERRAL TO NEUROPSYCHOLOGY     Follow-up and Dispositions    · Return in about 2 months (around 5/10/2021), or if symptoms worsen or fail to improve, for blood pressure, cholesterol, thyroid. results and schedule of future studies reviewed with patient  reviewed diet, exercise and weight  cardiovascular risk and specific lipid/LDL goals reviewed  reviewed medications and side effects in detail    Labs in 2 months when on regular meds  Increase dilt ER to 180 mg daily  Refills on all meds x 6 months  Pt declines cancer screenings  Pt thinks COVID vaccine done at [address] [place] [address] - need to clarify and document that      Communication with  re: coordination of care      On this date 03/10/2021 I have spent 45 minutes reviewing previous notes, test results and face to face with the patient discussing the diagnosis and importance of compliance with the treatment plan as well as documenting on the day of the visit. An electronic signature was used to authenticate this note.   -- Seth Baer MD

## 2021-03-10 NOTE — PROGRESS NOTES
Social Work Note  3/10/2021    8:33 am  Message left on patient's voicemail reminding her of PCP appointment today and transportation  of 9:15 am.      11:15 am  Call received from patient to advise of completion of appointment. SW advised that transportation will pick her up at 11:30 a.m.    2:30 pm  Message left on voicemail of patient. Will follow-up re: appointment, confirm refill/receipt of medications. 4:35 pm  Spoke with patient. She reported that she went to see PCP this morning and he has sent refills for medications. Patient stated that they will be delivered to her home. Patient initially stated that Dr. Dustin James wanted to see her in \"2 weeks\" then stated later in conversation that patient needs to return in \"2 months for blood work\". SW inquired about COVID vaccine. Patient stated that she \"was thinking that the COVID vaccine was a year ago\" when patient was in nursing home for rehab. SW advised patient that vaccine was not available until recently and asked patient whether she had been hospitalized in last 2 months. She stated \"no\". Patient reported that she declined tests Dr. Dustin James suggested she have. Through questioning, SW confirmed that patient was discussing her mammogram.  Patient stated that she told Dr. Dustin James that she was \"76years old\" and did not want to do that. (Note:  Patient just turned 71 last month). SW questioned patient about sadness, depression. Patient denied depression or feeling sad. Patient voiced feelings of loneliness. SW asked when patient last saw daughter. She stated \"two years ago\". (Note:  SW last spoke with daughter in fall 2020 to arrange medication and grocery delivery). When asked to name year, patient initially stated \"'91\", then \"0925\". Patient oriented to month but recall time to answer question was extended. Patient stated that she is independent with ADLs. 4:50 pm  Spoke with Christine Flores, at [facility]. Asked about Case Management services.   She stated that patient has been contacted by [facility] ([phone]) in past.      Concerns noted during call:  · Patient was not oriented to year, age, and took extended time for recall of month  · Patient did not appear to have understanding of COVID vaccine as she reported receiving it one year ago in a nursing facility  · Dr. Opal Ennis notified of observations    SW Plan:   · Follow up with [address] Medicaid and [address] Medicare to investigate case management options for additional patient support  · Assist patient to arrange physician follow-up appointment and transportation  · Follow-up with family    Elisa Jade, MSW, ACSW, [facility]    Ambulatory Care Management   [phone]

## 2021-04-05 NOTE — PROGRESS NOTES
Received notification that \"albuterol aer hfa\" is not on drug list.   sugested alternatives include:    - albuterol HFA - proair   - proair HFA    - proair respiclick    Please call pharmacy to confirm they are using current [facility] order that is generic for any form of albuterol to provide one of the above suggested rx.      Thanks  Angela Luo MD

## 2021-04-06 NOTE — PROGRESS NOTES
Called pharmacy and had them run the prescription under proair and it was covered.  Nothing else needed at this time

## 2021-04-26 ENCOUNTER — PATIENT OUTREACH (OUTPATIENT)
Dept: CASE MANAGEMENT | Age: 71
End: 2021-04-26

## 2021-04-26 NOTE — PROGRESS NOTES
Social Work Note  4/26/2021      Two attempts today to reach patient for follow-up. Messages left on voicemail requesting return call.     JET Devlin, ACSW, [facility]    Ambulatory Care Management   [phone]

## 2021-04-27 ENCOUNTER — PATIENT OUTREACH (OUTPATIENT)
Dept: CASE MANAGEMENT | Age: 71
End: 2021-04-27

## 2021-04-27 NOTE — PROGRESS NOTES
Social Work Note  4/27/2021    Call from Caridad  65.  Received message from Keanu Ansari RN care manager at [employer] ([phone]). She requested return call re: patient. Patient's name, birthdate provided in message. Received return call from Keanu Ansari. Patient identity confirmed with 4 identifiers. Patient provided verbal consent for SW to speak with Ms. Floresita Dang prior to conversation. Ms. Floresita Dang is a nurse care manager with [employer]. They are contracted by [employer] to provide care management services to patients with kidney disease. She stated that she has been following patient for approximately 6-8 months. She advised that a nurse practitioner from their organization visited patient in January and found that patient was taking all medications once/day. She stated that NP assisted patient in sorting out day/night time doses in bottles and combined all duplicate medications. Ms. Floresita Dang voiced concern with patient's management of HTN, Diabetes, and medications. She would like to see if PCP can order home health nursing to visit and assist with med box. SW advised that patient will need to have follow-up visit with PCP before new home health order can be written. SW advised that patient has had home health services in past.  Ms. Floresita Dang advised that she would be interested in speaking with office staff/PCP re: concerns and plans for patient re: labs/medications. Ms. Lindsey Jacob number is [phone]. Follow-up call with patient  Follow-up call to patient. Identity confirmed with two identifiers. Patient appeared SOB at onset of conversation, but stated she was feeding her cats. At end of conversation, SOB appeared to have resolved. ROSA advised of call from Kayley Ash,  at [employer]. Patient initially did not remember contact with agency, but then stated she thinks they may have called to ask her about care in the home.   Verbal consent provided by patient for SW to speak with Nuvia Lee at [email or web address]. SW also advised that patient is due for follow-up with PCP around 5/10/21. Patient stated she thought she had an appointment several weeks ago and got ready but no one came to pick her up. Patient reported that she \"gets confused about dates and numbers\". She stated that \"it doesn't take much to confuse me\". Patient could not voice SW name. When asked, patient could not provide date, month, or day of the week. SW unable to confirm whether patient has been taking medications. SW asked patient if she would be willing to meet with a doctor to check on her memory. Patient voiced agreement to appointment. SW advised patient that transportation can be arranged. SW also inquired about contact with family. Patient stated that she hasn't seen or talked to her daughter in a year. She stated that a friend helps her get groceries once/month and patient pays her own bills and manages her own finances. SW advised patient that follow-up appointment would be made with PCP and SW will contact her with date and transportation arrangements.      Follow-up appointment and Transportation:  Follow-up appointment scheduled with Dr. Niles Holland - 5/11/21 at 2:30 pm    Transportation scheduled with [employer] ([phone])    at home: 1:30 - 2:00 pm    at office for return home:  3:45 pm   Trip reference number:  [id number]    ROSA concerns/issues noted during call:  · Cognitive changes  · Medication compliance  · Lack of family support    ROSA Plan:   · Schedule PCP follow-up appointment and transportation - scheduled  · Attempt to reach family  · Follow-up with Nuvia Lee RN at [email or web address] following PCP appointment    JET Horvath, ACSW, [facility]    Ambulatory Care Management   [phone]

## 2021-04-28 ENCOUNTER — PATIENT OUTREACH (OUTPATIENT)
Dept: CASE MANAGEMENT | Age: 71
End: 2021-04-28

## 2021-04-28 NOTE — PROGRESS NOTES
Social Work Note  4/28/2021      Call from patient and NP from [address] call from patient. She asked SW to speak with NP visiting her at home. SW spoke with Nabil Ballard NP from [employer]. She advised that she is following patient and visits every 4-6 weeks. She stated that she has been working with patient on medication compliance and would like to start Accudose packs. SW advised that patient has follow-up appointment with PCP on 5/11/21 and it may be better to wait to start packaging medications until after appointment in the event PCP changes the medication list.  Ms. Katherine Do will obtain fax number so that updated medication list can be sent following appointment. Call - [employer]  ([phone])  Contacted customer service and spoke with Claudia Howard to request name/number of Medicare . Per Claudia Howard, patient is followed by navigator named Kassy Alcantar. Message provided to Claudia Howard to forward to Ms. Anneliese Christian. Requested return call to discuss multiple care managers involved in patient care. Call - [address] Medicaid Customer Service ([phone])  Attempted to reach customer service to determine name/number of patient's Medicaid . Was placed on hold for 20 minutes without answer. SW to try again at a later time. Concerns:  Patient has multiple , social workers, and navigators providing care coordination:    1. [facility] Management/Social Work - Yoanna Beverly  2. [address] [facility]  3. [address] of [facility] - unknown  4. [address] Nurse Practitioner - Nabil Ballard  5. [employer] [facility] (focus upon kidney health) - [address]    ROSA Plan:  · SW to contact [employer] to discuss duplication of  by [employer].    · Follow-up with patient    Ganga Kovacs MSW, ACSW, [facility]    Ambulatory Care Management   [phone]

## 2021-04-29 ENCOUNTER — PATIENT OUTREACH (OUTPATIENT)
Dept: CASE MANAGEMENT | Age: 71
End: 2021-04-29

## 2021-04-29 NOTE — PROGRESS NOTES
Social Work Note  4/29/2021      Received message from [facility] ([phone] [id number]). Returned call, no answer. Will attempt to reach out at a later time.      JET Peña, ACSW, [facility]    Ambulatory Care Management   [phone]

## 2021-05-03 ENCOUNTER — PATIENT OUTREACH (OUTPATIENT)
Dept: CASE MANAGEMENT | Age: 71
End: 2021-05-03

## 2021-05-03 NOTE — PROGRESS NOTES
Social Work Note  5/3/2021      Received call from patient requesting  time for transportation to appointment on 5/11/21. SW reviewed information with patient including date,  time, and return trip  time.       JET Leo, ACSW, [facility]    Ambulatory Care Management   [phone]

## 2021-05-04 ENCOUNTER — PATIENT OUTREACH (OUTPATIENT)
Dept: CASE MANAGEMENT | Age: 71
End: 2021-05-04

## 2021-05-04 NOTE — PROGRESS NOTES
Social Work Note  5/4/2021      Received message from patient stating \"thanks, I'll be going with someone else\". Attempted to reach patient for clarification. Message left requesting return call.      JET Scott, ACSW, [facility]    Ambulatory Care Management   [phone]

## 2021-05-05 ENCOUNTER — PATIENT OUTREACH (OUTPATIENT)
Dept: CASE MANAGEMENT | Age: 71
End: 2021-05-05

## 2021-05-06 ENCOUNTER — TELEPHONE (OUTPATIENT)
Dept: INTERNAL MEDICINE CLINIC | Age: 71
End: 2021-05-06

## 2021-05-06 NOTE — TELEPHONE ENCOUNTER
Reviewed SW message that pt's insurance coverage may have changed or . Calls NP from [address] [place] [address] to review concerns. No contact made. Voice mailbox is full and thus no message able to be left.

## 2021-05-06 NOTE — PROGRESS NOTES
Social Work Note  5/6/2021      Call - Tita Hernandez at [facility] with Wendy Patricio ([phone]) who advised that patient is no longer enrolled with [employer]. Disenrollment date 4/30/21. Wendy Curry was unable to provide reason for disenrollment or name of plan that patient is now covered by.  ROSA explained situation of multiple case management programs - through [address] [place] [address]. Wendy Curry advised that she does not have knowledge of these programs and is unsure as to how patient would be enrolled.      ROSA Plan:  · Follow-up with patient re: health insurance status  · Follow-up with care management agencies ([employer] and [employer])    Deric Porter, JET, ACSW, [facility]    Ambulatory Care Management   [phone]

## 2021-05-11 ENCOUNTER — OFFICE VISIT (OUTPATIENT)
Dept: INTERNAL MEDICINE CLINIC | Age: 71
End: 2021-05-11
Payer: MEDICARE

## 2021-05-11 ENCOUNTER — PATIENT OUTREACH (OUTPATIENT)
Dept: CASE MANAGEMENT | Age: 71
End: 2021-05-11

## 2021-05-11 VITALS
OXYGEN SATURATION: 96 % | RESPIRATION RATE: 18 BRPM | BODY MASS INDEX: 28.25 KG/M2 | DIASTOLIC BLOOD PRESSURE: 100 MMHG | HEART RATE: 94 BPM | TEMPERATURE: 97.5 F | SYSTOLIC BLOOD PRESSURE: 183 MMHG | HEIGHT: 59 IN | WEIGHT: 140.13 LBS

## 2021-05-11 DIAGNOSIS — I50.32 DIASTOLIC CHF, CHRONIC (HCC): ICD-10-CM

## 2021-05-11 DIAGNOSIS — N18.4 STAGE 4 CHRONIC KIDNEY DISEASE (HCC): ICD-10-CM

## 2021-05-11 DIAGNOSIS — I10 ESSENTIAL HYPERTENSION: ICD-10-CM

## 2021-05-11 DIAGNOSIS — R40.0 DAYTIME SOMNOLENCE: ICD-10-CM

## 2021-05-11 DIAGNOSIS — I69.322 DYSARTHRIA AS LATE EFFECT OF CEREBROVASCULAR ACCIDENT (CVA): ICD-10-CM

## 2021-05-11 DIAGNOSIS — R73.9 HYPERGLYCEMIA: ICD-10-CM

## 2021-05-11 DIAGNOSIS — E03.9 ACQUIRED HYPOTHYROIDISM: ICD-10-CM

## 2021-05-11 DIAGNOSIS — I48.20 CHRONIC A-FIB (HCC): ICD-10-CM

## 2021-05-11 DIAGNOSIS — E55.9 VITAMIN D DEFICIENCY: ICD-10-CM

## 2021-05-11 DIAGNOSIS — Z86.73 HISTORY OF CVA (CEREBROVASCULAR ACCIDENT): ICD-10-CM

## 2021-05-11 DIAGNOSIS — N25.81 HYPERPARATHYROIDISM, SECONDARY (HCC): ICD-10-CM

## 2021-05-11 DIAGNOSIS — E78.00 PURE HYPERCHOLESTEROLEMIA: Primary | ICD-10-CM

## 2021-05-11 DIAGNOSIS — R47.01 EXPRESSIVE APHASIA: ICD-10-CM

## 2021-05-11 PROCEDURE — G9711 PT HX TOT COL OR COLON CA: HCPCS | Performed by: INTERNAL MEDICINE

## 2021-05-11 PROCEDURE — G8510 SCR DEP NEG, NO PLAN REQD: HCPCS | Performed by: INTERNAL MEDICINE

## 2021-05-11 PROCEDURE — G8753 SYS BP > OR = 140: HCPCS | Performed by: INTERNAL MEDICINE

## 2021-05-11 PROCEDURE — G8419 CALC BMI OUT NRM PARAM NOF/U: HCPCS | Performed by: INTERNAL MEDICINE

## 2021-05-11 PROCEDURE — 1101F PT FALLS ASSESS-DOCD LE1/YR: CPT | Performed by: INTERNAL MEDICINE

## 2021-05-11 PROCEDURE — 1090F PRES/ABSN URINE INCON ASSESS: CPT | Performed by: INTERNAL MEDICINE

## 2021-05-11 PROCEDURE — 99215 OFFICE O/P EST HI 40 MIN: CPT | Performed by: INTERNAL MEDICINE

## 2021-05-11 PROCEDURE — G8755 DIAS BP > OR = 90: HCPCS | Performed by: INTERNAL MEDICINE

## 2021-05-11 PROCEDURE — G8536 NO DOC ELDER MAL SCRN: HCPCS | Performed by: INTERNAL MEDICINE

## 2021-05-11 PROCEDURE — G8400 PT W/DXA NO RESULTS DOC: HCPCS | Performed by: INTERNAL MEDICINE

## 2021-05-11 PROCEDURE — G8427 DOCREV CUR MEDS BY ELIG CLIN: HCPCS | Performed by: INTERNAL MEDICINE

## 2021-05-11 RX ORDER — METOPROLOL SUCCINATE 100 MG/1
100 TABLET, EXTENDED RELEASE ORAL DAILY
Qty: 90 TAB | Refills: 1 | Status: SHIPPED | OUTPATIENT
Start: 2021-05-11 | End: 2021-10-12 | Stop reason: DRUGHIGH

## 2021-05-11 NOTE — PROGRESS NOTES
RM: 15  Observed that pt may have a hard time remembering things  Chief Complaint   Patient presents with    Complete Physical      Visit Vitals  BP (!) 183/100 (BP 1 [facility]: Left upper arm, BP Patient Position: Sitting, BP Cuff Size: Adult)   Pulse 94   Temp 97.5 °F (36.4 °C) (Oral)   Resp 18   Ht 4' 11\" (1.499 m)   Wt 140 lb 2 oz (63.6 kg)   SpO2 96%   BMI 28.30 kg/m²     Recent Travel Screening and Travel History documentation     Travel Screening     Question   Response    In the last month, have you been in contact with someone who was confirmed or suspected to have Coronavirus / COVID-19? No / Unsure    Have you had a COVID-19 viral test in the last 14 days? No    Do you have any of the following new or worsening symptoms? None of these    Have you traveled internationally or domestically in the last month? No      Travel History   Travel since 04/11/21     No documented travel since 04/11/21        3 most recent [place] Screens 5/11/2021   Little interest or pleasure in doing things Not at all   Feeling down, depressed, irritable, or hopeless Not at all   Total Score PHQ 2 0            1. Have you been to the ER, urgent care clinic since your last visit? Hospitalized since your last visit? No    2. Have you seen or consulted any other health care providers outside of the [address] [place] [address] since your last visit? Include any pap smears or colon screening.  No     Health Maintenance Due   Topic Date Due    COVID-19 Vaccine (1) Never done    Shingrix Vaccine Age 50> (1 of 2) Never done    Colorectal Cancer Screening Combo  Never done    Breast Cancer Screen Mammogram  Never done        Learning Assessment 5/22/2018   PRIMARY LEARNER Patient   HIGHEST LEVEL OF EDUCATION - PRIMARY LEARNER  GRADUATED HIGH SCHOOL OR GED   BARRIERS PRIMARY LEARNER COGNITIVE   PRIMARY LANGUAGE ENGLISH   LEARNER PREFERENCE PRIMARY PICTURES   ANSWERED BY Aubrey Byrd   RELATIONSHIP SELF

## 2021-05-11 NOTE — PROGRESS NOTES
Social Work Note  5/11/2021      10:30 am  Attempted to reach patient to provide reminder of appointment with PCP today. Message left on voicemail with details including transportation arrangements and time of appointment. 12:20 pm  Received call from patient. Reminded her of transportation arrangements. She stated that she thought the time was for 12:30 pm.  SW reviewed arrangements again. Patient stated that she will be waiting for transportation. 12:25 pm  Call back to patient. Reached voicemail. Left message instructing patient to bring any new insurance cards with her to the doctor's office.      JET Vallejo, ACSW, [facility]    Ambulatory Care Management   [phone]

## 2021-05-11 NOTE — PROGRESS NOTES
HPI:  established patient  Presents for f/u HTN, lipids, thyroid    Presents alone. Pt reports med compliance     [facility] has questioned pt's compliance    SBP at home 165    +daytime somnolence    O/w pt reports being stable. No new complaints. Past medical, Social, and Family history reviewed    Prior to Admission medications    Medication Sig Start Date End Date Taking? Authorizing Provider   apixaban (ELIQUIS) 5 mg tablet Take 1 Tab by mouth two (2) times a day. 3/10/21  Yes Benji Cuellar MD   ipratropium-albuteroL (Combivent Respimat)  mcg/actuation inhaler Take 1 Puff by inhalation every six (6) hours. 3/10/21  Yes Benji Cuellar MD   pantoprazole (PROTONIX) 40 mg tablet Take 1 Tab by mouth Daily (before breakfast). 3/10/21  Yes Benji Cuellar MD   cholecalciferol (VITAMIN D3) (1000 Units /25 mcg) tablet Take 1 Tab by mouth daily. 3/10/21  Yes Benji Cuellar MD   albuterol (PROVENTIL HFA, VENTOLIN HFA, PROAIR HFA) 90 mcg/actuation inhaler Take 2 Puffs by inhalation every four (4) hours as needed for Shortness of Breath or Respiratory Distress. 3/10/21  Yes Benji Cuellar MD   topiramate (TOPAMAX) 50 mg tablet Take 1 Tab by mouth nightly. 3/10/21  Yes Benji Cuellar MD   traZODone (DESYREL) 50 mg tablet take 1 tablet by mouth NIGHTLY 3/10/21  Yes Benji Cuellar MD   pravastatin (PRAVACHOL) 80 mg tablet Take 1 Tab by mouth daily. 3/10/21  Yes Benji Cuellar MD   dilTIAZem ER (DILACOR XR) 180 mg capsule Take 1 Cap by mouth daily. 3/10/21  Yes Benji Cuellar MD   metoprolol succinate (TOPROL-XL) 50 mg XL tablet Take 1 Tab by mouth daily. 3/10/21  Yes Benji Cuellar MD   levothyroxine (SYNTHROID) 75 mcg tablet Take 1 Tab by mouth Daily (before breakfast). 10/28/20  Yes Benji Cuellar MD   aspirin delayed-release (ADULT LOW DOSE ASPIRIN) 81 mg tablet Take 1 Tab by mouth daily.  3/26/19  Yes Benjipriscilla Cuellar MD butalbital-acetaminophen-caffeine (FIORICET, ESGIC) -40 mg per tablet Take 1 Tab by mouth every six (6) hours as needed for Headache. 3/10/21   Pricilla Schmitt MD          ROS  Complete ROS reviewed and negative or stable except as noted in HPI. Physical Exam  Vitals signs and nursing note reviewed. Constitutional:       General: She is not in acute distress. HENT:      Head: Normocephalic and atraumatic. Eyes:      General: No scleral icterus. Pupils: Pupils are equal, round, and reactive to light. Neck:      Musculoskeletal: Normal range of motion and neck supple. Comments: No bruits  Cardiovascular:      Rate and Rhythm: Normal rate. Rhythm irregular. Heart sounds: Normal heart sounds. No murmur. No friction rub. No gallop. Comments: irreg irreg  Pulmonary:      Effort: Pulmonary effort is normal. No respiratory distress. Breath sounds: Normal breath sounds. No wheezing or rales. Abdominal:      General: There is no distension. Palpations: Abdomen is soft. Tenderness: There is no abdominal tenderness. Musculoskeletal: Normal range of motion. Skin:     General: Skin is warm. Findings: No rash. Neurological:      Mental Status: She is alert and oriented to person, place, and time. Motor: No abnormal muscle tone. Comments:   +dysarthria. Prior labs reviewed. Assessment/Plan:    ICD-10-CM ICD-9-CM    1. Pure hypercholesterolemia  E78.00 272.0 LIPID PANEL      METABOLIC PANEL, COMPREHENSIVE      METABOLIC PANEL, COMPREHENSIVE      LIPID PANEL   2. Essential hypertension  I10 401.9 metoprolol succinate (TOPROL-XL) 100 mg tablet      METABOLIC PANEL, COMPREHENSIVE      CBC WITH AUTOMATED DIFF      SLEEP MEDICINE REFERRAL      CBC WITH AUTOMATED DIFF      METABOLIC PANEL, COMPREHENSIVE   3. Chronic a-fib (HCC)  I48.20 427.31 metoprolol succinate (TOPROL-XL) 100 mg tablet   4.  Hyperparathyroidism, secondary ([address]  N25.81 588.81    5. Acquired hypothyroidism  E03.9 244.9 TSH 3RD GENERATION      T4, FREE      T4, FREE      TSH 3RD GENERATION   6. Vitamin D deficiency  E55.9 268.9 VITAMIN D, 25 HYDROXY      VITAMIN D, 25 HYDROXY   7. Dysarthria as late effect of cerebrovascular accident (CVA)  H89.221 438.13    8. Stage 4 chronic kidney disease (HCC)  N18.4 585.4    9. Diastolic CHF, chronic (HCC)  I50.32 428.32      428.0    10. Hyperglycemia  R73.9 790.29 HEMOGLOBIN A1C WITH EAG      HEMOGLOBIN A1C WITH EAG   11. History of CVA (cerebrovascular accident)  Z86.73 V12.54    12. Expressive aphasia  R47.01 784.3    13. Daytime somnolence  R40.0 780.54 SLEEP MEDICINE REFERRAL     Follow-up and Dispositions    · Return in about 2 months (around 7/11/2021), or if symptoms worsen or fail to improve, for blood pressure, thyroid. results and schedule of future studies reviewed with patient  reviewed diet, exercise and weight   cardiovascular risk and specific lipid/LDL goals reviewed  reviewed medications and side effects in detail    Ref to sleep - r/o RENAY contributing to HTN, daytime somnolence  Labs today  Increase metoprolol  XL to 100 mg  Continue current medications       On this date 05/11/2021 I have spent 41 minutes reviewing previous notes, test results and face to face with the patient discussing the diagnosis and importance of compliance with the treatment plan as well as documenting on the day of the visit. An electronic signature was used to authenticate this note.   -- Yohana Valentin MD

## 2021-05-12 DIAGNOSIS — E03.9 ACQUIRED HYPOTHYROIDISM: ICD-10-CM

## 2021-05-12 DIAGNOSIS — E55.9 VITAMIN D DEFICIENCY: ICD-10-CM

## 2021-05-12 LAB
25(OH)D3 SERPL-MCNC: 25 NG/ML (ref 30–100)
ALBUMIN SERPL-MCNC: 4 G/DL (ref 3.5–5)
ALBUMIN/GLOB SERPL: 1.2 {RATIO} (ref 1.1–2.2)
ALP SERPL-CCNC: 95 U/L (ref 45–117)
ALT SERPL-CCNC: 15 U/L (ref 12–78)
ANION GAP SERPL CALC-SCNC: 7 MMOL/L (ref 5–15)
AST SERPL-CCNC: 12 U/L (ref 15–37)
BASOPHILS # BLD: 0.1 K/UL (ref 0–0.1)
BASOPHILS NFR BLD: 1 % (ref 0–1)
BILIRUB SERPL-MCNC: 0.4 MG/DL (ref 0.2–1)
BUN SERPL-MCNC: 15 MG/DL (ref 6–20)
BUN/CREAT SERPL: 7 (ref 12–20)
CALCIUM SERPL-MCNC: 9.1 MG/DL (ref 8.5–10.1)
CHLORIDE SERPL-SCNC: 115 MMOL/L (ref 97–108)
CHOLEST SERPL-MCNC: 166 MG/DL
CO2 SERPL-SCNC: 20 MMOL/L (ref 21–32)
CREAT SERPL-MCNC: 2.05 MG/DL (ref 0.55–1.02)
DIFFERENTIAL METHOD BLD: ABNORMAL
EOSINOPHIL # BLD: 0.2 K/UL (ref 0–0.4)
EOSINOPHIL NFR BLD: 3 % (ref 0–7)
ERYTHROCYTE [DISTWIDTH] IN BLOOD BY AUTOMATED COUNT: 14.6 % (ref 11.5–14.5)
EST. AVERAGE GLUCOSE BLD GHB EST-MCNC: 105 MG/DL
GLOBULIN SER CALC-MCNC: 3.4 G/DL (ref 2–4)
GLUCOSE SERPL-MCNC: 100 MG/DL (ref 65–100)
HBA1C MFR BLD: 5.3 % (ref 4–5.6)
HCT VFR BLD AUTO: 36.7 % (ref 35–47)
HDLC SERPL-MCNC: 60 MG/DL
HDLC SERPL: 2.8 {RATIO} (ref 0–5)
HGB BLD-MCNC: 12 G/DL (ref 11.5–16)
IMM GRANULOCYTES # BLD AUTO: 0 K/UL (ref 0–0.04)
IMM GRANULOCYTES NFR BLD AUTO: 0 % (ref 0–0.5)
LDLC SERPL CALC-MCNC: 82.6 MG/DL (ref 0–100)
LIPID PROFILE,FLP: NORMAL
LYMPHOCYTES # BLD: 2.5 K/UL (ref 0.8–3.5)
LYMPHOCYTES NFR BLD: 38 % (ref 12–49)
MCH RBC QN AUTO: 30.4 PG (ref 26–34)
MCHC RBC AUTO-ENTMCNC: 32.7 G/DL (ref 30–36.5)
MCV RBC AUTO: 92.9 FL (ref 80–99)
MONOCYTES # BLD: 0.6 K/UL (ref 0–1)
MONOCYTES NFR BLD: 9 % (ref 5–13)
NEUTS SEG # BLD: 3.1 K/UL (ref 1.8–8)
NEUTS SEG NFR BLD: 49 % (ref 32–75)
NRBC # BLD: 0 K/UL (ref 0–0.01)
NRBC BLD-RTO: 0 PER 100 WBC
PLATELET # BLD AUTO: 240 K/UL (ref 150–400)
PMV BLD AUTO: 10.9 FL (ref 8.9–12.9)
POTASSIUM SERPL-SCNC: 4.2 MMOL/L (ref 3.5–5.1)
PROT SERPL-MCNC: 7.4 G/DL (ref 6.4–8.2)
RBC # BLD AUTO: 3.95 M/UL (ref 3.8–5.2)
SODIUM SERPL-SCNC: 142 MMOL/L (ref 136–145)
T4 FREE SERPL-MCNC: 0.8 NG/DL (ref 0.8–1.5)
TRIGL SERPL-MCNC: 117 MG/DL (ref ?–150)
TSH SERPL DL<=0.05 MIU/L-ACNC: 6.03 UIU/ML (ref 0.36–3.74)
VLDLC SERPL CALC-MCNC: 23.4 MG/DL
WBC # BLD AUTO: 6.4 K/UL (ref 3.6–11)

## 2021-05-12 RX ORDER — LEVOTHYROXINE SODIUM 88 UG/1
88 TABLET ORAL
Qty: 90 TAB | Refills: 1 | Status: SHIPPED | OUTPATIENT
Start: 2021-05-12 | End: 2021-10-13

## 2021-05-12 RX ORDER — MELATONIN
2000 DAILY
Qty: 180 TAB | Refills: 3 | Status: SHIPPED | OUTPATIENT
Start: 2021-05-12

## 2021-05-13 NOTE — PROGRESS NOTES
Please notify pt of results    Thyroid levels show that the dose needs to be increased to 88 mcg once a day. Also, vitamin D is low. Take an additional 1000 units of vitamin D daily. New Rx's sent to pharmacy. Other labs are either normal or stable and at goal.  Cholesterol looks much better on the medication. Keep up the good work!    Continue other current medications

## 2021-05-14 ENCOUNTER — PATIENT OUTREACH (OUTPATIENT)
Dept: CASE MANAGEMENT | Age: 71
End: 2021-05-14

## 2021-05-15 NOTE — PROGRESS NOTES
Social Work Note  5/14/2021      Received message from Eileen Laureano NP with [facility]. She visited with patient today for follow-up and is requesting copy of office notes from 5/11/21 PCP visit to verify medications. Email for Ms. Buenrostro:  [email or web address]    SW to follow-up with NP at [facility] [place].      Buck Severe, JET, ACSW, [facility]    Ambulatory Care Management   [phone]

## 2021-05-19 ENCOUNTER — PATIENT OUTREACH (OUTPATIENT)
Dept: CASE MANAGEMENT | Age: 71
End: 2021-05-19

## 2021-05-19 NOTE — PROGRESS NOTES
Social Work Note  5/19/2021    Contacted Yamila Petersen NP with [facility]. She advised she will return call - currently with a patient. [address] Neurology at [facility]. Spoke with Georgia Pierce to set up appointment for patient with Dr. Joce Khna for evaluation. Georgia Pierce advised that they are not scheduling with Dr. Joce Khan as he is leaving the practice. Dr. Angela Olsen is only neuropsychologist available with Neurology for testing.      Keysha Muniz, MSW, ACSW, [facility]    Ambulatory Care Management   [phone]

## 2021-05-20 ENCOUNTER — PATIENT OUTREACH (OUTPATIENT)
Dept: CASE MANAGEMENT | Age: 71
End: 2021-05-20

## 2021-05-26 ENCOUNTER — PATIENT OUTREACH (OUTPATIENT)
Dept: CASE MANAGEMENT | Age: 71
End: 2021-05-26

## 2021-05-26 NOTE — PROGRESS NOTES
Social Work Note  5/26/2021      Attempted to reach patient via phone. Patient has new phone number:  [phone]. Message left requesting return call. Per request, faxed copy of last office note to Angela Rossi, REEMA with [facility]. She is working to set up [employer]ud[employer] pill packing for patient.      Gautam Simeon MSW, ACSW, [facility]    Ambulatory Care Management   [phone]

## 2021-06-23 ENCOUNTER — TRANSCRIBE ORDER (OUTPATIENT)
Dept: NEUROLOGY | Age: 71
End: 2021-06-23

## 2021-09-16 DIAGNOSIS — E78.00 PURE HYPERCHOLESTEROLEMIA: ICD-10-CM

## 2021-09-16 RX ORDER — PANTOPRAZOLE SODIUM 40 MG/1
TABLET, DELAYED RELEASE ORAL
Qty: 90 TABLET | Refills: 1 | Status: SHIPPED | OUTPATIENT
Start: 2021-09-16 | End: 2022-09-19 | Stop reason: SDUPTHER

## 2021-09-16 RX ORDER — PRAVASTATIN SODIUM 80 MG/1
TABLET ORAL
Qty: 90 TABLET | Refills: 1 | Status: SHIPPED | OUTPATIENT
Start: 2021-09-16 | End: 2022-06-14 | Stop reason: SDUPTHER

## 2021-09-22 DIAGNOSIS — G47.00 INSOMNIA, UNSPECIFIED TYPE: ICD-10-CM

## 2021-09-22 DIAGNOSIS — R51.9 NONINTRACTABLE HEADACHE, UNSPECIFIED CHRONICITY PATTERN, UNSPECIFIED HEADACHE TYPE: ICD-10-CM

## 2021-09-22 RX ORDER — TRAZODONE HYDROCHLORIDE 50 MG/1
TABLET ORAL
Qty: 90 TABLET | Refills: 1 | Status: SHIPPED | OUTPATIENT
Start: 2021-09-22 | End: 2022-06-14 | Stop reason: SDUPTHER

## 2021-09-22 RX ORDER — APIXABAN 5 MG/1
TABLET, FILM COATED ORAL
Qty: 180 TABLET | Refills: 1 | Status: SHIPPED | OUTPATIENT
Start: 2021-09-22 | End: 2022-08-17 | Stop reason: SDUPTHER

## 2021-09-22 RX ORDER — TOPIRAMATE 50 MG/1
TABLET, FILM COATED ORAL
Qty: 90 TABLET | Refills: 1 | Status: SHIPPED | OUTPATIENT
Start: 2021-09-22 | End: 2022-06-14 | Stop reason: SDUPTHER

## 2021-09-22 NOTE — TELEPHONE ENCOUNTER
Dr. Bryant notified that pt's baby is to remain in hospital at this time. Pt's discharge cancelled for today, per MD order.   Medication refill authorized.

## 2021-10-04 ENCOUNTER — PATIENT OUTREACH (OUTPATIENT)
Dept: CASE MANAGEMENT | Age: 71
End: 2021-10-04

## 2021-10-05 NOTE — PROGRESS NOTES
Social Work Note      10/04/21  Follow-up call to patient. Reminded patient of SW role. Advised that Dr. Camden Eubanks would like patient to be seen in office on the next visit. SW asked patient if she was able to schedule transportation. Patient was not able to communicate process for scheduling transportation and appeared confused as to her insurance coverage. Advised that appointment is on 10/12/21 at 2:00 pm.  Asked patient to repeat date/time back and patient appeared confused as to month. Using teach back, had patient confirm appointment date/time. Reviewed status with patient:  · Medicines: Delivered via mail  · Groceries:  Patient stated that a friend takes her to get groceries every 2 weeks  · ADLs:  Patient reported that she is independent with ADLs, but has to sit down \"every once in a while\" because she becomes tired  · IADLs:  Patient stated that she pays her own bills  · Family contact:  Patient provided conflicting information as to when she last saw daughters. Patient initially stated that she has not seen her daughters in \"2 weeks\" then said she hasn't seen them in \"3, 4, 5, 6 weeks late\". · Housing issues:  Patient stated that she has a leak in her home and the landlord is now \"pulling her house up\" to fix it    SW observations during conversation:  · Patient appeared to have difficulty finding words and acknowledged the challenge  · When asked the month, patient responded \"12th\"  · When asked date, patient responded \"12th\"  · When asked day of the week, patient responded \"12th\". 10/05/21  Contacted [facility] Medicaid Ride [employer]ist ([phone]). Transportation scheduled for PCP appointment:   Date 10/12/21   Time:  2:00 pm   Pick-up at home:  1:30 pm   Pick-up at office:  3:15 pm   Reference #[id number]    8:50 am - Message left on patient VM requesting return call to discuss transportation arrangements.      4:00 pm - Voicemail left for patient with detailed information about appointment transportation arrangements. Requested return call to  to confirm receipt of information.      JET Loaiza, ACSW, [facility]    Ambulatory Care Management   [phone]

## 2021-10-11 ENCOUNTER — PATIENT OUTREACH (OUTPATIENT)
Dept: CASE MANAGEMENT | Age: 71
End: 2021-10-11

## 2021-10-11 NOTE — PROGRESS NOTES
Social Work Note  10/11/2021      11:20 am  VM message left for patient requesting return call to discuss transportation for PCP appointment tomorrow. Will continue to try and reach patient.      JET Loaiza, ACSW, [facility]    Ambulatory Care Management   [phone]

## 2021-10-12 ENCOUNTER — PATIENT OUTREACH (OUTPATIENT)
Dept: CASE MANAGEMENT | Age: 71
End: 2021-10-12

## 2021-10-12 ENCOUNTER — OFFICE VISIT (OUTPATIENT)
Dept: INTERNAL MEDICINE CLINIC | Age: 71
End: 2021-10-12
Payer: MEDICARE

## 2021-10-12 VITALS
HEART RATE: 93 BPM | SYSTOLIC BLOOD PRESSURE: 162 MMHG | OXYGEN SATURATION: 98 % | WEIGHT: 140 LBS | DIASTOLIC BLOOD PRESSURE: 110 MMHG | BODY MASS INDEX: 28.22 KG/M2 | HEIGHT: 59 IN

## 2021-10-12 DIAGNOSIS — R51.9 NONINTRACTABLE HEADACHE, UNSPECIFIED CHRONICITY PATTERN, UNSPECIFIED HEADACHE TYPE: ICD-10-CM

## 2021-10-12 DIAGNOSIS — I48.20 CHRONIC A-FIB (HCC): ICD-10-CM

## 2021-10-12 DIAGNOSIS — Z86.73 HISTORY OF CVA (CEREBROVASCULAR ACCIDENT): ICD-10-CM

## 2021-10-12 DIAGNOSIS — E55.9 VITAMIN D DEFICIENCY: ICD-10-CM

## 2021-10-12 DIAGNOSIS — I10 ESSENTIAL HYPERTENSION: ICD-10-CM

## 2021-10-12 DIAGNOSIS — E53.8 B12 DEFICIENCY: ICD-10-CM

## 2021-10-12 DIAGNOSIS — Z00.00 MEDICARE ANNUAL WELLNESS VISIT, SUBSEQUENT: Primary | ICD-10-CM

## 2021-10-12 DIAGNOSIS — R73.9 HYPERGLYCEMIA: ICD-10-CM

## 2021-10-12 DIAGNOSIS — E78.00 PURE HYPERCHOLESTEROLEMIA: ICD-10-CM

## 2021-10-12 DIAGNOSIS — E03.9 ACQUIRED HYPOTHYROIDISM: ICD-10-CM

## 2021-10-12 DIAGNOSIS — R26.89 IMBALANCE: ICD-10-CM

## 2021-10-12 PROCEDURE — 1101F PT FALLS ASSESS-DOCD LE1/YR: CPT | Performed by: INTERNAL MEDICINE

## 2021-10-12 PROCEDURE — G8755 DIAS BP > OR = 90: HCPCS | Performed by: INTERNAL MEDICINE

## 2021-10-12 PROCEDURE — G8419 CALC BMI OUT NRM PARAM NOF/U: HCPCS | Performed by: INTERNAL MEDICINE

## 2021-10-12 PROCEDURE — G8427 DOCREV CUR MEDS BY ELIG CLIN: HCPCS | Performed by: INTERNAL MEDICINE

## 2021-10-12 PROCEDURE — G8432 DEP SCR NOT DOC, RNG: HCPCS | Performed by: INTERNAL MEDICINE

## 2021-10-12 PROCEDURE — G8400 PT W/DXA NO RESULTS DOC: HCPCS | Performed by: INTERNAL MEDICINE

## 2021-10-12 PROCEDURE — 99214 OFFICE O/P EST MOD 30 MIN: CPT | Performed by: INTERNAL MEDICINE

## 2021-10-12 PROCEDURE — G8536 NO DOC ELDER MAL SCRN: HCPCS | Performed by: INTERNAL MEDICINE

## 2021-10-12 PROCEDURE — G0439 PPPS, SUBSEQ VISIT: HCPCS | Performed by: INTERNAL MEDICINE

## 2021-10-12 PROCEDURE — 1090F PRES/ABSN URINE INCON ASSESS: CPT | Performed by: INTERNAL MEDICINE

## 2021-10-12 PROCEDURE — G9711 PT HX TOT COL OR COLON CA: HCPCS | Performed by: INTERNAL MEDICINE

## 2021-10-12 PROCEDURE — G8753 SYS BP > OR = 140: HCPCS | Performed by: INTERNAL MEDICINE

## 2021-10-12 RX ORDER — METOPROLOL SUCCINATE 50 MG/1
50 TABLET, EXTENDED RELEASE ORAL DAILY
Qty: 90 TABLET | Refills: 1 | Status: SHIPPED | OUTPATIENT
Start: 2021-10-12 | End: 2022-10-06 | Stop reason: SDUPTHER

## 2021-10-12 RX ORDER — LOSARTAN POTASSIUM 25 MG/1
25 TABLET ORAL DAILY
Qty: 90 TABLET | Refills: 1 | Status: SHIPPED | OUTPATIENT
Start: 2021-10-12 | End: 2022-10-06 | Stop reason: SDUPTHER

## 2021-10-12 NOTE — PROGRESS NOTES
This is the Subsequent Medicare Annual Wellness Exam, performed 12 months or more after the Initial AWV or the last Subsequent AWV    I have reviewed the patient's medical history in detail and updated the computerized patient record. Assessment/Plan   Education and counseling provided:  Are appropriate based on today's review and evaluation    ICD-10-CM ICD-9-CM    1. Medicare annual wellness visit, subsequent  Z00.00 V70.0    2. Pure hypercholesterolemia  E78.00 333.9 CK      METABOLIC PANEL, COMPREHENSIVE      LIPID PANEL      LIPID PANEL      METABOLIC PANEL, COMPREHENSIVE      CK   3. Essential hypertension  I10 401.9 CBC WITH AUTOMATED DIFF      metoprolol succinate (TOPROL-XL) 50 mg XL tablet      CBC WITH AUTOMATED DIFF   4. Chronic a-fib (HCC)  I48.20 427.31 metoprolol succinate (TOPROL-XL) 50 mg XL tablet   5. Acquired hypothyroidism  E03.9 244.9 TSH 3RD GENERATION      T4, FREE      T4, FREE      TSH 3RD GENERATION   6. Vitamin D deficiency  E55.9 268.9 VITAMIN D, 25 HYDROXY      VITAMIN D, 25 HYDROXY   7. Nonintractable headache, unspecified chronicity pattern, unspecified headache type  R51.9 784.0    8. B12 deficiency  E53.8 266.2 VITAMIN B12      VITAMIN B12   9. History of CVA (cerebrovascular accident)  Z86.73 V12.54 REFERRAL TO ENT-OTOLARYNGOLOGY      AMB SUPPLY ORDER   10. Imbalance  R26.89 781.2 REFERRAL TO ENT-OTOLARYNGOLOGY      AMB SUPPLY ORDER   11. Hyperglycemia  R73.9 790.29 HEMOGLOBIN A1C WITH EAG      HEMOGLOBIN A1C WITH EAG     Follow-up and Dispositions    · Return in about 2 months (around 12/12/2021), or if symptoms worsen or fail to improve, for blood pressure, thyroid.         results and schedule of future studies reviewed with patient  reviewed diet, exercise and weight f  cardiovascular risk and specific lipid/LDL goals reviewed  reviewed medications and side effects in detail        Depression Risk Factor Screening     3 most recent PHQ Screens 10/12/2021   Little interest or pleasure in doing things Several days   Feeling down, depressed, irritable, or hopeless More than half the days   Total Score PHQ 2 3       Alcohol Risk Screen    Do you average more than 1 drink per night or more than 7 drinks a week:  No    On any one occasion in the past three months have you have had more than 3 drinks containing alcohol:  No        Functional Ability and Level of Safety    Hearing: Hearing is good. Activities of Daily Living: The home contains: no safety equipment. Patient needs help with:      Ambulation: with mild difficulty     Fall Risk:  Fall Risk Assessment, last 12 mths 5/11/2021   Able to walk? Yes   Fall in past 12 months? 0   Do you feel unsteady? 0   Are you worried about falling 0   Number of falls in past 12 months -   Fall with injury?  -      Abuse Screen:  Patient is not abused       Cognitive Screening    Has your family/caregiver stated any concerns about your memory: no         Health Maintenance Due     Health Maintenance Due   Topic Date Due    COVID-19 Vaccine (1) Never done    Colorectal Cancer Screening Combo  Never done    Breast Cancer Screen Mammogram  Never done       Patient Care Team   Patient Care Team:  Kacie Shelton MD as PCP - General (Pediatric Medicine)  Kacie Shelton MD as PCP - [facility]  JET Temple as   Miller French as Care Manager (Nurse Practitioner)    History     Patient Active Problem List   Diagnosis Code    Lumbar stenosis M48.061    CKD (chronic kidney disease) N18.9    Proteinuria R80.9    Secondary hyperparathyroidism, renal ([address] N25.81    Chronic intractable headache R51.9, G89.29    History of CVA (cerebrovascular accident) Z86.73    Hyperparathyroidism, secondary ([address] N25.81    Pneumococcal vaccination declined Z35.24    Mammogram declined Z53.20    Colonoscopy refused Z53.20    Atrial fibrillation with RVR ([address] I48.91    CAP (community acquired pneumonia) J18.9  Diastolic CHF, chronic (HCC) I50.32    Hypertension I10    Hyperlipidemia E78.5    SOB (shortness of breath) R06.02    Cough R05.9    Fatigue R53.83    Goals of care, counseling/discussion Z71.89    Chronic a-fib (HCC) I48.20     Past Medical History:   Diagnosis Date    Cancer ([facility]     ovaian stomach    CVA (cerebral vascular accident) ([facility]     Heart failure ([facility]     MI    Hypertension       Past Surgical History:   Procedure Laterality Date    HX GYN      hysterectomy    HX LUMBAR LAMINECTOMY  06/29/2016    L4-S1, Dr. John Mcdaniel      cancer removal     Current Outpatient Medications   Medication Sig Dispense Refill    losartan (COZAAR) 25 mg tablet Take 1 Tablet by mouth daily. 90 Tablet 1    metoprolol succinate (TOPROL-XL) 50 mg XL tablet Take 1 Tablet by mouth daily. 90 Tablet 1    fluticasone propionate (FLONASE) 50 mcg/actuation nasal spray 2 Sprays by Both Nostrils route daily. 1 Each 5    loratadine (Claritin) 10 mg tablet Take 1 Tablet by mouth daily. 30 Tablet 5    Eliquis 5 mg tablet TAKE 1 TABLET BY MOUTH TWICE DAILY 180 Tablet 1    topiramate (TOPAMAX) 50 mg tablet TAKE 1 TABLET BY MOUTH EVERY NIGHT 90 Tablet 1    traZODone (DESYREL) 50 mg tablet TAKE 1 TABLET BY MOUTH EVERY NIGHT 90 Tablet 1    pravastatin (PRAVACHOL) 80 mg tablet TAKE 1 TABLET BY MOUTH DAILY 90 Tablet 1    pantoprazole (PROTONIX) 40 mg tablet TAKE 1 TABLET BY MOUTH DAILY BEFORE BREAKFAST 90 Tablet 1    cholecalciferol (VITAMIN D3) (1000 Units /25 mcg) tablet Take 2 Tabs by mouth daily. 180 Tab 3    ipratropium-albuteroL (Combivent Respimat)  mcg/actuation inhaler Take 1 Puff by inhalation every six (6) hours. 3 Inhaler 2    albuterol (PROVENTIL HFA, VENTOLIN HFA, PROAIR HFA) 90 mcg/actuation inhaler Take 2 Puffs by inhalation every four (4) hours as needed for Shortness of Breath or Respiratory Distress.  1 Inhaler 2    dilTIAZem ER (DILACOR XR) 180 mg capsule Take 1 Cap by mouth daily. 90 Cap 1    aspirin delayed-release (ADULT LOW DOSE ASPIRIN) 81 mg tablet Take 1 Tab by mouth daily. 90 Tab 3    levothyroxine (SYNTHROID) 88 mcg tablet Take 1 Tab by mouth Daily (before breakfast).  80 Tab 1     No Known Allergies    Family History   Problem Relation Age of Onset    Hypertension Mother     Hypertension Sister     Heart Disease Brother     Suicide Brother     Diabetes Brother     Diabetes Brother     Suicide Brother     Cancer Brother     Hypertension Sister     Hypertension Sister     Hypertension Sister     Hypertension Sister      Social History     Tobacco Use    Smoking status: Never Smoker    Smokeless tobacco: Never Used   Substance Use Topics    Alcohol use: Not Currently         Marga Leventhal, MD

## 2021-10-12 NOTE — PROGRESS NOTES
HPI:  established patient  Presents for f/u HTN, HA, thyroid, etc    Pt reports taking metoprolol and diltiazem as Rx'd    +HAs - frequent  Unclear if due to HTN v other    +imbalance  Fall risk. Pt relates it to increasing metoprolol XL    Pt reports being stable otherwise. Past medical, Social, and Family history reviewed    Prior to Admission medications    Medication Sig Start Date End Date Taking? Authorizing Provider   fluticasone propionate (FLONASE) 50 mcg/actuation nasal spray 2 Sprays by Both Nostrils route daily. 9/28/21  Yes Shari Connolly MD   loratadine (Claritin) 10 mg tablet Take 1 Tablet by mouth daily. 9/28/21  Yes Shari Connolly MD   Eliquis 5 mg tablet TAKE 1 TABLET BY MOUTH TWICE DAILY 9/22/21  Yes Shari Connolly MD   topiramate (TOPAMAX) 50 mg tablet TAKE 1 TABLET BY MOUTH EVERY NIGHT 9/22/21  Yes Shari Connolly MD   traZODone (DESYREL) 50 mg tablet TAKE 1 TABLET BY MOUTH EVERY NIGHT 9/22/21  Yes Shari Connolly MD   pravastatin (PRAVACHOL) 80 mg tablet TAKE 1 TABLET BY MOUTH DAILY 9/16/21  Yes Shari Connolly MD   pantoprazole (PROTONIX) 40 mg tablet TAKE 1 TABLET BY MOUTH DAILY BEFORE BREAKFAST 9/16/21  Yes Sahri Connolly MD   cholecalciferol (VITAMIN D3) (1000 Units /25 mcg) tablet Take 2 Tabs by mouth daily. 5/12/21  Yes Shari Connolly MD   metoprolol succinate (TOPROL-XL) 100 mg tablet Take 1 Tab by mouth daily. 5/11/21  Yes Shari Connolly MD   ipratropium-albuteroL (Combivent Respimat)  mcg/actuation inhaler Take 1 Puff by inhalation every six (6) hours. 3/10/21  Yes Shari Connolly MD   albuterol (PROVENTIL HFA, VENTOLIN HFA, PROAIR HFA) 90 mcg/actuation inhaler Take 2 Puffs by inhalation every four (4) hours as needed for Shortness of Breath or Respiratory Distress. 3/10/21  Yes Shari Connolly MD   dilTIAZem ER (DILACOR XR) 180 mg capsule Take 1 Cap by mouth daily.  3/10/21  Yes Shari Connolly MD   aspirin delayed-release (ADULT LOW DOSE ASPIRIN) 81 mg tablet Take 1 Tab by mouth daily. 3/26/19  Yes Neri Menon MD   levothyroxine (SYNTHROID) 88 mcg tablet Take 1 Tab by mouth Daily (before breakfast). 5/12/21   Neri Menon MD          ROS  Complete ROS reviewed and negative or stable except as noted in HPI. Physical Exam  Vitals and nursing note reviewed. Constitutional:       General: She is not in acute distress. HENT:      Head: Normocephalic and atraumatic. Eyes:      General: No scleral icterus. Pupils: Pupils are equal, round, and reactive to light. Neck:      Comments: No bruits  Cardiovascular:      Rate and Rhythm: Normal rate. Rhythm irregular. Heart sounds: Normal heart sounds. No murmur heard. No friction rub. No gallop. Comments: irreg irreg  Pulmonary:      Effort: Pulmonary effort is normal. No respiratory distress. Breath sounds: Normal breath sounds. No wheezing or rales. Abdominal:      General: There is no distension. Palpations: Abdomen is soft. Tenderness: There is no abdominal tenderness. Musculoskeletal:         General: Normal range of motion. Cervical back: Normal range of motion and neck supple. Skin:     General: Skin is warm. Findings: No rash. Neurological:      Mental Status: She is alert and oriented to person, place, and time. Mental status is at baseline. Motor: No abnormal muscle tone. Prior labs reviewed. Assessment/Plan:    ICD-10-CM ICD-9-CM    1. Essential hypertension  I10 401.9 CBC WITH AUTOMATED DIFF      metoprolol succinate (TOPROL-XL) 50 mg XL tablet      CBC WITH AUTOMATED DIFF   2. Pure hypercholesterolemia  E78.00 707.3 CK      METABOLIC PANEL, COMPREHENSIVE      LIPID PANEL      LIPID PANEL      METABOLIC PANEL, COMPREHENSIVE      CK   3. Chronic a-fib (HCC)  I48.20 427.31 metoprolol succinate (TOPROL-XL) 50 mg XL tablet   4.  Acquired hypothyroidism  E03.9 244.9 TSH 3RD GENERATION      T4, FREE      T4, FREE      TSH 3RD GENERATION   5. Vitamin D deficiency  E55.9 268.9 VITAMIN D, 25 HYDROXY      VITAMIN D, 25 HYDROXY   6. Nonintractable headache, unspecified chronicity pattern, unspecified headache type  R51.9 784.0    7. B12 deficiency  E53.8 266.2 VITAMIN B12      VITAMIN B12   8. History of CVA (cerebrovascular accident)  Z86.73 V12.54 REFERRAL TO ENT-OTOLARYNGOLOGY      AMB SUPPLY ORDER   9. Imbalance  R26.89 781.2 REFERRAL TO ENT-OTOLARYNGOLOGY      AMB SUPPLY ORDER   10. Hyperglycemia  R73.9 790.29 HEMOGLOBIN A1C WITH EAG      HEMOGLOBIN A1C WITH EAG   11. Medicare annual wellness visit, subsequent  Z00.00 V70.0      Follow-up and Dispositions    · Return in about 2 months (around 12/12/2021), or if symptoms worsen or fail to improve, for blood pressure, thyroid. results and schedule of future studies reviewed with patient  reviewed diet, exercise and weight   cardiovascular risk and specific lipid/LDL goals reviewed  reviewed medications and side effects in detail    rec walker to limit falls pending med adjustment and ENT eval  Ref to ENT re: balance eval  Add losartan 25 mg daily  Reduce metoprolol XL back to 50 mg daily due to potential adverse effect. Labs today. An electronic signature was used to authenticate this note.   -- Keren Forrest MD

## 2021-10-12 NOTE — PATIENT INSTRUCTIONS

## 2021-10-12 NOTE — PROGRESS NOTES
Social Work Note  10/12/2021    10:07 am  Attempted to reach patient for appointment/transportation reminder. VM left reminding of appointment today. Requested return call. VM left on mobile number for daughter, Dianne Escobar. Requested return call. 11:40 am  VM left for patient requesting return call.     1:22 pm  Received message from  requesting confirmation of phone number as they have been unable to reach patient. Confirmed number. Agency will contact SW if patient is not available for transportation. 3:01 pm  Received call from PCP office re: transportation. Advised that transportation is scheduled to  patient at 3:15 pm.    3:50 pm  Received call from PCP office. Patient's ride has not arrived. Spoke with [facility]). She advised that since patient did not answer phone prior to pick-up, she was marked as no-show. Tavares Wyatt scheduled one-way trip home. Reference #[id number]. PCP office number given to transportation agency. 4:20 pm  Spoke with Morena Harvey at Dr. Georgina Lewis' office. Advised of transportation arrangements home. Ride to  patient within 15-30 minutes. (Exact time unknown - agency had to locate transportation provider). Provided [employer] number if needed:  [phone].     Elmo Callaway, MSW, ACSW, [facility]    Ambulatory Care Management   [phone]

## 2021-10-12 NOTE — PROGRESS NOTES
RM 14  Pt is fasting    Chief Complaint   Patient presents with    Follow-up    Hypertension    Thyroid Problem    Headache     pt states she is having costant headache on the left side. wakes her up at night        Visit Vitals  BP (!) 172/112   Pulse 93   Ht 4' 11\" (1.499 m)   Wt 140 lb (63.5 kg)   SpO2 98%   BMI 28.28 kg/m²       3 most recent PHQ Screens 10/12/2021   Little interest or pleasure in doing things Several days   Feeling down, depressed, irritable, or hopeless More than half the days   Total Score PHQ 2 3         1. Have you been to the ER, urgent care clinic since your last visit? Hospitalized since your last visit? No    2. Have you seen or consulted any other health care providers outside of the [address] [place] [address] since your last visit? Include any pap smears or colon screening. No    Health Maintenance Due   Topic Date Due    COVID-19 Vaccine (1) Never done    Colorectal Cancer Screening Combo  Never done    Shingrix Vaccine Age 50> (1 of 2) Never done    Breast Cancer Screen Mammogram  Never done    Flu Vaccine (1) Never done    Medicare Yearly Exam  10/27/2021       Learning Assessment 5/22/2018   PRIMARY LEARNER Patient   HIGHEST LEVEL OF EDUCATION - PRIMARY LEARNER  GRADUATED HIGH SCHOOL OR GED   BARRIERS PRIMARY LEARNER COGNITIVE   PRIMARY LANGUAGE ENGLISH   LEARNER PREFERENCE PRIMARY PICTURES   ANSWERED BY Edsel Osler   RELATIONSHIP SELF       AVS  education, follow up, and recommendations provided and addressed with patient.

## 2021-10-13 DIAGNOSIS — E03.9 ACQUIRED HYPOTHYROIDISM: ICD-10-CM

## 2021-10-13 LAB
25(OH)D3 SERPL-MCNC: 33.1 NG/ML (ref 30–100)
ALBUMIN SERPL-MCNC: 3.9 G/DL (ref 3.5–5)
ALBUMIN/GLOB SERPL: 1.1 {RATIO} (ref 1.1–2.2)
ALP SERPL-CCNC: 96 U/L (ref 45–117)
ALT SERPL-CCNC: 17 U/L (ref 12–78)
ANION GAP SERPL CALC-SCNC: 6 MMOL/L (ref 5–15)
AST SERPL-CCNC: 15 U/L (ref 15–37)
BASOPHILS # BLD: 0.1 K/UL (ref 0–0.1)
BASOPHILS NFR BLD: 2 % (ref 0–1)
BILIRUB SERPL-MCNC: 0.6 MG/DL (ref 0.2–1)
BUN SERPL-MCNC: 17 MG/DL (ref 6–20)
BUN/CREAT SERPL: 8 (ref 12–20)
CALCIUM SERPL-MCNC: 9.2 MG/DL (ref 8.5–10.1)
CHLORIDE SERPL-SCNC: 114 MMOL/L (ref 97–108)
CHOLEST SERPL-MCNC: 161 MG/DL
CK SERPL-CCNC: 73 U/L (ref 26–192)
CO2 SERPL-SCNC: 19 MMOL/L (ref 21–32)
CREAT SERPL-MCNC: 2.14 MG/DL (ref 0.55–1.02)
DIFFERENTIAL METHOD BLD: ABNORMAL
EOSINOPHIL # BLD: 0.2 K/UL (ref 0–0.4)
EOSINOPHIL NFR BLD: 3 % (ref 0–7)
ERYTHROCYTE [DISTWIDTH] IN BLOOD BY AUTOMATED COUNT: 14.4 % (ref 11.5–14.5)
EST. AVERAGE GLUCOSE BLD GHB EST-MCNC: 117 MG/DL
GLOBULIN SER CALC-MCNC: 3.5 G/DL (ref 2–4)
GLUCOSE SERPL-MCNC: 108 MG/DL (ref 65–100)
HBA1C MFR BLD: 5.7 % (ref 4–5.6)
HCT VFR BLD AUTO: 37.7 % (ref 35–47)
HDLC SERPL-MCNC: 54 MG/DL
HDLC SERPL: 3 {RATIO} (ref 0–5)
HGB BLD-MCNC: 12.6 G/DL (ref 11.5–16)
IMM GRANULOCYTES # BLD AUTO: 0 K/UL (ref 0–0.04)
IMM GRANULOCYTES NFR BLD AUTO: 0 % (ref 0–0.5)
LDLC SERPL CALC-MCNC: 82.6 MG/DL (ref 0–100)
LYMPHOCYTES # BLD: 1.8 K/UL (ref 0.8–3.5)
LYMPHOCYTES NFR BLD: 35 % (ref 12–49)
MCH RBC QN AUTO: 30.4 PG (ref 26–34)
MCHC RBC AUTO-ENTMCNC: 33.4 G/DL (ref 30–36.5)
MCV RBC AUTO: 91.1 FL (ref 80–99)
MONOCYTES # BLD: 0.5 K/UL (ref 0–1)
MONOCYTES NFR BLD: 9 % (ref 5–13)
NEUTS SEG # BLD: 2.7 K/UL (ref 1.8–8)
NEUTS SEG NFR BLD: 51 % (ref 32–75)
NRBC # BLD: 0 K/UL (ref 0–0.01)
NRBC BLD-RTO: 0 PER 100 WBC
PLATELET # BLD AUTO: 276 K/UL (ref 150–400)
PMV BLD AUTO: 11 FL (ref 8.9–12.9)
POTASSIUM SERPL-SCNC: 4.2 MMOL/L (ref 3.5–5.1)
PROT SERPL-MCNC: 7.4 G/DL (ref 6.4–8.2)
RBC # BLD AUTO: 4.14 M/UL (ref 3.8–5.2)
SODIUM SERPL-SCNC: 139 MMOL/L (ref 136–145)
T4 FREE SERPL-MCNC: 0.9 NG/DL (ref 0.8–1.5)
TRIGL SERPL-MCNC: 122 MG/DL (ref ?–150)
TSH SERPL DL<=0.05 MIU/L-ACNC: 8.13 UIU/ML (ref 0.36–3.74)
VIT B12 SERPL-MCNC: 458 PG/ML (ref 193–986)
VLDLC SERPL CALC-MCNC: 24.4 MG/DL
WBC # BLD AUTO: 5.3 K/UL (ref 3.6–11)

## 2021-10-13 RX ORDER — LEVOTHYROXINE SODIUM 100 UG/1
100 TABLET ORAL
Qty: 90 TABLET | Refills: 1 | Status: SHIPPED | OUTPATIENT
Start: 2021-10-13 | End: 2022-06-01

## 2021-10-13 NOTE — PROGRESS NOTES
Please notify pt of results    Thyroid dosing is inadequate. Increase to 100 mcg per day and encourage daily use. Recheck at f/u in 2 months. Kidney function is a little worse. She should stay well hydrated and see the kidney doctor - Dr. Simi Terrazas - to review and follow. If she needs transportation, contact , Ms. Julia Chung, to help arrange Renal follow up and follow up her in 2 months  Other labs are either normal or stable and at goal.  Continue other current medications

## 2021-10-18 ENCOUNTER — PATIENT OUTREACH (OUTPATIENT)
Dept: CASE MANAGEMENT | Age: 71
End: 2021-10-18

## 2021-10-18 DIAGNOSIS — R41.3 MEMORY CHANGES: ICD-10-CM

## 2021-10-18 DIAGNOSIS — Z86.73 HISTORY OF CVA (CEREBROVASCULAR ACCIDENT): ICD-10-CM

## 2021-10-18 DIAGNOSIS — I10 ESSENTIAL HYPERTENSION: Primary | ICD-10-CM

## 2021-10-18 DIAGNOSIS — R26.89 IMBALANCE: ICD-10-CM

## 2021-10-18 DIAGNOSIS — I48.20 CHRONIC A-FIB (HCC): ICD-10-CM

## 2021-10-18 NOTE — PROGRESS NOTES
Social Work Note  10/18/2021    Patient Call  Received message from patient. Returned call. Patient stated that she needs \"someone to come talk to her about everything\". Patient communicated that she has difficulty \"talking clearly\". She stated that she does not need the walker that Dr. Vee Manuel has recommended. Patient stated that she is able to move about her apartment without problems. Asked patient to clarify what she means by \"everything\". Reviewed concerns such as medications and patient stated that \"I don't know what I'm supposed to be taking\". Patient stated all medicines come in a bottle and that they take approximately a week to arrive as they are sent by mail from pharmacy. Patient has not received updated medications prescribed 10/12/21. She indicated that she would like someone to come to her home to review her medications with her. Discussed option of physical therapy evaluation to determine patient need related to walker. Patient voiced agreement. Patient agreeable to home health referral if appropriate. SW reviewed PCP visit with patient and advised that Dr. Vee Manuel would like her to see Dr. Vivek Matthews regarding her kidney function. Patient stated that she did not know this information. SW advised that transportation can be arranged with Medicaid. Call to [facility] Care Coordinator  Spoke with Vanita Mccauley, care coordinator through [facility] ([phone]). She advised that she also only follows patient telephonically, but if patient qualified for waiver (such as for personal care), then patient would receive care manager that makes home visits.       ROSA Plan:  · Follow-up with PCP re: home health referral (Medication review and teaching; physical therapy, and social work)  · Follow-up with Lui Salazar Coordinator  · Assist patient with appointment/transportation - Dr. Moises Rodríguez, MSW, ACSW, [facility]   Ambulatory Care Management   [phone]

## 2021-10-19 ENCOUNTER — HOME HEALTH ADMISSION (OUTPATIENT)
Dept: HOME HEALTH SERVICES | Facility: HOME HEALTH | Age: 71
End: 2021-10-19

## 2021-10-20 ENCOUNTER — PATIENT OUTREACH (OUTPATIENT)
Dept: CASE MANAGEMENT | Age: 71
End: 2021-10-20

## 2021-10-20 NOTE — PROGRESS NOTES
Social Work Note  10/20/2021      Attempted to reach patient x 2 today. Messages left requesting return call. Will attempt to clarify insurance. Per Li at [facility], patient has new insurance listed with effective date of 10/1/21. Number needed for home health referral.     Spoke with Camilo Li,  with [address] Medicaid. She advised that she spoke with patient and is concerned that patient needs someone in the home to assist her. She reported that patient's speech appeared \"off\" and patient would start a sentence and then not be able to finish it. She stated that she spoke with patient about a personal care aide, but that patient stated that she did not want an aide. Ms. Mirian Ta also advised that patient was not able to tell her about any of her health issues or medications. Per Ms. Mirian Ta, patient's D-[employer] coverage (dual Medicare/Medicaid managed plans) ended 7/31/21. Ms. Mirian Ta requested copy of medication list faxed to her: [phone]. CM request provided to PCP office. SW to continue to try and reach patient for coordination of services.      Xenia Rocha, MSW, ACSW, [facility]    Ambulatory Care Management   [phone]

## 2021-10-20 NOTE — PROGRESS NOTES
Detailed voice mail left for patient on secure voicemail with recommendations and results from Dr. Nimesh Byrnes. Call back number provided if needed. Thyroid dosing is inadequate.  Increase to 100 mcg per day and encourage daily use.  Recheck at f/u in 2 months. Kidney function is a little worse.  She should stay well hydrated and see the kidney doctor - Dr. Jon Eldridge - to review and follow. If she needs transportation, contact , Ms. Joanna San, to help arrange Renal follow up and follow up her in 2 months  Other labs are either normal or stable and at goal.  Continue other current medications

## 2021-10-22 ENCOUNTER — APPOINTMENT (OUTPATIENT)
Dept: CT IMAGING | Age: 71
DRG: 242 | End: 2021-10-22
Attending: NURSE PRACTITIONER
Payer: MEDICARE

## 2021-10-22 ENCOUNTER — HOSPITAL ENCOUNTER (INPATIENT)
Age: 71
LOS: 11 days | Discharge: HOME HEALTH | DRG: 242 | End: 2021-11-02
Attending: STUDENT IN AN ORGANIZED HEALTH CARE EDUCATION/TRAINING PROGRAM | Admitting: GENERAL ACUTE CARE HOSPITAL
Payer: MEDICARE

## 2021-10-22 ENCOUNTER — APPOINTMENT (OUTPATIENT)
Dept: GENERAL RADIOLOGY | Age: 71
DRG: 242 | End: 2021-10-22
Attending: STUDENT IN AN ORGANIZED HEALTH CARE EDUCATION/TRAINING PROGRAM
Payer: MEDICARE

## 2021-10-22 ENCOUNTER — HOME CARE VISIT (OUTPATIENT)
Dept: HOME HEALTH SERVICES | Facility: HOME HEALTH | Age: 71
End: 2021-10-22

## 2021-10-22 ENCOUNTER — HOME CARE VISIT (OUTPATIENT)
Dept: SCHEDULING | Facility: HOME HEALTH | Age: 71
End: 2021-10-22

## 2021-10-22 DIAGNOSIS — I45.5 SINUS PAUSE: ICD-10-CM

## 2021-10-22 DIAGNOSIS — G93.40 ENCEPHALOPATHY ACUTE: ICD-10-CM

## 2021-10-22 DIAGNOSIS — N39.0 URINARY TRACT INFECTION WITHOUT HEMATURIA, SITE UNSPECIFIED: ICD-10-CM

## 2021-10-22 DIAGNOSIS — I16.0 HYPERTENSIVE URGENCY: ICD-10-CM

## 2021-10-22 DIAGNOSIS — R45.851 PASSIVE SUICIDAL IDEATIONS: ICD-10-CM

## 2021-10-22 DIAGNOSIS — I67.9 CEREBRAL VASCULAR DISEASE: ICD-10-CM

## 2021-10-22 DIAGNOSIS — N30.00 ACUTE CYSTITIS WITHOUT HEMATURIA: ICD-10-CM

## 2021-10-22 DIAGNOSIS — F32.A DEPRESSION, UNSPECIFIED DEPRESSION TYPE: ICD-10-CM

## 2021-10-22 DIAGNOSIS — I48.91 ATRIAL FIBRILLATION WITH RAPID VENTRICULAR RESPONSE (HCC): Primary | ICD-10-CM

## 2021-10-22 DIAGNOSIS — I69.320 CVA, OLD, APHASIA: ICD-10-CM

## 2021-10-22 PROBLEM — I16.1 HYPERTENSIVE EMERGENCY: Status: ACTIVE | Noted: 2021-10-22

## 2021-10-22 LAB
ALBUMIN SERPL-MCNC: 3.7 G/DL (ref 3.5–5)
ALBUMIN/GLOB SERPL: 1 {RATIO} (ref 1.1–2.2)
ALP SERPL-CCNC: 81 U/L (ref 45–117)
ALT SERPL-CCNC: 14 U/L (ref 12–78)
ANION GAP SERPL CALC-SCNC: 7 MMOL/L (ref 5–15)
APPEARANCE UR: ABNORMAL
AST SERPL-CCNC: 11 U/L (ref 15–37)
ATRIAL RATE: 178 BPM
ATRIAL RATE: 234 BPM
BACTERIA URNS QL MICRO: ABNORMAL /HPF
BASOPHILS # BLD: 0.1 K/UL (ref 0–0.1)
BASOPHILS NFR BLD: 1 % (ref 0–1)
BILIRUB SERPL-MCNC: 0.8 MG/DL (ref 0.2–1)
BILIRUB UR QL: NEGATIVE
BUN SERPL-MCNC: 29 MG/DL (ref 6–20)
BUN/CREAT SERPL: 13 (ref 12–20)
CALCIUM SERPL-MCNC: 9.6 MG/DL (ref 8.5–10.1)
CALCULATED R AXIS, ECG10: -11 DEGREES
CALCULATED R AXIS, ECG10: -11 DEGREES
CALCULATED T AXIS, ECG11: -2 DEGREES
CALCULATED T AXIS, ECG11: -35 DEGREES
CHLORIDE SERPL-SCNC: 113 MMOL/L (ref 97–108)
CO2 SERPL-SCNC: 20 MMOL/L (ref 21–32)
COLOR UR: ABNORMAL
CREAT SERPL-MCNC: 2.28 MG/DL (ref 0.55–1.02)
DIAGNOSIS, 93000: NORMAL
DIAGNOSIS, 93000: NORMAL
DIFFERENTIAL METHOD BLD: ABNORMAL
EOSINOPHIL # BLD: 0.1 K/UL (ref 0–0.4)
EOSINOPHIL NFR BLD: 3 % (ref 0–7)
EPITH CASTS URNS QL MICRO: ABNORMAL /LPF
ERYTHROCYTE [DISTWIDTH] IN BLOOD BY AUTOMATED COUNT: 15 % (ref 11.5–14.5)
GLOBULIN SER CALC-MCNC: 3.6 G/DL (ref 2–4)
GLUCOSE SERPL-MCNC: 123 MG/DL (ref 65–100)
GLUCOSE UR STRIP.AUTO-MCNC: NEGATIVE MG/DL
HCT VFR BLD AUTO: 36.6 % (ref 35–47)
HGB BLD-MCNC: 11.7 G/DL (ref 11.5–16)
HGB UR QL STRIP: ABNORMAL
IMM GRANULOCYTES # BLD AUTO: 0 K/UL (ref 0–0.04)
IMM GRANULOCYTES NFR BLD AUTO: 0 % (ref 0–0.5)
KETONES UR QL STRIP.AUTO: ABNORMAL MG/DL
LEUKOCYTE ESTERASE UR QL STRIP.AUTO: ABNORMAL
LYMPHOCYTES # BLD: 1.4 K/UL (ref 0.8–3.5)
LYMPHOCYTES NFR BLD: 26 % (ref 12–49)
MAGNESIUM SERPL-MCNC: 2 MG/DL (ref 1.6–2.4)
MCH RBC QN AUTO: 29.9 PG (ref 26–34)
MCHC RBC AUTO-ENTMCNC: 32 G/DL (ref 30–36.5)
MCV RBC AUTO: 93.6 FL (ref 80–99)
MONOCYTES # BLD: 0.5 K/UL (ref 0–1)
MONOCYTES NFR BLD: 10 % (ref 5–13)
NEUTS SEG # BLD: 3.3 K/UL (ref 1.8–8)
NEUTS SEG NFR BLD: 60 % (ref 32–75)
NITRITE UR QL STRIP.AUTO: POSITIVE
NRBC # BLD: 0 K/UL (ref 0–0.01)
NRBC BLD-RTO: 0 PER 100 WBC
PH UR STRIP: 5.5 [PH] (ref 5–8)
PLATELET # BLD AUTO: 218 K/UL (ref 150–400)
PMV BLD AUTO: 10.1 FL (ref 8.9–12.9)
POTASSIUM SERPL-SCNC: 4.2 MMOL/L (ref 3.5–5.1)
PROT SERPL-MCNC: 7.3 G/DL (ref 6.4–8.2)
PROT UR STRIP-MCNC: 30 MG/DL
Q-T INTERVAL, ECG07: 284 MS
Q-T INTERVAL, ECG07: 352 MS
QRS DURATION, ECG06: 70 MS
QRS DURATION, ECG06: 74 MS
QTC CALCULATION (BEZET), ECG08: 458 MS
QTC CALCULATION (BEZET), ECG08: 463 MS
RBC # BLD AUTO: 3.91 M/UL (ref 3.8–5.2)
RBC #/AREA URNS HPF: ABNORMAL /HPF (ref 0–5)
SODIUM SERPL-SCNC: 140 MMOL/L (ref 136–145)
SP GR UR REFRACTOMETRY: 1.02 (ref 1–1.03)
TROPONIN-HIGH SENSITIVITY: 20 NG/L (ref 0–51)
TROPONIN-HIGH SENSITIVITY: 23 NG/L (ref 0–51)
TSH SERPL DL<=0.05 MIU/L-ACNC: 6.08 UIU/ML (ref 0.36–3.74)
UA: UC IF INDICATED,UAUC: ABNORMAL
UROBILINOGEN UR QL STRIP.AUTO: 0.2 EU/DL (ref 0.2–1)
VENTRICULAR RATE, ECG03: 102 BPM
VENTRICULAR RATE, ECG03: 160 BPM
WBC # BLD AUTO: 5.5 K/UL (ref 3.6–11)
WBC URNS QL MICRO: ABNORMAL /HPF (ref 0–4)

## 2021-10-22 PROCEDURE — 96374 THER/PROPH/DIAG INJ IV PUSH: CPT

## 2021-10-22 PROCEDURE — 84443 ASSAY THYROID STIM HORMONE: CPT

## 2021-10-22 PROCEDURE — 74011250636 HC RX REV CODE- 250/636: Performed by: STUDENT IN AN ORGANIZED HEALTH CARE EDUCATION/TRAINING PROGRAM

## 2021-10-22 PROCEDURE — 85025 COMPLETE CBC W/AUTO DIFF WBC: CPT

## 2021-10-22 PROCEDURE — 93005 ELECTROCARDIOGRAM TRACING: CPT

## 2021-10-22 PROCEDURE — 74011000258 HC RX REV CODE- 258: Performed by: STUDENT IN AN ORGANIZED HEALTH CARE EDUCATION/TRAINING PROGRAM

## 2021-10-22 PROCEDURE — 87077 CULTURE AEROBIC IDENTIFY: CPT

## 2021-10-22 PROCEDURE — 65660000000 HC RM CCU STEPDOWN

## 2021-10-22 PROCEDURE — 87186 SC STD MICRODIL/AGAR DIL: CPT

## 2021-10-22 PROCEDURE — 71045 X-RAY EXAM CHEST 1 VIEW: CPT

## 2021-10-22 PROCEDURE — 70450 CT HEAD/BRAIN W/O DYE: CPT

## 2021-10-22 PROCEDURE — 80053 COMPREHEN METABOLIC PANEL: CPT

## 2021-10-22 PROCEDURE — 84484 ASSAY OF TROPONIN QUANT: CPT

## 2021-10-22 PROCEDURE — 87086 URINE CULTURE/COLONY COUNT: CPT

## 2021-10-22 PROCEDURE — 74011000250 HC RX REV CODE- 250: Performed by: STUDENT IN AN ORGANIZED HEALTH CARE EDUCATION/TRAINING PROGRAM

## 2021-10-22 PROCEDURE — 81001 URINALYSIS AUTO W/SCOPE: CPT

## 2021-10-22 PROCEDURE — 99285 EMERGENCY DEPT VISIT HI MDM: CPT

## 2021-10-22 PROCEDURE — 83735 ASSAY OF MAGNESIUM: CPT

## 2021-10-22 PROCEDURE — 96375 TX/PRO/DX INJ NEW DRUG ADDON: CPT

## 2021-10-22 PROCEDURE — 36415 COLL VENOUS BLD VENIPUNCTURE: CPT

## 2021-10-22 PROCEDURE — 65660000001 HC RM ICU INTERMED STEPDOWN

## 2021-10-22 RX ORDER — FLUTICASONE PROPIONATE 50 MCG
2 SPRAY, SUSPENSION (ML) NASAL DAILY
Status: DISCONTINUED | OUTPATIENT
Start: 2021-10-23 | End: 2021-11-02 | Stop reason: HOSPADM

## 2021-10-22 RX ORDER — LEVOTHYROXINE SODIUM 100 UG/1
100 TABLET ORAL
Status: DISCONTINUED | OUTPATIENT
Start: 2021-10-23 | End: 2021-10-22

## 2021-10-22 RX ORDER — SODIUM CHLORIDE 0.9 % (FLUSH) 0.9 %
5-40 SYRINGE (ML) INJECTION EVERY 8 HOURS
Status: DISCONTINUED | OUTPATIENT
Start: 2021-10-23 | End: 2021-11-02 | Stop reason: HOSPADM

## 2021-10-22 RX ORDER — POLYETHYLENE GLYCOL 3350 17 G/17G
17 POWDER, FOR SOLUTION ORAL DAILY PRN
Status: DISCONTINUED | OUTPATIENT
Start: 2021-10-22 | End: 2021-11-02 | Stop reason: HOSPADM

## 2021-10-22 RX ORDER — TOPIRAMATE 25 MG/1
50 TABLET ORAL
Status: DISCONTINUED | OUTPATIENT
Start: 2021-10-23 | End: 2021-11-02 | Stop reason: HOSPADM

## 2021-10-22 RX ORDER — ACETAMINOPHEN 325 MG/1
650 TABLET ORAL
Status: DISCONTINUED | OUTPATIENT
Start: 2021-10-22 | End: 2021-11-02 | Stop reason: SDUPTHER

## 2021-10-22 RX ORDER — PRAVASTATIN SODIUM 40 MG/1
80 TABLET ORAL DAILY
Status: DISCONTINUED | OUTPATIENT
Start: 2021-10-23 | End: 2021-11-02 | Stop reason: HOSPADM

## 2021-10-22 RX ORDER — ACETAMINOPHEN 325 MG/1
650 TABLET ORAL
Status: DISCONTINUED | OUTPATIENT
Start: 2021-10-22 | End: 2021-10-24 | Stop reason: SDUPTHER

## 2021-10-22 RX ORDER — ASPIRIN 81 MG/1
81 TABLET ORAL DAILY
Status: DISCONTINUED | OUTPATIENT
Start: 2021-10-23 | End: 2021-11-02 | Stop reason: HOSPADM

## 2021-10-22 RX ORDER — METOPROLOL SUCCINATE 50 MG/1
50 TABLET, EXTENDED RELEASE ORAL DAILY
Status: DISCONTINUED | OUTPATIENT
Start: 2021-10-23 | End: 2021-10-25

## 2021-10-22 RX ORDER — ALBUTEROL SULFATE 90 UG/1
2 AEROSOL, METERED RESPIRATORY (INHALATION)
Status: DISCONTINUED | OUTPATIENT
Start: 2021-10-22 | End: 2021-10-22

## 2021-10-22 RX ORDER — HYDROCODONE BITARTRATE AND ACETAMINOPHEN 5; 325 MG/1; MG/1
1 TABLET ORAL
Status: DISCONTINUED | OUTPATIENT
Start: 2021-10-22 | End: 2021-11-02 | Stop reason: HOSPADM

## 2021-10-22 RX ORDER — SODIUM CHLORIDE 9 MG/ML
100 INJECTION, SOLUTION INTRAVENOUS CONTINUOUS
Status: DISCONTINUED | OUTPATIENT
Start: 2021-10-23 | End: 2021-10-23

## 2021-10-22 RX ORDER — SODIUM CHLORIDE 0.9 % (FLUSH) 0.9 %
5-40 SYRINGE (ML) INJECTION AS NEEDED
Status: DISCONTINUED | OUTPATIENT
Start: 2021-10-22 | End: 2021-11-02 | Stop reason: HOSPADM

## 2021-10-22 RX ORDER — DIAZEPAM 10 MG/2ML
5 INJECTION INTRAMUSCULAR ONCE
Status: ACTIVE | OUTPATIENT
Start: 2021-10-22 | End: 2021-10-23

## 2021-10-22 RX ORDER — PANTOPRAZOLE SODIUM 40 MG/1
40 TABLET, DELAYED RELEASE ORAL
Status: DISCONTINUED | OUTPATIENT
Start: 2021-10-23 | End: 2021-11-02 | Stop reason: HOSPADM

## 2021-10-22 RX ORDER — ONDANSETRON 2 MG/ML
4 INJECTION INTRAMUSCULAR; INTRAVENOUS
Status: DISCONTINUED | OUTPATIENT
Start: 2021-10-22 | End: 2021-11-02 | Stop reason: HOSPADM

## 2021-10-22 RX ORDER — CETIRIZINE HCL 10 MG
10 TABLET ORAL DAILY
Status: DISCONTINUED | OUTPATIENT
Start: 2021-10-23 | End: 2021-11-02 | Stop reason: HOSPADM

## 2021-10-22 RX ORDER — MELATONIN
2000 DAILY
Status: DISCONTINUED | OUTPATIENT
Start: 2021-10-23 | End: 2021-11-02 | Stop reason: HOSPADM

## 2021-10-22 RX ORDER — DILTIAZEM HYDROCHLORIDE 5 MG/ML
10 INJECTION INTRAVENOUS
Status: COMPLETED | OUTPATIENT
Start: 2021-10-22 | End: 2021-10-22

## 2021-10-22 RX ORDER — LOSARTAN POTASSIUM 25 MG/1
25 TABLET ORAL DAILY
Status: DISCONTINUED | OUTPATIENT
Start: 2021-10-23 | End: 2021-10-26

## 2021-10-22 RX ORDER — ALBUTEROL SULFATE 0.83 MG/ML
2.5 SOLUTION RESPIRATORY (INHALATION)
Status: DISCONTINUED | OUTPATIENT
Start: 2021-10-22 | End: 2021-11-02 | Stop reason: HOSPADM

## 2021-10-22 RX ORDER — LEVOTHYROXINE SODIUM 100 UG/1
100 TABLET ORAL
Status: DISCONTINUED | OUTPATIENT
Start: 2021-10-23 | End: 2021-11-02 | Stop reason: HOSPADM

## 2021-10-22 RX ORDER — TRAZODONE HYDROCHLORIDE 50 MG/1
50 TABLET ORAL
Status: DISCONTINUED | OUTPATIENT
Start: 2021-10-23 | End: 2021-11-02 | Stop reason: HOSPADM

## 2021-10-22 RX ORDER — ONDANSETRON 2 MG/ML
4 INJECTION INTRAMUSCULAR; INTRAVENOUS
Status: COMPLETED | OUTPATIENT
Start: 2021-10-22 | End: 2021-10-22

## 2021-10-22 RX ORDER — IPRATROPIUM BROMIDE AND ALBUTEROL SULFATE 2.5; .5 MG/3ML; MG/3ML
3 SOLUTION RESPIRATORY (INHALATION)
Status: DISCONTINUED | OUTPATIENT
Start: 2021-10-23 | End: 2021-10-23

## 2021-10-22 RX ADMIN — SODIUM CHLORIDE 2.5 MG/HR: 900 INJECTION, SOLUTION INTRAVENOUS at 20:40

## 2021-10-22 RX ADMIN — SODIUM CHLORIDE 500 ML: 9 INJECTION, SOLUTION INTRAVENOUS at 16:17

## 2021-10-22 RX ADMIN — ONDANSETRON 4 MG: 2 INJECTION INTRAMUSCULAR; INTRAVENOUS at 14:22

## 2021-10-22 RX ADMIN — DILTIAZEM HYDROCHLORIDE 10 MG: 5 INJECTION INTRAVENOUS at 14:24

## 2021-10-22 RX ADMIN — CEFTRIAXONE 1 G: 1 INJECTION, POWDER, FOR SOLUTION INTRAMUSCULAR; INTRAVENOUS at 23:17

## 2021-10-22 NOTE — Clinical Note
A venogram was performed on the right subclavian. Injected with single hand injection. Injection volume  = 20 mL.

## 2021-10-22 NOTE — Clinical Note
TRANSFER - OUT REPORT:     Verbal report given to: Shanti Fatima. Report consisted of patient's Situation, Background, Assessment and   Recommendations(SBAR). Opportunity for questions and clarification was provided. Patient transported with a Registered Nurse. [facility]
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
## 2021-10-22 NOTE — ED NOTES
1720: Patient attempting to leave ED, removing monitoring equipment and not agreeing to stay for admission. Patient reports \"you do not need to worry about me, I will be fine. \" Patient making vague suicidal statements such as, \"I just want to go home\" and points to the ceiling. When this writer informs patient that it will be unsafe to return home given her current medical conditions she states \"I don't care what happens to me if I go home, if I go to [facility] just know that I will be okay. I just don't want to be here. \" Patient reports she has called a ride to come pick her up. Patient  Kris Mention [phone] supposedly picking up. MD made aware. 1740: [facility] contacted at this time of patient's situation. [facility] advises medical TDO through [facility] office. MD made aware and contact information passed along. 1840: Medical Emergency Temporary correction Petition faxed to [facility] office at this time. 1915 Bedside and Verbal shift change report given to Catalina Wren (oncoming nurse) by Juan Leal (offgoing nurse). Report included the following information SBAR, Kardex, ED Summary, [facility] and Recent Results.

## 2021-10-22 NOTE — ED PROVIDER NOTES
EMERGENCY DEPARTMENT HISTORY AND PHYSICAL EXAM      Date: 10/22/2021  Patient Name: Oscar Delgadillo    History of Presenting Illness     Chief Complaint   Patient presents with    Rapid Heart Rate     Home health care was visiting pt today and found her heart rate 10 be 170-190. Pt has hx of A-fib nad stroke, non-compliant with meds. EMS interpreted rhythm A-fib with RVR. Pt asympyomatic. Vagal manuever brought heart rate to 130-170 range. Pt has one episode of vomiting today. History Provided By: EMS, patient    HPI: Oscar Delgadillo, 66 y.o. female with past medical history of CVA, A. fib, diastolic CHF, hypertension, hyperlipidemia, CKD, brought to the ED by EMS for A. fib with RVR. Patient's home health aide checked on her today, and noted that she was in rapid A. fib, subsequently calling EMS. Per EMS, patient was found at home with several bottles of her usual medications, all of which were picked up from 3 months ago. Per EMS, all of the bottles appear to be full. They are concerned that patient has been noncompliant with meds. She lives alone, and has occasional home health aides that check on her intermittently. EMS report that patient told him that she did not care if she lived or . Did not endorse active suicidality. She did not initially want to be evaluated in the ED, but EMS was able to persuade her to come. Patient currently denies any palpitations, chest pain, shortness of breath, lightheadedness, dizziness. She does complain of a headache, along with nausea and vomiting. States that the symptoms came on this morning. She denies any associated abdominal pain, fevers or chills. She has no concerning neurologic symptoms. When questioned about possibility of medication noncompliance, patient states that she has been taking her medications except for today.   When questioned about what she told EMS in terms of not wanting to live, she states that she only means if she were to die today that she would not be sad. Tells me that she is \"ready to go. \"  States that she is not actively suicidal, and has not attempted to hurt herself or jeopardize her own life in any way. Patient denies all other complaints. PCP: Meche Nowak MD    No current facility-administered medications on file prior to encounter. Current Outpatient Medications on File Prior to Encounter   Medication Sig Dispense Refill    levothyroxine (SYNTHROID) 100 mcg tablet Take 1 Tablet by mouth Daily (before breakfast). 90 Tablet 1    losartan (COZAAR) 25 mg tablet Take 1 Tablet by mouth daily. 90 Tablet 1    metoprolol succinate (TOPROL-XL) 50 mg XL tablet Take 1 Tablet by mouth daily. 90 Tablet 1    fluticasone propionate (FLONASE) 50 mcg/actuation nasal spray 2 Sprays by Both Nostrils route daily. 1 Each 5    loratadine (Claritin) 10 mg tablet Take 1 Tablet by mouth daily. 30 Tablet 5    Eliquis 5 mg tablet TAKE 1 TABLET BY MOUTH TWICE DAILY 180 Tablet 1    topiramate (TOPAMAX) 50 mg tablet TAKE 1 TABLET BY MOUTH EVERY NIGHT 90 Tablet 1    traZODone (DESYREL) 50 mg tablet TAKE 1 TABLET BY MOUTH EVERY NIGHT 90 Tablet 1    pravastatin (PRAVACHOL) 80 mg tablet TAKE 1 TABLET BY MOUTH DAILY 90 Tablet 1    pantoprazole (PROTONIX) 40 mg tablet TAKE 1 TABLET BY MOUTH DAILY BEFORE BREAKFAST 90 Tablet 1    cholecalciferol (VITAMIN D3) (1000 Units /25 mcg) tablet Take 2 Tabs by mouth daily. 180 Tab 3    ipratropium-albuteroL (Combivent Respimat)  mcg/actuation inhaler Take 1 Puff by inhalation every six (6) hours. 3 Inhaler 2    albuterol (PROVENTIL HFA, VENTOLIN HFA, PROAIR HFA) 90 mcg/actuation inhaler Take 2 Puffs by inhalation every four (4) hours as needed for Shortness of Breath or Respiratory Distress. 1 Inhaler 2    dilTIAZem ER (DILACOR XR) 180 mg capsule Take 1 Cap by mouth daily. 90 Cap 1    aspirin delayed-release (ADULT LOW DOSE ASPIRIN) 81 mg tablet Take 1 Tab by mouth daily.  90 Tab 3       Past History     Past Medical History:  Past Medical History:   Diagnosis Date    Cancer ([facility] [address]     ovaian stomach    CVA (cerebral vascular accident) [facility] [address]     Heart failure ([facility] [address]     MI    Hypertension        Past Surgical History:  Past Surgical History:   Procedure Laterality Date    HX GYN      hysterectomy    HX LUMBAR LAMINECTOMY  06/29/2016    L4-S1, Dr. Michelle Alejandra      cancer removal       Family History:  Family History   Problem Relation Age of Onset    Hypertension Mother     Hypertension Sister     Heart Disease Brother     Suicide Brother     Diabetes Brother     Diabetes Brother     Suicide Brother     Cancer Brother     Hypertension Sister     Hypertension Sister     Hypertension Sister     Hypertension Sister        Social History:  Social History     Tobacco Use    Smoking status: Never Smoker    Smokeless tobacco: Never Used   Substance Use Topics    Alcohol use: Not Currently    Drug use: No       Allergies:  No Known Allergies      Review of Systems   Review of Systems   Constitutional: Negative for chills and fever. HENT: Negative for congestion and rhinorrhea. Eyes: Negative for visual disturbance. Respiratory: Negative for chest tightness and shortness of breath. Cardiovascular: Negative for chest pain, palpitations and leg swelling. Gastrointestinal: Positive for nausea and vomiting. Negative for abdominal pain and diarrhea. Genitourinary: Negative for dysuria, flank pain and hematuria. Musculoskeletal: Negative for back pain and neck pain. Skin: Negative for rash. Allergic/Immunologic: Negative for immunocompromised state. Neurological: Positive for headaches. Negative for dizziness, speech difficulty and weakness. Hematological: Negative for adenopathy. Psychiatric/Behavioral: Negative for dysphoric mood and suicidal ideas. Physical Exam   Physical Exam  Vitals and nursing note reviewed. Constitutional:       General: She is not in acute distress. Appearance: She is not ill-appearing or toxic-appearing. HENT:      Head: Normocephalic and atraumatic. Nose: Nose normal.      Mouth/Throat:      Mouth: Mucous membranes are moist.   Eyes:      Extraocular Movements: Extraocular movements intact. Pupils: Pupils are equal, round, and reactive to light. Cardiovascular:      Rate and Rhythm: Tachycardia present. Rhythm irregular. Pulses: Normal pulses. Pulmonary:      Effort: Pulmonary effort is normal.      Breath sounds: No stridor. No wheezing or rhonchi. Abdominal:      General: Abdomen is flat. Bowel sounds are normal. There is no distension. Palpations: Abdomen is soft. Tenderness: There is no abdominal tenderness. There is no right CVA tenderness or left CVA tenderness. Musculoskeletal:         General: Normal range of motion. Cervical back: Normal range of motion and neck supple. Skin:     General: Skin is warm and dry. Neurological:      General: No focal deficit present. Mental Status: She is alert and oriented to person, place, and time. Cranial Nerves: Cranial nerves are intact. Sensory: Sensation is intact. Motor: Motor function is intact. Coordination: Coordination is intact. Gait: Gait is intact. Psychiatric:         Attention and Perception: She is inattentive. Mood and Affect: Affect is flat and inappropriate. Speech: Speech is delayed. Behavior: Behavior is slowed. Judgment: Judgment is impulsive and inappropriate. Comments: Poor insight.            Diagnostic Study Results     Labs -     Recent Results (from the past 24 hour(s))   EKG, 12 LEAD, INITIAL    Collection Time: 10/22/21  1:43 PM   Result Value Ref Range    Ventricular Rate 160 BPM    Atrial Rate 178 BPM    QRS Duration 70 ms    Q-T Interval 284 ms    QTC Calculation (Bezet) 463 ms    Calculated R Axis -11 degrees Calculated T Axis -2 degrees    Diagnosis       Atrial fibrillation with rapid ventricular response  Cannot rule out Inferior infarct , age undetermined  When compared with ECG of 30-APR-2020 09:04,  Criteria for Septal infarct are no longer present  T wave inversion less evident in Anterolateral leads  Confirmed by JESÚS Galicia ([id number]) on 10/22/2021 9:52:35 PM     CBC WITH AUTOMATED DIFF    Collection Time: 10/22/21  2:00 PM   Result Value Ref Range    WBC 5.5 3.6 - 11.0 K/uL    RBC 3.91 3.80 - 5.20 M/uL    HGB 11.7 11.5 - 16.0 g/dL    HCT 36.6 35.0 - 47.0 %    MCV 93.6 80.0 - 99.0 FL    MCH 29.9 26.0 - 34.0 PG    MCHC 32.0 30.0 - 36.5 g/dL    RDW 15.0 (H) 11.5 - 14.5 %    PLATELET 917 085 - 780 K/uL    MPV 10.1 8.9 - 12.9 FL    NRBC 0.0 0  WBC    ABSOLUTE NRBC 0.00 0.00 - 0.01 K/uL    NEUTROPHILS 60 32 - 75 %    LYMPHOCYTES 26 12 - 49 %    MONOCYTES 10 5 - 13 %    EOSINOPHILS 3 0 - 7 %    BASOPHILS 1 0 - 1 %    IMMATURE GRANULOCYTES 0 0.0 - 0.5 %    ABS. NEUTROPHILS 3.3 1.8 - 8.0 K/UL    ABS. LYMPHOCYTES 1.4 0.8 - 3.5 K/UL    ABS. MONOCYTES 0.5 0.0 - 1.0 K/UL    ABS. EOSINOPHILS 0.1 0.0 - 0.4 K/UL    ABS. BASOPHILS 0.1 0.0 - 0.1 K/UL    ABS. IMM. GRANS. 0.0 0.00 - 0.04 K/UL    DF AUTOMATED     METABOLIC PANEL, COMPREHENSIVE    Collection Time: 10/22/21  2:00 PM   Result Value Ref Range    Sodium 140 136 - 145 mmol/L    Potassium 4.2 3.5 - 5.1 mmol/L    Chloride 113 (H) 97 - 108 mmol/L    CO2 20 (L) 21 - 32 mmol/L    Anion gap 7 5 - 15 mmol/L    Glucose 123 (H) 65 - 100 mg/dL    BUN 29 (H) 6 - 20 MG/DL    Creatinine 2.28 (H) 0.55 - 1.02 MG/DL    BUN/Creatinine ratio 13 12 - 20      GFR est AA 26 (L) >60 ml/min/1.73m2    GFR est non-AA 21 (L) >60 ml/min/1.73m2    Calcium 9.6 8.5 - 10.1 MG/DL    Bilirubin, total 0.8 0.2 - 1.0 MG/DL    ALT (SGPT) 14 12 - 78 U/L    AST (SGOT) 11 (L) 15 - 37 U/L    Alk.  phosphatase 81 45 - 117 U/L    Protein, total 7.3 6.4 - 8.2 g/dL    Albumin 3.7 3.5 - 5.0 g/dL    Globulin 3.6 2.0 - 4.0 g/dL    A-G Ratio 1.0 (L) 1.1 - 2.2     TSH 3RD GENERATION    Collection Time: 10/22/21  2:00 PM   Result Value Ref Range    TSH 6.08 (H) 0.36 - 3.74 uIU/mL   MAGNESIUM    Collection Time: 10/22/21  2:00 PM   Result Value Ref Range    Magnesium 2.0 1.6 - 2.4 mg/dL   TROPONIN-HIGH SENSITIVITY    Collection Time: 10/22/21  2:12 PM   Result Value Ref Range    Troponin-High Sensitivity 20 0 - 51 ng/L   EKG, 12 LEAD, SUBSEQUENT    Collection Time: 10/22/21  2:28 PM   Result Value Ref Range    Ventricular Rate 102 BPM    Atrial Rate 234 BPM    QRS Duration 74 ms    Q-T Interval 352 ms    QTC Calculation (Bezet) 458 ms    Calculated R Axis -11 degrees    Calculated T Axis -35 degrees    Diagnosis       Atrial fibrillation with rapid ventricular response  Inferior infarct , age undetermined  Anterior infarct , age undetermined    Confirmed by Ronmichael Ax, P.V. ([id number]) on 10/22/2021 9:53:12 PM     TROPONIN-HIGH SENSITIVITY    Collection Time: 10/22/21  4:22 PM   Result Value Ref Range    Troponin-High Sensitivity 23 0 - 51 ng/L   URINALYSIS W/ REFLEX CULTURE    Collection Time: 10/22/21  8:44 PM    Specimen: Urine   Result Value Ref Range    Color YELLOW/STRAW      Appearance CLOUDY (A) CLEAR      Specific gravity 1.017 1.003 - 1.030      pH (UA) 5.5 5.0 - 8.0      Protein 30 (A) NEG mg/dL    Glucose Negative NEG mg/dL    Ketone TRACE (A) NEG mg/dL    Bilirubin Negative NEG      Blood TRACE (A) NEG      Urobilinogen 0.2 0.2 - 1.0 EU/dL    Nitrites Positive (A) NEG      Leukocyte Esterase LARGE (A) NEG      WBC 20-50 0 - 4 /hpf    RBC 0-5 0 - 5 /hpf    Epithelial cells FEW FEW /lpf    Bacteria 3+ (A) NEG /hpf    UA:UC IF INDICATED URINE CULTURE ORDERED (A) CNI       Radiologic Studies -   CT HEAD WO CONT   Final Result   No acute intracranial hemorrhage. Chronic microvascular ischemic disease. Age   indeterminate but likely chronic left parietotemporal infarct.           XR CHEST PORT   Final Result   Stable cardiomegaly. Clear lungs. MRI BRAIN WO CONT    (Results Pending)     CT Results  (Last 48 hours)               10/22/21 2307  CT HEAD WO CONT Final result    Impression:  No acute intracranial hemorrhage. Chronic microvascular ischemic disease. Age   indeterminate but likely chronic left parietotemporal infarct. Narrative:  EXAM:  CT HEAD WO CONT       INDICATION: Altered mental status       COMPARISON: 4/22/2012       TECHNIQUE: Axial noncontrast head CT from foramen magnum to vertex. Coronal and   sagittal reformatted images were obtained. CT dose reduction was achieved   through use of a standardized protocol tailored for this examination and   automatic exposure control for dose modulation. FINDINGS:  There is diffuse age-related parenchymal volume loss. The ventricles   and sulci are age-appropriate without hydrocephalus. There is no mass effect or   midline shift. There is no intracranial hemorrhage or extra-axial fluid   collection. Scattered foci of low attenuation in the periventricular white   matter represent stable chronic microvascular ischemic changes. There is an age   indeterminate but likely chronic left parietotemporal infarct. The basal   cisterns are patent. The osseous structures are intact. The visualized paranasal sinuses and mastoid   air cells are clear. CXR Results  (Last 48 hours)               10/22/21 1428  XR CHEST PORT Final result    Impression:  Stable cardiomegaly. Clear lungs. Narrative:  INDICATION: Tachycardia, not feeling well. Portable AP view of the chest.       Direct comparison made to prior chest x-ray dated April 2020. Cardiomediastinal silhouette is stable. Lungs are clear bilaterally. Pleural   spaces are normal and there is no pneumothorax. Osseous structures are intact.                  Medical Decision Making   Navin PEPPER MD am the first provider for this patient, and I am the attending of record for this patient encounter. I reviewed the vital signs, available nursing notes, past medical history, past surgical history, family history and social history. Vital Signs-Reviewed the patient's vital signs. Patient Vitals for the past 24 hrs:   Temp Pulse Resp BP SpO2   10/23/21 0342 97.4 °F (36.3 °C) 95 18 (!) 144/95 98 %   10/23/21 0231     95 %   10/23/21 0005 97.5 °F (36.4 °C) (!) 110 18 (!) 154/95    10/22/21 2100  (!) 147 24 (!) 151/122 96 %   10/22/21 2045  (!) 146 22 (!) 187/138 96 %   10/22/21 2030  (!) 134 (!) 32 (!) 161/130 96 %   10/22/21 2015  (!) 116 21 (!) 166/127 95 %   10/22/21 2000  (!) 124 18 (!) 170/121 96 %   10/22/21 1945  (!) 152 15 (!) 181/144 97 %   10/22/21 1930 98.9 °F (37.2 °C) (!) 146 23 (!) 155/126 97 %   10/22/21 1725  (!) 161 29  96 %   10/22/21 1715  (!) 147 29 (!) 202/174 90 %   10/22/21 1615    (!) 111/98 95 %   10/22/21 1600    100/84 95 %   10/22/21 1500  97 20 (!) 157/91 96 %   10/22/21 1445  (!) 105 21 (!) 149/127 94 %   10/22/21 1430  (!) 139 21 (!) 140/87 96 %   10/22/21 1347 97.9 °F (36.6 °C) (!) 165 19 (!) 193/137 96 %       EKG interpretation: (Preliminary)  A. fib with RVR, nonspecific ST changes, QTC normal.  No STEMI. EKG interpretation by Armen Herrera MD    Records Reviewed: Nursing Notes, Old Medical Records and Previous Laboratory Studies    Provider Notes (Medical Decision Making):   Patient is hypertensive, tachycardic with heart rate between 150s 160s on ED arrival.  She is otherwise alert, at her baseline mental status. She appears to have a flat affect, is slowed in her behavior, abnormally apathetic attitude towards her health. Patient appears to have poor insight into her clinical condition, and is largely unwilling and uncooperative to be in the ED to undergo medical evaluation and treatment. She repeatedly emphasizes that although she does not have any active thoughts of suicide, she is \"ready to go. \"  States that she does not care if she dies right now. Patient was initially able to be convinced to undergo blood work, have IV placed, and receive initial 10 mg of IV diltiazem for rate control, with subsequent improvement in blood pressure as well as in heart rate into the low 100s. Will check cardiac enzyme, magnesium, TSH, UA, and chest x-ray. Despite report of headache, patient has no focal neurologic deficits on my exam.  She is walking around the ED room with a steady gait. She does appear at times intermittently disoriented, however, I do not suspect acute CVA. More likely metabolic encephalopathy versus psychiatric disorder such as depression. Patient has also had a history of headaches based on chart review, for which she has been following up with her PCP for. And in spite of patient complaining of nausea and vomiting, she has no reproducible abdominal pain with a completely benign abdominal exam.  Do not suspect acute abdomen or surgical process. Do not feel she requires CT imaging of her abdomen at this time. Work-up in the ED is remarkable for mild ROJELIO on CKD with creatinine of 2.28, BUN elevated at 29. Suspect component of dehydration. Patient is administered 500 cc of IV fluids for gentle hydration. TSH is elevated at 6.08. Magnesium is normal.  High-sensitivity troponin slightly elevated at 20.  2-hour repeat is not significantly changed with a value of 23. Doubt cardiac ischemia. Chest x-ray reveals stable cardiomegaly. Clear lungs. UA is consistent with infectionpositive nitrates, large leukocyte esterase, elevated WBCs, and 3+ bacteria. Urine culture sent. Patient is empirically treated with IV ceftriaxone. Despite initial willingness to undergo ED work-up and treatment, patient became uncooperative a few hours into her ED course. She has been ordered diltiazem drip due to uptrending heart rate and extremely elevated blood pressure, which she has refused per RN.   Patient requesting discharge to home at this time. See documentation below for timestamp sequence of events. ED Course as of Oct 23 0422   Fri Oct 22, 2021   1811 Patient attempting to leave despite HR of 160s and SBP >200. Took off all of her leads. Attempting to leave. States she is not staying here any longer. Explained to patient that she needs medical treatment for her A. fib with RVR and hypertensive urgency, as well as potential risk of not undergoing proper medical treatment such as permanent disability, cardiac arrest, death. Patient replied \"I know, and I do not care. I am okay with dying. \"  Concern for passive suicidality, depressive symptoms. On chart review, it appears that APS was called on the patient by home health aide, and there were concerns that patient has not been acting like herself, intermittently confused, living in dismal conditions with fleas, flies etc.  Apparently her daughter, who is listed as her next of kin/POA, was contacted. Daughter apparently reported no concern for her mother, and documented as saying that she has not been in contact or spoken to her mother in years. Due to all the above, I do not feel patient has good insight into her current condition, or capacity to make informed medical decisions for herself-- putting her at risk for endangerment to her own life. I attempted to reach out to her next to kin/daughter/POA listed in her chart. Attempted to call several numbers listed to contact family, however, unable to reach them for discussion/help with care considering patient's current clinical situation. I requested that RN speak with Prateek lopez to potentially obtain psych TDO. Crisis suggested that we instead reach out to the  to obtain a medical TDO due to concern for medical deterioration in current clinical condition. I called and spoke with the , and explained the patient situation, and she agreed that patient needs to be a TDO due to lack of capacity.   Advised that I fill out the TDO form and fax it over to her for approval.  Will discuss with charge RN to obtain form required. [JM]   1905 Patient's medical TDO approved by 's office. Per - will dispatch police officers out to assist.    [JUAN M]      ED Course User Index  [JM] Ronel Holland MD     Patient restarted on diltiazem drip with gradual improvement in BP and HR. She is discussed with the hospitalist, who will come evaluate her for admission. CRITICAL CARE NOTE:    Authorized and Performed by: Laura Feldman MD    IMPENDING DETERIORATION -Cardiovascular, CNS, Metabolic and Renal  ASSOCIATED RISK FACTORS - Dysrhythmia, Metabolic changes, Dehydration, Vascular Compromise and CNS Decompensation  MANAGEMENT- Bedside Assessment  INTERPRETATION -  Xrays, ECG, Blood Pressure, Cardiac Output Measures, pulse ox, and all labs  INTERVENTIONS - hemodynamic mngmt, vascular control and Metobolic interventions  CASE REVIEW - Hospitalist/Intensivist, Medical Sub-Specialist and Nursing  TREATMENT RESPONSE -Stable  PERFORMED BY - Self    I have spent 120 minutes of critical care time involved in obtaining a history, examining the patient, lab review, arranging urgent treatment with development of a management plan, consultations with other providers, family decision- making, bedside attention and documentation. During this entire length of time I was immediately available to the patient . Critical Care: The reason for providing this level of medical care for this critically ill patient was due to a critical illness that impaired one or more vital organ systems, such that there was a high probability of imminent or life threatening deterioration in the patient's condition. This care involved high complexity decision making to assess, manipulate, and support vital system functions, to treat this degree of vital organ system failure, and to prevent further life threatening deterioration of the patients condition. Critical care time was exclusive of separately billable procedures. ED Course:   Initial assessment performed. The patient's presenting problems have been discussed, and they are in agreement with the care plan formulated and outlined with them. I have encouraged them to ask questions as they arise throughout their visit. Ludivina Stein MD      Disposition:  Admit    Diagnosis     Clinical Impression:   1. Atrial fibrillation with rapid ventricular response ([address]    2. Hypertensive urgency    3. Passive suicidal ideations    4. Acute cystitis without hematuria    5. Depression, unspecified depression type        Attestations:    Ludivina Stein MD    Please note that this dictation was completed with [employer], the computer voice recognition software. Quite often unanticipated grammatical, syntax, homophones, and other interpretive errors are inadvertently transcribed by the computer software. Please disregard these errors. Please excuse any errors that have escaped final proofreading. Thank you.

## 2021-10-23 ENCOUNTER — APPOINTMENT (OUTPATIENT)
Dept: NON INVASIVE DIAGNOSTICS | Age: 71
DRG: 242 | End: 2021-10-23
Attending: NURSE PRACTITIONER
Payer: MEDICARE

## 2021-10-23 ENCOUNTER — APPOINTMENT (OUTPATIENT)
Dept: MRI IMAGING | Age: 71
DRG: 242 | End: 2021-10-23
Attending: NURSE PRACTITIONER
Payer: MEDICARE

## 2021-10-23 PROBLEM — R41.0 CONFUSION: Status: ACTIVE | Noted: 2021-10-22

## 2021-10-23 PROBLEM — Z91.199 NONCOMPLIANCE: Status: ACTIVE | Noted: 2021-10-22

## 2021-10-23 PROBLEM — R41.89 IMPAIRED INSIGHT: Status: ACTIVE | Noted: 2021-10-22

## 2021-10-23 PROBLEM — R45.851 PASSIVE SUICIDAL IDEATIONS: Status: ACTIVE | Noted: 2021-10-22

## 2021-10-23 LAB
ALBUMIN SERPL-MCNC: 3.4 G/DL (ref 3.5–5)
ALBUMIN/GLOB SERPL: 0.9 {RATIO} (ref 1.1–2.2)
ALP SERPL-CCNC: 78 U/L (ref 45–117)
ALT SERPL-CCNC: 12 U/L (ref 12–78)
ANION GAP SERPL CALC-SCNC: 8 MMOL/L (ref 5–15)
AST SERPL-CCNC: 11 U/L (ref 15–37)
BASOPHILS # BLD: 0.1 K/UL (ref 0–0.1)
BASOPHILS NFR BLD: 1 % (ref 0–1)
BILIRUB SERPL-MCNC: 0.6 MG/DL (ref 0.2–1)
BNP SERPL-MCNC: 4662 PG/ML
BUN SERPL-MCNC: 23 MG/DL (ref 6–20)
BUN/CREAT SERPL: 11 (ref 12–20)
CALCIUM SERPL-MCNC: 9.5 MG/DL (ref 8.5–10.1)
CHLORIDE SERPL-SCNC: 116 MMOL/L (ref 97–108)
CHOLEST SERPL-MCNC: 149 MG/DL
CK SERPL-CCNC: 144 U/L (ref 26–192)
CK SERPL-CCNC: 144 U/L (ref 26–192)
CO2 SERPL-SCNC: 20 MMOL/L (ref 21–32)
CREAT SERPL-MCNC: 2.07 MG/DL (ref 0.55–1.02)
DIFFERENTIAL METHOD BLD: ABNORMAL
EOSINOPHIL # BLD: 0.2 K/UL (ref 0–0.4)
EOSINOPHIL NFR BLD: 2 % (ref 0–7)
ERYTHROCYTE [DISTWIDTH] IN BLOOD BY AUTOMATED COUNT: 14.8 % (ref 11.5–14.5)
GLOBULIN SER CALC-MCNC: 3.7 G/DL (ref 2–4)
GLUCOSE SERPL-MCNC: 120 MG/DL (ref 65–100)
HCT VFR BLD AUTO: 34.7 % (ref 35–47)
HDLC SERPL-MCNC: 46 MG/DL
HDLC SERPL: 3.2 {RATIO} (ref 0–5)
HGB BLD-MCNC: 11 G/DL (ref 11.5–16)
IMM GRANULOCYTES # BLD AUTO: 0 K/UL (ref 0–0.04)
IMM GRANULOCYTES NFR BLD AUTO: 0 % (ref 0–0.5)
LDLC SERPL CALC-MCNC: 89.6 MG/DL (ref 0–100)
LYMPHOCYTES # BLD: 1.8 K/UL (ref 0.8–3.5)
LYMPHOCYTES NFR BLD: 28 % (ref 12–49)
MAGNESIUM SERPL-MCNC: 2.2 MG/DL (ref 1.6–2.4)
MCH RBC QN AUTO: 29.9 PG (ref 26–34)
MCHC RBC AUTO-ENTMCNC: 31.7 G/DL (ref 30–36.5)
MCV RBC AUTO: 94.3 FL (ref 80–99)
MONOCYTES # BLD: 0.8 K/UL (ref 0–1)
MONOCYTES NFR BLD: 13 % (ref 5–13)
NEUTS SEG # BLD: 3.4 K/UL (ref 1.8–8)
NEUTS SEG NFR BLD: 56 % (ref 32–75)
NRBC # BLD: 0 K/UL (ref 0–0.01)
NRBC BLD-RTO: 0 PER 100 WBC
PLATELET # BLD AUTO: 203 K/UL (ref 150–400)
PMV BLD AUTO: 10 FL (ref 8.9–12.9)
POTASSIUM SERPL-SCNC: 4.4 MMOL/L (ref 3.5–5.1)
PROT SERPL-MCNC: 7.1 G/DL (ref 6.4–8.2)
RBC # BLD AUTO: 3.68 M/UL (ref 3.8–5.2)
SODIUM SERPL-SCNC: 144 MMOL/L (ref 136–145)
TRIGL SERPL-MCNC: 67 MG/DL (ref ?–150)
TROPONIN-HIGH SENSITIVITY: 26 NG/L (ref 0–51)
TSH SERPL DL<=0.05 MIU/L-ACNC: 6.9 UIU/ML (ref 0.36–3.74)
VIT B12 SERPL-MCNC: 424 PG/ML (ref 193–986)
VLDLC SERPL CALC-MCNC: 13.4 MG/DL
WBC # BLD AUTO: 6.3 K/UL (ref 3.6–11)

## 2021-10-23 PROCEDURE — 74011000258 HC RX REV CODE- 258: Performed by: NURSE PRACTITIONER

## 2021-10-23 PROCEDURE — 74011250637 HC RX REV CODE- 250/637: Performed by: NURSE PRACTITIONER

## 2021-10-23 PROCEDURE — 94640 AIRWAY INHALATION TREATMENT: CPT

## 2021-10-23 PROCEDURE — 36415 COLL VENOUS BLD VENIPUNCTURE: CPT

## 2021-10-23 PROCEDURE — 74011250636 HC RX REV CODE- 250/636: Performed by: INTERNAL MEDICINE

## 2021-10-23 PROCEDURE — 77030029684 HC NEB SM VOL KT MONA -A

## 2021-10-23 PROCEDURE — 74011000250 HC RX REV CODE- 250: Performed by: GENERAL ACUTE CARE HOSPITAL

## 2021-10-23 PROCEDURE — 65660000001 HC RM ICU INTERMED STEPDOWN

## 2021-10-23 PROCEDURE — 80061 LIPID PANEL: CPT

## 2021-10-23 PROCEDURE — 82550 ASSAY OF CK (CPK): CPT

## 2021-10-23 PROCEDURE — 74011250636 HC RX REV CODE- 250/636: Performed by: NURSE PRACTITIONER

## 2021-10-23 PROCEDURE — 82747 ASSAY OF FOLIC ACID RBC: CPT

## 2021-10-23 PROCEDURE — 83735 ASSAY OF MAGNESIUM: CPT

## 2021-10-23 PROCEDURE — 74011250637 HC RX REV CODE- 250/637: Performed by: INTERNAL MEDICINE

## 2021-10-23 PROCEDURE — 83880 ASSAY OF NATRIURETIC PEPTIDE: CPT

## 2021-10-23 PROCEDURE — 80053 COMPREHEN METABOLIC PANEL: CPT

## 2021-10-23 PROCEDURE — 99223 1ST HOSP IP/OBS HIGH 75: CPT | Performed by: PSYCHIATRY & NEUROLOGY

## 2021-10-23 PROCEDURE — 74011000258 HC RX REV CODE- 258: Performed by: INTERNAL MEDICINE

## 2021-10-23 PROCEDURE — 85025 COMPLETE CBC W/AUTO DIFF WBC: CPT

## 2021-10-23 PROCEDURE — 82607 VITAMIN B-12: CPT

## 2021-10-23 PROCEDURE — 70551 MRI BRAIN STEM W/O DYE: CPT

## 2021-10-23 PROCEDURE — 74011250637 HC RX REV CODE- 250/637: Performed by: GENERAL ACUTE CARE HOSPITAL

## 2021-10-23 PROCEDURE — 2709999900 HC NON-CHARGEABLE SUPPLY

## 2021-10-23 PROCEDURE — 84484 ASSAY OF TROPONIN QUANT: CPT

## 2021-10-23 PROCEDURE — 93306 TTE W/DOPPLER COMPLETE: CPT

## 2021-10-23 PROCEDURE — 86592 SYPHILIS TEST NON-TREP QUAL: CPT

## 2021-10-23 PROCEDURE — 84443 ASSAY THYROID STIM HORMONE: CPT

## 2021-10-23 RX ORDER — DILTIAZEM HYDROCHLORIDE 180 MG/1
180 CAPSULE, COATED, EXTENDED RELEASE ORAL DAILY
Status: DISCONTINUED | OUTPATIENT
Start: 2021-10-23 | End: 2021-10-29

## 2021-10-23 RX ORDER — HYDRALAZINE HYDROCHLORIDE 20 MG/ML
20 INJECTION INTRAMUSCULAR; INTRAVENOUS
Status: DISCONTINUED | OUTPATIENT
Start: 2021-10-23 | End: 2021-11-02 | Stop reason: HOSPADM

## 2021-10-23 RX ADMIN — TOPIRAMATE 50 MG: 25 TABLET, FILM COATED ORAL at 00:57

## 2021-10-23 RX ADMIN — PANTOPRAZOLE SODIUM 40 MG: 40 TABLET, DELAYED RELEASE ORAL at 09:53

## 2021-10-23 RX ADMIN — PRAVASTATIN SODIUM 80 MG: 40 TABLET ORAL at 09:52

## 2021-10-23 RX ADMIN — SODIUM CHLORIDE 10 MG/HR: 900 INJECTION, SOLUTION INTRAVENOUS at 07:06

## 2021-10-23 RX ADMIN — TRAZODONE HYDROCHLORIDE 50 MG: 50 TABLET ORAL at 21:54

## 2021-10-23 RX ADMIN — IPRATROPIUM BROMIDE AND ALBUTEROL SULFATE 3 ML: .5; 3 SOLUTION RESPIRATORY (INHALATION) at 02:32

## 2021-10-23 RX ADMIN — ASPIRIN 81 MG: 81 TABLET, COATED ORAL at 09:53

## 2021-10-23 RX ADMIN — Medication 10 ML: at 07:06

## 2021-10-23 RX ADMIN — DILTIAZEM HYDROCHLORIDE 180 MG: 180 CAPSULE, COATED, EXTENDED RELEASE ORAL at 14:30

## 2021-10-23 RX ADMIN — TOPIRAMATE 50 MG: 25 TABLET, FILM COATED ORAL at 21:54

## 2021-10-23 RX ADMIN — Medication 10 ML: at 21:57

## 2021-10-23 RX ADMIN — LOSARTAN POTASSIUM 25 MG: 25 TABLET, FILM COATED ORAL at 09:52

## 2021-10-23 RX ADMIN — Medication 10 ML: at 14:30

## 2021-10-23 RX ADMIN — LEVOTHYROXINE SODIUM 100 MCG: 0.1 TABLET ORAL at 07:07

## 2021-10-23 RX ADMIN — Medication 2000 UNITS: at 09:52

## 2021-10-23 RX ADMIN — METOPROLOL SUCCINATE 50 MG: 50 TABLET, EXTENDED RELEASE ORAL at 09:52

## 2021-10-23 RX ADMIN — SODIUM CHLORIDE 100 ML/HR: 9 INJECTION, SOLUTION INTRAVENOUS at 00:59

## 2021-10-23 RX ADMIN — APIXABAN 5 MG: 5 TABLET, FILM COATED ORAL at 09:53

## 2021-10-23 RX ADMIN — APIXABAN 5 MG: 5 TABLET, FILM COATED ORAL at 18:40

## 2021-10-23 RX ADMIN — SODIUM CHLORIDE 7.5 MG/HR: 900 INJECTION, SOLUTION INTRAVENOUS at 09:55

## 2021-10-23 RX ADMIN — TRAZODONE HYDROCHLORIDE 50 MG: 50 TABLET ORAL at 00:57

## 2021-10-23 NOTE — H&P
Hospitalist Admission Note    NAME: Eric Thayer   :  1950   MRN:  [id number]     Date/Time:  10/23/2021 3:33 AM    Patient PCP: Ryan Booker MD  ______________________________________________________________________  Given the patient's current clinical presentation, I have a high level of concern for decompensation if discharged from the emergency department. Complex decision making was performed, which includes reviewing the patient's available past medical records, laboratory results, and x-ray films. Assessment / Plan:      Atrial fibrillation with RVR ([facility]) (2020)  · Admit to stepdown unit  · Cardiac monitoring  · Continue diltiazem drip  · Echocardiogram  · Cardiology consult      CKD (chronic kidney disease) (2017)  · Monitor renal function  · Avoid nephrotoxic medications  · IV fluid hydration with normal saline at 100 mL/h      Diastolic CHF, chronic ([facility]) (2020)  · Monitor NT proBNP  · Continue metoprolol  ·     Hypertensive emergency (10/22/2021)  · Continue home losartan and metoprolol  · Continue pravastatin  · Check lipid panel      Impaired insight (10/22/2021)    Noncompliance (10/22/2021)    Passive suicidal ideations (10/22/2021)    Confusion (10/22/2021)  · CT of head was negative for acute hemorrhage or process  · We will check MRI of brain in a.m. · Check RPR and B-12  · Consult KRYSTAL MICHELLE RT  · Consult case management  · Medical TDO has been petitioned by ED provider  · One-on-one   · Psych consult  · Continue home medications      UTI  · Continue IV Rocephin 1 g daily          Code Status: Full code  Surrogate Decision Maker: None-per ER physician report, patient's daughter was contacted however she stated that she does not want to be involved.     DVT Prophylaxis: Eliquis  GI Prophylaxis: Protonix    Activity at baseline: Ambulates without assistive device    Lives with: Self with home health/private duty nurse visiting Subjective:     CHIEF COMPLAINT: Fast heart rate    HISTORY OF PRESENT ILLNESS:     Betsey Chaney is a 66 y.o. DECLINED female with medical history including but not limited to chronic kidney disease, atrial fibrillation, congestive heart failure, hypertension, hyperlipidemia, history of CVA presented to the emergency room via ambulance for atrial fibrillation with RVR. Patient reportedly had not been feeling well since this morning and when her home health aide arrived she found that patient had vomited and had a very rapid heart rate and therefore alerted EMS. .  Reportedly paramedics found numerous bottles of medication which were full and had not been used by patient and therefore concluded that patient has been noncompliant with all her meds. Also reportedly patient's home health aide had tried to get APS involved due to patient's poor home situation. Patient was evaluated in the emergency department, she was found to be confused and lacked poor insight and judgment to her medical condition. Patient attempted to leave AMA. A TDO petition was done by emergency physician. When I evaluated patient, I found patient not to have any insight into her severity of medical condition. Patient was very and uninterested and her prognosis and stated to me multiple times that she does not care if she dies as she is 66years old and what needs to happen happens. She is also very confused and cannot hold the same subject of conversation for longer than 5 minutes. We were asked to admit for work up and evaluation of the above problems.       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 Wade parr Social History     Tobacco Use    Smoking status: Never Smoker    Smokeless tobacco: Never Used   Substance Use Topics    Alcohol use: Not Currently        Family History   Problem Relation Age of Onset    Hypertension Mother     Hypertension Sister     Heart Disease Brother     Suicide Brother     Diabetes Brother     Diabetes Brother     Suicide Brother     Cancer Brother     Hypertension Sister     Hypertension Sister     Hypertension Sister     Hypertension Sister      No Known Allergies     Prior to Admission medications    Medication Sig Start Date End Date Taking? Authorizing Provider   levothyroxine (SYNTHROID) 100 mcg tablet Take 1 Tablet by mouth Daily (before breakfast). 10/13/21   Zoila Patel MD   losartan (COZAAR) 25 mg tablet Take 1 Tablet by mouth daily. 10/12/21   Zoila Patel MD   metoprolol succinate (TOPROL-XL) 50 mg XL tablet Take 1 Tablet by mouth daily. 10/12/21   Zoila Patel MD   fluticasone propionate (FLONASE) 50 mcg/actuation nasal spray 2 Sprays by Both Nostrils route daily. 9/28/21   Zoila Patel MD   loratadine (Claritin) 10 mg tablet Take 1 Tablet by mouth daily. 9/28/21   Zoila Patel MD   Eliquis 5 mg tablet TAKE 1 TABLET BY MOUTH TWICE DAILY 9/22/21   Zoila Patel MD   topiramate (TOPAMAX) 50 mg tablet TAKE 1 TABLET BY MOUTH EVERY NIGHT 9/22/21   Zoila Patel MD   traZODone (DESYREL) 50 mg tablet TAKE 1 TABLET BY MOUTH EVERY NIGHT 9/22/21   Zoila Patel MD   pravastatin (PRAVACHOL) 80 mg tablet TAKE 1 TABLET BY MOUTH DAILY 9/16/21   Zoila Patel MD   pantoprazole (PROTONIX) 40 mg tablet TAKE 1 TABLET BY MOUTH DAILY BEFORE BREAKFAST 9/16/21   Zoila Patel MD   cholecalciferol (VITAMIN D3) (1000 Units /25 mcg) tablet Take 2 Tabs by mouth daily. 5/12/21   Zoila Patel MD   ipratropium-albuteroL (Combivent Respimat)  mcg/actuation inhaler Take 1 Puff by inhalation every six (6) hours.  3/10/21 Kiley Zamarripa MD   albuterol (PROVENTIL HFA, VENTOLIN HFA, PROAIR HFA) 90 mcg/actuation inhaler Take 2 Puffs by inhalation every four (4) hours as needed for Shortness of Breath or Respiratory Distress. 3/10/21   Kiley Zamarripa MD   dilTIAZem ER (DILACOR XR) 180 mg capsule Take 1 Cap by mouth daily. 3/10/21   Kiley Zamarripa MD   aspirin delayed-release (ADULT LOW DOSE ASPIRIN) 81 mg tablet Take 1 Tab by mouth daily. 3/26/19   Kiley Zamarripa MD       REVIEW OF SYSTEMS:         Review of Systems   Constitutional: Positive for fever. HENT: Positive for sore throat. Respiratory: Negative for cough and shortness of breath. Cardiovascular: Positive for palpitations. Negative for chest pain. Gastrointestinal: Positive for abdominal pain and nausea. Genitourinary: Positive for difficulty urinating. Musculoskeletal: Positive for arthralgias and back pain. Neurological: Positive for headaches. Objective:   VITALS:    Visit Vitals  BP (!) 154/95   Pulse (!) 110   Temp 97.5 °F (36.4 °C)   Resp 18   Ht 4' 11\" (1.499 m)   Wt 61.2 kg (135 lb)   SpO2 95%   BMI 27.27 kg/m²           Physical Exam  HENT:      Head: Normocephalic and atraumatic. Nose: Nose normal.      Mouth/Throat:      Mouth: Mucous membranes are dry. Eyes:      Conjunctiva/sclera: Conjunctivae normal.   Cardiovascular:      Rate and Rhythm: Tachycardia present. Rhythm irregular. Pulmonary:      Effort: Pulmonary effort is normal.      Breath sounds: Normal breath sounds. Abdominal:      General: Bowel sounds are normal.      Palpations: Abdomen is soft. Neurological:      Mental Status: She is alert. Psychiatric:         Mood and Affect: Mood is depressed. Behavior: Behavior is uncooperative. Judgment: Judgment is inappropriate.                 Procedures: see electronic medical records for all procedures/Xrays and details which were not copied into this note but were reviewed prior to creation of Plan. Recent Imaging studies(If Any)    CXR Results  (Last 48 hours)               10/22/21 1428  XR CHEST PORT Final result    Impression:  Stable cardiomegaly. Clear lungs. Narrative:  INDICATION: Tachycardia, not feeling well. Portable AP view of the chest.       Direct comparison made to prior chest x-ray dated April 2020. Cardiomediastinal silhouette is stable. Lungs are clear bilaterally. Pleural   spaces are normal and there is no pneumothorax. Osseous structures are intact. Echo Results  (Last 48 hours)    None           CT Results  (Last 48 hours)               10/22/21 2307  CT HEAD WO CONT Final result    Impression:  No acute intracranial hemorrhage. Chronic microvascular ischemic disease. Age   indeterminate but likely chronic left parietotemporal infarct. Narrative:  EXAM:  CT HEAD WO CONT       INDICATION: Altered mental status       COMPARISON: 4/22/2012       TECHNIQUE: Axial noncontrast head CT from foramen magnum to vertex. Coronal and   sagittal reformatted images were obtained. CT dose reduction was achieved   through use of a standardized protocol tailored for this examination and   automatic exposure control for dose modulation. FINDINGS:  There is diffuse age-related parenchymal volume loss. The ventricles   and sulci are age-appropriate without hydrocephalus. There is no mass effect or   midline shift. There is no intracranial hemorrhage or extra-axial fluid   collection. Scattered foci of low attenuation in the periventricular white   matter represent stable chronic microvascular ischemic changes. There is an age   indeterminate but likely chronic left parietotemporal infarct. The basal   cisterns are patent. The osseous structures are intact. The visualized paranasal sinuses and mastoid   air cells are clear.                   EKG RESULTS     Procedure Component Value Units Date/Time    EKG, 12 LEAD, SUBSEQUENT [[id number]] Collected: 10/22/21 1428    Order Status: Completed Updated: 10/22/21 2153     Ventricular Rate 102 BPM      Atrial Rate 234 BPM      QRS Duration 74 ms      Q-T Interval 352 ms      QTC Calculation (Bezet) 458 ms      Calculated R Axis -11 degrees      Calculated T Axis -35 degrees      Diagnosis --     Atrial fibrillation with rapid ventricular response  Inferior infarct , age undetermined  Anterior infarct , age undetermined    Confirmed by Keshia Reeve, P.V. ([id number]) on 10/22/2021 9:53:12 PM      EKG, 12 LEAD, INITIAL [[id number]] Collected: 10/22/21 1343    Order Status: Completed Updated: 10/22/21 2152     Ventricular Rate 160 BPM      Atrial Rate 178 BPM      QRS Duration 70 ms      Q-T Interval 284 ms      QTC Calculation (Bezet) 463 ms      Calculated R Axis -11 degrees      Calculated T Axis -2 degrees      Diagnosis --     Atrial fibrillation with rapid ventricular response  Cannot rule out Inferior infarct , age undetermined  When compared with ECG of 30-APR-2020 09:04,  Criteria for Septal infarct are no longer present  T wave inversion less evident in Anterolateral leads  Confirmed by Keshia Reeve, P.V. ([id number]) on 10/22/2021 9:52:35 PM             04/23/20    ECHO ADULT FOLLOW-UP OR LIMITED 05/01/2020 5/1/2020    Interpretation Summary  · Small pericardial effusion. · Image quality for this study was technically difficult. · Normal cavity size and systolic function (ejection fraction normal). Mild concentric hypertrophy. Estimated left ventricular ejection fraction is 55 - 60%. · Moderately dilated left atrium. · Mildly dilated right atrium. · Mitral valve thickening. There was no Doppler done with this study . Signed by: Juan Irizarry MD on 5/1/2020  4:43 PM           Recent Microbiology Data(If Any)  .   All Micro Results     Procedure Component Value Units Date/Time    CULTURE, URINE [[id number]] Collected: 10/22/21 2044    Order Status: Completed Updated: 10/22/21 2118 LAB DATA REVIEWED:    Recent Results (from the past 24 hour(s))   EKG, 12 LEAD, INITIAL    Collection Time: 10/22/21  1:43 PM   Result Value Ref Range    Ventricular Rate 160 BPM    Atrial Rate 178 BPM    QRS Duration 70 ms    Q-T Interval 284 ms    QTC Calculation (Bezet) 463 ms    Calculated R Axis -11 degrees    Calculated T Axis -2 degrees    Diagnosis       Atrial fibrillation with rapid ventricular response  Cannot rule out Inferior infarct , age undetermined  When compared with ECG of 30-APR-2020 09:04,  Criteria for Septal infarct are no longer present  T wave inversion less evident in Anterolateral leads  Confirmed by [employer], P.V. ([id number]) on 10/22/2021 9:52:35 PM     CBC WITH AUTOMATED DIFF    Collection Time: 10/22/21  2:00 PM   Result Value Ref Range    WBC 5.5 3.6 - 11.0 K/uL    RBC 3.91 3.80 - 5.20 M/uL    HGB 11.7 11.5 - 16.0 g/dL    HCT 36.6 35.0 - 47.0 %    MCV 93.6 80.0 - 99.0 FL    MCH 29.9 26.0 - 34.0 PG    MCHC 32.0 30.0 - 36.5 g/dL    RDW 15.0 (H) 11.5 - 14.5 %    PLATELET 896 467 - 040 K/uL    MPV 10.1 8.9 - 12.9 FL    NRBC 0.0 0  WBC    ABSOLUTE NRBC 0.00 0.00 - 0.01 K/uL    NEUTROPHILS 60 32 - 75 %    LYMPHOCYTES 26 12 - 49 %    MONOCYTES 10 5 - 13 %    EOSINOPHILS 3 0 - 7 %    BASOPHILS 1 0 - 1 %    IMMATURE GRANULOCYTES 0 0.0 - 0.5 %    ABS. NEUTROPHILS 3.3 1.8 - 8.0 K/UL    ABS. LYMPHOCYTES 1.4 0.8 - 3.5 K/UL    ABS. MONOCYTES 0.5 0.0 - 1.0 K/UL    ABS. EOSINOPHILS 0.1 0.0 - 0.4 K/UL    ABS. BASOPHILS 0.1 0.0 - 0.1 K/UL    ABS. IMM.  GRANS. 0.0 0.00 - 0.04 K/UL    DF AUTOMATED     METABOLIC PANEL, COMPREHENSIVE    Collection Time: 10/22/21  2:00 PM   Result Value Ref Range    Sodium 140 136 - 145 mmol/L    Potassium 4.2 3.5 - 5.1 mmol/L    Chloride 113 (H) 97 - 108 mmol/L    CO2 20 (L) 21 - 32 mmol/L    Anion gap 7 5 - 15 mmol/L    Glucose 123 (H) 65 - 100 mg/dL    BUN 29 (H) 6 - 20 MG/DL    Creatinine 2.28 (H) 0.55 - 1.02 MG/DL    BUN/Creatinine ratio 13 12 - 20      GFR est AA 26 (L) >60 ml/min/1.73m2    GFR est non-AA 21 (L) >60 ml/min/1.73m2    Calcium 9.6 8.5 - 10.1 MG/DL    Bilirubin, total 0.8 0.2 - 1.0 MG/DL    ALT (SGPT) 14 12 - 78 U/L    AST (SGOT) 11 (L) 15 - 37 U/L    Alk.  phosphatase 81 45 - 117 U/L    Protein, total 7.3 6.4 - 8.2 g/dL    Albumin 3.7 3.5 - 5.0 g/dL    Globulin 3.6 2.0 - 4.0 g/dL    A-G Ratio 1.0 (L) 1.1 - 2.2     TSH 3RD GENERATION    Collection Time: 10/22/21  2:00 PM   Result Value Ref Range    TSH 6.08 (H) 0.36 - 3.74 uIU/mL   MAGNESIUM    Collection Time: 10/22/21  2:00 PM   Result Value Ref Range    Magnesium 2.0 1.6 - 2.4 mg/dL   TROPONIN-HIGH SENSITIVITY    Collection Time: 10/22/21  2:12 PM   Result Value Ref Range    Troponin-High Sensitivity 20 0 - 51 ng/L   EKG, 12 LEAD, SUBSEQUENT    Collection Time: 10/22/21  2:28 PM   Result Value Ref Range    Ventricular Rate 102 BPM    Atrial Rate 234 BPM    QRS Duration 74 ms    Q-T Interval 352 ms    QTC Calculation (Bezet) 458 ms    Calculated R Axis -11 degrees    Calculated T Axis -35 degrees    Diagnosis       Atrial fibrillation with rapid ventricular response  Inferior infarct , age undetermined  Anterior infarct , age undetermined    Confirmed by Yang Barksdale, P.V. ([id number]) on 10/22/2021 9:53:12 PM     TROPONIN-HIGH SENSITIVITY    Collection Time: 10/22/21  4:22 PM   Result Value Ref Range    Troponin-High Sensitivity 23 0 - 51 ng/L   URINALYSIS W/ REFLEX CULTURE    Collection Time: 10/22/21  8:44 PM    Specimen: Urine   Result Value Ref Range    Color YELLOW/STRAW      Appearance CLOUDY (A) CLEAR      Specific gravity 1.017 1.003 - 1.030      pH (UA) 5.5 5.0 - 8.0      Protein 30 (A) NEG mg/dL    Glucose Negative NEG mg/dL    Ketone TRACE (A) NEG mg/dL    Bilirubin Negative NEG      Blood TRACE (A) NEG      Urobilinogen 0.2 0.2 - 1.0 EU/dL    Nitrites Positive (A) NEG      Leukocyte Esterase LARGE (A) NEG      WBC 20-50 0 - 4 /hpf    RBC 0-5 0 - 5 /hpf    Epithelial cells FEW FEW /lpf    Bacteria 3+ (A) NEG /hpf    UA:UC IF INDICATED URINE CULTURE ORDERED (A) CNI                    _______________________________________________________________________  Care Plan discussed with:    Comments   Patient x    Family      RN     Care Manager                    Consultant:  x    _______________________________________________________________________  Expected  Disposition:   Home with Family    HH/PT/OT/RN    SNF/LTC x   MEI    ________________________________________________________________________  TOTAL TIME:  39 Minutes    Critical Care Provided     Minutes non procedure based      Comments    x Reviewed previous records   >50% of visit spent in counseling and coordination of care x Discussion with patient and/or family and questions answered           Patient hemodynamically stable at time of admission    ________________________________________________________________________  Signed: Renea Velez, YAMILA, [facility]    Please note that this note was dictated using Dragon computer voice recognition software. Quite often unanticipated grammatical, syntax, homophones, and other interpretive errors are inadvertently transcribed by the computer software. Please disregard these errors. Please excuse any errors that have escaped final proofreading.

## 2021-10-23 NOTE — CONSULTS
Neurology Note    Patient ID:  Philipp Barrett  [id number]  85 y.o.  1950      Date of Consultation:  October 23, 2021    Referring Physician: Shawnee Lopez NP    Reason for Consultation:  Neurological symptoms      Assessment and Plan:    The patient is a pleasant 71-year-old female with multiple medical conditions who was admitted to the hospital with worsening mental status, tachycardia, emesis. Her neurological examination does reveal evidence of an aphasia. Acute encephalopathy:  The differential includes a new stroke, worsening of a baseline aphasia associated with urinary tract infection and metabolic derangements,  Seizure disorder but less likely. I would recommend the patient receive a brain MRI. I will order an EEG. Risk factors for stroke include prior stroke, hypertension, atrial fibrillation, dyslipidemia  Continue anticoagulation. Hypertension: Aggressive control with goal systolic blood pressure under 140  Atrial fibrillation: Continue rate control. Dyslipidemia: Updated LDL 89. Continue aggressive lipid-lowering therapy    Urinary tract infection - abx  Chronic headaches: On Topamax nightly  Hypothyroidism: Synthroid    Neurology will continue to follow along       Subjective: I am not sure why I am here     History of Present Illness:   Philipp Barrett is a 66 y.o. female with a history of congestive heart failure, atrial fibrillation, chronic kidney disease, dyslipidemia, prior stroke who was brought to the emergency department after not feeling well on the morning of 10/22/2021. From reviewing the medical records, the patient had vomited and had a very rapid heart rate and EMS was called. After EMS arrived, there was a question of medication compliance. There is also concerned about the patient's poor home situation. In the emergency department the patient was found to be confused with lack of poor insight and judgment into her medical condition.   Her blood pressure had been above 920 systolic while in the emergency department. Preliminary lab results are suggestive of possible UTI, increased creatinine. Normal CK, slightly elevated TSH. Hemoglobin A1c was 5.7, vitamin B12 458. CK normal, LDL was 82 and triglycerides 122. Upon my visit with the patient, the patient states that she is uncertain why she is in the hospital but the people wanted her to come in. She states that she has had multiple strokes in the past and this has impacted her ability to think clearly. She denies any pain or lightheadedness. No numbness or tingling. Upon reviewing the medical records, she was last seen by her primary care doctor approximately 2 weeks ago. She was having frequent headaches at that time and does take Topamax for that. 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 Axel Vázquez      cancer removal        Family History   Problem Relation Age of Onset    Hypertension Mother     Hypertension Sister     Heart Disease Brother     Suicide Brother     Diabetes Brother     Diabetes Brother     Suicide Brother     Cancer Brother     Hypertension Sister     Hypertension Sister     Hypertension Sister     Hypertension Sister         Social History     Tobacco Use    Smoking status: Never Smoker    Smokeless tobacco: Never Used   Substance Use Topics    Alcohol use: Not Currently        No Known Allergies     Prior to Admission medications    Medication Sig Start Date End Date Taking? Authorizing Provider   levothyroxine (SYNTHROID) 100 mcg tablet Take 1 Tablet by mouth Daily (before breakfast).  10/13/21   Bethel Esquivel MD   losartan (COZAAR) 25 mg tablet Take 1 Tablet by mouth daily. 10/12/21   Melissa Medley MD   metoprolol succinate (TOPROL-XL) 50 mg XL tablet Take 1 Tablet by mouth daily. 10/12/21   Melissa Medley MD   fluticasone propionate (FLONASE) 50 mcg/actuation nasal spray 2 Sprays by Both Nostrils route daily. 9/28/21   Melissa Medley MD   loratadine (Claritin) 10 mg tablet Take 1 Tablet by mouth daily. 9/28/21   Melissa Medley MD   Eliquis 5 mg tablet TAKE 1 TABLET BY MOUTH TWICE DAILY 9/22/21   Melissa Medley MD   topiramate (TOPAMAX) 50 mg tablet TAKE 1 TABLET BY MOUTH EVERY NIGHT 9/22/21   Melissa Medley MD   traZODone (DESYREL) 50 mg tablet TAKE 1 TABLET BY MOUTH EVERY NIGHT 9/22/21   Melissa Medley MD   pravastatin (PRAVACHOL) 80 mg tablet TAKE 1 TABLET BY MOUTH DAILY 9/16/21   Melissa Medley MD   pantoprazole (PROTONIX) 40 mg tablet TAKE 1 TABLET BY MOUTH DAILY BEFORE BREAKFAST 9/16/21   Melissa Medley MD   cholecalciferol (VITAMIN D3) (1000 Units /25 mcg) tablet Take 2 Tabs by mouth daily. 5/12/21   Melissa Medley MD   ipratropium-albuteroL (Combivent Respimat)  mcg/actuation inhaler Take 1 Puff by inhalation every six (6) hours. 3/10/21   Melissa Medley MD   albuterol (PROVENTIL HFA, VENTOLIN HFA, PROAIR HFA) 90 mcg/actuation inhaler Take 2 Puffs by inhalation every four (4) hours as needed for Shortness of Breath or Respiratory Distress. 3/10/21   Meilssa Medley MD   dilTIAZem ER (DILACOR XR) 180 mg capsule Take 1 Cap by mouth daily. 3/10/21   Melissa Medley MD   aspirin delayed-release (ADULT LOW DOSE ASPIRIN) 81 mg tablet Take 1 Tab by mouth daily.  3/26/19   Melissa Medley MD     Current Facility-Administered Medications   Medication Dose Route Frequency    dilTIAZem (CARDIZEM) 100 mg in 0.9% sodium chloride (MBP/ADV) 100 mL infusion  0-15 mg/hr IntraVENous TITRATE    acetaminophen (TYLENOL) tablet 650 mg  650 mg Oral Q6H PRN    ondansetron (ZOFRAN) injection 4 mg  4 mg IntraVENous Q4H PRN    diazePAM (VALIUM) injection 5 mg  5 mg IntraVENous ONCE    cefTRIAXone (ROCEPHIN) 1 g in 0.9% sodium chloride (MBP/ADV) 50 mL MBP  1 g IntraVENous Q24H    aspirin delayed-release tablet 81 mg  81 mg Oral DAILY    cholecalciferol (VITAMIN D3) (1000 Units /25 mcg) tablet 2,000 Units  2,000 Units Oral DAILY    apixaban (ELIQUIS) tablet 5 mg  5 mg Oral BID    fluticasone propionate (FLONASE) 50 mcg/actuation nasal spray 2 Spray  2 Spray Both Nostrils DAILY    cetirizine (ZYRTEC) tablet 10 mg  10 mg Oral DAILY    losartan (COZAAR) tablet 25 mg  25 mg Oral DAILY    metoprolol succinate (TOPROL-XL) XL tablet 50 mg  50 mg Oral DAILY    pantoprazole (PROTONIX) tablet 40 mg  40 mg Oral ACB    pravastatin (PRAVACHOL) tablet 80 mg  80 mg Oral DAILY    topiramate (TOPAMAX) tablet 50 mg  50 mg Oral QHS    traZODone (DESYREL) tablet 50 mg  50 mg Oral QHS    sodium chloride (NS) flush 5-40 mL  5-40 mL IntraVENous Q8H    sodium chloride (NS) flush 5-40 mL  5-40 mL IntraVENous PRN    acetaminophen (TYLENOL) tablet 650 mg  650 mg Oral Q4H PRN    HYDROcodone-acetaminophen (NORCO) 5-325 mg per tablet 1 Tablet  1 Tablet Oral Q4H PRN    polyethylene glycol (MIRALAX) packet 17 g  17 g Oral DAILY PRN    0.9% sodium chloride infusion  100 mL/hr IntraVENous CONTINUOUS    albuterol (PROVENTIL VENTOLIN) nebulizer solution 2.5 mg  2.5 mg Nebulization Q4H PRN    levothyroxine (SYNTHROID) tablet 100 mcg  100 mcg Oral 6am       Review of Systems:    General, constitutional: negative  Eyes, vision: negative  Ears, nose, throat: negative  Cardiovascular, heart: negative  Respiratory: negative  Gastrointestinal: negative  Genitourinary: negative  Musculoskeletal: negative  Skin and integumentary: negative  Psychiatric: negative  Endocrine: negative  Neurological: negative, except for HPI  Hematologic/lymphatic: negative  Allergy/immunology: negative    Objective:     Visit Vitals  BP (!) 144/95   Pulse 95   Temp 97.4 °F (36.3 °C)   Resp 18 Ht 4' 11\" (1.499 m)   Wt 135 lb (61.2 kg) Comment: pt stated   SpO2 98%   BMI 27.27 kg/m²       Physical Exam:      General:  appears well nourished in no acute distress  Neck: no carotid bruits  Lungs: clear to auscultation  Heart:  no murmurs, regular rate  Lower extremity: peripheral pulses palpable and no edema  Skin: intact    Neurological exam:    The patient is awake and alert. She is oriented to person and place. She does know she is in the hospital.  She does not know the year or the month. She was able to perform simple calculations for me. She had some difficulty with naming objects but with certain close she was able to name objects. She can follow one-step commands for me. She does feel that her mental status is close to her baseline. I have not been able to confirm this with family today. Cranial nerves:   II-XII were tested    Perrrla  Fundoscopic examination revealed venous pulsations and no clear abnormalities  Visual fields were full  Eomi, no evidence of nystagmus  Facial sensation:  normal and symmetric  Facial motor: normal and symmetric  Hearing intact  SCM strength intact  Tongue: midline without fasciculations    Motor: Tone normal  No pronator drift. No evidence of fasciculations    Strength testing:  She had difficult time participating fully with motor testing but there was no evidence of a pronator drift and she is at least a 4+ out of 5 strength throughout. Sensory:  Upper extremity: intact to pp,  Lower extremity: intact to pp,  She had a difficult time with comprehension of more detailed sensory testing. Reflexes:    Right Left  Biceps  2 2  Triceps 2 2  Brachiorad. 2 2  Patella  2 2  Achilles 2 2    Plantar response: Extensor on the right      Cerebellar testing:  no tremor apparent, finger/nose and indra were intact    Gait: This was not assessed due to concerns over safety.     Labs:     Lab Results   Component Value Date/Time    Hemoglobin A1c 5.7 (H) 10/12/2021 02:45 PM    Sodium 144 10/23/2021 04:35 AM    Potassium 4.4 10/23/2021 04:35 AM    Chloride 116 (H) 10/23/2021 04:35 AM    Glucose 120 (H) 10/23/2021 04:35 AM    BUN 23 (H) 10/23/2021 04:35 AM    Creatinine 2.07 (H) 10/23/2021 04:35 AM    Calcium 9.5 10/23/2021 04:35 AM    WBC 6.3 10/23/2021 04:35 AM    HCT 34.7 (L) 10/23/2021 04:35 AM    HGB 11.0 (L) 10/23/2021 04:35 AM    PLATELET 236 80/84/0200 04:35 AM       Imaging:    Results from Hospital Encounter encounter on 03/25/13    MRI LUMB SPINE WO CONT    Narrative  **Final Report**      ICD Codes / Adm. Diagnosis: 585.9   / Lumbago  Examination:  MR CABRERA SPINE WO CON  - [id number] - Mar 25 2013  3:38PM  Accession No:  [id number]  Reason:  low back pain      REPORT:  INDICATION: Low back pain. Severe increasing pain for 6 months. TECHNIQUE: Sagittal T1, T2, STIR and axial T1 and T2 weighted images of the  lumbar spine were obtained. COMPARISON: Lumbar spine x-ray 11/8/12  FINDINGS:  The distal spinal cord has normal contour and signal.  The lumbar vertebra are in good alignment. T10-11: Chronic loss of disc space height with mild endplate osteophyte  formation. Mild stenosis without cord compression. T11-12: Loss of disc space height. Small broad-based posterior disc  bulge/protrusion with mild stenosis. Mild cord displacement without definite  compression. T12-L1: No significant posterior disc bulge or stenosis. L1-L2:  Mild disc bulge without significant stenosis. Minimal facet  hypertrophy. L2-L3:  Small broad-based posterior disc bulge/protrusion. Mild facet  hypertrophy. Mild spinal stenosis. L3-L4:  Mild diffuse disc bulge. Moderate facet arthrosis and hypertrophy. Minimal/mild stenosis. L4-L5:  Chronic facet joint osteoarthrosis and hypertrophy. Grade 1  degenerative spondylolisthesis. Ligamentum flavum thickening. Broad-based  posterior disc bulge with left posterior lateral protrusion extending into  the left foramen.  Severe spinal canal and left foramina stenosis. L5-S1:  Chronic appearing total loss of disc space height with degenerative  endplate changes. Retrolisthesis of L5 on S1. At least moderate facet  arthrosis greater on the left. No significant central stenosis. Severe left  foramina stenosis. Mild/moderate right foraminal stenosis. IMPRESSION:  1. L5-S1 chronic degenerative change with severe left foramina stenosis. 2. L4-5 chronic degenerative change with severe spinal canal and left  foramina stenosis. 3. L3-4 minimal/mild stenosis. 4. L2-3 mild foramina stenosis. Signing/Reading Doctor: Radha Zambrano ([id number])  Approved: Radha Zamrbano ([id number])  Mar 25 2013  4:23PM      Results from Hospital Encounter encounter on 10/22/21    CT HEAD WO CONT    Narrative  EXAM:  CT HEAD WO CONT    INDICATION: Altered mental status    COMPARISON: 4/22/2012    TECHNIQUE: Axial noncontrast head CT from foramen magnum to vertex. Coronal and  sagittal reformatted images were obtained. CT dose reduction was achieved  through use of a standardized protocol tailored for this examination and  automatic exposure control for dose modulation. FINDINGS:  There is diffuse age-related parenchymal volume loss. The ventricles  and sulci are age-appropriate without hydrocephalus. There is no mass effect or  midline shift. There is no intracranial hemorrhage or extra-axial fluid  collection. Scattered foci of low attenuation in the periventricular white  matter represent stable chronic microvascular ischemic changes. There is an age  indeterminate but likely chronic left parietotemporal infarct. The basal  cisterns are patent. The osseous structures are intact. The visualized paranasal sinuses and mastoid  air cells are clear. Impression  No acute intracranial hemorrhage. Chronic microvascular ischemic disease. Age  indeterminate but likely chronic left parietotemporal infarct. I did independently review the head CT from October 22, 2021. There is no acute abnormalities. There is chronic microvascular ischemic disease and diffuse atrophy.   There is an old chronic left parietal temporal stroke             Principal Problem:    Atrial fibrillation with RVR ([facility] [address] (4/23/2020)    Active Problems:    CKD (chronic kidney disease) (7/90/0737)      Diastolic CHF, chronic ([facility] [address] (4/30/2020)      Hypertensive emergency (10/22/2021)      Impaired insight (10/22/2021)      Noncompliance (10/22/2021)      Passive suicidal ideations (10/22/2021)      Confusion (10/22/2021)                   Signed By:  Rosita Harman DO FAAN    October 23, 2021

## 2021-10-23 NOTE — CONSULTS
PSYCHIATRY CONSULT NOTE:    REASON FOR CONSULT: Behavioral evaluation    HISTORY OF PRESENTING COMPLAINT:  Eric Thayer is a 66 y.o. DECLINED female who is currently admitted to the medical floor at [facility]. She told EMS that she didn't care if she lived or  when they picked her up, but later clarified she was not actively suicidal, just meant she felt ready to die. Neuro workup found aphasia secondary to UTI and metabolic derangements. She attempted to leave the ED AMA and a TDO petition was implemented by ED physician due to poor insight into severity of medical condition. Ms. Amanda Bennett states \"I'm feeling fine. \" She states she is not feeling depressed currently. She denies symptoms of anxiety. She denies feeling like she wants to hurt herself or end her life. She states she has never felt like she wanted to end her life, but felt that \"if God took me now, that would be OK. \" She was alert and oriented, but did exhibit memory lapses at times. PAST PSYCHIATRIC HISTORY and SUBSTANCE ABUSE HISTORY:  Ms. Amanda Bennett has never been hospitalized for mental health. She denies ever attempting suicide. She has never seen a psychiatrist. She denies a hx of perceptual disturbances. She has never taken psychiatric medication. Highest level of education completed is 12th grade. She has worked as a . She is single. Ms. Amanda Bennett lives at home but has home health aides who check on her. Family psychiatric hx includes brother who  by suicide. She denies a hx of substance abuse. PAST MEDICAL HISTORY:    Please see H&P for details. 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 [address]     Heart failure ([facility] [address]     MI    Hypertension      Prior to Admission medications    Medication Sig Start Date End Date Taking? Authorizing Provider   levothyroxine (SYNTHROID) 100 mcg tablet Take 1 Tablet by mouth Daily (before breakfast).  10/13/21   Ryan Booker MD   losartan (COZAAR) 25 mg tablet Take 1 Tablet by mouth daily. 10/12/21   Gary Lazaro MD   metoprolol succinate (TOPROL-XL) 50 mg XL tablet Take 1 Tablet by mouth daily. 10/12/21   Gary Lazaro MD   fluticasone propionate (FLONASE) 50 mcg/actuation nasal spray 2 Sprays by Both Nostrils route daily. 9/28/21   Gary Lazaro MD   loratadine (Claritin) 10 mg tablet Take 1 Tablet by mouth daily. 9/28/21   Gary Lazaro MD   Eliquis 5 mg tablet TAKE 1 TABLET BY MOUTH TWICE DAILY 9/22/21   Gary Lazaro MD   topiramate (TOPAMAX) 50 mg tablet TAKE 1 TABLET BY MOUTH EVERY NIGHT 9/22/21   Gary Lazaro MD   traZODone (DESYREL) 50 mg tablet TAKE 1 TABLET BY MOUTH EVERY NIGHT 9/22/21   Gary Lazaro MD   pravastatin (PRAVACHOL) 80 mg tablet TAKE 1 TABLET BY MOUTH DAILY 9/16/21   Gary Lazaro MD   pantoprazole (PROTONIX) 40 mg tablet TAKE 1 TABLET BY MOUTH DAILY BEFORE BREAKFAST 9/16/21   Gary Lazaro MD   cholecalciferol (VITAMIN D3) (1000 Units /25 mcg) tablet Take 2 Tabs by mouth daily. 5/12/21   Gary Lazaro MD   ipratropium-albuteroL (Combivent Respimat)  mcg/actuation inhaler Take 1 Puff by inhalation every six (6) hours. 3/10/21   Gary Lazaro MD   albuterol (PROVENTIL HFA, VENTOLIN HFA, PROAIR HFA) 90 mcg/actuation inhaler Take 2 Puffs by inhalation every four (4) hours as needed for Shortness of Breath or Respiratory Distress. 3/10/21   Gary Lazaro MD   dilTIAZem ER (DILACOR XR) 180 mg capsule Take 1 Cap by mouth daily. 3/10/21   Gary Lazaro MD   aspirin delayed-release (ADULT LOW DOSE ASPIRIN) 81 mg tablet Take 1 Tab by mouth daily.  3/26/19   Gary Lazaro MD     Vitals:    10/23/21 1338 10/23/21 1348 10/23/21 1400 10/23/21 1419   BP: 139/83 (!) 139/93 (!) 141/86 (!) 135/107   Pulse: 75 79 74 73   Resp: 18 18 18 18   Temp: 97.6 °F (36.4 °C) 97.5 °F (36.4 °C) 98 °F (36.7 °C) 97.9 °F (36.6 °C)   SpO2: 93% 92% 94% 94%   Weight: Height:         Lab Results   Component Value Date/Time    WBC 6.3 10/23/2021 04:35 AM    Hemoglobin (POC) 11.2 (L) 09/28/2016 07:26 AM    HGB 11.0 (L) 10/23/2021 04:35 AM    Hematocrit (POC) 33 (L) 09/28/2016 07:26 AM    HCT 34.7 (L) 10/23/2021 04:35 AM    PLATELET 652 17/02/2537 04:35 AM    MCV 94.3 10/23/2021 04:35 AM     Lab Results   Component Value Date/Time    Sodium 144 10/23/2021 04:35 AM    Potassium 4.4 10/23/2021 04:35 AM    Chloride 116 (H) 10/23/2021 04:35 AM    CO2 20 (L) 10/23/2021 04:35 AM    Anion gap 8 10/23/2021 04:35 AM    Glucose 120 (H) 10/23/2021 04:35 AM    BUN 23 (H) 10/23/2021 04:35 AM    Creatinine 2.07 (H) 10/23/2021 04:35 AM    BUN/Creatinine ratio 11 (L) 10/23/2021 04:35 AM    GFR est AA 29 (L) 10/23/2021 04:35 AM    GFR est non-AA 24 (L) 10/23/2021 04:35 AM    Calcium 9.5 10/23/2021 04:35 AM    Bilirubin, total 0.6 10/23/2021 04:35 AM    Alk. phosphatase 78 10/23/2021 04:35 AM    Protein, total 7.1 10/23/2021 04:35 AM    Albumin 3.4 (L) 10/23/2021 04:35 AM    Globulin 3.7 10/23/2021 04:35 AM    A-G Ratio 0.9 (L) 10/23/2021 04:35 AM    ALT (SGPT) 12 10/23/2021 04:35 AM    AST (SGOT) 11 (L) 10/23/2021 04:35 AM     No results found for: VALF2, VALAC, VALP, VALPR, DS6, CRBAM, CRBAMP, CARB2, XCRBAM  No results found for: LITHM  RADIOLOGY REPORTS:(reviewed/updated 10/23/2021)  CT HEAD WO CONT    Result Date: 10/22/2021  EXAM:  CT HEAD WO CONT INDICATION: Altered mental status COMPARISON: 4/22/2012 TECHNIQUE: Axial noncontrast head CT from foramen magnum to vertex. Coronal and sagittal reformatted images were obtained. CT dose reduction was achieved through use of a standardized protocol tailored for this examination and automatic exposure control for dose modulation. FINDINGS:  There is diffuse age-related parenchymal volume loss. The ventricles and sulci are age-appropriate without hydrocephalus. There is no mass effect or midline shift.  There is no intracranial hemorrhage or extra-axial fluid collection. Scattered foci of low attenuation in the periventricular white matter represent stable chronic microvascular ischemic changes. There is an age indeterminate but likely chronic left parietotemporal infarct. The basal cisterns are patent. The osseous structures are intact. The visualized paranasal sinuses and mastoid air cells are clear. No acute intracranial hemorrhage. Chronic microvascular ischemic disease. Age indeterminate but likely chronic left parietotemporal infarct. XR CHEST PORT    Result Date: 10/22/2021  INDICATION: Tachycardia, not feeling well. Portable AP view of the chest. Direct comparison made to prior chest x-ray dated April 2020. Cardiomediastinal silhouette is stable. Lungs are clear bilaterally. Pleural spaces are normal and there is no pneumothorax. Osseous structures are intact. Stable cardiomegaly. Clear lungs. No results found for: [address], [id number], [id number], [id number], PREGU, POCHCG, MHCGN, HCGQR, THCGA1, SHCG, HCGN, HCGSERUM, HCGURQLPOC    MENTAL STATUS EXAM:    General appearance: Moderately groomed, psychomotor activity is WNL  Eye contact: Avoids eye contact  Speech: Spontaneous, soft, decreased output. Affect : appropriate   Mood: \"fine\"  Thought Process: linear  Perception: Denies AH or VH. Thought Content: denies SI or Plan  Insight: fair  Judgmnt: fair  Cognition: Intact grossly. ASSESSMENT AND PLAN:  Philipp Barrett meets criteria for a diagnosis of adjustment disorder.  -Plan: Ms. Jase Vera denies thoughts of suicide/self-harm currently and agrees to remain safe. 1:1 sitter can be discontinued. No other changes indicated at this time, psychiatric hospitalization not necessary. Thank your your consult. Please feel free to consult us again as needed.

## 2021-10-23 NOTE — PROGRESS NOTES
TRANSFER - IN REPORT:  Verbal report received from ED RN(name) on Nelda Daigle  being received from ED(unit) for routine progression of care    Report consisted of patients Situation, Background, Assessment and   Recommendations(SBAR). Information from the following report(s) SBAR, Kardex, ED Summary, Procedure Summary, Intake/Output, MAR and Recent Results was reviewed with the receiving nurse. Opportunity for questions and clarification was provided. Assessment completed upon patients arrival to unit and care assumed. Primary Nurse Shade Preston RN and Ramiro Hampton RN performed a dual skin assessment on this patient No impairment noted  Aldo score is 23.

## 2021-10-23 NOTE — CONSULTS
Consult    NAME: Ernie Miranda   :  1950   MRN:  [id number]     Date/Time:  10/23/2021 11:28 AM    Patient PCP: Eric Hamilton MD  ________________________________________________________________________     Assessment:     1. Encephalopathy  2. Urinary tract infection  3. Chronic atrial fibrillation  4. CAD s/p prior MI. Echo  w/ EF 55-60%, mod LAE  5. Hyperlipidemia  6. Hypertension  7. Prior CVA  8. CKD; Stg 4  9. Hypothyroidism  10. Ovarian CA  11. Usual Cardiologist:  Dr. Manuel Christy:   HRs in 60s in AF  Compliance in question    Pt wants to go home    1. Stop dilt gtt  2. Continue eliquis  3. Continue metoprolol  4. Continue ASA  5. Continue statin  6. Continue diltiazem orally  7. Echo ordered by primary    Thank you for this consult and allowing me to take part in this patients care. Please call with questions. [x]        High complexity decision making was performed        Subjective:   CHIEF COMPLAINT: AF    HISTORY OF PRESENT ILLNESS:     Cassie Link is a 66 y.o.  female who was \"brought to the ED by EMS for A. fib with RVR. Patient's home health aide checked on her today, and noted that she was in rapid A. fib, subsequently calling EMS. Per EMS, patient was found at home with several bottles of her usual medications, all of which were picked up from 3 months ago. Per EMS, all of the bottles appear to be full. They are concerned that patient has been noncompliant with meds. She lives alone, and has occasional home health aides that check on her intermittently. EMS report that patient told him that she did not care if she lived or . Did not endorse active suicidality. She did not initially want to be evaluated in the ED, but EMS was able to persuade her to come. Patient currently denies any palpitations, chest pain, shortness of breath, lightheadedness, dizziness. She does complain of a headache, along with nausea and vomiting.   States that the symptoms came on this morning. She denies any associated abdominal pain, fevers or chills. She has no concerning neurologic symptoms. When questioned about possibility of medication noncompliance, patient states that she has been taking her medications except for today. When questioned about what she told EMS in terms of not wanting to live, she states that she only means if she were to die today that she would not be sad. Tells me that she is \"ready to go. \"  States that she is not actively suicidal, and has not attempted to hurt herself or jeopardize her own life in any way. Patient denies all other complaints.:      We were asked to consult for work up and evaluation of the above problems.      Past Medical History:   Diagnosis Date    Cancer ([facility] [address]     ovaian stomach    CVA (cerebral vascular accident) ([facility] [address]     Heart failure ([facility] [address]     MI    Hypertension       Past Surgical History:   Procedure Laterality Date    HX GYN      hysterectomy    HX LUMBAR LAMINECTOMY  06/29/2016    L4-S1, Dr. Susan Okeefe      cancer removal     No Known Allergies   Meds:  See below  Social History     Tobacco Use    Smoking status: Never Smoker    Smokeless tobacco: Never Used   Substance Use Topics    Alcohol use: Not Currently      Family History   Problem Relation Age of Onset    Hypertension Mother     Hypertension Sister     Heart Disease Brother     Suicide Brother     Diabetes Brother     Diabetes Brother     Suicide Brother     Cancer Brother     Hypertension Sister     Hypertension Sister     Hypertension Sister     Hypertension Sister        REVIEW OF SYSTEMS:     []         Unable to obtain  ROS due to ---   [x]         Total of 12 systems reviewed as follows:    Constitutional: negative fever, negative chills, negative weight loss  Eyes:   negative visual changes  ENT:   negative sore throat, tongue or lip swelling  Respiratory:  negative cough, negative dyspnea  Cards:  negative for chest pain, palpitations, lower extremity edema  GI:   negative for nausea, vomiting, diarrhea, and abdominal pain  Genitourinary: negative for frequency, dysuria  Integument:  negative for rash   Hematologic:  negative for easy bruising and gum/nose bleeding  Musculoskel: negative for myalgias,  back pain  Neurological:  negative for headaches, dizziness, vertigo, weakness  Behavl/Psych: negative for feelings of anxiety, depression     Pertinent Positives include :    Objective:      Physical Exam:    Last 24hrs VS reviewed since prior progress note. Most recent are:    Visit Vitals  BP (!) 147/95 (BP 1 [facility]: Right upper arm, BP Patient Position: Lying; At rest)   Pulse 67   Temp 97.6 °F (36.4 °C)   Resp 18   Ht 4' 11\" (1.499 m)   Wt 61.2 kg (135 lb) Comment: pt stated   SpO2 92%   BMI 27.27 kg/m²     No intake or output data in the 24 hours ending 10/23/21 1128     General Appearance: Well developed, well nourished, alert & oriented x 3,    no acute distress. Ears/Nose/Mouth/Throat: Pupils equal and round, Hearing grossly normal.  Neck: Supple. JVP within normal limits. Carotids good upstrokes, with no bruit. Chest: Lungs clear to auscultation bilaterally. Cardiovascular: Irregular rate and rhythm, S1S2 normal, no murmur, rubs, gallops. Abdomen: Soft, non-tender, bowel sounds are active. No organomegaly. Extremities: No edema bilaterally. Femoral pulses +2, Distal Pulses +1. Skin: Warm and dry. Neuro: CN II-XII grossly intact, Strength and sensation grossly intact. []         Post-cath site without hematoma, bruit, tenderness, or thrill. Distal pulses intact. Data:      Telemetry:  AF    EKG:  AF  []  No new EKG for review. Prior to Admission medications    Medication Sig Start Date End Date Taking? Authorizing Provider   levothyroxine (SYNTHROID) 100 mcg tablet Take 1 Tablet by mouth Daily (before breakfast).  10/13/21   Lafrances Skiff, MD   losartan (COZAAR) 25 mg tablet Take 1 Tablet by mouth daily. 10/12/21   Steph Milton MD   metoprolol succinate (TOPROL-XL) 50 mg XL tablet Take 1 Tablet by mouth daily. 10/12/21   Steph Milton MD   fluticasone propionate (FLONASE) 50 mcg/actuation nasal spray 2 Sprays by Both Nostrils route daily. 9/28/21   Steph Milton MD   loratadine (Claritin) 10 mg tablet Take 1 Tablet by mouth daily. 9/28/21   Steph Milton MD   Eliquis 5 mg tablet TAKE 1 TABLET BY MOUTH TWICE DAILY 9/22/21   Steph Milton MD   topiramate (TOPAMAX) 50 mg tablet TAKE 1 TABLET BY MOUTH EVERY NIGHT 9/22/21   Steph Milton MD   traZODone (DESYREL) 50 mg tablet TAKE 1 TABLET BY MOUTH EVERY NIGHT 9/22/21   Steph Milton MD   pravastatin (PRAVACHOL) 80 mg tablet TAKE 1 TABLET BY MOUTH DAILY 9/16/21   Steph Milton MD   pantoprazole (PROTONIX) 40 mg tablet TAKE 1 TABLET BY MOUTH DAILY BEFORE BREAKFAST 9/16/21   Steph Milton MD   cholecalciferol (VITAMIN D3) (1000 Units /25 mcg) tablet Take 2 Tabs by mouth daily. 5/12/21   Steph Milton MD   ipratropium-albuteroL (Combivent Respimat)  mcg/actuation inhaler Take 1 Puff by inhalation every six (6) hours. 3/10/21   Steph Milton MD   albuterol (PROVENTIL HFA, VENTOLIN HFA, PROAIR HFA) 90 mcg/actuation inhaler Take 2 Puffs by inhalation every four (4) hours as needed for Shortness of Breath or Respiratory Distress. 3/10/21   Steph Milton MD   dilTIAZem ER (DILACOR XR) 180 mg capsule Take 1 Cap by mouth daily. 3/10/21   Steph Milton MD   aspirin delayed-release (ADULT LOW DOSE ASPIRIN) 81 mg tablet Take 1 Tab by mouth daily.  3/26/19   Steph Milton MD       Recent Results (from the past 24 hour(s))   EKG, 12 LEAD, INITIAL    Collection Time: 10/22/21  1:43 PM   Result Value Ref Range    Ventricular Rate 160 BPM    Atrial Rate 178 BPM    QRS Duration 70 ms    Q-T Interval 284 ms    QTC Calculation (Bezet) 463 ms    Calculated R Axis -11 degrees    Calculated T Axis -2 degrees    Diagnosis       Atrial fibrillation with rapid ventricular response  Cannot rule out Inferior infarct , age undetermined  When compared with ECG of 30-APR-2020 09:04,  Criteria for Septal infarct are no longer present  T wave inversion less evident in Anterolateral leads  Confirmed by JESÚS Mcgraw ([id number]) on 10/22/2021 9:52:35 PM     CBC WITH AUTOMATED DIFF    Collection Time: 10/22/21  2:00 PM   Result Value Ref Range    WBC 5.5 3.6 - 11.0 K/uL    RBC 3.91 3.80 - 5.20 M/uL    HGB 11.7 11.5 - 16.0 g/dL    HCT 36.6 35.0 - 47.0 %    MCV 93.6 80.0 - 99.0 FL    MCH 29.9 26.0 - 34.0 PG    MCHC 32.0 30.0 - 36.5 g/dL    RDW 15.0 (H) 11.5 - 14.5 %    PLATELET 538 987 - 296 K/uL    MPV 10.1 8.9 - 12.9 FL    NRBC 0.0 0  WBC    ABSOLUTE NRBC 0.00 0.00 - 0.01 K/uL    NEUTROPHILS 60 32 - 75 %    LYMPHOCYTES 26 12 - 49 %    MONOCYTES 10 5 - 13 %    EOSINOPHILS 3 0 - 7 %    BASOPHILS 1 0 - 1 %    IMMATURE GRANULOCYTES 0 0.0 - 0.5 %    ABS. NEUTROPHILS 3.3 1.8 - 8.0 K/UL    ABS. LYMPHOCYTES 1.4 0.8 - 3.5 K/UL    ABS. MONOCYTES 0.5 0.0 - 1.0 K/UL    ABS. EOSINOPHILS 0.1 0.0 - 0.4 K/UL    ABS. BASOPHILS 0.1 0.0 - 0.1 K/UL    ABS. IMM. GRANS. 0.0 0.00 - 0.04 K/UL    DF AUTOMATED     METABOLIC PANEL, COMPREHENSIVE    Collection Time: 10/22/21  2:00 PM   Result Value Ref Range    Sodium 140 136 - 145 mmol/L    Potassium 4.2 3.5 - 5.1 mmol/L    Chloride 113 (H) 97 - 108 mmol/L    CO2 20 (L) 21 - 32 mmol/L    Anion gap 7 5 - 15 mmol/L    Glucose 123 (H) 65 - 100 mg/dL    BUN 29 (H) 6 - 20 MG/DL    Creatinine 2.28 (H) 0.55 - 1.02 MG/DL    BUN/Creatinine ratio 13 12 - 20      GFR est AA 26 (L) >60 ml/min/1.73m2    GFR est non-AA 21 (L) >60 ml/min/1.73m2    Calcium 9.6 8.5 - 10.1 MG/DL    Bilirubin, total 0.8 0.2 - 1.0 MG/DL    ALT (SGPT) 14 12 - 78 U/L    AST (SGOT) 11 (L) 15 - 37 U/L    Alk.  phosphatase 81 45 - 117 U/L    Protein, total 7.3 6.4 - 8.2 g/dL    Albumin 3.7 3.5 - 5.0 g/dL Globulin 3.6 2.0 - 4.0 g/dL    A-G Ratio 1.0 (L) 1.1 - 2.2     TSH 3RD GENERATION    Collection Time: 10/22/21  2:00 PM   Result Value Ref Range    TSH 6.08 (H) 0.36 - 3.74 uIU/mL   MAGNESIUM    Collection Time: 10/22/21  2:00 PM   Result Value Ref Range    Magnesium 2.0 1.6 - 2.4 mg/dL   TROPONIN-HIGH SENSITIVITY    Collection Time: 10/22/21  2:12 PM   Result Value Ref Range    Troponin-High Sensitivity 20 0 - 51 ng/L   EKG, 12 LEAD, SUBSEQUENT    Collection Time: 10/22/21  2:28 PM   Result Value Ref Range    Ventricular Rate 102 BPM    Atrial Rate 234 BPM    QRS Duration 74 ms    Q-T Interval 352 ms    QTC Calculation (Bezet) 458 ms    Calculated R Axis -11 degrees    Calculated T Axis -35 degrees    Diagnosis       Atrial fibrillation with rapid ventricular response  Inferior infarct , age undetermined  Anterior infarct , age undetermined    Confirmed by Anjum Arguelles, P.V. ([id number]) on 10/22/2021 9:53:12 PM     TROPONIN-HIGH SENSITIVITY    Collection Time: 10/22/21  4:22 PM   Result Value Ref Range    Troponin-High Sensitivity 23 0 - 51 ng/L   URINALYSIS W/ REFLEX CULTURE    Collection Time: 10/22/21  8:44 PM    Specimen: Urine   Result Value Ref Range    Color YELLOW/STRAW      Appearance CLOUDY (A) CLEAR      Specific gravity 1.017 1.003 - 1.030      pH (UA) 5.5 5.0 - 8.0      Protein 30 (A) NEG mg/dL    Glucose Negative NEG mg/dL    Ketone TRACE (A) NEG mg/dL    Bilirubin Negative NEG      Blood TRACE (A) NEG      Urobilinogen 0.2 0.2 - 1.0 EU/dL    Nitrites Positive (A) NEG      Leukocyte Esterase LARGE (A) NEG      WBC 20-50 0 - 4 /hpf    RBC 0-5 0 - 5 /hpf    Epithelial cells FEW FEW /lpf    Bacteria 3+ (A) NEG /hpf    UA:UC IF INDICATED URINE CULTURE ORDERED (A) CNI     TSH 3RD GENERATION    Collection Time: 10/23/21  4:35 AM   Result Value Ref Range    TSH 6.90 (H) 0.36 - 3.74 uIU/mL   VITAMIN B12    Collection Time: 10/23/21  4:35 AM   Result Value Ref Range    Vitamin B12 424 193 - 986 pg/mL   CBC WITH AUTOMATED DIFF    Collection Time: 10/23/21  4:35 AM   Result Value Ref Range    WBC 6.3 3.6 - 11.0 K/uL    RBC 3.68 (L) 3.80 - 5.20 M/uL    HGB 11.0 (L) 11.5 - 16.0 g/dL    HCT 34.7 (L) 35.0 - 47.0 %    MCV 94.3 80.0 - 99.0 FL    MCH 29.9 26.0 - 34.0 PG    MCHC 31.7 30.0 - 36.5 g/dL    RDW 14.8 (H) 11.5 - 14.5 %    PLATELET 227 925 - 771 K/uL    MPV 10.0 8.9 - 12.9 FL    NRBC 0.0 0  WBC    ABSOLUTE NRBC 0.00 0.00 - 0.01 K/uL    NEUTROPHILS 56 32 - 75 %    LYMPHOCYTES 28 12 - 49 %    MONOCYTES 13 5 - 13 %    EOSINOPHILS 2 0 - 7 %    BASOPHILS 1 0 - 1 %    IMMATURE GRANULOCYTES 0 0.0 - 0.5 %    ABS. NEUTROPHILS 3.4 1.8 - 8.0 K/UL    ABS. LYMPHOCYTES 1.8 0.8 - 3.5 K/UL    ABS. MONOCYTES 0.8 0.0 - 1.0 K/UL    ABS. EOSINOPHILS 0.2 0.0 - 0.4 K/UL    ABS. BASOPHILS 0.1 0.0 - 0.1 K/UL    ABS. IMM.  GRANS. 0.0 0.00 - 0.04 K/UL    DF AUTOMATED     CK    Collection Time: 10/23/21  4:35 AM   Result Value Ref Range     26 - 192 U/L   TROPONIN-HIGH SENSITIVITY    Collection Time: 10/23/21  4:35 AM   Result Value Ref Range    Troponin-High Sensitivity 26 0 - 51 ng/L   CK W/ REFLX CKMB    Collection Time: 10/23/21  4:35 AM   Result Value Ref Range     26 - 192 U/L   NT-PRO BNP    Collection Time: 10/23/21  4:35 AM   Result Value Ref Range    NT pro-BNP 4,662 (H) <125 PG/ML   LIPID PANEL    Collection Time: 10/23/21  4:35 AM   Result Value Ref Range    Cholesterol, total 149 <200 MG/DL    Triglyceride 67 <150 MG/DL    HDL Cholesterol 46 MG/DL    LDL, calculated 89.6 0 - 100 MG/DL    VLDL, calculated 13.4 MG/DL    CHOL/HDL Ratio 3.2 0.0 - 5.0     MAGNESIUM    Collection Time: 10/23/21  4:35 AM   Result Value Ref Range    Magnesium 2.2 1.6 - 2.4 mg/dL   METABOLIC PANEL, COMPREHENSIVE    Collection Time: 10/23/21  4:35 AM   Result Value Ref Range    Sodium 144 136 - 145 mmol/L    Potassium 4.4 3.5 - 5.1 mmol/L    Chloride 116 (H) 97 - 108 mmol/L    CO2 20 (L) 21 - 32 mmol/L    Anion gap 8 5 - 15 mmol/L    Glucose 120 (H) 65 - 100 mg/dL    BUN 23 (H) 6 - 20 MG/DL    Creatinine 2.07 (H) 0.55 - 1.02 MG/DL    BUN/Creatinine ratio 11 (L) 12 - 20      GFR est AA 29 (L) >60 ml/min/1.73m2    GFR est non-AA 24 (L) >60 ml/min/1.73m2    Calcium 9.5 8.5 - 10.1 MG/DL    Bilirubin, total 0.6 0.2 - 1.0 MG/DL    ALT (SGPT) 12 12 - 78 U/L    AST (SGOT) 11 (L) 15 - 37 U/L    Alk.  phosphatase 78 45 - 117 U/L    Protein, total 7.1 6.4 - 8.2 g/dL    Albumin 3.4 (L) 3.5 - 5.0 g/dL    Globulin 3.7 2.0 - 4.0 g/dL    A-G Ratio 0.9 (L) 1.1 - 2.2          Dequan Clay III, DO

## 2021-10-23 NOTE — PROGRESS NOTES
-Please complete MRI History and Safety Screening Form   - Patient cannot be scanned until this form is completed and reviewed in MRI to ensure patient is SAFE and eligible for MRI. - CALL MRI when this has been successfully completed at [phone].

## 2021-10-23 NOTE — PROGRESS NOTES
Hospitalist Progress Note    NAME: Mila Villarreal   :  1950   MRN:  [id number]     Admit date: 10/22/2021    Today's date: 10/23/21    PCP: Yang Sorto MD      Anticipated discharge date:10/25    Barriers:  Medical issues    Assessment / Plan:    Acute metabolic encephalopathy POA  UTI POA  · Sent in by home health aide with emesis, tachycardia  · MS reported as poor insight  · Neurology evaluation ? Aphasia, MRI brain pending  · Normal B12   · UA with cloudy urine, nitrite +, 20-50 WBC, 0-5 RBC, 3+ bacteria  · Urine culture in lab  · IV ceftriaxone  · PT/OT consults  · ASA, statin, eliquis    Permament atrial fibrillation with RVR ([facility] [address] (2020) POA  · ? Compliance with meds, noted with RVR at home  · Admit EKG with HR 160s, atrial fib  · Cardiac monitoring  · IV cardizem gtt, wean off now that back on PO meds, HR < 100  · Cardiology consult appreciated  · Continue ASA, eliquis, cozaar, toprol XL, eliquis       CKD stage 4 POA  · Baseline creatinine 1.8 to 2.0 range  · Avoid nephrotoxic medications  · Stop IVF       Diastolic CHF, chronic ([facility] [address] (2020) POA    Hypertensive urgency (10/22/2021) 202/174 POA  · No evidence of end organ damage  · Continue home losartan and metoprolol  · Continue pravastatin  · BP improved on home medications, ? Compliance  · cozaar, toprol XL  · Add back po cardizem  · PRN hydralazine       Impaired insight (10/22/2021)    Noncompliance (10/22/2021)  · CT of head was negative for acute hemorrhage or process  · Check MRI of brain  · Normal B-12  · Consult case management  · Medical TDO petitioned by ED provider  · One-on-one   · Treat underlying medical conditions  · Psychiatry consult once medical conditions worked up  · Continue home medications  · Denies active suicidal ideation, stop sitter  · Able to tell me his birthday, year, trouble getting where she was(hospital)  · ?  Confusion vs difficulty finding right words(aphasia)    Hypothyroidism POA  · TSH mildly increased 6.90  · Continue levothyroxine    Overweight POA Body mass index is 27.27 kg/m². Code Status: Full code    Surrogate Decision Maker: None   per ER physician report, patient's daughter was contacted however she stated that she does not want to be involved.     DVT Prophylaxis: Eliquis  GI Prophylaxis: Protonix     Activity at baseline: Ambulates without assistive device     Lives with: Self with home health/private duty nurse visiting      Subjective:     Chief Complaint / Reason for Physician Visit  \"I want to go home\". Discussed with RN events overnight. Alert , talking, denies pain or SOB or headache  Some word finding difficulties  Denies suicidal ideation, \" I am ready when god calls for me, but would not hurt myself\"  Trouble answering some orientation questions      Could not tell me where she was(police station vs school vs hospital)      Seemed to be having trouble getting words out  All she wants to do is go home and take care of her cats        Agrees to stay to get MRI  Denies contact with daughter    Review of Systems:  Symptom Y/N Comments  Symptom Y/N Comments   Fever/Chills n   Chest Pain n    Poor Appetite    Edema     Cough n   Abdominal Pain n    Sputum    Joint Pain     SOB/RIVERS n   Headache     Nausea/vomit n   Tolerating PT/OT     Diarrhea n   Tolerating Diet y    Constipation    Other       Could NOT obtain due to:      Objective:     VITALS:   Last 24hrs VS reviewed since prior progress note.  Most recent are:  Patient Vitals for the past 24 hrs:   Temp Pulse Resp BP SpO2   10/23/21 1200 97.6 °F (36.4 °C) 88 19 (!) 151/114 96 %   10/23/21 1146 97.6 °F (36.4 °C) 60 18 136/82 95 %   10/23/21 1130 97.5 °F (36.4 °C) 65 18 (!) 146/83 96 %   10/23/21 1115 97.6 °F (36.4 °C) 67 18 (!) 147/95 92 %   10/23/21 1100 97.6 °F (36.4 °C) 68 19 (!) 142/73 95 %   10/23/21 1045 97.6 °F (36.4 °C) 74 18 (!) 136/118 94 %   10/23/21 1031 97.5 °F (36.4 °C) 81 19 (!) 143/97 93 %   10/23/21 1015 97.5 °F (36.4 °C) 84 19 (!) 142/111 95 %   10/23/21 1005 97.5 °F (36.4 °C) 100 20 (!) 154/120 91 %   10/23/21 0945 97.6 °F (36.4 °C) 76 18 (!) 161/85 93 %   10/23/21 0930 97.6 °F (36.4 °C) 74 18 (!) 144/75 96 %   10/23/21 0915 97.5 °F (36.4 °C) 83 18 (!) 142/92 90 %   10/23/21 0900 97.6 °F (36.4 °C) 92 20 (!) 165/101 93 %   10/23/21 0845 97.6 °F (36.4 °C) 96 19 (!) 164/91 97 %   10/23/21 0835  80      10/23/21 0830 97.5 °F (36.4 °C) 88 19 (!) 172/83 95 %   10/23/21 0815 97.6 °F (36.4 °C) (!) 108 20 (!) 160/99 92 %   10/23/21 0801 97.7 °F (36.5 °C) 97 19 (!) 170/92 96 %   10/23/21 0745 97.7 °F (36.5 °C) 82 18 (!) 159/81 94 %   10/23/21 0730 97.6 °F (36.4 °C) 86 18 (!) 155/95 95 %   10/23/21 0719 97.6 °F (36.4 °C) 100 19 (!) 154/106 96 %   10/23/21 0342 97.4 °F (36.3 °C) 95 18 (!) 144/95 98 %   10/23/21 0231     95 %   10/23/21 0005 97.5 °F (36.4 °C) (!) 110 18 (!) 154/95    10/22/21 2100  (!) 147 24 (!) 151/122 96 %   10/22/21 2045  (!) 146 22 (!) 187/138 96 %   10/22/21 2030  (!) 134 (!) 32 (!) 161/130 96 %   10/22/21 2015  (!) 116 21 (!) 166/127 95 %   10/22/21 2000  (!) 124 18 (!) 170/121 96 %   10/22/21 1945  (!) 152 15 (!) 181/144 97 %   10/22/21 1930 98.9 °F (37.2 °C) (!) 146 23 (!) 155/126 97 %   10/22/21 1725  (!) 161 29  96 %   10/22/21 1715  (!) 147 29 (!) 202/174 90 %   10/22/21 1615    (!) 111/98 95 %   10/22/21 1600    100/84 95 %   10/22/21 1500  97 20 (!) 157/91 96 %   10/22/21 1445  (!) 105 21 (!) 149/127 94 %   10/22/21 1430  (!) 139 21 (!) 140/87 96 %   10/22/21 1347 97.9 °F (36.6 °C) (!) 165 19 (!) 193/137 96 %     No intake or output data in the 24 hours ending 10/23/21 1210     Wt Readings from Last 12 Encounters:   10/22/21 61.2 kg (135 lb)   10/12/21 63.5 kg (140 lb)   05/11/21 63.6 kg (140 lb 2 oz)   03/10/21 64 kg (141 lb)   10/26/20 62.1 kg (136 lb 12.8 oz)   05/02/20 73.5 kg (161 lb 15.9 oz)   11/10/19 71.7 kg (158 lb) 10/01/19 71.8 kg (158 lb 4 oz)   03/26/19 68 kg (150 lb)   05/22/18 72.9 kg (160 lb 12.8 oz)   01/12/18 73.1 kg (161 lb 3.2 oz)   11/14/17 71.2 kg (157 lb)       PHYSICAL EXAM:    I had a face to face encounter and independently examined this patient on 10/23/21 as outlined below:    General: WD, WN. Alert, cooperative, no acute distress    EENT:  PERRL. Anicteric sclerae. MMM  Resp:  CTA bilaterally, no wheezing or rales. No accessory muscle use  CV:  Irregular  rhythm,  No edema  GI:  Soft, Non distended, Non tender. +Bowel sounds, no rebound  Neurologic:  Alert to B-day and year, word finding difficulties, non focal motor exam  Psych:   Not anxious nor agitated  Skin:  No rashes. No jaundice    Reviewed most current lab test results and cultures  YES  Reviewed most current radiology test results   YES  Review and summation of old records today    NO  Reviewed patient's current orders and MAR    YES  PMH/ reviewed - no change compared to H&P  ________________________________________________________________________  Care Plan discussed with:    Comments   Patient x    Family      RN x    Care Manager     Consultant                        Multidiciplinary team rounds were held today with , nursing, pharmacist and clinical coordinator. Patient's plan of care was discussed; medications were reviewed and discharge planning was addressed. ________________________________________________________________________      Comments   >50% of visit spent in counseling and coordination of care     ________________________________________________________________________  Arely Shah MD     Procedures: see electronic medical records for all procedures/Xrays and details which were not copied into this note but were reviewed prior to creation of Plan. LABS:  I reviewed today's most current labs and imaging studies.   Pertinent labs include:  Recent Labs     10/23/21  0435 10/22/21  1400   WBC 6.3 5. 5   HGB 11.0* 11.7   HCT 34.7* 36.6    218     Recent Labs     10/23/21  0435 10/22/21  1400    140   K 4.4 4.2   * 113*   CO2 20* 20*   * 123*   BUN 23* 29*   CREA 2.07* 2.28*   CA 9.5 9.6   MG 2.2 2.0   ALB 3.4* 3.7   TBILI 0.6 0.8   ALT 12 14

## 2021-10-23 NOTE — ED NOTES
Papito Reyna from [facility] contacted at this time, she is unable to evaluate this patient as she is a medically admitted patient at this time. Per FR[facility] 14 will not be able to evaluate patient until she is medically clear, at this time patient is not allowing staff to touch her and medicated her.

## 2021-10-24 LAB
ANION GAP SERPL CALC-SCNC: 5 MMOL/L (ref 5–15)
AV R PG: 12.9 MMHG
BASOPHILS # BLD: 0.1 K/UL (ref 0–0.1)
BASOPHILS NFR BLD: 2 % (ref 0–1)
BUN SERPL-MCNC: 18 MG/DL (ref 6–20)
BUN/CREAT SERPL: 8 (ref 12–20)
CALCIUM SERPL-MCNC: 9.7 MG/DL (ref 8.5–10.1)
CHLORIDE SERPL-SCNC: 114 MMOL/L (ref 97–108)
CO2 SERPL-SCNC: 22 MMOL/L (ref 21–32)
CREAT SERPL-MCNC: 2.34 MG/DL (ref 0.55–1.02)
DIFFERENTIAL METHOD BLD: ABNORMAL
ECHO AO ROOT DIAM: 3.16 CM
ECHO AR MAX VEL PISA: 179.59 CM/S
ECHO AV AREA PEAK VELOCITY: 2.68 CM2
ECHO AV AREA/BSA PEAK VELOCITY: 1.7 CM2/M2
ECHO AV MEAN GRADIENT: 1.33 MMHG
ECHO AV PEAK GRADIENT: 2.21 MMHG
ECHO AV PEAK VELOCITY: 74.3 CM/S
ECHO AV REGURGITANT PHT: 105.7 MS
ECHO AV VTI: 11.73 CM
ECHO EST RA PRESSURE: 10 MMHG
ECHO LA AREA 4C: 26.06 CM2
ECHO LA MAJOR AXIS: 4.34 CM
ECHO LA MINOR AXIS: 2.78 CM
ECHO LA VOL 4C: 73.52 ML (ref 22–52)
ECHO LA VOLUME INDEX A4C: 47.13 ML/M2 (ref 16–28)
ECHO LV EDV A4C: 30.37 ML
ECHO LV EDV INDEX A4C: 19.5 ML/M2
ECHO LV EJECTION FRACTION A4C: 34 PERCENT
ECHO LV ESV A4C: 20.18 ML
ECHO LV ESV INDEX A4C: 12.9 ML/M2
ECHO LV INTERNAL DIMENSION DIASTOLIC: 3.32 CM (ref 3.9–5.3)
ECHO LV INTERNAL DIMENSION SYSTOLIC: 2.35 CM
ECHO LV IVSD: 1.69 CM (ref 0.6–0.9)
ECHO LV MASS 2D: 192.4 G (ref 67–162)
ECHO LV MASS INDEX 2D: 123.3 G/M2 (ref 43–95)
ECHO LV POSTERIOR WALL DIASTOLIC: 1.43 CM (ref 0.6–0.9)
ECHO LVOT DIAM: 2.11 CM
ECHO LVOT PEAK GRADIENT: 1.31 MMHG
ECHO LVOT PEAK VELOCITY: 57.15 CM/S
ECHO MV MAX VELOCITY: 228.15 CM/S
ECHO MV MEAN GRADIENT: 9.59 MMHG
ECHO MV PEAK GRADIENT: 20.82 MMHG
ECHO MV VTI: 42.79 CM
ECHO PV MAX VELOCITY: 77.39 CM/S
ECHO PV PEAK INSTANTANEOUS GRADIENT SYSTOLIC: 2.4 MMHG
ECHO PV REGURGITANT MAX VELOCITY: 162.06 CM/S
ECHO RIGHT VENTRICULAR SYSTOLIC PRESSURE (RVSP): 41.12 MMHG
ECHO TV REGURGITANT MAX VELOCITY: 278.45 CM/S
ECHO TV REGURGITANT MAX VELOCITY: 534.53 CM/S
ECHO TV REGURGITANT PEAK GRADIENT: 31.12 MMHG
EOSINOPHIL # BLD: 0.3 K/UL (ref 0–0.4)
EOSINOPHIL NFR BLD: 6 % (ref 0–7)
ERYTHROCYTE [DISTWIDTH] IN BLOOD BY AUTOMATED COUNT: 14.9 % (ref 11.5–14.5)
GLUCOSE SERPL-MCNC: 107 MG/DL (ref 65–100)
HCT VFR BLD AUTO: 32 % (ref 35–47)
HGB BLD-MCNC: 10.6 G/DL (ref 11.5–16)
IMM GRANULOCYTES # BLD AUTO: 0 K/UL (ref 0–0.04)
IMM GRANULOCYTES NFR BLD AUTO: 0 % (ref 0–0.5)
LYMPHOCYTES # BLD: 1.4 K/UL (ref 0.8–3.5)
LYMPHOCYTES NFR BLD: 27 % (ref 12–49)
MCH RBC QN AUTO: 29.9 PG (ref 26–34)
MCHC RBC AUTO-ENTMCNC: 33.1 G/DL (ref 30–36.5)
MCV RBC AUTO: 90.1 FL (ref 80–99)
MONOCYTES # BLD: 0.7 K/UL (ref 0–1)
MONOCYTES NFR BLD: 13 % (ref 5–13)
NEUTS SEG # BLD: 2.8 K/UL (ref 1.8–8)
NEUTS SEG NFR BLD: 53 % (ref 32–75)
NRBC # BLD: 0 K/UL (ref 0–0.01)
NRBC BLD-RTO: 0 PER 100 WBC
PLATELET # BLD AUTO: 217 K/UL (ref 150–400)
PMV BLD AUTO: 10.4 FL (ref 8.9–12.9)
POTASSIUM SERPL-SCNC: 4 MMOL/L (ref 3.5–5.1)
RBC # BLD AUTO: 3.55 M/UL (ref 3.8–5.2)
RPR SER QL: NONREACTIVE
SODIUM SERPL-SCNC: 141 MMOL/L (ref 136–145)
WBC # BLD AUTO: 5.4 K/UL (ref 3.6–11)

## 2021-10-24 PROCEDURE — 97161 PT EVAL LOW COMPLEX 20 MIN: CPT

## 2021-10-24 PROCEDURE — 74011250637 HC RX REV CODE- 250/637: Performed by: INTERNAL MEDICINE

## 2021-10-24 PROCEDURE — 80048 BASIC METABOLIC PNL TOTAL CA: CPT

## 2021-10-24 PROCEDURE — 65660000001 HC RM ICU INTERMED STEPDOWN

## 2021-10-24 PROCEDURE — 85025 COMPLETE CBC W/AUTO DIFF WBC: CPT

## 2021-10-24 PROCEDURE — 99233 SBSQ HOSP IP/OBS HIGH 50: CPT | Performed by: PSYCHIATRY & NEUROLOGY

## 2021-10-24 PROCEDURE — 74011250637 HC RX REV CODE- 250/637: Performed by: NURSE PRACTITIONER

## 2021-10-24 PROCEDURE — 97165 OT EVAL LOW COMPLEX 30 MIN: CPT

## 2021-10-24 PROCEDURE — 36415 COLL VENOUS BLD VENIPUNCTURE: CPT

## 2021-10-24 PROCEDURE — 74011250637 HC RX REV CODE- 250/637: Performed by: GENERAL ACUTE CARE HOSPITAL

## 2021-10-24 PROCEDURE — 97535 SELF CARE MNGMENT TRAINING: CPT

## 2021-10-24 PROCEDURE — 97116 GAIT TRAINING THERAPY: CPT

## 2021-10-24 RX ORDER — LORAZEPAM 1 MG/1
1 TABLET ORAL
Status: DISCONTINUED | OUTPATIENT
Start: 2021-10-24 | End: 2021-11-02 | Stop reason: HOSPADM

## 2021-10-24 RX ADMIN — TRAZODONE HYDROCHLORIDE 50 MG: 50 TABLET ORAL at 21:33

## 2021-10-24 RX ADMIN — DILTIAZEM HYDROCHLORIDE 180 MG: 180 CAPSULE, COATED, EXTENDED RELEASE ORAL at 09:22

## 2021-10-24 RX ADMIN — METOPROLOL SUCCINATE 50 MG: 50 TABLET, EXTENDED RELEASE ORAL at 09:22

## 2021-10-24 RX ADMIN — PRAVASTATIN SODIUM 80 MG: 40 TABLET ORAL at 09:22

## 2021-10-24 RX ADMIN — LOSARTAN POTASSIUM 25 MG: 25 TABLET, FILM COATED ORAL at 09:22

## 2021-10-24 RX ADMIN — CETIRIZINE HYDROCHLORIDE 10 MG: 10 TABLET, FILM COATED ORAL at 09:22

## 2021-10-24 RX ADMIN — LEVOTHYROXINE SODIUM 100 MCG: 0.1 TABLET ORAL at 06:53

## 2021-10-24 RX ADMIN — LORAZEPAM 1 MG: 1 TABLET ORAL at 21:53

## 2021-10-24 RX ADMIN — Medication 2000 UNITS: at 09:22

## 2021-10-24 RX ADMIN — Medication 10 ML: at 14:00

## 2021-10-24 RX ADMIN — ASPIRIN 81 MG: 81 TABLET, COATED ORAL at 09:00

## 2021-10-24 RX ADMIN — APIXABAN 5 MG: 5 TABLET, FILM COATED ORAL at 18:00

## 2021-10-24 RX ADMIN — TOPIRAMATE 50 MG: 25 TABLET, FILM COATED ORAL at 21:33

## 2021-10-24 RX ADMIN — Medication 10 ML: at 21:40

## 2021-10-24 RX ADMIN — APIXABAN 5 MG: 5 TABLET, FILM COATED ORAL at 09:22

## 2021-10-24 RX ADMIN — PANTOPRAZOLE SODIUM 40 MG: 40 TABLET, DELAYED RELEASE ORAL at 09:22

## 2021-10-24 NOTE — PROGRESS NOTES
Problem: Mobility Impaired (Adult and Pediatric)  Goal: *Acute Goals and Plan of Care (Insert Text)  Description: FUNCTIONAL STATUS PRIOR TO ADMISSION: Patient questionable historian secondary to confusion and aphasia. Patient reported she was independent with functional mobility but stated she had a friend  her to appointments and stores. Per chart review, patient had home [facility] aide who assist a few times during the week. HOME SUPPORT PRIOR TO ADMISSION: The patient lived alone with no local support. Pt has [facility] aide who assists occasionally. Physical Therapy Goals  Initiated 10/24/2021  1. Patient will move from supine to sit and sit to supine  in bed with modified independence within 7 day(s). 2.  Patient will transfer from bed to chair and chair to bed with supervision/set-up using the least restrictive device within 7 day(s). 3.  Patient will perform sit to stand with supervision/set-up within 7 day(s). 4.  Patient will ambulate with supervision/set-up for 150 feet with the least restrictive device within 7 day(s). 5.  Patient will ascend/descend 16 stairs with unilateral handrail(s) with supervision/set-up within 7 day(s). Outcome: Not Met     PHYSICAL THERAPY EVALUATION  Patient: Oscar Delgadillo (23 y.o. female)  Date: 10/24/2021  Primary Diagnosis: Hypertensive emergency [I16.1]  Atrial fibrillation with RVR ([facility] [address] [I48.91]        Precautions:   Fall, Bed Alarm    ASSESSMENT  Based on the objective data described below, the patient presents with decreased strength, impaired balance, decreased safety awareness and impaired quality of gait s/p hospitalization due to rapid HR and AMS. Patient has aphasia at baseline due to h/o CVA. She is oriented to name only. Patient educated on the purpose and benefits of skilled PT and goals to be addressed with pt verbalizing minimal understanding d/t confusion.  Patient directed in transfer from sit to stand with CGA and engaged in gait training within room with CGA. Pt ambulates 48' with 4 turns with pt demo narrow [place] with slight LOB turning requiring assist to recover and instruction to reduce speed. Patient can continue to benefit from skilled PT prior to discharge when medically appropriate to improve safety with functional mobility. Current Level of Function Impacting Discharge (mobility/balance): CGA for transfers and gait without device    Functional Outcome Measure: The patient scored 36/56 on the VELEZ balance assessment outcome measure which is indicative of moderate risk for falls. Other factors to consider for discharge: pt below baseline function     Patient will benefit from skilled therapy intervention to address the above noted impairments. PLAN :  Recommendations and Planned Interventions: bed mobility training, transfer training, gait training, therapeutic exercises, neuromuscular re-education, patient and family training/education, and therapeutic activities      Frequency/Duration: Patient will be followed by physical therapy:  5 times a week to address goals. Recommendation for discharge: (in order for the patient to meet his/her long term goals)  To be determined: SNF vs home with 24 hour supervision    This discharge recommendation:  Has been made in collaboration with the attending provider and/or case management    IF patient discharges home will need the following DME: to be determined (TBD)         SUBJECTIVE:   Patient stated I know my birthday, I just can't say it right.     OBJECTIVE DATA SUMMARY:   HISTORY:    Past Medical History:   Diagnosis Date    Cancer ([facility] [address]     ovaian stomach    CVA (cerebral vascular accident) ([facility] [address]     Heart failure ([facility] [address]     MI    Hypertension      Past Surgical History:   Procedure Laterality Date    HX GYN      hysterectomy    HX LUMBAR LAMINECTOMY  06/29/2016    L4-S1, Dr. Hugh Dance      cancer removal       Personal factors and/or comorbidities impacting plan of care: confusion and stairs in home    Home Situation  Home Environment: Private residence  # Steps to Enter: 3  Rails to Enter: No  One/Two Story Residence: Two story  # of Interior Steps: 16  Living Alone: Yes  [facility] Systems: [facility]pt poor historian))  Patient Expects to be Discharged to[de-identified] [employer]  Current DME Used/Available at Home: None  Tub or Shower Type: Tub/Shower combination    EXAMINATION/PRESENTATION/DECISION MAKING:   Critical Behavior:  Neurologic State: Alert  Orientation Level: Oriented to person, Disoriented to place, Disoriented to situation, Disoriented to time (unable to state ; identifed hospital with choices)  Cognition: Follows commands, Decreased attention/concentration, Poor safety awareness, Impulsive, Impaired decision making  Safety/Judgement: Decreased awareness of environment, Decreased awareness of need for assistance, Decreased awareness of need for safety, Decreased insight into deficits, Fall prevention  Hearing:   Auditory  Auditory Impairment: None  Skin:    Edema:   Range Of Motion:  AROM: Within functional limits                       Strength:    Strength: Generally decreased, functional                    Tone & Sensation:   Tone: Normal              Sensation: Intact               Coordination:  Coordination: Within functional limits  Vision:   Acuity: Within Defined Limits;Able to read employee name badge without difficulty  Functional Mobility:  Bed Mobility:  Rolling: Stand-by assistance  Supine to Sit: Stand-by assistance  Sit to Supine: Stand-by assistance  Scooting: Stand-by assistance  Transfers:  Sit to Stand: Contact guard assistance  Stand to Sit: Contact guard assistance                       Balance:   Sitting: Intact  Standing: Impaired  Standing - Static: Good  Standing - Dynamic : Fair  Ambulation/Gait Training:  Distance (ft): 50 Feet (ft)  Assistive Device: Gait belt  Ambulation - Level of Assistance: Contact guard assistance        Gait Abnormalities: Scissoring        Base of Support: Narrowed     Speed/[place]: Pace decreased (<100 feet/min)                        Stairs: Therapeutic Exercises:       Functional Measure:  Fuchs Balance Test:    Sitting to Standing: 3  Standing Unsupported: 3  Sitting with Back Unsupported: 4  Standing to Sitting: 3  Transfers: 3  Standing Unsupported with Eyes Closed: 3  Standing Unsupported with Feet Together: 3  Reach Forward with Outstretched Arm: 3   Object: 3  Turn to Look Over Shoulders: 3  Turn 360 Degrees: 3  Alternate Foot on Step/Stool: 2  Standing Unsupported One Foot in Front: 0  Stand on One Le  Total: 36/56         56=Maximum possible score;   0-20=High fall risk  21-40=Moderate fall risk   41-56=Low fall risk                Physical Therapy Evaluation Charge Determination   History Examination Presentation Decision-Making   LOW Complexity : Zero comorbidities / personal factors that will impact the outcome / POC LOW Complexity : 1-2 Standardized tests and measures addressing body structure, function, activity limitation and / or participation in recreation  LOW Complexity : Stable, uncomplicated  LOW Complexity : FOTO score of       Based on the above components, the patient evaluation is determined to be of the following complexity level: LOW     Pain Ratin/10    Activity Tolerance:   Fair    After treatment patient left in no apparent distress:   Supine in bed and Call bell within reach    COMMUNICATION/EDUCATION:   The patients plan of care was discussed with: Occupational therapist and Registered nurse. Fall prevention education was provided and the patient/caregiver indicated understanding., Patient/family have participated as able in goal setting and plan of care. , and Patient/family agree to work toward stated goals and plan of care.     Thank you for this referral.  Ashvin Garsia, PT   Time Calculation: 19 mins

## 2021-10-24 NOTE — PROGRESS NOTES
Problem: Falls - Risk of  Goal: *Absence of Falls  Description: Document Sofia Fothergill Fall Risk and appropriate interventions in the flowsheet.   Outcome: Progressing Towards Goal  Note: Fall Risk Interventions:       Mentation Interventions: Bed/chair exit alarm, Door open when patient unattended    Medication Interventions: Bed/chair exit alarm, Patient to call before getting OOB    Elimination Interventions: Bed/chair exit alarm, Call light in reach, Patient to call for help with toileting needs    History of Falls Interventions: Bed/chair exit alarm, Door open when patient unattended

## 2021-10-24 NOTE — PROGRESS NOTES
Problem: Self Care Deficits Care Plan (Adult)  Goal: *Acute Goals and Plan of Care (Insert Text)  Description:   FUNCTIONAL STATUS PRIOR TO ADMISSION: Patient questionable historian secondary to confusion and aphasia. Patient reported she was independent for self-care and functional mobility but stated she had a friend  her to appointments and stores. Per chart review, patient had home [facility] aide who assist a few times during the week. HOME SUPPORT: The patient lived alone with no local support. Caregiver assisted patient occasionally during week and daughter did not want to be involved per chart review. Occupational Therapy Goals  Initiated 10/24/2021  1. Patient will perform grooming standing at sink with supervision/set-up within 7 day(s). 2.  Patient will perform lower body dressing with supervision/set-up within 7 day(s). 3.  Patient will perform seated sponge bathing with supervision/set-up within 7 day(s). 4.  Patient will perform toilet transfers with supervision/set-up within 7 day(s). 5.  Patient will perform all aspects of toileting with supervision/set-up within 7 day(s). 6.  Patient will utilize energy conservation techniques ans safety awareness during functional activities with verbal cues within 7 day(s). Outcome: Not Met   OCCUPATIONAL THERAPY EVALUATION  Patient: Giorgi Winn (37 y.o. female)  Date: 10/24/2021  Primary Diagnosis: Hypertensive emergency [I16.1]  Atrial fibrillation with RVR ([facility] [address] [I48.91]        Precautions: Fall, Bed Alarm    ASSESSMENT  Based on the objective data described below, the patient presents with decreased independence in self-care and functional mobility secondary to confusion, general weakness, impaired balance, decreased safety awareness, poor insight, impulsivity, and decreased activity tolerance. Patient has baseline aphasia from past CVA. Patient questionable historian and oriented to name only (gave incorrect ).  Patient appears to be functioning below her baseline for self-care and functional mobility, now completing self-care with set-up/supervision to min assist and functional mobility with stand-by to contact guard assist. Patient received semisupine in bed and agreeable for therapy. Patient completed bed mobility with stand-by assist and demonstrated intact sitting balance. Patient donned socks with contact guard assist and required redirection to stay on task. Patient stood and ambulated to bathroom with contact guard assist. Patient completed toileting and grooming at sink with contact guard and cueing to stay on task. Patient returned to EOB to comb hair for energy conservation. Patient declined sitting in chair despite encouragement and returned to supine with contact guard assist. When asked what number patient would call for an emergency at home, patient replied \"[phone]\" but corrected to \"[phone]\" when provided options. Patient was left semisupine in bed with all needs met, VSS, and bed alarmed. Patient would benefit from skilled OT services during acute hospital stay. Anticipate patient cannot return home alone secondary to safety concerns and confusion. Patient would benefit from SNF rehab prior to returning home. If patient declines rehab, she will need 24/7 supervision and assist from family/caregiver with HHOT/PT. Current Level of Function Impacting Discharge (ADLs/self-care): set-up/supervision to min assist for self-care, stand-by to contact guard assist for functional mobility     Functional Outcome Measure: The patient scored 60/100 on the Barthel Index outcome measure which is indicative of 40% functional impairment in ADLs, minimally independent. Other factors to consider for discharge: fall risk, confusion, poor safety awareness     Patient will benefit from skilled therapy intervention to address the above noted impairments.        PLAN :  Recommendations and Planned Interventions: self care training, functional mobility training, therapeutic exercise, balance training, therapeutic activities, endurance activities, patient education, home safety training and family training/education    Frequency/Duration: Patient will be followed by occupational therapy 4 times a week to address goals. Recommendation for discharge: (in order for the patient to meet his/her long term goals)  SNF rehab; If patient returns home will need 24/7 supervision and assist for safety     This discharge recommendation:  Has not yet been discussed the attending provider and/or case management    IF patient discharges home will need the following DME: TBD pending progress       SUBJECTIVE:   Patient stated I just like to take care of my 5 cats.     OBJECTIVE DATA SUMMARY:   HISTORY:   Past Medical History:   Diagnosis Date    Cancer ([facility] [address]     ovaian stomach    CVA (cerebral vascular accident) ([facility] [address]     Heart failure ([facility] [address]     MI    Hypertension      Past Surgical History:   Procedure Laterality Date    HX GYN      hysterectomy    HX LUMBAR LAMINECTOMY  2016    L4-S1, Dr. Henry Demarco      cancer removal       Expanded or extensive additional review of patient history:     Home Situation  Home Environment: Private residence  # Steps to Enter: 3  Rails to Enter: No  One/Two Story Residence: Two story  # of Interior Steps: 16  Living Alone: Yes  Support Systems: Friend/Neighbor ([facility] aide)  Patient Expects to be Discharged to[de-identified] [employer]  Current DME Used/Available at Home: None  Tub or Shower Type: Tub/Shower combination    Hand dominance: Right    EXAMINATION OF PERFORMANCE DEFICITS:  Cognitive/Behavioral Status:  Neurologic State: Alert  Orientation Level: Oriented to person;Disoriented to place; Disoriented to situation;Disoriented to time (unable to state ; identifed hospital with choices)  Cognition: Follows commands;Decreased attention/concentration;Poor safety awareness; Impulsive; Impaired decision making  Perception: Appears intact  Perseveration: Perseverates during conversation  Safety/Judgement: Decreased awareness of environment;Decreased awareness of need for assistance;Decreased awareness of need for safety;Decreased insight into deficits; Fall prevention    Hearing: Auditory  Auditory Impairment: None    Vision/Perceptual:    Acuity: Within Defined Limits;Able to read employee name badge without difficulty      Range of Motion:  AROM: Within functional limits    Strength:  Strength: Generally decreased, functional    Coordination:  Coordination: Within functional limits  Fine Motor Skills-Upper: Left Intact; Right Intact    Gross Motor Skills-Upper: Left Intact; Right Intact    Tone & Sensation:  Tone: Normal  Sensation: Intact    Balance:  Sitting: Intact  Standing: Impaired; Without support  Standing - Static: Good  Standing - Dynamic : Fair    Functional Mobility and Transfers for ADLs:  Bed Mobility:  Rolling: Stand-by assistance  Supine to Sit: Stand-by assistance  Sit to Supine: Contact guard assistance  Scooting: Stand-by assistance    Transfers:  Sit to Stand: Contact guard assistance  Stand to Sit: Contact guard assistance  Bathroom Mobility: Contact guard assistance  Toilet Transfer : Contact guard assistance    ADL Assessment:  Feeding: Setup;Supervision  Oral Facial Hygiene/Grooming: Contact guard assistance; Additional time  Bathing: Minimum assistance  Upper Body Dressing: Setup;Supervision  Lower Body Dressing: Contact guard assistance  Toileting: Contact guard assistance    ADL Intervention and task modifications:    Grooming  Position Performed: Standing (at sink)  Washing Hands: Contact guard assistance  Brushing Teeth: Contact guard assistance  Brushing/Combing Hair: Contact guard assistance (seated for energy conservation)  Cues: Tactile cues provided;Verbal cues provided    Lower Body Dressing Assistance  Socks: Contact guard assistance  Leg Crossed Method Used: Yes  Position Performed: Seated edge of bed  Cues: Don;Tactile cues provided;Verbal cues provided    Toileting  Bladder Hygiene: Contact guard assistance  Clothing Management: Contact guard assistance  Cues: Tactile cues provided;Verbal cues provided    Cognitive Retraining  Safety/Judgement: Decreased awareness of environment;Decreased awareness of need for assistance;Decreased awareness of need for safety;Decreased insight into deficits; Fall prevention    Functional Measure:    Barthel Index:  Bathin  Bladder: 10  Bowels: 10  Groomin  Dressin  Feeding: 10  Mobility: 10  Stairs: 0  Toilet Use: 5  Transfer (Bed to Chair and Back): 10  Total: 60/100      The Barthel ADL Index: Guidelines  1. The index should be used as a record of what a patient does, not as a record of what a patient could do. 2. The main aim is to establish degree of independence from any help, physical or verbal, however minor and for whatever reason. 3. The need for supervision renders the patient not independent. 4. A patient's performance should be established using the best available evidence. Asking the patient, friends/relatives and nurses are the usual sources, but direct observation and common sense are also important. However direct testing is not needed. 5. Usually the patient's performance over the preceding 24-48 hours is important, but occasionally longer periods will be relevant. 6. Middle categories imply that the patient supplies over 50 per cent of the effort. 7. Use of aids to be independent is allowed. Score Interpretation (from [address] [facility] [address])    Independent   60-79 Minimally independent   40-59 Partially dependent   20-39 Very dependent   <20 Totally dependent     -Greta Huizar., Barthel, D.W. (1965). Functional evaluation: the Barthel Index. [address] [facility] ([address] (1997). The Barthel activities of daily living index: self-reporting versus actual performance in the old (> or = 75 years).  Journal of American Geriatric Society 457), [address] [facility], [facility], Rush Finch., Dianne Richards (1999). Measuring the change in disability after inpatient rehabilitation; comparison of the responsiveness of the Barthel Index and Functional [place] Measure. Journal of Neurology, Neurosurgery, and Psychiatry, 66(4), 658-173. TIM Storm, YESSENIA Gómez, & Bhanu Torres M.A. (2004) Assessment of post-stroke quality of life in cost-effectiveness studies: The usefulness of the Barthel Index and the EuroQoL-5D. Quality of Life Research, 13, 057-04     Based on the above components, the patient evaluation is determined to be of the following complexity level: LOW   Pain Rating:  Patient did not c/o pain during session. Activity Tolerance:   Fair, SpO2 stable on RA, and required redirection to stay on task and cueing for safety awareness    After treatment patient left in no apparent distress:    Supine in bed, Call bell within reach, Bed / chair alarm activated, and Side rails x 3    COMMUNICATION/EDUCATION:   The patients plan of care was discussed with: Registered nurse. Home safety education was provided and the patient/caregiver indicated understanding., Patient/family have participated as able in goal setting and plan of care. , and Patient/family agree to work toward stated goals and plan of care. This patients plan of care is appropriate for delegation to [facility].     Thank you for this referral.  Gregory Chowdhury, OTR/L  Time Calculation: 26 mins

## 2021-10-24 NOTE — PROGRESS NOTES
Sat evening, 2200: Pt's IV infiltrated. Pt will not allow RN to place a new IV. Pt not able to receive scheduled IV abx.     0145: RN attempted again to start new IV and/or draw morning labs, pt refused.

## 2021-10-24 NOTE — PROGRESS NOTES
Neurology Note    Patient ID:  Clarence Sebastian  [id number]  21 y.o.  1950      Date of Consultation:  October 24, 2021        Assessment and Plan:    The patient is a pleasant 59-year-old female with multiple medical conditions who was admitted to the hospital with worsening mental status, tachycardia, emesis. Her neurological examination does reveal evidence of an aphasia. Acute encephalopathy:  Improving. MRI with evidence of old stroke, but no new stroke but significant old stroke in the left temporoparietal with moderate to severe chronic microvascular ischemic disease. Most likely transient worsening of baseline aphasia associated with urinary tract infection and metabolic derangements. Due to the severity of the chronic microvascular ischemic disease, development of a vascular dementia may also be present. Risk factors for stroke include prior stroke, hypertension, atrial fibrillation, dyslipidemia  Continue anticoagulation. Hypertension: Aggressive control with goal systolic blood pressure under 140  Atrial fibrillation: Continue rate control. Dyslipidemia: Updated LDL 89. Continue aggressive lipid-lowering therapy    Urinary tract infection - abx  Chronic headaches: On Topamax nightly  Hypothyroidism: Synthroid    There is no other additional neurology recommendations at this time. If questions arise, please not hesitate to contact me and I will return to see the patient. The patient should follow-up in clinic in 3 to 4 weeks time after discharge. Subjective: I feel better     History of Present Illness:   Clarence Sebastian is a 66 y.o. female with a history of congestive heart failure, atrial fibrillation, chronic kidney disease, dyslipidemia, prior stroke who was brought to the emergency department after not feeling well on the morning of 10/22/2021. From reviewing the medical records, the patient had vomited and had a very rapid heart rate and EMS was called.   After EMS arrived, there was a question of medication compliance. There is also concerned about the patient's poor home situation. [place], there has been no worsening of her mental status and she is more alert and engaging today. She denies any chest pain, shortness of breath, or focal weakness.     Past Medical History:   Diagnosis Date    Cancer ([facility]     ovaian stomach    CVA (cerebral vascular accident) ([facility]     Heart failure ([facility] [address] [facility]     MI    Hypertension         Past Surgical History:   Procedure Laterality Date    HX GYN      hysterectomy    HX LUMBAR LAMINECTOMY  06/29/2016    L4-S1, Dr. Katy Cano      cancer removal        Family History   Problem Relation Age of Onset    Hypertension Mother     Hypertension Sister     Heart Disease Brother     Suicide Brother     Diabetes Brother     Diabetes Brother     Suicide Brother     Cancer Brother     Hypertension Sister     Hypertension Sister     Hypertension Sister     Hypertension Sister         Social History     Tobacco Use    Smoking status: Never Smoker    Smokeless tobacco: Never Used   Substance Use Topics    Alcohol use: Not Currently        No Known Allergies       Current Facility-Administered Medications   Medication Dose Route Frequency    cefTRIAXone (ROCEPHIN) 1 g in 0.9% sodium chloride (MBP/ADV) 50 mL MBP  1 g IntraVENous Q24H    dilTIAZem ER (CARDIZEM CD) capsule 180 mg  180 mg Oral DAILY    hydrALAZINE (APRESOLINE) 20 mg/mL injection 20 mg  20 mg IntraVENous Q6H PRN    ondansetron (ZOFRAN) injection 4 mg  4 mg IntraVENous Q4H PRN    aspirin delayed-release tablet 81 mg  81 mg Oral DAILY    cholecalciferol (VITAMIN D3) (1000 Units /25 mcg) tablet 2,000 Units  2,000 Units Oral DAILY    apixaban (ELIQUIS) tablet 5 mg  5 mg Oral BID    fluticasone propionate (FLONASE) 50 mcg/actuation nasal spray 2 Spray  2 Spray Both Nostrils DAILY    cetirizine (ZYRTEC) tablet 10 mg  10 mg Oral DAILY    losartan (COZAAR) tablet 25 mg  25 mg Oral DAILY    metoprolol succinate (TOPROL-XL) XL tablet 50 mg  50 mg Oral DAILY    pantoprazole (PROTONIX) tablet 40 mg  40 mg Oral ACB    pravastatin (PRAVACHOL) tablet 80 mg  80 mg Oral DAILY    topiramate (TOPAMAX) tablet 50 mg  50 mg Oral QHS    traZODone (DESYREL) tablet 50 mg  50 mg Oral QHS    sodium chloride (NS) flush 5-40 mL  5-40 mL IntraVENous Q8H    sodium chloride (NS) flush 5-40 mL  5-40 mL IntraVENous PRN    acetaminophen (TYLENOL) tablet 650 mg  650 mg Oral Q4H PRN    HYDROcodone-acetaminophen (NORCO) 5-325 mg per tablet 1 Tablet  1 Tablet Oral Q4H PRN    polyethylene glycol (MIRALAX) packet 17 g  17 g Oral DAILY PRN    albuterol (PROVENTIL VENTOLIN) nebulizer solution 2.5 mg  2.5 mg Nebulization Q4H PRN    levothyroxine (SYNTHROID) tablet 100 mcg  100 mcg Oral 6am       Review of Systems:    General, constitutional: negative  Eyes, vision: negative  Ears, nose, throat: negative  Cardiovascular, heart: negative  Respiratory: negative  Gastrointestinal: negative  Genitourinary: negative  Musculoskeletal: negative  Skin and integumentary: negative  Psychiatric: negative  Endocrine: negative  Neurological: negative, except for HPI  Hematologic/lymphatic: negative  Allergy/immunology: negative    Objective:     Visit Vitals  BP (!) 158/146 (BP 1 [facility]: Left upper arm, BP Patient Position: Sitting)   Pulse 76   Temp 97.5 °F (36.4 °C)   Resp 19   Ht 4' 11\" (1.499 m)   Wt 134 lb 14.7 oz (61.2 kg)   SpO2 97%   BMI 27.25 kg/m²       Physical Exam:  General:  appears well nourished in no acute distress  Neck: no carotid bruits  Lungs: clear to auscultation  Heart:  no murmurs, regular rate  Lower extremity: peripheral pulses palpable and no edema  Skin: intact    Neurological exam:    The patient is awake and alert. She is oriented to person and place. She does know she is in the hospital.  She does not know the year or the month.   She was able to perform simple calculations for me. She had some difficulty with naming objects but with certain close she was able to name objects. She can follow one-step commands for me. She is More alert and engaging than yesterday. Cranial nerves:   II-XII were tested    Perrrla  Visual fields were full  Eomi, no evidence of nystagmus  Facial sensation:  normal and symmetric  Facial motor: normal and symmetric  Hearing intact  SCM strength intact  Tongue: midline without fasciculations    Motor: Tone normal  No pronator drift. No evidence of fasciculations    Strength testing:  He was more participatory in motor testing today and there was no focal weakness. Sensory:  Upper extremity: intact to pp,  Lower extremity: intact to pp,    Reflexes:    Right Left  Biceps  2 2  Triceps 2 2  Brachiorad. 2 2  Patella  2 2  Achilles 2 2    Plantar response: Extensor on the right      Cerebellar testing:  no tremor apparent, finger/nose and indra were intact    Labs:     Lab Results   Component Value Date/Time    Hemoglobin A1c 5.7 (H) 10/12/2021 02:45 PM    Sodium 144 10/23/2021 04:35 AM    Potassium 4.4 10/23/2021 04:35 AM    Chloride 116 (H) 10/23/2021 04:35 AM    Glucose 120 (H) 10/23/2021 04:35 AM    BUN 23 (H) 10/23/2021 04:35 AM    Creatinine 2.07 (H) 10/23/2021 04:35 AM    Calcium 9.5 10/23/2021 04:35 AM    WBC 6.3 10/23/2021 04:35 AM    HCT 35.2 10/23/2021 04:35 AM    HCT 34.7 (L) 10/23/2021 04:35 AM    HGB 11.0 (L) 10/23/2021 04:35 AM    PLATELET 568 99/98/7181 04:35 AM       Imaging:    Results from Hospital Encounter encounter on 10/22/21    MRI BRAIN WO CONT    Narrative  EXAM: MRI BRAIN WO CONT    INDICATION: AMS, ? Dementia    COMPARISON: CT head 10/22/2021. CONTRAST: None. TECHNIQUE:  Multiplanar multisequence acquisition without contrast of the brain. FINDINGS:  The ventricles are normal in size and position. Large chronic infarct in the  left temporoparietal lobe.  Confluent areas of periventricular and deep white  matter T2/FLAIR hyperintensity, consistent with moderate to severe chronic  microvascular ischemic disease. Chronic infarct in the left basal ganglia with  associated hemosiderin staining. Few chronic microhemorrhages noted within the  left corona radiata and left parietal lobe. There is no acute infarct,  hemorrhage, extra-axial fluid collection, or mass effect. There is no cerebellar  tonsillar herniation. Expected arterial flow-voids are present. The paranasal sinuses, mastoid air cells, and middle ears are clear. The orbital  contents are within normal limits. No significant osseous or scalp lesions are  identified. Impression  1. No acute intracranial abnormality. 2. Large chronic infarct in the left temporoparietal lobe. 3. Moderate to severe chronic microvascular ischemic disease. Chronic infarct in  the left basal ganglia. Results from [place] encounter on 10/22/21    CT HEAD WO CONT    Narrative  EXAM:  CT HEAD WO CONT    INDICATION: Altered mental status    COMPARISON: 4/22/2012    TECHNIQUE: Axial noncontrast head CT from foramen magnum to vertex. Coronal and  sagittal reformatted images were obtained. CT dose reduction was achieved  through use of a standardized protocol tailored for this examination and  automatic exposure control for dose modulation. FINDINGS:  There is diffuse age-related parenchymal volume loss. The ventricles  and sulci are age-appropriate without hydrocephalus. There is no mass effect or  midline shift. There is no intracranial hemorrhage or extra-axial fluid  collection. Scattered foci of low attenuation in the periventricular white  matter represent stable chronic microvascular ischemic changes. There is an age  indeterminate but likely chronic left parietotemporal infarct. The basal  cisterns are patent. The osseous structures are intact.  The visualized paranasal sinuses and mastoid  air cells are clear.    Impression  No acute intracranial hemorrhage. Chronic microvascular ischemic disease. Age  indeterminate but likely chronic left parietotemporal infarct. head CT from October 22, 2021. There is no acute abnormalities. There is chronic microvascular ischemic disease and diffuse atrophy. There is an old chronic left parietal temporal stroke    Did independently review the brain MRI from October 23, 2021. There is a large chronic infarct in the left temporal parietal lobe. There is moderate to severe chronic microvascular ischemic disease as well as atrophy. Principal Problem:    Atrial fibrillation with RVR ([address] (4/23/2020)    Active Problems:    CKD (chronic kidney disease) (1/22/9640)      Diastolic CHF, chronic ([address] (4/30/2020)      Hypertensive emergency (10/22/2021)      Impaired insight (10/22/2021)      Noncompliance (10/22/2021)      Passive suicidal ideations (10/22/2021)      Confusion (10/22/2021)      I spent   35  minutes providing care to this  acutely ill inpatient with > 50% of the time counseling and assisting in the coordination of care of the patient on the patient's hospital floor/unit.                     Signed By:  Courtney Jordan DO FAAN    October 24, 2021

## 2021-10-24 NOTE — PROGRESS NOTES
Progress Note      10/24/2021 11:02 AM  NAME: Malini Norton   MRN:  [id number]   Admit Diagnosis: Hypertensive emergency [I16.1]  Atrial fibrillation with RVR ([facility] [address] [I48.91]      Problem List:     1. Encephalopathy; resolved? 2. Urinary tract infection  3. Chronic atrial fibrillation  4. CAD s/p prior MI. Echo 4/20 w/ EF 55-60%, mod LAE  5. Hyperlipidemia  6. Hypertension  7. Prior CVA  8. CKD; Stg 4  9. Hypothyroidism  10. Ovarian CA  11. Usual Cardiologist:  Dr. Nelda Cabrera     Assessment/Plan:   HRs in 60/70s in AF  Compliance in question     Pt wants to go home    Echo w/ EF 55-60%, mod-sev MS (mean gradient 10mmHg), mild CLARISSA, RVSP 46mmHg, mod TR.     12. Stopped dilt gtt  13. Continue eliquis. Ideally should be on coumadin w/ goal INR 2-3 with her MS but with significant compliance issues would continue eliquis despite this being off-label for V-AF. 14. Continue metoprolol  15. Continue ASA  16. Continue statin  17. Continue diltiazem orally  18. She needs better med compliance. [x]       High complexity decision making was performed in this patient at high risk for decompensation with multiple organ involvement. Subjective:     Malini Norton denies chest pain, dyspnea. Discussed with RN events overnight. Review of Systems:    Symptom Y/N Comments  Symptom Y/N Comments   Fever/Chills N   Chest Pain N    Poor Appetite N   Edema N    Cough N   Abdominal Pain N    Sputum N   Joint Pain N    SOB/RIVERS N   Pruritis/Rash N    Nausea/vomit N   Tolerating PT/OT Y    Diarrhea N   Tolerating Diet Y    Constipation N   Other       Could NOT obtain due to:      Objective:      Physical Exam:    Last 24hrs VS reviewed since prior progress note.  Most recent are:    Visit Vitals  BP (!) 158/146 (BP 1 [facility]: Left upper arm, BP Patient Position: Sitting)   Pulse 76   Temp 97.5 °F (36.4 °C)   Resp 19   Ht 4' 11\" (1.499 m)   Wt 61.2 kg (134 lb 14.7 oz)   SpO2 97%   BMI 27.25 kg/m²     No intake or output data in the 24 hours ending 10/24/21 1102     General Appearance: Well developed, well nourished, alert & oriented x 3,    no acute distress. Ears/Nose/Mouth/Throat: Hearing grossly normal.  Neck: Supple. Chest: Lungs clear to auscultation bilaterally. Cardiovascular: Irregular rate and rhythm, S1S2 normal, no murmur. Abdomen: Soft, non-tender, bowel sounds are active. Extremities: No edema bilaterally. Skin: Warm and dry. []         Post-cath site without hematoma, bruit, tenderness, or thrill. Distal pulses intact. PMH/SH reviewed - no change compared to H&P    Data Review    Telemetry: AF    EKG:   [x]  No new EKG for review    Lab Data Personally Reviewed:    Recent Labs     10/23/21  0435 10/22/21  1400   WBC 6.3 5.5   HGB 11.0* 11.7   HCT 35.2  34.7* 36.6    218     No results for input(s): INR, PTP, APTT, INREXT in the last 72 hours. Recent Labs     10/23/21  0435 10/22/21  1400    140   K 4.4 4.2   * 113*   CO2 20* 20*   BUN 23* 29*   CREA 2.07* 2.28*   * 123*   CA 9.5 9.6   MG 2.2 2.0     Recent Labs     10/23/21  0435        Lab Results   Component Value Date/Time    Cholesterol, total 149 10/23/2021 04:35 AM    HDL Cholesterol 46 10/23/2021 04:35 AM    LDL, calculated 89.6 10/23/2021 04:35 AM    Triglyceride 67 10/23/2021 04:35 AM    CHOL/HDL Ratio 3.2 10/23/2021 04:35 AM       Recent Labs     10/23/21  0435 10/22/21  1400   AP 78 81   TP 7.1 7.3   ALB 3.4* 3.7   GLOB 3.7 3.6     No results for input(s): PH, PCO2, PO2 in the last 72 hours.     Medications Personally Reviewed:    Current Facility-Administered Medications   Medication Dose Route Frequency    cefTRIAXone (ROCEPHIN) 1 g in 0.9% sodium chloride (MBP/ADV) 50 mL MBP  1 g IntraVENous Q24H    dilTIAZem ER (CARDIZEM CD) capsule 180 mg  180 mg Oral DAILY    hydrALAZINE (APRESOLINE) 20 mg/mL injection 20 mg  20 mg IntraVENous Q6H PRN    ondansetron (ZOFRAN) injection 4 mg  4 mg IntraVENous Q4H PRN  aspirin delayed-release tablet 81 mg  81 mg Oral DAILY    cholecalciferol (VITAMIN D3) (1000 Units /25 mcg) tablet 2,000 Units  2,000 Units Oral DAILY    apixaban (ELIQUIS) tablet 5 mg  5 mg Oral BID    fluticasone propionate (FLONASE) 50 mcg/actuation nasal spray 2 Spray  2 Spray Both Nostrils DAILY    cetirizine (ZYRTEC) tablet 10 mg  10 mg Oral DAILY    losartan (COZAAR) tablet 25 mg  25 mg Oral DAILY    metoprolol succinate (TOPROL-XL) XL tablet 50 mg  50 mg Oral DAILY    pantoprazole (PROTONIX) tablet 40 mg  40 mg Oral ACB    pravastatin (PRAVACHOL) tablet 80 mg  80 mg Oral DAILY    topiramate (TOPAMAX) tablet 50 mg  50 mg Oral QHS    traZODone (DESYREL) tablet 50 mg  50 mg Oral QHS    sodium chloride (NS) flush 5-40 mL  5-40 mL IntraVENous Q8H    sodium chloride (NS) flush 5-40 mL  5-40 mL IntraVENous PRN    acetaminophen (TYLENOL) tablet 650 mg  650 mg Oral Q4H PRN    HYDROcodone-acetaminophen (NORCO) 5-325 mg per tablet 1 Tablet  1 Tablet Oral Q4H PRN    polyethylene glycol (MIRALAX) packet 17 g  17 g Oral DAILY PRN    albuterol (PROVENTIL VENTOLIN) nebulizer solution 2.5 mg  2.5 mg Nebulization Q4H PRN    levothyroxine (SYNTHROID) tablet 100 mcg  100 mcg Oral 6am         Benedicto Connor III, DO

## 2021-10-24 NOTE — PROGRESS NOTES
0700: [facility] Report consisted of SBAR, Kardex, recent results, intake/output, and cardiac rhythm. 1030: Avasys placed in patients room due to increased concern for patient safety. 1145: Patient showed increased confusion and agitation. Made multiple unsuccessful attempts to reorient patient to situation. Notified Dr. Marcela Hinds of concerns. 1800: New 22 PIV placed in the Right forearm. Daily labs drawn and sent to lab for processing. Daily weight completed. End of Shift Note    Bedside shift change report given to [place], RN (oncoming nurse) by Devon Jackson RN (offgoing nurse). Report included the following information SBAR, Kardex, Intake/Output, MAR and Cardiac Rhythm A fib    Shift worked:  [id number]     Shift summary and any significant changes:     Patient had periods of increased confusion. She was unable to be oriented to situation, place, and time. MD aware. Telesitter at bedside due to increased concern for patient safety. Worked with PT/OT today. Patient consented to new PIV placement and lab draws. Concerns for physician to address:  increased confusion      Zone phone for oncoming shift:          Activity:  Activity Level: Up with Assistance  Number times ambulated in hallways past shift: 0  Number of times OOB to chair past shift: 3    Cardiac:   Cardiac Monitoring: Yes      Cardiac Rhythm: Atrial Fib    Access:   Current line(s): PIV     Genitourinary:   Urinary status: voiding    Respiratory:   O2 Device: None (Room air)  Chronic home O2 use?: NO  Incentive spirometer at bedside: NO     GI:  Last Bowel Movement Date: 10/22/21  Current diet:  ADULT DIET Regular  Passing flatus: YES  Tolerating current diet: YES       Pain Management:   Patient states pain is manageable on current regimen: YES    Skin:  Aldo Score: 23  Interventions: increase time out of bed, PT/OT consult and limit briefs    Patient Safety:  Fall Score:  Total Score: 3  Interventions: bed/chair alarm, assistive device (walker, cane, etc), gripper socks and pt to call before getting OOB, telesitter  High Fall Risk: Yes    Length of Stay:  Expected LOS: - - -  Actual LOS: 2      Josué Lange RN

## 2021-10-24 NOTE — PROGRESS NOTES
0730  Report received from [place], [facility]. SBAR, Kardex, ED Summary, Procedure Summary, Intake/Output, MAR, Accordion, Recent Results, Med Rec Status and Cardiac Rhythm A-fib were discussed. Gina Knight    End of Shift Note    Bedside shift change report given to [place] (oncoming nurse) by Ynes Stringer (offgoing nurse). Report included the following information SBAR, Kardex, ED Summary, Procedure Summary, Intake/Output, MAR, Accordion, Recent Results, Med Rec Status and Cardiac Rhythm a-fib    Shift worked:  7a-7p     Shift summary and any significant changes:     Patient had mild periods of short term memory loss but would answer all orientation questions from nurse, but had a loss of words with the MD.  Stopped diltiazem drip today per Dr. Rosalba Michel and started PO. Per Dr. Rosalba Michel she is cleared by cardiology. [facility] assessed patient and said 1:1 sitter can be discontinued. MRI screening complete and patient is awaiting test.  Patient has very poor appetite    Concerns for physician to address:     Zone phone for oncoming shift:       Activity:  Activity Level: Up with Assistance  Number times ambulated in hallways past shift: 0  Number of times OOB to chair past shift: 0    Cardiac:   Cardiac Monitoring: Yes      Cardiac Rhythm: Atrial Fib    Access:   Current line(s): PIV     Genitourinary:   Urinary status: voiding    Respiratory:   O2 Device: None (Room air)  Chronic home O2 use?: NO  Incentive spirometer at bedside: NO     GI:  Last Bowel Movement Date: 10/22/21  Current diet:  ADULT DIET Regular  Passing flatus: YES  Tolerating current diet: NO       Pain Management:   Patient states pain is manageable on current regimen: YES    Skin:  Aldo Score: 23  Interventions: float heels and increase time out of bed    Patient Safety:  Fall Score:  Total Score: 1  Interventions: bed/chair alarm, assistive device (walker, cane, etc), gripper socks, pt to call before getting OOB and stay with me (per policy) Length of Stay:  Expected LOS: - - -  Actual LOS: [address]

## 2021-10-25 ENCOUNTER — PATIENT OUTREACH (OUTPATIENT)
Dept: CASE MANAGEMENT | Age: 71
End: 2021-10-25

## 2021-10-25 LAB
ALBUMIN SERPL-MCNC: 3 G/DL (ref 3.5–5)
ALBUMIN/GLOB SERPL: 0.9 {RATIO} (ref 1.1–2.2)
ALP SERPL-CCNC: 71 U/L (ref 45–117)
ALT SERPL-CCNC: 14 U/L (ref 12–78)
ANION GAP SERPL CALC-SCNC: 6 MMOL/L (ref 5–15)
AST SERPL-CCNC: 14 U/L (ref 15–37)
BACTERIA SPEC CULT: ABNORMAL
BASOPHILS # BLD: 0.1 K/UL (ref 0–0.1)
BASOPHILS NFR BLD: 2 % (ref 0–1)
BILIRUB SERPL-MCNC: 0.3 MG/DL (ref 0.2–1)
BUN SERPL-MCNC: 21 MG/DL (ref 6–20)
BUN/CREAT SERPL: 10 (ref 12–20)
CALCIUM SERPL-MCNC: 9.1 MG/DL (ref 8.5–10.1)
CC UR VC: ABNORMAL
CHLORIDE SERPL-SCNC: 116 MMOL/L (ref 97–108)
CO2 SERPL-SCNC: 20 MMOL/L (ref 21–32)
CREAT SERPL-MCNC: 2.2 MG/DL (ref 0.55–1.02)
DIFFERENTIAL METHOD BLD: ABNORMAL
EOSINOPHIL # BLD: 0.3 K/UL (ref 0–0.4)
EOSINOPHIL NFR BLD: 6 % (ref 0–7)
ERYTHROCYTE [DISTWIDTH] IN BLOOD BY AUTOMATED COUNT: 15.4 % (ref 11.5–14.5)
GLOBULIN SER CALC-MCNC: 3.3 G/DL (ref 2–4)
GLUCOSE SERPL-MCNC: 114 MG/DL (ref 65–100)
HCT VFR BLD AUTO: 32.1 % (ref 35–47)
HGB BLD-MCNC: 10.4 G/DL (ref 11.5–16)
IMM GRANULOCYTES # BLD AUTO: 0 K/UL (ref 0–0.04)
IMM GRANULOCYTES NFR BLD AUTO: 0 % (ref 0–0.5)
LYMPHOCYTES # BLD: 1.3 K/UL (ref 0.8–3.5)
LYMPHOCYTES NFR BLD: 27 % (ref 12–49)
MCH RBC QN AUTO: 30.1 PG (ref 26–34)
MCHC RBC AUTO-ENTMCNC: 32.4 G/DL (ref 30–36.5)
MCV RBC AUTO: 92.8 FL (ref 80–99)
MONOCYTES # BLD: 0.6 K/UL (ref 0–1)
MONOCYTES NFR BLD: 14 % (ref 5–13)
NEUTS SEG # BLD: 2.4 K/UL (ref 1.8–8)
NEUTS SEG NFR BLD: 51 % (ref 32–75)
NRBC # BLD: 0 K/UL (ref 0–0.01)
NRBC BLD-RTO: 0 PER 100 WBC
PLATELET # BLD AUTO: 211 K/UL (ref 150–400)
PMV BLD AUTO: 10.1 FL (ref 8.9–12.9)
POTASSIUM SERPL-SCNC: 3.8 MMOL/L (ref 3.5–5.1)
PROT SERPL-MCNC: 6.3 G/DL (ref 6.4–8.2)
RBC # BLD AUTO: 3.46 M/UL (ref 3.8–5.2)
SERVICE CMNT-IMP: ABNORMAL
SODIUM SERPL-SCNC: 142 MMOL/L (ref 136–145)
WBC # BLD AUTO: 4.6 K/UL (ref 3.6–11)

## 2021-10-25 PROCEDURE — 97535 SELF CARE MNGMENT TRAINING: CPT

## 2021-10-25 PROCEDURE — 97530 THERAPEUTIC ACTIVITIES: CPT | Performed by: PHYSICAL THERAPIST

## 2021-10-25 PROCEDURE — 74011250637 HC RX REV CODE- 250/637: Performed by: NURSE PRACTITIONER

## 2021-10-25 PROCEDURE — 74011250636 HC RX REV CODE- 250/636: Performed by: INTERNAL MEDICINE

## 2021-10-25 PROCEDURE — 74011000258 HC RX REV CODE- 258: Performed by: INTERNAL MEDICINE

## 2021-10-25 PROCEDURE — 36415 COLL VENOUS BLD VENIPUNCTURE: CPT

## 2021-10-25 PROCEDURE — 97116 GAIT TRAINING THERAPY: CPT | Performed by: PHYSICAL THERAPIST

## 2021-10-25 PROCEDURE — 65660000001 HC RM ICU INTERMED STEPDOWN

## 2021-10-25 PROCEDURE — 80053 COMPREHEN METABOLIC PANEL: CPT

## 2021-10-25 PROCEDURE — 51798 US URINE CAPACITY MEASURE: CPT

## 2021-10-25 PROCEDURE — 85025 COMPLETE CBC W/AUTO DIFF WBC: CPT

## 2021-10-25 RX ORDER — DILTIAZEM HYDROCHLORIDE 5 MG/ML
10 INJECTION INTRAVENOUS
Status: DISCONTINUED | OUTPATIENT
Start: 2021-10-25 | End: 2021-11-02 | Stop reason: HOSPADM

## 2021-10-25 RX ADMIN — Medication 10 ML: at 15:33

## 2021-10-25 RX ADMIN — APIXABAN 5 MG: 5 TABLET, FILM COATED ORAL at 17:21

## 2021-10-25 RX ADMIN — CETIRIZINE HYDROCHLORIDE 10 MG: 10 TABLET, FILM COATED ORAL at 09:33

## 2021-10-25 RX ADMIN — CEFTRIAXONE 1 G: 1 INJECTION, POWDER, FOR SOLUTION INTRAMUSCULAR; INTRAVENOUS at 09:32

## 2021-10-25 RX ADMIN — APIXABAN 5 MG: 5 TABLET, FILM COATED ORAL at 09:33

## 2021-10-25 RX ADMIN — Medication 10 ML: at 21:00

## 2021-10-25 RX ADMIN — ASPIRIN 81 MG: 81 TABLET, COATED ORAL at 09:33

## 2021-10-25 RX ADMIN — LOSARTAN POTASSIUM 25 MG: 25 TABLET, FILM COATED ORAL at 15:32

## 2021-10-25 RX ADMIN — Medication 2000 UNITS: at 09:33

## 2021-10-25 RX ADMIN — TOPIRAMATE 50 MG: 25 TABLET, FILM COATED ORAL at 20:58

## 2021-10-25 RX ADMIN — Medication 10 ML: at 07:15

## 2021-10-25 RX ADMIN — FLUTICASONE PROPIONATE 2 SPRAY: 50 SPRAY, METERED NASAL at 09:37

## 2021-10-25 RX ADMIN — PRAVASTATIN SODIUM 80 MG: 40 TABLET ORAL at 09:33

## 2021-10-25 RX ADMIN — PANTOPRAZOLE SODIUM 40 MG: 40 TABLET, DELAYED RELEASE ORAL at 09:33

## 2021-10-25 RX ADMIN — TRAZODONE HYDROCHLORIDE 50 MG: 50 TABLET ORAL at 20:58

## 2021-10-25 NOTE — PROGRESS NOTES
Problem: Falls - Risk of  Goal: *Absence of Falls  Description: Document Timothy Thomas Fall Risk and appropriate interventions in the flowsheet.   Outcome: Progressing Towards Goal  Note: Fall Risk Interventions:       Mentation Interventions: Adequate sleep, hydration, pain control, Bed/chair exit alarm, Door open when patient unattended, Family/sitter at bedside, Familiar objects from home, Increase mobility, More frequent rounding, Reorient patient, Room close to nurse's station, Toileting rounds, Update white board    Medication Interventions: Bed/chair exit alarm, Teach patient to arise slowly, Patient to call before getting OOB    Elimination Interventions: Call light in reach, Bed/chair exit alarm, Patient to call for help with toileting needs, Toilet paper/wipes in reach, Toileting schedule/hourly rounds    History of Falls Interventions: Bed/chair exit alarm, Door open when patient unattended, Room close to nurse's station, Utilize gait belt for transfer/ambulation         Problem: Patient Education: Go to Patient Education Activity  Goal: Patient/Family Education  Outcome: Progressing Towards Goal     Problem: Afib Pathway: Day 3  Goal: Off Pathway (Use only if patient is Off Pathway)  Outcome: Progressing Towards Goal  Goal: Activity/Safety  Outcome: Progressing Towards Goal  Goal: Diagnostic Test/Procedures  Outcome: Progressing Towards Goal  Goal: Nutrition/Diet  Outcome: Progressing Towards Goal  Goal: Discharge Planning  Outcome: Progressing Towards Goal  Goal: Medications  Outcome: Progressing Towards Goal  Goal: Respiratory  Outcome: Progressing Towards Goal  Goal: Treatments/Interventions/Procedures  Outcome: Progressing Towards Goal  Goal: Psychosocial  Outcome: Progressing Towards Goal

## 2021-10-25 NOTE — PROGRESS NOTES
Hospitalist Progress Note    NAME: Jose David Saucedo   :  1950   MRN:  [id number]     Admit date: 10/22/2021    Today's date: 10/24/21    PCP: Bridget Ogden MD      Anticipated discharge date:10/25    Barriers:  Medical issues    Assessment / Plan:    Acute metabolic encephalopathy POA  GNR UTI POA  · Sent in by home health aide with emesis, tachycardia  · MS reported as poor insight, unclear what baseline is  · Neurology evaluation ? Aphasia  · MRI brain with no acute stroke   Chronic left tempoparietal CVA, chronic left basal ganglia infarct   Small vessel ischemic infarct  · Normal B12   · UA with cloudy urine, nitrite +, 20-50 WBC, 0-5 RBC, 3+ bacteria  · Urine culture with lactose-fermenting GNR  · IV ceftriaxone  · PT/OT consults  · ASA, statin, eliquis    Moderate to severe mitral stenosis POA likely prior rheumatic fever  Permament atrial fibrillation [facility] ([facility] (2020) POA  · ? Compliance with meds, noted with RVR at home  · Admit EKG with HR 160s, atrial fib  · Cardiac monitoring  · IV cardizem gtt, wean off now that back on PO meds, HR < 100  · Cardiology consult appreciated  · Continue ASA, eliquis, cozaar, toprol XL  · Echo LVEF 55-60%, mod to severe mitral stenosis  · Ideally needs coumadin for stroke prevention = high risk   Cards concerns re compliance with follow up noted   Eliquis for now  · Outpatient cardiology follow up       CKD stage 4 POA  · Baseline creatinine 1.8 to 2.0 range  · Avoid nephrotoxic medications  · Stop IVF       Diastolic CHF, chronic ([facility] (2020) POA    Hypertensive urgency (10/22/2021) 202/174 POA  · No evidence of end organ damage  · Continue home losartan and metoprolol  · Continue pravastatin  · BP improved on home medications, ?  Compliance  · cozaar, toprol XL  · Add back po cardizem  · PRN hydralazine       Impaired insight (10/22/2021)    Noncompliance (10/22/2021)  · CT of head was negative for acute hemorrhage or process  · MRI of brain as above  · Normal B-12  · Consult case management  · Medical TDO petitioned by ED provider, now   · One-on-one  removed, agree with Psych   She repeatedly denies she is suicidial  · Treat underlying medical conditions  · Psychiatry consult appreciated  · Continue home medications  · Able to tell me his birthday, year, trouble getting where she was(hospital)  · ? Confusion vs difficulty finding right words(aphasia)    Hypothyroidism POA  · TSH mildly increased 6.90  · Continue levothyroxine    Overweight POA Body mass index is 27.96 kg/m². Code Status: Full code    Surrogate Decision Maker: None  Per ER physician report, patient's daughter was contacted however she stated that she does not want to be involved.     DVT Prophylaxis: Eliquis  GI Prophylaxis: Protonix     Activity at baseline: Ambulates without assistive device     Lives with: Self with home health/private duty nurse visiting      Subjective:     Chief Complaint / Reason for Physician Visit  \"Please let me go home\". Discussed with RN events overnight. Alert , talking, denies pain or SOB or headache  Trouble answering some orientation questions      Could not tell me where she was(police station vs school vs hospital)      Seemed to be having trouble getting words out  All she wants to do is go home and take care of her cats  Denies contact with daughter    Review of Systems:  Symptom Y/N Comments  Symptom Y/N Comments   Fever/Chills n   Chest Pain n    Poor Appetite    Edema     Cough n   Abdominal Pain n    Sputum    Joint Pain     SOB/RIVERS n   Headache     Nausea/vomit n   Tolerating PT/OT     Diarrhea n   Tolerating Diet y    Constipation    Other       Could NOT obtain due to:      Objective:     VITALS:   Last 24hrs VS reviewed since prior progress note.  Most recent are:  Patient Vitals for the past 24 hrs:   Temp Pulse Resp BP SpO2   10/24/21 1902 98 °F (36.7 °C) 73 18 129/83 92 %   10/24/21 1901    (!) 127/97    10/24/21 1857  65  136/71 91 %   10/24/21 1512 97.9 °F (36.6 °C) 67 18 121/77 98 %   10/24/21 1209 97.3 °F (36.3 °C) 70 20 133/66 92 %   10/24/21 1007 97.5 °F (36.4 °C) 76 19 (!) 158/146 97 %   10/24/21 0920  73  (!) 158/84    10/24/21 0814  73  (!) 153/92 (!) 76 %   10/24/21 0314 97.6 °F (36.4 °C) 75 18 (!) 167/87 92 %   10/23/21 2316 98.4 °F (36.9 °C) 80 20 (!) 170/99 96 %       Intake/Output Summary (Last 24 hours) at 10/24/2021 2210  Last data filed at 10/24/2021 1512  Gross per 24 hour   Intake 120 ml   Output 400 ml   Net -280 ml        Wt Readings from Last 12 Encounters:   10/24/21 62.8 kg (138 lb 7.2 oz)   10/12/21 63.5 kg (140 lb)   05/11/21 63.6 kg (140 lb 2 oz)   03/10/21 64 kg (141 lb)   10/26/20 62.1 kg (136 lb 12.8 oz)   05/02/20 73.5 kg (161 lb 15.9 oz)   11/10/19 71.7 kg (158 lb)   10/01/19 71.8 kg (158 lb 4 oz)   03/26/19 68 kg (150 lb)   05/22/18 72.9 kg (160 lb 12.8 oz)   01/12/18 73.1 kg (161 lb 3.2 oz)   11/14/17 71.2 kg (157 lb)       PHYSICAL EXAM:    I had a face to face encounter and independently examined this patient on 10/24/21 as outlined below:    General: WD, WN. Alert, cooperative, no acute distress    EENT:  PERRL. Anicteric sclerae. MMM  Resp:  CTA bilaterally, no wheezing or rales. No accessory muscle use  CV:  Irregular  rhythm,  No edema  GI:  Soft, Non distended, Non tender. +Bowel sounds, no rebound  Neurologic:  Alert to B-day and year, word finding difficulties, non focal motor exam  Psych:   Not anxious nor agitated  Skin:  No rashes.   No jaundice    Reviewed most current lab test results and cultures  YES  Reviewed most current radiology test results   YES  Review and summation of old records today    NO  Reviewed patient's current orders and MAR    YES  PMH/ reviewed - no change compared to H&P  ________________________________________________________________________  Care Plan discussed with:    Comments   Patient x    Family      RN x Care Manager     Consultant                        Multidiciplinary team rounds were held today with , nursing, pharmacist and clinical coordinator. Patient's plan of care was discussed; medications were reviewed and discharge planning was addressed. ________________________________________________________________________      Comments   >50% of visit spent in counseling and coordination of care     ________________________________________________________________________  Artur Bautista MD     Procedures: see electronic medical records for all procedures/Xrays and details which were not copied into this note but were reviewed prior to creation of Plan. LABS:  I reviewed today's most current labs and imaging studies.   Pertinent labs include:  Recent Labs     10/24/21  1852 10/23/21  0435 10/22/21  1400   WBC 5.4 6.3 5.5   HGB 10.6* 11.0* 11.7   HCT 32.0* 35.2  34.7* 36.6    203 218     Recent Labs     10/24/21  1852 10/23/21  0435 10/22/21  1400    144 140   K 4.0 4.4 4.2   * 116* 113*   CO2 22 20* 20*   * 120* 123*   BUN 18 23* 29*   CREA 2.34* 2.07* 2.28*   CA 9.7 9.5 9.6   MG  --  2.2 2.0   ALB  --  3.4* 3.7   TBILI  --  0.6 0.8   ALT  --  12 14

## 2021-10-25 NOTE — PROGRESS NOTES
Problem: Self Care Deficits Care Plan (Adult)  Goal: *Acute Goals and Plan of Care (Insert Text)  Description:   FUNCTIONAL STATUS PRIOR TO ADMISSION: Patient questionable historian secondary to confusion and aphasia. Patient reported she was independent for self-care and functional mobility but stated she had a friend  her to appointments and stores. Per chart review, patient had home [facility] aide who assist a few times during the week. HOME SUPPORT: The patient lived alone with no local support. Caregiver assisted patient occasionally during week and daughter did not want to be involved per chart review. Occupational Therapy Goals  Initiated 10/24/2021  1. Patient will perform grooming standing at sink with supervision/set-up within 7 day(s). 2.  Patient will perform lower body dressing with supervision/set-up within 7 day(s). 3.  Patient will perform seated sponge bathing with supervision/set-up within 7 day(s). 4.  Patient will perform toilet transfers with supervision/set-up within 7 day(s). 5.  Patient will perform all aspects of toileting with supervision/set-up within 7 day(s). 6.  Patient will utilize energy conservation techniques ans safety awareness during functional activities with verbal cues within 7 day(s). Outcome: Progressing Towards Goal   OCCUPATIONAL THERAPY TREATMENT  Patient: Samia Erickson (74 y.o. female)  Date: 10/25/2021  Diagnosis: Hypertensive emergency [I16.1]  Atrial fibrillation with RVR ([address] [I48.91] Atrial fibrillation with RVR ([address]       Precautions: Fall, Bed Alarm  Chart, occupational therapy assessment, plan of care, and goals were reviewed. ASSESSMENT  Patient continues with skilled OT services and is progressing towards goals. Pt received seated EOB with PT, agreeable to participate.  She donned underwear with overall mod A to thread distal LEs and to maintain standing balance when pulling up to waist. Using RW she ambulated in room and performed standing grooming ADLs at sink, pt unsteady, required min A for balance and frequent cueing for safe RW management and transfer technique. Min verbal cues provided for initiation and sequencing during grooming tasks. She returned to bed at end of session, VSS. Pt is functioning below her baseline at this time, limited by decreased insight into deficits, confusion, decreased balance, endurance and strength and impaired functional mobility. She continues to benefit from skilled intervention to address the above impairments. Pending progress recommend rehab at discharge to maximize functional independence. Current Level of Function Impacting Discharge (ADLs): Min A transfers, mod I-mod A ADLs    Other factors to consider for discharge: fall risk, lives alone         PLAN :  Continue treatment per established plan of care to address goals. Recommendation for discharge: (in order for the patient to meet his/her long term goals)  Therapy up to 5 days/week in SNF setting     This discharge recommendation:  Has not yet been discussed the attending provider and/or case management    IF patient discharges home will need the following DME: TBD       SUBJECTIVE:   Patient stated I'd love to brush my teeth.     OBJECTIVE DATA SUMMARY:   Cognitive/Behavioral Status:  Neurologic State: Alert  Orientation Level: Oriented X4;Other (Comment) (Periodic confusion)  Cognition: Appropriate decision making; Appropriate for age attention/concentration; Appropriate safety awareness  Perception: Appears intact     Safety/Judgement: Fall prevention;Decreased insight into deficits; Decreased awareness of need for safety;Decreased awareness of need for assistance    Functional Mobility and Transfers for ADLs:  Bed Mobility:  Supine to Sit: Supervision; Additional time  Scooting: Supervision; Additional time    Transfers:  Sit to Stand: Contact guard assistance          Balance:  Sitting: Intact  Standing: Impaired  Standing - Static: Fair  Standing - Dynamic : Fair    ADL Intervention:       Grooming  Position Performed: Standing  Brushing Teeth: Minimum assistance (A for balance)  Cues: Verbal cues provided; Tactile cues provided       Lower Body Dressing Assistance  Underpants: Moderate assistance (A to thread over feet, min A for balance when standing)  Leg Crossed Method Used: Yes  Position Performed: Seated edge of bed  Cues: Verbal cues provided;Physical assistance         Cognitive Retraining  Safety/Judgement: Fall prevention;Decreased insight into deficits; Decreased awareness of need for safety;Decreased awareness of need for assistance  Pain:  Pt did not report pain during session    Activity Tolerance:   Fair    After treatment patient left in no apparent distress:   Supine in bed, Call bell within reach, Bed / chair alarm activated and Side rails x 3    COMMUNICATION/COLLABORATION:   The patients plan of care was discussed with: Physical therapist and Registered nurse.      Debi Roldan OT  Time Calculation: 15 mins

## 2021-10-25 NOTE — PROGRESS NOTES
Cardiology Progress Note      10/25/2021 1:49 PM    Admit Date: 10/22/2021          Subjective: Events noted. Remains difficult to arouse         Visit Vitals  /63   Pulse (!) 54   Temp 98.2 °F (36.8 °C)   Resp 20   Ht 4' 11\" (1.499 m)   Wt 62.8 kg (138 lb 7.2 oz)   SpO2 97%   BMI 27.96 kg/m²     10/23 1901 - 10/25 0700  In: 120 [P.O.:120]  Out: 400 [Urine:400]        Objective:      Physical Exam:  VS as above  Chest CTA  Card Irreg irreg no gallop    Data Review:   Labs:    BUN 21  Creat 2.2  Hgb10.4     Telemetry: afib R 60-70      Assessment:     1. Encephalopathy; resolved? 2. Urinary tract infection  3. Chronic atrial fibrillation  4. CAD s/p prior MI.  Echo 4/20 w/ EF 55-60%, mod LAE  5. Hyperlipidemia  6. Hypertension  7. . CKD  8. Chronic anemia. 9.  Prior history of cerebrovascular accident with expressive aphasia. 10. History of ovarian cancer status post surgery. 11. Hypothyroidism  12. Moderate to severe mitral stenosis, nl EF by echo     Plan:    Cont current Rx. Will need long term anticoagulation.

## 2021-10-25 NOTE — PROGRESS NOTES
3:45pm- CM attempted to call pt's daughter Juliane Arellano via phone numerous times to complete initial assessment. No answer. CM will attempt to call again to complete initial assessment. CM will continue to follow patient for discharge planning needs and arrange for services as deemed necessary.     Gopal Phillips [address]  [phone]

## 2021-10-25 NOTE — PROGRESS NOTES
Hospitalist Progress Note    NAME: Caryle Sailors   :  1950   MRN:  [id number]     Admit date: 10/22/2021    Today's date: 10/25/21    PCP: Zoila Patel MD      Anticipated discharge date:10/25    Barriers:  Medical issues    Assessment / Plan:    Acute metabolic encephalopathy POA  E coli UTI POA  ? Dementia POA  · Sent in by home health aide with emesis, tachycardia  · MS reported as poor insight, unclear what baseline is  · Neurology evaluation ? Aphasia  · MRI brain with no acute stroke   Chronic left tempoparietal CVA, chronic left basal ganglia infarct   Small vessel ischemic infarct  · Normal B12   · UA with cloudy urine, nitrite +, 20-50 WBC, 0-5 RBC, 3+ bacteria  · Urine culture with lactose-fermenting GNR  · IV ceftriaxone  · PT/OT consults  · ASA, statin, eliquis  · PT/OT recommends SNF  · Cannot find any relatives    Bradycardia with pauses to 2.5-3.0 secs 10/25 on diltiazem/metoprolol XL  Moderate to severe mitral stenosis POA likely prior rheumatic fever  Permament atrial fibrillation with RVR ([facility] (2020) POA  · ? Compliance with meds, noted with RVR at home  · Admit EKG with HR 160s, atrial fib  · Cardiac monitoring  · IV cardizem gtt, wean off now that back on PO meds, HR < 100  · Cardiology consult appreciated  · Continue ASA, eliquis, cozaar  · Echo LVEF 55-60%, mod to severe mitral stenosis  · Ideally needs coumadin for stroke prevention = high risk give MS and prior strokes   Cards concerns re compliance with follow up noted   Eliquis for now  · 10/25 noted with episodes of bradycardia to 30s with pauses to 2.5 to 3.0 sec  · Held metoprol and cardizem  · PRN IV cardizem if RVR  · Spoke with Dr Warren Iverson, he will evaluate in Am, ?  Needs PM       CKD stage 4 POA  · Baseline creatinine 1.8 to 2.0 range  · Avoid nephrotoxic medications  · Stop IVF  · Appears stable       Diastolic CHF, chronic ([facility] (2020) POA    Hypertensive urgency (10/22/2021) 202/174 POA  · No evidence of end organ damage  · Continue home losartan and metoprolol  · Continue pravastatin  · BP improved on home medications, ? Compliance  · cozaar  · PRN hydralazine       Impaired insight (10/22/2021)    Noncompliance (10/22/2021)  · CT of head was negative for acute hemorrhage or process  · MRI of brain as above  · Normal B-12  · Consult case management  · Medical TDO petitioned by ED provider, now   · One-on-one  removed, agree with Psych   She repeatedly denies she is suicidial  · Treat underlying medical conditions  · Psychiatry consult appreciated  · Continue home medications  · Able to tell me his birthday, year, trouble getting where she was(hospital)  · ? Confusion vs difficulty finding right words(aphasia)    Hypothyroidism POA  · TSH mildly increased 6.90  · Continue levothyroxine    Overweight POA Body mass index is 27.96 kg/m². Code Status: Full code    Surrogate Decision Maker: None  Per ER physician report, patient's daughter was contacted however she stated that she does not want to be involved.     DVT Prophylaxis: Eliquis  GI Prophylaxis: Protonix     Activity at baseline: Ambulates without assistive device     Lives with: Self with home health/private duty nurse visiting      Subjective:     Chief Complaint / Reason for Physician Visit f/u atrial fib with RVR  \"I am okay\". Discussed with RN events overnight.    Awake, talking  Has 3 children, no contact with them  Denies CP, HA, abdominal pain, N/V  Bradycardia and pauses to 2.5 to 3.0 sec today  Worked with PT, SNF recommended    Review of Systems:  Symptom Y/N Comments  Symptom Y/N Comments   Fever/Chills n   Chest Pain n    Poor Appetite    Edema     Cough n   Abdominal Pain n    Sputum    Joint Pain     SOB/RIVERS n   Headache     Nausea/vomit n   Tolerating PT/OT     Diarrhea n   Tolerating Diet y    Constipation    Other       Could NOT obtain due to:      Objective:     VITALS:   Last 24hrs VS reviewed since prior progress note. Most recent are:  Patient Vitals for the past 24 hrs:   Temp Pulse Resp BP SpO2   10/25/21 1527 97.7 °F (36.5 °C) 65 20 (!) 137/96 98 %   10/25/21 1049 98.2 °F (36.8 °C) (!) 54 20 119/63 97 %   10/25/21 0932  (!) 43   91 %   10/25/21 0931  (!) 37   92 %   10/25/21 0930  (!) 40   95 %   10/25/21 0920  (!) 40   96 %   10/25/21 0915  (!) 38   96 %   10/25/21 0913  (!) 38   97 %   10/25/21 0909  (!) 43   96 %   10/25/21 0907  (!) 38   92 %   10/25/21 0906  (!) 44   95 %   10/25/21 0901  (!) 41   96 %   10/25/21 0858  (!) 38   96 %   10/25/21 0857  (!) 39   93 %   10/25/21 0856  (!) 35   92 %   10/25/21 0845  (!) 47   96 %   10/25/21 0844  (!) 38   94 %   10/25/21 0843  (!) 42   96 %   10/25/21 0813  (!) 42   94 %   10/25/21 0807  (!) 43      10/25/21 0804  (!) 41      10/25/21 0726 97.7 °F (36.5 °C) (!) 48 16 (!) 123/57 96 %   10/25/21 0400   18         Intake/Output Summary (Last 24 hours) at 10/25/2021 1926  Last data filed at 10/25/2021 1836  Gross per 24 hour   Intake 220 ml   Output 200 ml   Net 20 ml        Wt Readings from Last 12 Encounters:   10/24/21 62.8 kg (138 lb 7.2 oz)   10/12/21 63.5 kg (140 lb)   05/11/21 63.6 kg (140 lb 2 oz)   03/10/21 64 kg (141 lb)   10/26/20 62.1 kg (136 lb 12.8 oz)   05/02/20 73.5 kg (161 lb 15.9 oz)   11/10/19 71.7 kg (158 lb)   10/01/19 71.8 kg (158 lb 4 oz)   03/26/19 68 kg (150 lb)   05/22/18 72.9 kg (160 lb 12.8 oz)   01/12/18 73.1 kg (161 lb 3.2 oz)   11/14/17 71.2 kg (157 lb)       PHYSICAL EXAM:    I had a face to face encounter and independently examined this patient on 10/25/21 as outlined below:    General: WD, WN. Alert, cooperative, no acute distress    EENT:  PERRL. Anicteric sclerae. MMM  Resp:  CTA bilaterally, no wheezing or rales. No accessory muscle use  CV:  Irregular  rhythm,  No edema  GI:  Soft, Non distended, Non tender.   +Bowel sounds, no rebound  Neurologic:  Alert to B-day and year, word finding difficulties, non focal motor exam  Psych:   Not anxious nor agitated  Skin:  No rashes. No jaundice    Reviewed most current lab test results and cultures  YES  Reviewed most current radiology test results   YES  Review and summation of old records today    NO  Reviewed patient's current orders and MAR    YES  PMH/SH reviewed - no change compared to H&P  ________________________________________________________________________  Care Plan discussed with:    Comments   Patient x    Family      RN x    Care Manager     Consultant  x Dr Sandy Munguia team rounds were held today with , nursing, pharmacist and clinical coordinator. Patient's plan of care was discussed; medications were reviewed and discharge planning was addressed. ________________________________________________________________________      Comments   >50% of visit spent in counseling and coordination of care     ________________________________________________________________________  Divya Patel MD     Procedures: see electronic medical records for all procedures/Xrays and details which were not copied into this note but were reviewed prior to creation of Plan. LABS:  I reviewed today's most current labs and imaging studies.   Pertinent labs include:  Recent Labs     10/25/21  0654 10/24/21  1852 10/23/21  0435   WBC 4.6 5.4 6.3   HGB 10.4* 10.6* 11.0*   HCT 32.1* 32.0* 35.2  34.7*    217 203     Recent Labs     10/25/21  0654 10/24/21  1852 10/23/21  0435    141 144   K 3.8 4.0 4.4   * 114* 116*   CO2 20* 22 20*   * 107* 120*   BUN 21* 18 23*   CREA 2.20* 2.34* 2.07*   CA 9.1 9.7 9.5   MG  --   --  2.2   ALB 3.0*  --  3.4*   TBILI 0.3  --  0.6   ALT 14  --  12

## 2021-10-25 NOTE — PROGRESS NOTES
Problem: Mobility Impaired (Adult and Pediatric)  Goal: *Acute Goals and Plan of Care (Insert Text)  Description: FUNCTIONAL STATUS PRIOR TO ADMISSION: Patient questionable historian secondary to confusion and aphasia. Patient reported she was independent with functional mobility but stated she had a friend  her to appointments and stores. Per chart review, patient had home [facility] aide who assist a few times during the week. HOME SUPPORT PRIOR TO ADMISSION: The patient lived alone with no local support. Pt has [facility] aide who assists occasionally. Physical Therapy Goals  Initiated 10/24/2021  1. Patient will move from supine to sit and sit to supine  in bed with modified independence within 7 day(s). 2.  Patient will transfer from bed to chair and chair to bed with supervision/set-up using the least restrictive device within 7 day(s). 3.  Patient will perform sit to stand with supervision/set-up within 7 day(s). 4.  Patient will ambulate with supervision/set-up for 150 feet with the least restrictive device within 7 day(s). 5.  Patient will ascend/descend 16 stairs with unilateral handrail(s) with supervision/set-up within 7 day(s). Outcome: Progressing Towards Goal   PHYSICAL THERAPY TREATMENT  Patient: Deepa Lovell (11 y.o. female)  Date: 10/25/2021  Diagnosis: Hypertensive emergency [I16.1]  Atrial fibrillation with RVR ([facility] [address] [I48.91] Atrial fibrillation with RVR ([facility])       Precautions: Fall, Bed Alarm  Chart, physical therapy assessment, plan of care and goals were reviewed. ASSESSMENT  Patient continues with skilled PT services and is progressing towards goals. Patient continues with aphasia, gait instability, impaired balance, decreased activity tolerance and decreased insight into deficits. Currently needing SBA to come to EOB but needs extra time for task. Good static sitting balance and fair dynamic. CGA for transfers but has fair standing balance.  Amb approx 25 feet with Remberto with frequent path deviations and numerous losses of balance. Scissoring at times with gait. Utilized RW for additional 15 feet with continued Remberto and poor walker control. Patient is a high fall risk and will benefit from SNF rehab to progress to more independent level of function. Noted that she lives alone. Other factors to consider for discharge: aphasic, at risk for falls, lives alone         PLAN :  Patient continues to benefit from skilled intervention to address the above impairments. Continue treatment per established plan of care. to address goals. Recommendation for discharge: (in order for the patient to meet his/her long term goals)  Therapy up to 5 days/week in SNF setting      IF patient discharges home will need the following DME: to be determined (TBD)       SUBJECTIVE:   Patient stated Did someone let my cats outside?     OBJECTIVE DATA SUMMARY:   Critical Behavior:  Neurologic State: Drowsy  Orientation Level: Oriented X4, Other (Comment) (Periodic confusion)  Cognition: Appropriate decision making, Appropriate for age attention/concentration, Appropriate safety awareness  Safety/Judgement: Decreased awareness of environment, Decreased awareness of need for assistance, Decreased awareness of need for safety, Decreased insight into deficits, Fall prevention  Functional Mobility Training:  Bed Mobility:     Supine to Sit: Supervision; Additional time     Scooting: Supervision; Additional time        Transfers:  Sit to Stand: Contact guard assistance  Stand to Sit: Contact guard assistance                             Balance:  Sitting: Intact  Standing: Impaired  Standing - Static: Fair  Standing - Dynamic : Fair  Ambulation/Gait Training:  Distance (ft): 25 Feet (ft) (additional 15 feet with RW)  Assistive Device: Gait belt  Ambulation - Level of Assistance: Minimal assistance;Assist x1        Gait Abnormalities: Decreased step clearance; Path deviations;Scissoring        Base of Support: Center of gravity altered;Narrowed     Speed/Manjula: Pace decreased (<100 feet/min); Slow          Pain Rating:  No c/o pain    Activity Tolerance:   Fair and requires rest breaks    After treatment patient left in no apparent distress:   Supine in bed, Call bell within reach, Bed / chair alarm activated, and Side rails x 3    COMMUNICATION/COLLABORATION:   The patients plan of care was discussed with: Physical therapist, Occupational therapist, and Registered nurse.      Jesus Novak, PT, DPT   Time Calculation: 32 mins

## 2021-10-25 NOTE — PROGRESS NOTES
1900 Received report from [address] [place] [address], [facility]. Assumed care of patient.

## 2021-10-25 NOTE — PROGRESS NOTES
Ambulatory Care Management Note    Date/Time:  10/25/2021 8:10 AM     Received notification of patient admission. Chart reviewed. Patient is open to Ambulatory Care Management -Social Work services. Patient information:  · Patient lives alone and is estranged from daughters. Messages left by this SW for daughter, Kun Carlson ([phone] or [phone]) in last few weeks. No return call. · Patient has refused personal care aide assistance in home   · [facility] Care Coordinator is Ayleen Elliott ([phone]; Fax #[phone])  · Previously known to [facility] Aggarwal (several years ago)  · Last SNF stay: [address] - May 2020  · Medications delivered by [facility] pharmacy  · Patient aware of Medicaid transportation, but requires assistance with scheduling  · During recent conversations with this SW, noted that patient has had increased difficulty finding words, finishing sentences. Observed repetition of phrases and confusion with dates, day of week. 8:35 am - VM left for Estephania Leung [facility] inpatient CM for transition of care. Ambulatory Social Work to monitor patient status and resume care upon discharge. Please contact JET Majano, ACSW, [facility] ([phone]) with any questions.      JET Majano, ACSW, [facility]    Ambulatory Care Management   [phone]

## 2021-10-25 NOTE — PROGRESS NOTES
0715: Bedside shift change report given to Patsy RN (oncoming nurse) by Madyson De La Rosa RN (offgoing nurse). Report included the following information SBAR, Intake/Output, MAR, Recent Results and Cardiac Rhythm Afib.     0845: Pt had 2.97 second pause. Pt drowsy. Dr. Jacob Nichole and Dr. Breana Bunch notified. Will hold off on PO cardiac meds until Dr. Breana Bunch assesses pt.     [phone]: Pt had 2.67 second pause. Pt still drowsy. MD's aware. 1500: Dr. Jacob Nichole at bedside, he would like to hold PO cardiac medications. He would like to give losartan. 1800: Pt has not voided since AM. Got pt to bedside commode, pt voided 200 mL. PVR: 24 mL.     1900:  End of Shift Note    Bedside shift change report given to Demarco Valente RN (oncoming nurse) by Baltazar Resendiz RN (offgoing nurse). Report included the following information SBAR, Intake/Output, MAR, Recent Results and Cardiac Rhythm Afib    Shift worked:  [id number]     Shift summary and any significant changes:     See above     Concerns for physician to address:  Cardiac medication regimen     Zone phone for oncoming shift:   [phone]       Activity:  Activity Level: Up with Assistance  Number times ambulated in hallways past shift: 1  Number of times OOB to chair past shift: 0    Cardiac:   Cardiac Monitoring: Yes      Cardiac Rhythm: Atrial Fib    Access:   Current line(s): PIV     Genitourinary:   Urinary status: voiding    Respiratory:   O2 Device: None (Room air)  Chronic home O2 use?: NO  Incentive spirometer at bedside: NO     GI:  Last Bowel Movement Date: 10/24/21  Current diet:  ADULT DIET Regular  Passing flatus: YES  Tolerating current diet: YES       Pain Management:   Patient states pain is manageable on current regimen: YES    Skin:  Aldo Score: 17  Interventions: turn team, increase time out of bed and PT/OT consult    Patient Safety:  Fall Score:  Total Score: 3  Interventions: bed/chair alarm, assistive device (walker, cane, etc), gripper socks, pt to call before getting OOB and tele sitter   High Fall Risk: Yes    Length of Stay:  Expected LOS: 3d 19h  Actual LOS: 3      Ernie Mortensen RN

## 2021-10-25 NOTE — PROGRESS NOTES
CM made attempt to complete CM initial assessment was unsuccessful. Left vm to pt's daughter Manpreet Chambers @ [phone]. Floor Cm will follow up.     Juaquin Godinez [facility]  ED Case Manager   Ext [phone]

## 2021-10-25 NOTE — PROGRESS NOTES
2115: Pt gets more confused and less cooperative at night. Pt continues to pull off telemetry monitor and pulse ox and climb out of bed. Pt has avasys video monitor and bed alarm for safety. 2225: Pt becoming increasingly confused. Not cooperating. Restless, climbing out of bed. PRN ativan given at 2153.    2300: Pt refusing for VS to be taken at this time.

## 2021-10-26 LAB
ANION GAP SERPL CALC-SCNC: 7 MMOL/L (ref 5–15)
BUN SERPL-MCNC: 21 MG/DL (ref 6–20)
BUN/CREAT SERPL: 10 (ref 12–20)
CALCIUM SERPL-MCNC: 9.5 MG/DL (ref 8.5–10.1)
CHLORIDE SERPL-SCNC: 114 MMOL/L (ref 97–108)
CO2 SERPL-SCNC: 19 MMOL/L (ref 21–32)
CREAT SERPL-MCNC: 2.15 MG/DL (ref 0.55–1.02)
FOLATE BLD-MCNC: 292 NG/ML
FOLATE RBC-MCNC: 830 NG/ML
GLUCOSE SERPL-MCNC: 131 MG/DL (ref 65–100)
HCT VFR BLD AUTO: 35.2 % (ref 34–46.6)
POTASSIUM SERPL-SCNC: 3.6 MMOL/L (ref 3.5–5.1)
SODIUM SERPL-SCNC: 140 MMOL/L (ref 136–145)

## 2021-10-26 PROCEDURE — 97530 THERAPEUTIC ACTIVITIES: CPT

## 2021-10-26 PROCEDURE — 74011250636 HC RX REV CODE- 250/636: Performed by: INTERNAL MEDICINE

## 2021-10-26 PROCEDURE — 74011250637 HC RX REV CODE- 250/637: Performed by: NURSE PRACTITIONER

## 2021-10-26 PROCEDURE — 36415 COLL VENOUS BLD VENIPUNCTURE: CPT

## 2021-10-26 PROCEDURE — 80048 BASIC METABOLIC PNL TOTAL CA: CPT

## 2021-10-26 PROCEDURE — 74011000258 HC RX REV CODE- 258: Performed by: INTERNAL MEDICINE

## 2021-10-26 PROCEDURE — 65660000001 HC RM ICU INTERMED STEPDOWN

## 2021-10-26 PROCEDURE — 74011250637 HC RX REV CODE- 250/637: Performed by: GENERAL ACUTE CARE HOSPITAL

## 2021-10-26 RX ORDER — LOSARTAN POTASSIUM 50 MG/1
50 TABLET ORAL DAILY
Status: DISCONTINUED | OUTPATIENT
Start: 2021-10-27 | End: 2021-11-02 | Stop reason: HOSPADM

## 2021-10-26 RX ADMIN — PANTOPRAZOLE SODIUM 40 MG: 40 TABLET, DELAYED RELEASE ORAL at 08:45

## 2021-10-26 RX ADMIN — LOSARTAN POTASSIUM 25 MG: 25 TABLET, FILM COATED ORAL at 08:45

## 2021-10-26 RX ADMIN — PRAVASTATIN SODIUM 80 MG: 40 TABLET ORAL at 08:44

## 2021-10-26 RX ADMIN — Medication 10 ML: at 06:21

## 2021-10-26 RX ADMIN — Medication 10 ML: at 21:17

## 2021-10-26 RX ADMIN — ASPIRIN 81 MG: 81 TABLET, COATED ORAL at 08:44

## 2021-10-26 RX ADMIN — CEFTRIAXONE 1 G: 1 INJECTION, POWDER, FOR SOLUTION INTRAMUSCULAR; INTRAVENOUS at 09:02

## 2021-10-26 RX ADMIN — Medication 10 ML: at 17:34

## 2021-10-26 RX ADMIN — TOPIRAMATE 50 MG: 25 TABLET, FILM COATED ORAL at 21:16

## 2021-10-26 RX ADMIN — FLUTICASONE PROPIONATE 2 SPRAY: 50 SPRAY, METERED NASAL at 09:36

## 2021-10-26 RX ADMIN — APIXABAN 5 MG: 5 TABLET, FILM COATED ORAL at 17:34

## 2021-10-26 RX ADMIN — TRAZODONE HYDROCHLORIDE 50 MG: 50 TABLET ORAL at 21:16

## 2021-10-26 RX ADMIN — Medication 2000 UNITS: at 08:44

## 2021-10-26 RX ADMIN — APIXABAN 5 MG: 5 TABLET, FILM COATED ORAL at 08:44

## 2021-10-26 RX ADMIN — LEVOTHYROXINE SODIUM 100 MCG: 0.1 TABLET ORAL at 06:21

## 2021-10-26 RX ADMIN — HYDRALAZINE HYDROCHLORIDE 20 MG: 20 INJECTION INTRAMUSCULAR; INTRAVENOUS at 23:25

## 2021-10-26 RX ADMIN — CETIRIZINE HYDROCHLORIDE 10 MG: 10 TABLET, FILM COATED ORAL at 08:45

## 2021-10-26 NOTE — PROGRESS NOTES
Problem: Falls - Risk of  Goal: *Absence of Falls  Description: Document Sarah Foreman Fall Risk and appropriate interventions in the flowsheet.   Outcome: Progressing Towards Goal  Note: Fall Risk Interventions:       Mentation Interventions: Adequate sleep, hydration, pain control, Bed/chair exit alarm, Door open when patient unattended, Increase mobility, More frequent rounding, Reorient patient, Room close to nurse's station, Toileting rounds, Update white board    Medication Interventions: Bed/chair exit alarm, Patient to call before getting OOB, Teach patient to arise slowly    Elimination Interventions: Bed/chair exit alarm, Call light in reach, Patient to call for help with toileting needs, Toilet paper/wipes in reach, Toileting schedule/hourly rounds    History of Falls Interventions: Bed/chair exit alarm, Door open when patient unattended, Room close to nurse's station

## 2021-10-26 NOTE — PROGRESS NOTES
Problem: Falls - Risk of  Goal: *Absence of Falls  Description: Document Tatyana Dudley Fall Risk and appropriate interventions in the flowsheet. Outcome: Progressing Towards Goal  Note: Fall Risk Interventions:       Mentation Interventions: Adequate sleep, hydration, pain control, Door open when patient unattended, Bed/chair exit alarm, Increase mobility, More frequent rounding, Reorient patient, Room close to nurse's station, Toileting rounds, Update white board    Medication Interventions: Bed/chair exit alarm, Patient to call before getting OOB, Teach patient to arise slowly    Elimination Interventions: Bed/chair exit alarm, Call light in reach, Patient to call for help with toileting needs, Toilet paper/wipes in reach, Toileting schedule/hourly rounds    History of Falls Interventions: Bed/chair exit alarm, Door open when patient unattended, Room close to nurse's station         Problem: Patient Education: Go to Patient Education Activity  Goal: Patient/Family Education  Outcome: Progressing Towards Goal     Problem: Afib Pathway: Day 3  Goal: Off Pathway (Use only if patient is Off Pathway)  Outcome: Progressing Towards Goal  Goal: Activity/Safety  Outcome: Progressing Towards Goal  Goal: Diagnostic Test/Procedures  Outcome: Progressing Towards Goal  Goal: Nutrition/Diet  Outcome: Progressing Towards Goal  Goal: Discharge Planning  Outcome: Progressing Towards Goal  Goal: Medications  Outcome: Progressing Towards Goal  Goal: Respiratory  Outcome: Progressing Towards Goal  Goal: Treatments/Interventions/Procedures  Outcome: Progressing Towards Goal  Goal: Psychosocial  Outcome: Progressing Towards Goal     Problem: Pressure Injury - Risk of  Goal: *Prevention of pressure injury  Description: Document Aldo Scale and appropriate interventions in the flowsheet.   Outcome: Progressing Towards Goal  Note: Pressure Injury Interventions:  Sensory Interventions: Assess changes in LOC, Keep linens dry and wrinkle-free, Maintain/enhance activity level, Minimize linen layers, Pressure redistribution bed/mattress (bed type), Sit a 90-degree angle/use footstool if needed, Use 30-degree side-lying position         Activity Interventions: Increase time out of bed, Pressure redistribution bed/mattress(bed type), PT/OT evaluation    Mobility Interventions: HOB 30 degrees or less, Pressure redistribution bed/mattress (bed type), PT/OT evaluation    Nutrition Interventions: Document food/fluid/supplement intake, Offer support with meals,snacks and hydration    Friction and Shear Interventions: Minimize layers, Sit at 90-degree angle, HOB 30 degrees or less                Problem: Patient Education: Go to Patient Education Activity  Goal: Patient/Family Education  Outcome: Progressing Towards Goal

## 2021-10-26 NOTE — PROGRESS NOTES
Problem: Self Care Deficits Care Plan (Adult)  Goal: *Acute Goals and Plan of Care (Insert Text)  Description:   FUNCTIONAL STATUS PRIOR TO ADMISSION: Patient questionable historian secondary to confusion and aphasia. Patient reported she was independent for self-care and functional mobility but stated she had a friend  her to appointments and stores. Per chart review, patient had home [facility] aide who assist a few times during the week. HOME SUPPORT: The patient lived alone with no local support. Caregiver assisted patient occasionally during week and daughter did not want to be involved per chart review. Occupational Therapy Goals  Initiated 10/24/2021  1. Patient will perform grooming standing at sink with supervision/set-up within 7 day(s). 2.  Patient will perform lower body dressing with supervision/set-up within 7 day(s). 3.  Patient will perform seated sponge bathing with supervision/set-up within 7 day(s). 4.  Patient will perform toilet transfers with supervision/set-up within 7 day(s). 5.  Patient will perform all aspects of toileting with supervision/set-up within 7 day(s). 6.  Patient will utilize energy conservation techniques ans safety awareness during functional activities with verbal cues within 7 day(s). Outcome: Progressing Towards Goal     OCCUPATIONAL THERAPY TREATMENT  Patient: Royce Maahjan (9 y.o. female)  Date: 10/26/2021  Diagnosis: Hypertensive emergency [I16.1]  Atrial fibrillation with RVR ([address] [I48.91] Atrial fibrillation with RVR ([address]       Precautions: Fall, Bed Alarm  Chart, occupational therapy assessment, plan of care, and goals were reviewed. ASSESSMENT  Patient continues with skilled OT services and is progressing towards goals. Pt progressing with distal LE ADL management, donning socks at EOB, implementing crossed leg technique, with SBA, with no LOB; however, pt was impulsive as she attempted to stand when cued to remain seated.   Good seated balance, with good sit to stand at SBA with CGA for pivoting from EOB to armchair and cues for hand placement. Nursing aware and following pt's elevated BP (151/101; seated armchair). Given pt's impulsivity and decreased functional safety, reporting therapist believes pt will benefit from SNF rehab upon discharge, with acute OT to continue to follow during acute hospitalization. Current Level of Function Impacting Discharge (ADLs): Min A LE ADLs, SBA/CGA mobility/balance    Other factors to consider for discharge: impulsive, BP         PLAN :  Patient continues to benefit from skilled intervention to address the above impairments. Continue treatment per established plan of care to address goals. Recommend with staff: OOB meals, Active ADL engagement, CGA toileting    Recommend next OT session: POC progression    Recommendation for discharge: (in order for the patient to meet his/her long term goals)  Therapy up to 5 days/week in SNF setting    This discharge recommendation:  Has not yet been discussed the attending provider and/or case management    IF patient discharges home will need the following DME: TBD       SUBJECTIVE:   Patient stated I don't care.     OBJECTIVE DATA SUMMARY:   Cognitive/Behavioral Status:  Neurologic State: Alert;Confused  Orientation Level: Oriented to person  Cognition: Impulsive;Poor safety awareness  Perception: Appears intact  Perseveration: No perseveration noted  Safety/Judgement: Fall prevention    Functional Mobility and Transfers for ADLs:  Bed Mobility:  Rolling: Supervision  Supine to Sit: Supervision    Transfers:  Sit to Stand: Stand-by assistance     Bed to Chair: Contact guard assistance    Pt educated on safe transfer techniques, with specific emphasis on proper hand placement to push up from seated surface rather than attempt to pull self up, fully positioning self in-front of desired seated location, feeling chair on back of legs and reaching back with 1-2 UE to slowly lower self to seated position. Balance:  Sitting: Intact  Standing: Impaired; With support  Standing - Static: Good;Fair  Standing - Dynamic : Fair    ADL Intervention:  Lower Body Dressing Assistance  Socks: Stand-by assistance  Leg Crossed Method Used: Yes  Position Performed: Seated edge of bed    Cognitive Retraining  Safety/Judgement: Fall prevention    Pain:  No c/o pain    Activity Tolerance:   Fair and requires rest breaks    After treatment patient left in no apparent distress:   Sitting in chair, Call bell within reach, Bed / chair alarm activated, and PCT present    COMMUNICATION/COLLABORATION:   The patients plan of care was discussed with: Physical therapist and Registered nurse.      Becky Rich OT  Time Calculation: 28 mins

## 2021-10-26 NOTE — PROGRESS NOTES
11:30am- CM Director Maureen Benitez called CM via phone to inform CM that Loye Pinks, Complex CM will needs a next of kin search for pt.     10:45am- CM met with CM Director Maureen Benitez to discuss pt. CM informed Marissa Lee that CM is unable to get in contact with family and that pt has dementia. CM needs assistance with locating other family members. CM Director stated that she would look at the chart to assistance CM. CM will continue to follow patient for discharge planning needs and arrange for services as deemed necessary.     Gopal Mayfield [address]  [phone]

## 2021-10-26 NOTE — PROGRESS NOTES
0715: Bedside shift change report given to DANNY Garner (oncoming nurse) by DANNY Edwards (offgoing nurse). Report included the following information SBAR, Intake/Output, MAR, Recent Results and Cardiac Rhythm Afib.     0940: Avasys removed. 1900:  End of Shift Note    Bedside shift change report given to DANNY Almeida (oncoming nurse) by Denzel Friedman RN (offgoing nurse). Report included the following information SBAR, Intake/Output, MAR, Recent Results and Cardiac Rhythm Afib    Shift worked:  1100-4653     Shift summary and any significant changes:     See above     Concerns for physician to address:  None     Zone phone for oncoming shift:   [phone]       Activity:  Activity Level: Up with Assistance  Number times ambulated in hallways past shift: 0  Number of times OOB to chair past shift: 2    Cardiac:   Cardiac Monitoring: Yes      Cardiac Rhythm: Atrial Fib    Access:   Current line(s): PIV     Genitourinary:   Urinary status: voiding    Respiratory:   O2 Device: None (Room air)  Chronic home O2 use?: N/A  Incentive spirometer at bedside: N/A     GI:  Last Bowel Movement Date: 10/24/21  Current diet:  ADULT DIET Regular  Passing flatus: YES  Tolerating current diet: YES       Pain Management:   Patient states pain is manageable on current regimen: N/A    Skin:  Aldo Score: 18  Interventions: increase time out of bed and PT/OT consult    Patient Safety:  Fall Score:  Total Score: 3  Interventions: bed/chair alarm, assistive device (walker, cane, etc), gripper socks and pt to call before getting OOB  High Fall Risk: Yes    Length of Stay:  Expected LOS: 3d 19h  Actual LOS: [address], RN

## 2021-10-26 NOTE — PROGRESS NOTES
CM made attempt to complete CM initial assessment was unsuccessful. Left vm to pt's daughter Mona Woodard @ [phone]. CM also tried [phone] and [phone] (not a working number). Unit CM to follow up.     [facility]  Care Manager, [address] [place] [address]

## 2021-10-26 NOTE — PROGRESS NOTES
Hospitalist Progress Note    NAME: Cornell Blizzard   :  1950   MRN:  [id number]     Admit date: 10/22/2021    Today's date: 10/26/21    PCP: Leon Alarcon MD      Anticipated discharge date:unknown    Barriers:  Medical issues, ? placement    Assessment / Plan:    Acute metabolic encephalopathy POA  E coli UTI POA  ? Dementia POA  · Sent in by home health aide with emesis, tachycardia  · MS reported as poor insight, unclear what baseline is  · Neurology evaluation ? Aphasia with old strokes and UTI  · MRI brain with no acute stroke   Chronic left tempoparietal CVA, chronic left basal ganglia infarct   Small vessel ischemic infarct  · Normal B12   · UA with cloudy urine, nitrite +, 20-50 WBC, 0-5 RBC, 3+ bacteria  · Urine culture with Coli  · IV ceftriaxone  · PT/OT consults  · ASA, statin, eliquis  · PT/OT recommends SNF, pt wants to go home  · Cannot find any relatives, see below, concerns about her decision making capacity    Bradycardia with pauses to 2.5-3.0 secs 10/25 on diltiazem/metoprolol XL  Moderate to severe mitral stenosis POA likely prior rheumatic fever  Permament atrial fibrillation with RVR ([facility] [address] (2020) POA  · ? Compliance with meds at home, noted with RVR at home  · Admit EKG with HR 160s, atrial fib  · Cardiac monitoring  · IV cardizem gtt, wean off now that back on PO meds  · Cardiology consult appreciated  · Continue ASA, eliquis, cozaar  · Echo LVEF 55-60%, mod to severe mitral stenosis  · Ideally needs coumadin for stroke prevention = high risk give MS and prior strokes   Cards concerns re compliance with follow up noted   Eliquis for now  · 10/25 noted with episodes of bradycardia to 30s with pauses to 2.5 to 3.0 sec  · Held metoprol and cardizem  · PRN IV cardizem if RVR  · Pauses improved off cardizem and metoprolol  · ?  Add back low dose cardizem in AM and follow  · Cards feels no current indication for PM       CKD stage 4 POA  · Baseline creatinine 1.8 to 2.0 range  · Avoid nephrotoxic medications  · Stop IVF  · Appears stable       Diastolic CHF, chronic ([facility] [address] (2020) POA    Hypertensive urgency (10/22/2021) 202/174 POA  · No evidence of end organ damage  · Continue home losartan, increase dose as BP uncontrolled  · Continue pravastatin  · BP improved on home medications, ? Compliance  · PRN hydralazine       Impaired insight (10/22/2021)    Noncompliance (10/22/2021)  · CT of head was negative for acute hemorrhage or process  · MRI of brain with old strokes  · Some expressive aphasia, friend notes word finding difficulties chronically  · Normal B-12  · Case management  · Medical TDO petitioned in ED now , ?? suicidial  · One-on-one  removed after admit    Seen by psychiatry   She repeatedly denies she is suicidial  · Psychiatry consult appreciated  · Treated atrial fib and UTI, no significant improvement  · Still with confusion, oriented x 2 today, insight is not good  · Concerned about decision making capacity and safety at home, not able to reach family  · Tried to call daughter. left message with my cell phone  · ? Needs a guardian appointed  · Spoke with friend Davis Moreno [phone]   He drives her to store and checks on her along with a neighbor   Reports she often has trouble getting her words out   He is not aware of any safety concerns about her living alone   He does not know how to reach any family    Hypothyroidism POA  · TSH mildly increased 6.90  · Continue levothyroxine    Overweight POA Body mass index is 29.41 kg/m². Code Status: Full code    Surrogate Decision Maker: None, not able to reach daughter     DVT Prophylaxis: Eliquis  GI Prophylaxis: Protonix     Activity at baseline: Ambulates without assistive device     Lives with: Self with home health/private duty nurse visiting    Subjective:     Chief Complaint / Reason for Physician Visit f/u atrial fib with RVR  \"can I go home tomorrow\".   Discussed with RN events overnight. Awake, talking,oriented x 2  Denies CP, HA, abdominal pain, N/V  No further bradycardia and pauses off the the toprol and cardizem  Worked with PT, SNF recommended    Review of Systems:  Symptom Y/N Comments  Symptom Y/N Comments   Fever/Chills n   Chest Pain n    Poor Appetite    Edema     Cough n   Abdominal Pain n    Sputum    Joint Pain     SOB/RIVERS n   Headache     Nausea/vomit n   Tolerating PT/OT     Diarrhea n   Tolerating Diet y    Constipation    Other       Could NOT obtain due to:      Objective:     VITALS:   Last 24hrs VS reviewed since prior progress note. Most recent are:  Patient Vitals for the past 24 hrs:   Temp Pulse Resp BP SpO2   10/26/21 1502 97.6 °F (36.4 °C) 84 20 (!) 149/97 100 %   10/26/21 1133 98.1 °F (36.7 °C) 77 20 (!) 169/102 100 %   10/26/21 0806 97.6 °F (36.4 °C) 65 20 (!) 170/97 100 %   10/26/21 0346 97.5 °F (36.4 °C) 69 20 114/75 95 %   10/25/21 2338 98.1 °F (36.7 °C) 73 18 (!) 142/81 96 %   10/25/21 1948 97.7 °F (36.5 °C) 77 20 131/75 99 %   10/25/21 1527 97.7 °F (36.5 °C) 65 20 (!) 137/96 98 %       Intake/Output Summary (Last 24 hours) at 10/26/2021 1516  Last data filed at 10/26/2021 0806  Gross per 24 hour   Intake 410 ml   Output 400 ml   Net 10 ml        Wt Readings from Last 12 Encounters:   10/26/21 66 kg (145 lb 9.6 oz)   10/12/21 63.5 kg (140 lb)   05/11/21 63.6 kg (140 lb 2 oz)   03/10/21 64 kg (141 lb)   10/26/20 62.1 kg (136 lb 12.8 oz)   05/02/20 73.5 kg (161 lb 15.9 oz)   11/10/19 71.7 kg (158 lb)   10/01/19 71.8 kg (158 lb 4 oz)   03/26/19 68 kg (150 lb)   05/22/18 72.9 kg (160 lb 12.8 oz)   01/12/18 73.1 kg (161 lb 3.2 oz)   11/14/17 71.2 kg (157 lb)       PHYSICAL EXAM:    I had a face to face encounter and independently examined this patient on 10/26/21 as outlined below:    General: WD, WN. Alert, cooperative, no acute distress    EENT:  PERRL. Anicteric sclerae. MMM  Resp:  CTA bilaterally, no wheezing or rales.   No accessory muscle use  CV:  Irregular  rhythm,  No edema  GI:  Soft, Non distended, Non tender. +Bowel sounds, no rebound  Neurologic:  Alert to B-day and year, word finding difficulties, non focal motor exam  Psych:   Not anxious nor agitated  Skin:  No rashes. No jaundice    Reviewed most current lab test results and cultures  YES  Reviewed most current radiology test results   YES  Review and summation of old records today    NO  Reviewed patient's current orders and MAR    YES  PMH/SH reviewed - no change compared to H&P  ________________________________________________________________________  Care Plan discussed with:    Comments   Patient x    Family      RN x    Care Manager     Consultant                       x Multidiciplinary team rounds were held today with , nursing, pharmacist and clinical coordinator. Patient's plan of care was discussed; medications were reviewed and discharge planning was addressed. ________________________________________________________________________      Comments   >50% of visit spent in counseling and coordination of care     ________________________________________________________________________  Kailee Hernandez MD     Procedures: see electronic medical records for all procedures/Xrays and details which were not copied into this note but were reviewed prior to creation of Plan. LABS:  I reviewed today's most current labs and imaging studies.   Pertinent labs include:  Recent Labs     10/25/21  0654 10/24/21  1852   WBC 4.6 5.4   HGB 10.4* 10.6*   HCT 32.1* 32.0*    217     Recent Labs     10/26/21  0344 10/25/21  0654 10/24/21  1852    142 141   K 3.6 3.8 4.0   * 116* 114*   CO2 19* 20* 22   * 114* 107*   BUN 21* 21* 18   CREA 2.15* 2.20* 2.34*   CA 9.5 9.1 9.7   ALB  --  3.0*  --    TBILI  --  0.3  --    ALT  --  14  --

## 2021-10-26 NOTE — PROGRESS NOTES
Cardiology Progress Note      10/26/2021 9:20 AM    Admit Date: 10/22/2021          Subjective:  More alert today. No c/o. Wants to go home. No prolonged pauses overnight. Visit Vitals  BP (!) 170/97 (BP 1 [facility]: Left upper arm, BP Patient Position: Supine)   Pulse 65   Temp 97.6 °F (36.4 °C)   Resp 20   Ht 4' 11\" (1.499 m)   Wt 66 kg (145 lb 9.6 oz)   SpO2 100%   BMI 29.41 kg/m²     10/24 1901 - 10/26 0700  In: 220 [P.O.:170; I.V.:50]  Out: 200 [Urine:200]        Objective:      Physical Exam:  VS as above  Chest CTA  Card Irreg irreg no gallop     Data Review:   Labs:    BUN 21  Creat 2.15    Telemetry: afib R 70       Assessment:     1. Encephalopathy; resolved  2. Urinary tract infection  3. Chronic atrial fibrillation, some 2-3 sec pauses   4. CAD s/p prior MI.  Echo 4/20 w/ EF 55-60%, mod LAE  5. Hyperlipidemia  6. Hypertension  7. . CKD  8.  Chronic anemia. 9.  Prior history of cerebrovascular accident with expressive aphasia. 10. History of ovarian cancer status post surgery. 11. Hypothyroidism  12. Moderate to severe mitral stenosis, nl EF by echo     Plan:  Cont to hold diltiazem. Probably can resume and just stop Toprol. No clear need for pacer at this point.

## 2021-10-27 ENCOUNTER — PATIENT OUTREACH (OUTPATIENT)
Dept: CASE MANAGEMENT | Age: 71
End: 2021-10-27

## 2021-10-27 LAB
ANION GAP SERPL CALC-SCNC: 6 MMOL/L (ref 5–15)
BASOPHILS # BLD: 0.1 K/UL (ref 0–0.1)
BASOPHILS NFR BLD: 2 % (ref 0–1)
BUN SERPL-MCNC: 21 MG/DL (ref 6–20)
BUN/CREAT SERPL: 11 (ref 12–20)
CALCIUM SERPL-MCNC: 9.8 MG/DL (ref 8.5–10.1)
CHLORIDE SERPL-SCNC: 112 MMOL/L (ref 97–108)
CO2 SERPL-SCNC: 21 MMOL/L (ref 21–32)
CREAT SERPL-MCNC: 1.99 MG/DL (ref 0.55–1.02)
DIFFERENTIAL METHOD BLD: ABNORMAL
EOSINOPHIL # BLD: 0.4 K/UL (ref 0–0.4)
EOSINOPHIL NFR BLD: 5 % (ref 0–7)
ERYTHROCYTE [DISTWIDTH] IN BLOOD BY AUTOMATED COUNT: 15 % (ref 11.5–14.5)
GLUCOSE SERPL-MCNC: 102 MG/DL (ref 65–100)
HCT VFR BLD AUTO: 37.3 % (ref 35–47)
HGB BLD-MCNC: 12.3 G/DL (ref 11.5–16)
IMM GRANULOCYTES # BLD AUTO: 0 K/UL (ref 0–0.04)
IMM GRANULOCYTES NFR BLD AUTO: 1 % (ref 0–0.5)
LYMPHOCYTES # BLD: 1.8 K/UL (ref 0.8–3.5)
LYMPHOCYTES NFR BLD: 26 % (ref 12–49)
MCH RBC QN AUTO: 29.7 PG (ref 26–34)
MCHC RBC AUTO-ENTMCNC: 33 G/DL (ref 30–36.5)
MCV RBC AUTO: 90.1 FL (ref 80–99)
MONOCYTES # BLD: 0.7 K/UL (ref 0–1)
MONOCYTES NFR BLD: 11 % (ref 5–13)
NEUTS SEG # BLD: 3.8 K/UL (ref 1.8–8)
NEUTS SEG NFR BLD: 55 % (ref 32–75)
NRBC # BLD: 0 K/UL (ref 0–0.01)
NRBC BLD-RTO: 0 PER 100 WBC
PLATELET # BLD AUTO: 268 K/UL (ref 150–400)
PMV BLD AUTO: 9.8 FL (ref 8.9–12.9)
POTASSIUM SERPL-SCNC: 3.9 MMOL/L (ref 3.5–5.1)
RBC # BLD AUTO: 4.14 M/UL (ref 3.8–5.2)
SODIUM SERPL-SCNC: 139 MMOL/L (ref 136–145)
WBC # BLD AUTO: 6.8 K/UL (ref 3.6–11)

## 2021-10-27 PROCEDURE — 74011250637 HC RX REV CODE- 250/637: Performed by: INTERNAL MEDICINE

## 2021-10-27 PROCEDURE — 80048 BASIC METABOLIC PNL TOTAL CA: CPT

## 2021-10-27 PROCEDURE — 97530 THERAPEUTIC ACTIVITIES: CPT

## 2021-10-27 PROCEDURE — 74011250636 HC RX REV CODE- 250/636: Performed by: INTERNAL MEDICINE

## 2021-10-27 PROCEDURE — 74011250637 HC RX REV CODE- 250/637: Performed by: GENERAL ACUTE CARE HOSPITAL

## 2021-10-27 PROCEDURE — 74011000250 HC RX REV CODE- 250: Performed by: INTERNAL MEDICINE

## 2021-10-27 PROCEDURE — 65660000001 HC RM ICU INTERMED STEPDOWN

## 2021-10-27 PROCEDURE — 97535 SELF CARE MNGMENT TRAINING: CPT

## 2021-10-27 PROCEDURE — 36415 COLL VENOUS BLD VENIPUNCTURE: CPT

## 2021-10-27 PROCEDURE — 85025 COMPLETE CBC W/AUTO DIFF WBC: CPT

## 2021-10-27 PROCEDURE — 97116 GAIT TRAINING THERAPY: CPT

## 2021-10-27 PROCEDURE — 74011250637 HC RX REV CODE- 250/637: Performed by: NURSE PRACTITIONER

## 2021-10-27 PROCEDURE — 74011000258 HC RX REV CODE- 258: Performed by: INTERNAL MEDICINE

## 2021-10-27 RX ADMIN — Medication 10 ML: at 14:00

## 2021-10-27 RX ADMIN — PANTOPRAZOLE SODIUM 40 MG: 40 TABLET, DELAYED RELEASE ORAL at 08:34

## 2021-10-27 RX ADMIN — Medication 2000 UNITS: at 08:34

## 2021-10-27 RX ADMIN — ASPIRIN 81 MG: 81 TABLET, COATED ORAL at 08:34

## 2021-10-27 RX ADMIN — Medication 10 ML: at 21:58

## 2021-10-27 RX ADMIN — LEVOTHYROXINE SODIUM 100 MCG: 0.1 TABLET ORAL at 06:05

## 2021-10-27 RX ADMIN — TRAZODONE HYDROCHLORIDE 50 MG: 50 TABLET ORAL at 21:56

## 2021-10-27 RX ADMIN — DILTIAZEM HYDROCHLORIDE 10 MG: 5 INJECTION INTRAVENOUS at 00:47

## 2021-10-27 RX ADMIN — DILTIAZEM HYDROCHLORIDE 180 MG: 180 CAPSULE, COATED, EXTENDED RELEASE ORAL at 08:34

## 2021-10-27 RX ADMIN — APIXABAN 5 MG: 5 TABLET, FILM COATED ORAL at 08:34

## 2021-10-27 RX ADMIN — CEFTRIAXONE 1 G: 1 INJECTION, POWDER, FOR SOLUTION INTRAMUSCULAR; INTRAVENOUS at 09:55

## 2021-10-27 RX ADMIN — Medication 10 ML: at 06:05

## 2021-10-27 RX ADMIN — APIXABAN 5 MG: 5 TABLET, FILM COATED ORAL at 17:02

## 2021-10-27 RX ADMIN — LOSARTAN POTASSIUM 50 MG: 50 TABLET, FILM COATED ORAL at 08:34

## 2021-10-27 RX ADMIN — CETIRIZINE HYDROCHLORIDE 10 MG: 10 TABLET, FILM COATED ORAL at 08:34

## 2021-10-27 RX ADMIN — TOPIRAMATE 50 MG: 25 TABLET, FILM COATED ORAL at 21:56

## 2021-10-27 RX ADMIN — PRAVASTATIN SODIUM 80 MG: 40 TABLET ORAL at 08:34

## 2021-10-27 NOTE — PROGRESS NOTES
Ambulatory Care Management - Social Work Note  10/27/2021     7:40 am  Message left on VM of Zaid Joya ED [facility] Inpatient CM. Requested return call. Advised that this SW has background information for patient. Patient status discussed with Zaid Joya, inpatient CM.      JET Sheikh, ACSW, [facility]    Ambulatory Care Management   [phone]

## 2021-10-27 NOTE — PROGRESS NOTES
2325: Patient /103. PRN hydralazine given. 0041: Patient currently in a fib with heart rate going to 140s-150s. PRN cardizem given. 0700: End of Shift Note    Bedside shift change report given to DANNY Garner (oncoming nurse) by Rachele Pineda (offgoing nurse). Report included the following information SBAR, Kardex, Intake/Output, MAR, Recent Results and Cardiac Rhythm A fib    Shift worked:  Night     Shift summary and any significant changes:     See notes above     Concerns for physician to address:       Zone phone for oncoming shift:          Activity:  Activity Level: Up with Assistance  Number times ambulated in hallways past shift: 0  Number of times OOB to chair past shift: 3    Cardiac:   Cardiac Monitoring: Yes      Cardiac Rhythm: Atrial Fib    Access:   Current line(s): PIV     Genitourinary:   Urinary status: external catheter    Respiratory:   O2 Device: None (Room air)  Chronic home O2 use?: NO  Incentive spirometer at bedside: NO     GI:  Last Bowel Movement Date: 10/24/21  Current diet:  ADULT DIET Regular  Passing flatus: YES  Tolerating current diet: YES       Pain Management:   Patient states pain is manageable on current regimen: YES    Skin:  Aldo Score: 18  Interventions: increase time out of bed and PT/OT consult    Patient Safety:  Fall Score:  Total Score: 3  Interventions: bed/chair alarm, gripper socks and pt to call before getting OOB  High Fall Risk: Yes    Length of Stay:  Expected LOS: 3d 19h  Actual LOS: 5      Elle Mendez

## 2021-10-27 NOTE — PROGRESS NOTES
Problem: Falls - Risk of  Goal: *Absence of Falls  Description: Document Radha Bass Fall Risk and appropriate interventions in the flowsheet. Outcome: Progressing Towards Goal  Note: Fall Risk Interventions:       Mentation Interventions: Adequate sleep, hydration, pain control, Bed/chair exit alarm, Door open when patient unattended, Increase mobility, More frequent rounding, Reorient patient, Room close to nurse's station, Toileting rounds, Update white board    Medication Interventions: Bed/chair exit alarm, Patient to call before getting OOB, Teach patient to arise slowly    Elimination Interventions: Bed/chair exit alarm, Call light in reach, Patient to call for help with toileting needs, Toilet paper/wipes in reach, Toileting schedule/hourly rounds    History of Falls Interventions: Bed/chair exit alarm, Door open when patient unattended, Room close to nurse's station         Problem: Patient Education: Go to Patient Education Activity  Goal: Patient/Family Education  Outcome: Progressing Towards Goal     Problem: Patient Education: Go to Patient Education Activity  Goal: Patient/Family Education  Outcome: Progressing Towards Goal     Problem: Afib Pathway: Day 3  Goal: Off Pathway (Use only if patient is Off Pathway)  Outcome: Progressing Towards Goal  Goal: Activity/Safety  Outcome: Progressing Towards Goal  Goal: Diagnostic Test/Procedures  Outcome: Progressing Towards Goal  Goal: Nutrition/Diet  Outcome: Progressing Towards Goal  Goal: Discharge Planning  Outcome: Progressing Towards Goal  Goal: Medications  Outcome: Progressing Towards Goal  Goal: Respiratory  Outcome: Progressing Towards Goal  Goal: Treatments/Interventions/Procedures  Outcome: Progressing Towards Goal  Goal: Psychosocial  Outcome: Progressing Towards Goal     Problem: Pressure Injury - Risk of  Goal: *Prevention of pressure injury  Description: Document Aldo Scale and appropriate interventions in the flowsheet.   Outcome: Progressing Towards Goal  Note: Pressure Injury Interventions:  Sensory Interventions: Assess changes in LOC, Keep linens dry and wrinkle-free, Maintain/enhance activity level, Minimize linen layers, Pressure redistribution bed/mattress (bed type), Sit a 90-degree angle/use footstool if needed, Use 30-degree side-lying position         Activity Interventions: Increase time out of bed, Pressure redistribution bed/mattress(bed type), PT/OT evaluation    Mobility Interventions: HOB 30 degrees or less, Pressure redistribution bed/mattress (bed type), PT/OT evaluation    Nutrition Interventions: Document food/fluid/supplement intake, Offer support with meals,snacks and hydration    Friction and Shear Interventions: HOB 30 degrees or less, Minimize layers, Sit at 90-degree angle                Problem: Patient Education: Go to Patient Education Activity  Goal: Patient/Family Education  Outcome: Progressing Towards Goal

## 2021-10-27 NOTE — PROGRESS NOTES
Problem: Self Care Deficits Care Plan (Adult)  Goal: *Acute Goals and Plan of Care (Insert Text)  Description:   FUNCTIONAL STATUS PRIOR TO ADMISSION: Patient questionable historian secondary to confusion and aphasia. Patient reported she was independent for self-care and functional mobility but stated she had a friend  her to appointments and stores. Per chart review, patient had home [facility] aide who assist a few times during the week. HOME SUPPORT: The patient lived alone with no local support. Caregiver assisted patient occasionally during week and daughter did not want to be involved per chart review. Occupational Therapy Goals  Initiated 10/24/2021  1. Patient will perform grooming standing at sink with supervision/set-up within 7 day(s). 2.  Patient will perform lower body dressing with supervision/set-up within 7 day(s). 3.  Patient will perform seated sponge bathing with supervision/set-up within 7 day(s). 4.  Patient will perform toilet transfers with supervision/set-up within 7 day(s). 5.  Patient will perform all aspects of toileting with supervision/set-up within 7 day(s). 6.  Patient will utilize energy conservation techniques ans safety awareness during functional activities with verbal cues within 7 day(s). Outcome: Progressing Towards Goal   OCCUPATIONAL THERAPY TREATMENT  Patient: Katina Vela (46 y.o. female)  Date: 10/27/2021  Diagnosis: Hypertensive emergency [I16.1]  Atrial fibrillation with RVR ([address] [I48.91] Atrial fibrillation with RVR ([address]       Precautions: Fall, Bed Alarm  Chart, occupational therapy assessment, plan of care, and goals were reviewed. ASSESSMENT  Patient continues with skilled OT services and is progressing towards goals, demo'd improved functional mobility today. Pt received semisupine in bed, agreeable to participate. She performed bed mobility with supervision, good sitting balance.  Using RW pt ambulated to bathroom to perform standing ADLs at sink with SBA/CGA, noted improved standing balance today and pt required less cueing for sequencing of ADL tasks. Pt ambulated in room with CGA without using AD with path deviations noted. Transferred to chair at end of session with needs met, VSS. Pt is functioning below her reported baseline at this time due to generalized weakness, decreased balance and endurance, confusion and impaired functional mobility, and will benefit from continued skilled intervention to address the above impairments. Current Level of Function Impacting Discharge (ADLs): CGA transfers, setup-min A ADLs    Other factors to consider for discharge: fall risk, lives alone, level of assistance available? PLAN :  Patient continues to benefit from skilled intervention to address the above impairments. Continue treatment per established plan of care to address goals. Recommendation for discharge: (in order for the patient to meet his/her long term goals)  SNF vs. HH therapy and 24/7 assistance    This discharge recommendation:  Has not yet been discussed the attending provider and/or case management    IF patient discharges home will need the following DME: TBD       SUBJECTIVE:   Patient stated One of my cats is sick.     OBJECTIVE DATA SUMMARY:   Cognitive/Behavioral Status:  Neurologic State: Alert; Appropriate for age  Orientation Level: Oriented to person;Oriented to place; Disoriented to situation;Disoriented to time  Cognition: Follows commands; Impaired decision making; Impulsive       Functional Mobility and Transfers for ADLs:  Bed Mobility:  Rolling: Modified independent;Supervision  Supine to Sit: Modified independent;Supervision  Scooting: Modified independent;Supervision    Transfers:  Sit to Stand: Supervision;Stand-by assistance     Bed to Chair: Supervision;Stand-by assistance    Balance:  Sitting: Intact  Standing: Impaired; Without support  Standing - Static: Good; Unsupported  Standing - Dynamic : Good;Fair;Unsupported    ADL Intervention:       Grooming  Position Performed: Standing  Washing Hands: Contact guard assistance  Brushing/Combing Hair: Contact guard assistance        Lower Body Dressing Assistance  Socks: Contact guard assistance  Leg Crossed Method Used: Yes  Position Performed: Seated in chair  Cues: Verbal cues provided    Pain:  Pt did not report pain during session    Activity Tolerance:   Good    After treatment patient left in no apparent distress:   Sitting in chair, Call bell within reach, and Bed / chair alarm activated    COMMUNICATION/COLLABORATION:   The patients plan of care was discussed with: Physical therapist and Registered nurse.      Sarita Chew OT  Time Calculation: 23 mins

## 2021-10-27 NOTE — PROGRESS NOTES
Problem: Mobility Impaired (Adult and Pediatric)  Goal: *Acute Goals and Plan of Care (Insert Text)  Description: FUNCTIONAL STATUS PRIOR TO ADMISSION: Patient questionable historian secondary to confusion and aphasia. Patient reported she was independent with functional mobility but stated she had a friend  her to appointments and stores. Per chart review, patient had home [facility] aide who assist a few times during the week. HOME SUPPORT PRIOR TO ADMISSION: The patient lived alone with no local support. Pt has [facility] aide who assists occasionally. Physical Therapy Goals  Initiated 10/24/2021  1. Patient will move from supine to sit and sit to supine  in bed with modified independence within 7 day(s). 2.  Patient will transfer from bed to chair and chair to bed with supervision/set-up using the least restrictive device within 7 day(s). 3.  Patient will perform sit to stand with supervision/set-up within 7 day(s). 4.  Patient will ambulate with supervision/set-up for 150 feet with the least restrictive device within 7 day(s). 5.  Patient will ascend/descend 16 stairs with unilateral handrail(s) with supervision/set-up within 7 day(s). Outcome: Progressing Towards Goal   PHYSICAL THERAPY TREATMENT  Patient: Deepa Lovell (81 y.o. female)  Date: 10/27/2021  Diagnosis: Hypertensive emergency [I16.1]  Atrial fibrillation with RVR ([address] [I48.91] Atrial fibrillation with RVR ([facility])       Precautions: Fall, Bed Alarm  Chart, physical therapy assessment, plan of care and goals were reviewed. ASSESSMENT  Patient continues with skilled PT services and is progressing towards goals. Pt with significant progress with mobility, awareness, sequencing, and overall functional independence this visit. Pt received in bed, agreeable to PT session. Pt requesting not to use the RW, but initial gait trial to the bathroom had pt use it.   While standing at the sink, pt demonstrated intact static standing balance, thus had pt do a second gait trial without RW. Pt with very subtle path deviations only. No scissoring noted and pt reporting she felt at her baseline. Pt also able to retrieve items from the floor and take an object off of a high shelf and put it back without difficulty. Pt less impulsive this visit, with increased safety noted. Pt left sitting in bedside chair with all needs met. Pt would be fine to discharge home with HHPT and increased [facility] aide and family support. If this cannot be provided, may need to consider SNF with transition to LTC. Current Level of Function Impacting Discharge (mobility/balance): S to SBA    Other factors to consider for discharge: lives alone, APS referrals in the past, ? Assistance available to her         PLAN :  Patient continues to benefit from skilled intervention to address the above impairments. Continue treatment per established plan of care. to address goals. Recommendation for discharge: (in order for the patient to meet his/her long term goals)  Physical therapy at least 2 days/week in the home AND ensure assist and/or supervision for safety with activity inside and outside of the home vs SNF with transition to LTC    This discharge recommendation:  Has been made in collaboration with the attending provider and/or case management    IF patient discharges home will need the following DME: none       SUBJECTIVE:   Patient stated Esther Singh I go home. I have a sick cat at home.     OBJECTIVE DATA SUMMARY:   Critical Behavior:  Neurologic State: Alert, Appropriate for age  Orientation Level: Oriented to person, Oriented to place, Disoriented to situation, Disoriented to time  Cognition: Follows commands, Impaired decision making, Impulsive  Safety/Judgement: Fall prevention  Functional Mobility Training:  Bed Mobility:  Rolling: Modified independent;Supervision  Supine to Sit: Modified independent;Supervision     Scooting: Modified independent;Supervision Transfers:  Sit to Stand: Supervision;Stand-by assistance  Stand to Sit: Supervision;Stand-by assistance        Bed to Chair: Supervision;Stand-by assistance                    Balance:  Sitting: Intact  Standing: Impaired; Without support  Standing - Static: Good; Unsupported  Standing - Dynamic : Good;Fair;Unsupported  Ambulation/Gait Training:  Distance (ft): 50 Feet (ft)  Assistive Device: Gait belt  Ambulation - Level of Assistance: Stand-by assistance;Contact guard assistance        Gait Abnormalities: Decreased step clearance; Path deviations              Speed/Manjula: Pace decreased (<100 feet/min)           Pain Rating:  None reported    Activity Tolerance:   Good, Fair and SpO2 stable on RA, HR increased to 123 with activity    After treatment patient left in no apparent distress:   Sitting in chair and Call bell within reach    COMMUNICATION/COLLABORATION:   The patients plan of care was discussed with: Occupational therapist and Registered nurse.      Cate Ruff, PT   Time Calculation: 23 mins

## 2021-10-27 NOTE — PROGRESS NOTES
Hospitalist Progress Note    NAME: Rosas Davidson   :  1950   MRN:  [id number]     Admit date: 10/22/2021    Today's date: 10/27/21    PCP: Taty Hatfield MD      Anticipated discharge date:unknown    Barriers:  Medical issues, ? placement    Assessment / Plan:    Acute metabolic encephalopathy POA  E coli UTI POA  ? Dementia POA  · Sent in by home health aide with emesis, tachycardia  · MS reported as poor insight, unclear what baseline is  · Patient clinically does not have aphasia at this point, she is alert awake and oriented to name, date of birth and her address  · She is not oriented to current year and situation  · MRI brain with no acute stroke   Chronic left tempoparietal CVA, chronic left basal ganglia infarct   Small vessel ischemic infarct  · Normal B12   · UA with cloudy urine, nitrite +, 20-50 WBC, 0-5 RBC, 3+ bacteria  · Urine culture with  E Coli  · Status post IV ceftriaxone  · PT/OT consults  · ASA, statin, eliquis  · PT/OT recommends SNF, pt wants to go home  · Cannot find any relatives, see below, concerns about her decision making capacity  · Case management working on next of kin determination    Bradycardia with pauses to 2.5-3.0 secs 10/25 on diltiazem/metoprolol XL  Moderate to severe mitral stenosis POA likely prior rheumatic fever  Permament atrial fibrillation with RVR ([facility] [address] (2020) POA  · ?  Compliance with meds at home, noted with RVR at home  · Admit EKG with HR 160s, atrial fib  · Cardiac monitoring  · Was initially on Cardizem drip which was weaned off  · Noted to have pauses on telemetry on Cardizem and Toprol  · And into rapid A. fib again on stopping Cardizem and Toprol  · -Restart Cardizem only for now  · Cardiology consult appreciated  · Continue ASA, eliquis, cozaar  · Echo LVEF 55-60%, mod to severe mitral stenosis  · Ideally needs coumadin for stroke prevention = high risk give MS and prior strokes   Cards concerns re compliance with follow up noted   Eliquis for now  · No need for permanent pacemaker per cardiology as patient's pauses were less than 3 seconds       CKD stage 4 POA  · Baseline creatinine 1.8 to 2.0 range, creatinine at baseline  · Avoid nephrotoxic medications  · Status post IVF  · Appears stable       Diastolic CHF, chronic ([address] (2020) POA    Hypertensive urgency (10/22/2021) 202/174 POA  · No evidence of end organ damage  · Continue home losartan, increase dose as BP uncontrolled  · Continue pravastatin  · BP improved on home medications, ? Compliance  · PRN hydralazine       Impaired insight (10/22/2021)    Noncompliance (10/22/2021)  · CT of head was negative for acute hemorrhage or process  · MRI of brain with old strokes  · Some expressive aphasia, friend notes word finding difficulties chronically  · Normal B-12  · Case management  · Medical TDO petitioned in ED now , ?? suicidial  · One-on-one  removed after admit    Seen by psychiatry   She repeatedly denies she is suicidial  · Psychiatry consult appreciated  · Treated atrial fib and UTI, no significant improvement  · Still with confusion, oriented x 2 today, insight is not good  · Concerned about decision making capacity and safety at home, not able to reach family  · Tried to call daughter. left message with my cell phone  · ? Needs a guardian appointed  · Spoke with friend Gio Phillips [phone]   He drives her to store and checks on her along with a neighbor   Reports she often has trouble getting her words out   He is not aware of any safety concerns about her living alone   He does not know how to reach any family    Hypothyroidism POA  · TSH mildly increased 6.90  · Continue levothyroxine    Overweight POA Body mass index is 27.61 kg/m².     Code Status: Full code    Surrogate Decision Maker: None, not able to reach daughter     DVT Prophylaxis: Eliquis  GI Prophylaxis: Protonix     Activity at baseline: Ambulates without assistive device     Lives with: Self with home health/private duty nurse visiting    Subjective:     Chief Complaint / Reason for Physician Visit f/u atrial fib with RVR  Patient wants to go home, she is alert awake and oriented to name, date of birth and her address. She is unaware of situation, current year and person    Review of Systems:  Symptom Y/N Comments  Symptom Y/N Comments   Fever/Chills n   Chest Pain n    Poor Appetite    Edema     Cough n   Abdominal Pain n    Sputum    Joint Pain     SOB/RIVERS n   Headache     Nausea/vomit n   Tolerating PT/OT     Diarrhea n   Tolerating Diet y    Constipation    Other       Could NOT obtain due to:      Objective:     VITALS:   Last 24hrs VS reviewed since prior progress note.  Most recent are:  Patient Vitals for the past 24 hrs:   Temp Pulse Resp BP SpO2   10/27/21 1143 98.2 °F (36.8 °C) 76 20 (!) 148/95 97 %   10/27/21 0756 97.4 °F (36.3 °C) 89 18 (!) 119/94 97 %   10/27/21 0315 97.3 °F (36.3 °C) 90 18 114/66 96 %   10/27/21 0048  (!) 129   98 %   10/27/21 0047  (!) 106   94 %   10/27/21 0046  (!) 120   98 %   10/27/21 0041  (!) 141   98 %   10/26/21 2325  98  (!) 172/103    10/26/21 2322 97.5 °F (36.4 °C) 97 20 (!) 172/103 98 %   10/26/21 1933 97.6 °F (36.4 °C) 88 18 (!) 157/97 100 %   10/26/21 1502 97.6 °F (36.4 °C) 84 20 (!) 149/97 100 %       Intake/Output Summary (Last 24 hours) at 10/27/2021 1313  Last data filed at 10/27/2021 1057  Gross per 24 hour   Intake 1180 ml   Output 1050 ml   Net 130 ml        Wt Readings from Last 12 Encounters:   10/27/21 62 kg (136 lb 11 oz)   10/12/21 63.5 kg (140 lb)   05/11/21 63.6 kg (140 lb 2 oz)   03/10/21 64 kg (141 lb)   10/26/20 62.1 kg (136 lb 12.8 oz)   05/02/20 73.5 kg (161 lb 15.9 oz)   11/10/19 71.7 kg (158 lb)   10/01/19 71.8 kg (158 lb 4 oz)   03/26/19 68 kg (150 lb)   05/22/18 72.9 kg (160 lb 12.8 oz)   01/12/18 73.1 kg (161 lb 3.2 oz)   11/14/17 71.2 kg (157 lb)       PHYSICAL EXAM:    I had a face to face encounter and independently examined this patient on 10/27/21 as outlined below:    General: WD, WN. Alert, cooperative, no acute distress    EENT:  PERRL. Anicteric sclerae. MMM  Resp:  CTA bilaterally, no wheezing or rales. No accessory muscle use  CV:  Irregular  rhythm,  No edema  GI:  Soft, Non distended, Non tender. +Bowel sounds, no rebound  Neurologic:  Alert to B-day and year, word finding difficulties, non focal motor exam  Psych:   Not anxious nor agitated  Skin:  No rashes. No jaundice    Reviewed most current lab test results and cultures  YES  Reviewed most current radiology test results   YES  Review and summation of old records today    NO  Reviewed patient's current orders and MAR    YES  PMH/SH reviewed - no change compared to H&P  ________________________________________________________________________  Care Plan discussed with:    Comments   Patient x    Family      RN x    Care Manager     Consultant                       x Multidiciplinary team rounds were held today with , nursing, pharmacist and clinical coordinator. Patient's plan of care was discussed; medications were reviewed and discharge planning was addressed. ________________________________________________________________________      Comments   >50% of visit spent in counseling and coordination of care     ________________________________________________________________________  Hiram Coello MD     Procedures: see electronic medical records for all procedures/Xrays and details which were not copied into this note but were reviewed prior to creation of Plan. LABS:  I reviewed today's most current labs and imaging studies.   Pertinent labs include:  Recent Labs     10/27/21  0016 10/25/21  0654 10/24/21  1852   WBC 6.8 4.6 5.4   HGB 12.3 10.4* 10.6*   HCT 37.3 32.1* 32.0*    211 217     Recent Labs     10/27/21  0016 10/26/21  0344 10/25/21  0654    140 142   K 3.9 3.6 3.8   * 114* 116* CO2 21 19* 20*   * 131* 114*   BUN 21* 21* 21*   CREA 1.99* 2.15* 2.20*   CA 9.8 9.5 9.1   ALB  --   --  3.0*   TBILI  --   --  0.3   ALT  --   --  14

## 2021-10-27 NOTE — PROGRESS NOTES
4:00pm- CM called pt's daughter Simone Briggs. No answer. 2:00pm- CM called Lauren Modi ( [phone]) via phone to discuss pt. Barb Gannon stated that she received an APS report on Monday. Barb Gannon provided CM with pt's daughter number Simone Briggs [phone]. Barb Teressa provided CM with pt's daughter Li's address [address] [place] [address]. Barb Gannon stated that she will reach out to pt's daughter Curtis Eubanks.     11:30am- CM called [facility] to discuss pt. Eldon Long stated that she has an address for pt's daughter Nathalia Saunemin informed Eldon Long that Curtis Eubanks is not responding to anyone's phone call. Eldon Long provided CM with APS worker Jes Wylie [phone]. CM will continue to follow patient for discharge planning needs and arrange for services as deemed necessary.     Garcia Kuhn, [facility]  [phone]

## 2021-10-27 NOTE — PROGRESS NOTES
Problem: Falls - Risk of  Goal: *Absence of Falls  Description: Document Tita Marking Fall Risk and appropriate interventions in the flowsheet.   Outcome: Progressing Towards Goal  Note: Fall Risk Interventions:       Mentation Interventions: Adequate sleep, hydration, pain control, Bed/chair exit alarm    Medication Interventions: Bed/chair exit alarm, Evaluate medications/consider consulting pharmacy, Patient to call before getting OOB    Elimination Interventions: Bed/chair exit alarm, Call light in reach, Patient to call for help with toileting needs    History of Falls Interventions: Bed/chair exit alarm

## 2021-10-27 NOTE — PROGRESS NOTES
Cardiology Progress Note      10/27/2021 9:48 AM    Admit Date: 10/22/2021          Subjective:  Up in chair. No new c/o. HR controlled on diltiazem alone with no prolonged pauses         Visit Vitals  BP (!) 119/94   Pulse 89   Temp 97.4 °F (36.3 °C)   Resp 18   Ht 4' 11\" (1.499 m)   Wt 62 kg (136 lb 11 oz)   SpO2 97%   BMI 27.61 kg/m²     10/25 1901 - 10/27 0700  In: 1010 [P.O.:960; I.V.:50]  Out: 1000 [Urine:1000]        Objective:      Physical Exam:  VS as above  Chest CTA  Card Irreg irreg no changes     Data Review:   Labs:    BUN 21  Creat 2.0  Hgb 12.3    Telemetry: afib R 70-80     Assessment:        1. Encephalopathy; resolved  2. Urinary tract infection  3. Chronic atrial fibrillation, some 2-3 sec pauses   4. CAD s/p prior MI.  Echo 4/20 w/ EF 55-60%, mod LAE  5. Hyperlipidemia  6. Hypertension  7. . CKD  8.  Chronic anemia. 9.  Prior history of cerebrovascular accident with expressive aphasia. 10. History of ovarian cancer status post surgery. 11. Hypothyroidism  12. Moderate to severe mitral stenosis, nl EF by echo     Plan: Cont diltiazem alone for rate control. No need for pacer at this point.  OK for d/c from cardiac standpoint

## 2021-10-28 ENCOUNTER — PATIENT OUTREACH (OUTPATIENT)
Dept: CASE MANAGEMENT | Age: 71
End: 2021-10-28

## 2021-10-28 LAB
ANION GAP SERPL CALC-SCNC: 10 MMOL/L (ref 5–15)
BUN SERPL-MCNC: 24 MG/DL (ref 6–20)
BUN/CREAT SERPL: 11 (ref 12–20)
CALCIUM SERPL-MCNC: 9.4 MG/DL (ref 8.5–10.1)
CHLORIDE SERPL-SCNC: 113 MMOL/L (ref 97–108)
CO2 SERPL-SCNC: 17 MMOL/L (ref 21–32)
CREAT SERPL-MCNC: 2.1 MG/DL (ref 0.55–1.02)
ERYTHROCYTE [DISTWIDTH] IN BLOOD BY AUTOMATED COUNT: 14.8 % (ref 11.5–14.5)
GLUCOSE SERPL-MCNC: 116 MG/DL (ref 65–100)
HCT VFR BLD AUTO: 38.1 % (ref 35–47)
HGB BLD-MCNC: 12.2 G/DL (ref 11.5–16)
MCH RBC QN AUTO: 29.8 PG (ref 26–34)
MCHC RBC AUTO-ENTMCNC: 32 G/DL (ref 30–36.5)
MCV RBC AUTO: 92.9 FL (ref 80–99)
NRBC # BLD: 0 K/UL (ref 0–0.01)
NRBC BLD-RTO: 0 PER 100 WBC
PLATELET # BLD AUTO: 266 K/UL (ref 150–400)
PMV BLD AUTO: 9.9 FL (ref 8.9–12.9)
POTASSIUM SERPL-SCNC: 3.7 MMOL/L (ref 3.5–5.1)
RBC # BLD AUTO: 4.1 M/UL (ref 3.8–5.2)
SODIUM SERPL-SCNC: 140 MMOL/L (ref 136–145)
WBC # BLD AUTO: 7.8 K/UL (ref 3.6–11)

## 2021-10-28 PROCEDURE — 36415 COLL VENOUS BLD VENIPUNCTURE: CPT

## 2021-10-28 PROCEDURE — 65660000001 HC RM ICU INTERMED STEPDOWN

## 2021-10-28 PROCEDURE — 97535 SELF CARE MNGMENT TRAINING: CPT

## 2021-10-28 PROCEDURE — 74011250637 HC RX REV CODE- 250/637: Performed by: INTERNAL MEDICINE

## 2021-10-28 PROCEDURE — 97530 THERAPEUTIC ACTIVITIES: CPT

## 2021-10-28 PROCEDURE — 74011250637 HC RX REV CODE- 250/637: Performed by: NURSE PRACTITIONER

## 2021-10-28 PROCEDURE — 80048 BASIC METABOLIC PNL TOTAL CA: CPT

## 2021-10-28 PROCEDURE — 74011250637 HC RX REV CODE- 250/637: Performed by: GENERAL ACUTE CARE HOSPITAL

## 2021-10-28 PROCEDURE — 85027 COMPLETE CBC AUTOMATED: CPT

## 2021-10-28 PROCEDURE — 97116 GAIT TRAINING THERAPY: CPT

## 2021-10-28 RX ADMIN — PRAVASTATIN SODIUM 80 MG: 40 TABLET ORAL at 09:46

## 2021-10-28 RX ADMIN — Medication 10 ML: at 21:48

## 2021-10-28 RX ADMIN — APIXABAN 5 MG: 5 TABLET, FILM COATED ORAL at 09:46

## 2021-10-28 RX ADMIN — CETIRIZINE HYDROCHLORIDE 10 MG: 10 TABLET, FILM COATED ORAL at 09:46

## 2021-10-28 RX ADMIN — PANTOPRAZOLE SODIUM 40 MG: 40 TABLET, DELAYED RELEASE ORAL at 09:46

## 2021-10-28 RX ADMIN — Medication 2000 UNITS: at 09:46

## 2021-10-28 RX ADMIN — LEVOTHYROXINE SODIUM 100 MCG: 0.1 TABLET ORAL at 05:35

## 2021-10-28 RX ADMIN — Medication 10 ML: at 14:00

## 2021-10-28 RX ADMIN — APIXABAN 5 MG: 5 TABLET, FILM COATED ORAL at 17:16

## 2021-10-28 RX ADMIN — Medication 10 ML: at 05:34

## 2021-10-28 RX ADMIN — LOSARTAN POTASSIUM 50 MG: 50 TABLET, FILM COATED ORAL at 09:46

## 2021-10-28 RX ADMIN — TOPIRAMATE 50 MG: 25 TABLET, FILM COATED ORAL at 21:49

## 2021-10-28 RX ADMIN — ASPIRIN 81 MG: 81 TABLET, COATED ORAL at 09:46

## 2021-10-28 RX ADMIN — TRAZODONE HYDROCHLORIDE 50 MG: 50 TABLET ORAL at 21:49

## 2021-10-28 RX ADMIN — DILTIAZEM HYDROCHLORIDE 180 MG: 180 CAPSULE, COATED, EXTENDED RELEASE ORAL at 09:46

## 2021-10-28 NOTE — PROGRESS NOTES
Ambulatory Care Management Social Work Note  10/28/2021     Received message from PCP office advising that Ken Mendez Manager  needed to speak with SW.  Returned call. Advised of initial home health visit, subsequent hospitalization, and care coordination by inpatient CM while hospitalized. Received call from Ms. Tarah Oneil - BALDEV at [phone] re: family contact information.        Maureen Sinclair, MSW, ACSW, [facility]    Ambulatory Care Management   [phone]

## 2021-10-28 NOTE — PROGRESS NOTES
555 Ezra Salazar report from [facility] access [facility]. SBAR, Kardex, and MAR.

## 2021-10-28 NOTE — PROGRESS NOTES
Problem: Falls - Risk of  Goal: *Absence of Falls  Description: Document Timothy Gallegoer Fall Risk and appropriate interventions in the flowsheet.   Outcome: Progressing Towards Goal  Note: Fall Risk Interventions:       Mentation Interventions: Bed/chair exit alarm, Adequate sleep, hydration, pain control, Door open when patient unattended, More frequent rounding    Medication Interventions: Patient to call before getting OOB, Teach patient to arise slowly, Evaluate medications/consider consulting pharmacy    Elimination Interventions: Call light in reach, Bed/chair exit alarm    History of Falls Interventions: Bed/chair exit alarm

## 2021-10-28 NOTE — PROGRESS NOTES
Spiritual Care Assessment/Progress Note  [facility]      NAME: Samia Erickson      MRN: [id number]  AGE: 66 y.o. SEX: female  Taoist Affiliation: Evangelical   Language: English     10/28/2021     Total Time (in minutes): 13     Spiritual Assessment begun in [facility] through conversation with:         []Patient        [] Family    [] Friend(s)        Reason for Consult: Initial visit     Spiritual beliefs: (Please include comment if needed)     [] Identifies with a peter tradition:         [] Supported by a peter community:            [] Claims no spiritual orientation:           [] Seeking spiritual identity:                [] Adheres to an individual form of spirituality:           [x] Not able to assess:                           Identified resources for coping:      [] Prayer                               [] Music                  [] Guided Imagery     [] Family/friends                 [] Pet visits     [] Devotional reading                         [x] Unknown     [] Other:                                             Interventions offered during this visit: (See comments for more details)                Plan of Care:     [] Support spiritual and/or cultural needs    [] Support AMD and/or advance care planning process      [] Support grieving process   [] Coordinate Rites and/or Rituals    [] Coordination with community clergy   [] No spiritual needs identified at this time   [] Detailed Plan of Care below (See Comments)  [] Make referral to Music Therapy  [] Make referral to Pet Therapy     [] Make referral to Addiction services  [] Make referral to [facility]  [] Make referral to Spiritual Care Partner  [] No future visits requested        [] Follow up upon further referrals     Comments: Attempted Initial Spiritual Assessment for this pt in [facility]74. Reviewed pt's chart prior to this visit to augment any observed or expressed support needs this pt may have.   Pt was resting and therefore was unable to be assessed at this time. No family/friends present at time of visit. Contact Spiritual Care Services for any spiritual or emotional support needs. Grecia Luna MDiv.  Staff   Request  Support/Spiritual Care Services via [address] [place] [address]

## 2021-10-28 NOTE — PROGRESS NOTES
Problem: Mobility Impaired (Adult and Pediatric)  Goal: *Acute Goals and Plan of Care (Insert Text)  Description: FUNCTIONAL STATUS PRIOR TO ADMISSION: Patient questionable historian secondary to confusion and aphasia. Patient reported she was independent with functional mobility but stated she had a friend  her to appointments and stores. Per chart review, patient had home salazar aide who assist a few times during the week. HOME SUPPORT PRIOR TO ADMISSION: The patient lived alone with no local support. [facility] who assists occasionally. Physical Therapy Goals  Reviewed 10/28/2021 and remain appropriate for the next 7 days  Initiated 10/24/2021  1. Patient will move from supine to sit and sit to supine  in bed with modified independence within 7 day(s). 2.  Patient will transfer from bed to chair and chair to bed with supervision/set-up using the least restrictive device within 7 day(s). 3.  Patient will perform sit to stand with supervision/set-up within 7 day(s). 4.  Patient will ambulate with supervision/set-up for 150 feet with the least restrictive device within 7 day(s). 5.  Patient will ascend/descend 16 stairs with unilateral handrail(s) with supervision/set-up within 7 day(s). Outcome: Progressing Towards Goal   PHYSICAL THERAPY TREATMENT: WEEKLY REASSESSMENT  Patient: Franco Madrigal (43 y.o. female)  Date: 10/28/2021  Primary Diagnosis: Hypertensive emergency [I16.1]  Atrial fibrillation with RVR ([address] [I48.91]        Precautions:   Fall, Bed Alarm      ASSESSMENT  Patient continues with skilled PT services and is progressing towards goals. Pt received in bed, requesting to put on underwear. Mobility continues to improve. Gait trial in the hallway and pt did have one episode of scissoring, but was able to self recover. She does endorse touching objects at home with ambulation.   Stair navigation training completed and pt instructed to perform step to pattern but preferred the reciprocal pattern. One episode of not clearing the R foot, but no lob resulted. Impulsivity remains an issue, but greatly improved compared to when she was admitted. CM present upon entering pt's room and reported she received a call from a friend of the pt who reports he checks on her everyday and provides transportation to the store and to appointments. Pt functioning well enough to discharge home with assistance of her neighbors and private aide. HHPT appropriate at discharge. Patient's progression toward goals since last assessment: improved mobility, decreased impulsivity, better insight     Current Level of Function Impacting Discharge (mobility/balance): S to SBA    Functional Outcome Measure: The patient scored 80/100 on the Barthel Index outcome measure which is indicative of 20% impaired function/adls      Other factors to consider for discharge: lives alone, impulsive         PLAN :  Goals have been updated based on progression since last assessment. Patient continues to benefit from skilled intervention to address the above impairments. Recommendations and Planned Interventions: bed mobility training, transfer training, gait training, therapeutic exercises, patient and family training/education, and therapeutic activities      Frequency/Duration: Patient will be followed by physical therapy:  4 times a week to address goals. Recommendation for discharge: (in order for the patient to meet his/her long term goals)  Physical therapy at least 2 days/week in the home     This discharge recommendation:  Has been made in collaboration with the attending provider and/or case management    IF patient discharges home will need the following DME: none         SUBJECTIVE:   Patient stated am I going home.     OBJECTIVE DATA SUMMARY:   HISTORY:    Past Medical History:   Diagnosis Date    Cancer ([facility] [address]     ovaian stomach    CVA (cerebral vascular accident) ([facility] [address]     Heart failure ([facility] [address]     MI Hypertension      Past Surgical History:   Procedure Laterality Date    HX GYN      hysterectomy    HX LUMBAR LAMINECTOMY  2016    L4-S1, Dr. Glenn Reese      cancer removal       Personal factors and/or comorbidities impacting plan of care:  ?dementia, expressive aphasia    Home Situation  Home Environment: Private residence  # Steps to Enter: 3  Rails to Enter: No  One/Two Story Residence: Two story  # of Interior Steps: 16  Living Alone: Yes  Support Systems: [facility] (pt poor historian))  Patient Expects to be Discharged to[de-identified] [employer]  Current DME Used/Available at Home: None  Tub or Shower Type: Tub/Shower combination    EXAMINATION/PRESENTATION/DECISION MAKING:   Critical Behavior:  Neurologic State: Alert  Orientation Level: Oriented to person, Oriented to place  Cognition: Follows commands  Safety/Judgement: Awareness of environment, Fall prevention  Hearing: Auditory  Auditory Impairment: None         Functional Mobility:  Bed Mobility:        Sit to Supine: Independent     Transfers:  Sit to Stand: Supervision  Stand to Sit: Supervision        Bed to Chair: Supervision              Balance:   Sitting: Intact  Standing: Intact  Standing - Static: Good  Standing - Dynamic : Good  Ambulation/Gait Training:  Distance (ft): 200 Feet (ft)  Assistive Device: Gait belt  Ambulation - Level of Assistance: Stand-by assistance;Contact guard assistance (one scissoring episode with self recovery)        Gait Abnormalities: Decreased step clearance; Path deviations              Speed/Manjula: Pace decreased (<100 feet/min)          Stairs:  Number of Stairs Trained: 13  Stairs - Level of Assistance: Stand-by assistance   Rail Use: Left         Functional Measure:  Barthel Index:    Bathin  Bladder: 10  Bowels: 10  Groomin  Dressing: 10  Feeding: 10  Mobility: 10  Stairs: 5  Toilet Use: 10  Transfer (Bed to Chair and Back): 10  Total: 80/100       The Barthel ADL Index: Guidelines  1. The index should be used as a record of what a patient does, not as a record of what a patient could do. 2. The main aim is to establish degree of independence from any help, physical or verbal, however minor and for whatever reason. 3. The need for supervision renders the patient not independent. 4. A patient's performance should be established using the best available evidence. Asking the patient, friends/relatives and nurses are the usual sources, but direct observation and common sense are also important. However direct testing is not needed. 5. Usually the patient's performance over the preceding 24-48 hours is important, but occasionally longer periods will be relevant. 6. Middle categories imply that the patient supplies over 50 per cent of the effort. 7. Use of aids to be independent is allowed. Jayden Ramirez., Barthel, D.W. (9214). Functional evaluation: the Barthel Index. [facility] (14)2. Mykel Anderson buzz LORENZO Solares, Larissa Palencia., Davis Nice., Shwetha Posey, [facility] (1999). Measuring the change indisability after inpatient rehabilitation; comparison of the responsiveness of the Barthel Index and Functional [place] Measure. Journal of Neurology, Neurosurgery, and Psychiatry, 66(4), 295-900. Gus Cavanaugh, N.J.A, YESSENIA Gómez, & Baldemar Tariq MMICHAEL. (2004.) Assessment of post-stroke quality of life in cost-effectiveness studies: The usefulness of the Barthel Index and the EuroQoL-5D. Quality of Life Research, 13, 427-43          Pain Rating:  None reported    Activity Tolerance:   Good and SpO2 stable on RA    After treatment patient left in no apparent distress:   Sitting in chair, Call bell within reach, and Bed / chair alarm activated    COMMUNICATION/EDUCATION:   The patients plan of care was discussed with: Occupational therapist and Registered nurse.      Fall prevention education was provided and the patient/caregiver indicated understanding., Patient/family have participated as able in goal setting and plan of care. , and Patient/family agree to work toward stated goals and plan of care.     Thank you for this referral.  Michael Neal, PT   Time Calculation: 24 mins

## 2021-10-28 NOTE — PROGRESS NOTES
Bedside and Verbal shift change report received from [address]. Report included the following information SBAR, Kardex and Recent Results.

## 2021-10-28 NOTE — PROGRESS NOTES
Transition of Care Plan:    RUR: 14%- Low Risk  Disposition: Home with [facility] - referral- [facility]  [phone]  CM to f/u with Cristin Chou [phone]  with [facility]. Follow up appointments: PCP  DME needed: none, pt has no DME needs  Transportation at Discharge: Alejandro Bucio to provide transportation  [address] or means to access home:      Pt has access to home  IM Medicare Letter: 2nd IM Letter at d/c  Is patient a BCPI-A Bundle:      n/a      If yes, was Bundle Letter given?:     Caregiver Contact: Pt has support of neighbors and friends,Tony- [phone] and Cheryle Overland [phone]. CM has made several attempts to contact family with no success. Discharge Caregiver contacted prior to discharge? Reason for Admission:   Atrial fibrillation with RVR                      RUR Score:          14%- low risk           Plan for utilizing home health:    Resume with [facility]    PCP: First and Last name:  Bridget Ogden MD     Name of Practice:    Are you a current patient: Yes/No:  Yes   Approximate date of last visit:  10/12/21   Can you participate in a virtual visit with your PCP:                     Current Advanced Directive/Advance Care Plan: Full Code  Yadiraelmichelle 13 (ACP) Conversation      Date of Conversation: 10/28/21  Conducted with: Patient with Decision Making Capacity and Pt confused at times with events and time. Healthcare Decision Maker:   No healthcare decision makers have been documented. Click here to complete [address] including selection of the Healthcare Decision Maker Relationship (ie \"Primary\")    Today we Pt has acp on file but not able to contact daughter listed. Multiple attempts made wi no success. Content/Action Overview:    Has ACP document(s) on file - do NOT reflect the patient's care preferences, patient to provide new document(s)  Reviewed DNR/DNI and patient elects Full Code (Attempt Resuscitation)    Length of Voluntary ACP Conversation in minutes:  21 minutes    Boston Beth, RN         Healthcare Decision Maker:   Click here to complete [address] including selection of the Healthcare Decision Maker Relationship (ie \"Primary\")                           Transition of Care Plan:                       Home with [facility]    CM introduced self and role to pt at bedside, verified pt demographics, insurance info and emergency contact. CM has made several attempts to contact daughters, Daniela Gonzalez with no success or return call as noted in chart. Pt is alert and oriented, answering some questions appropriately. . Pt lives alone  in two story home that she rents,she sold her house and now rents it. Pt is independent with ADL, ambulating and relies on friends, neighbor and medicaid for transportation. HH in the past- [facility] and SNF in the past but does not recall name. Pt has been followed by  Patient outreach for a 11/2 years  CM updated Ken Waddell  on pt progress. Per Honorio, pt has declined aides in the past. CM informed pt that could use assistance in the home. CM spoke with Sagar Winterkhoa, a supportive friend, who states he checks on pt daily, provides transportation and shops for patient. Care Management Interventions  PCP Verified by CM: Yes  Mode of Transport at Discharge:  Other (see comment) (Jewels Carrion to provide )  Transition of Care Consult (CM Consult): Discharge Planning  Discharge Durable Medical Equipment: No  Physical Therapy Consult: Yes  Occupational Therapy Consult: Yes  Support Systems: Friend/Neighbor (Friends, Best Pak and Lila provide support )  [facility] [address]  Phone: [phone]

## 2021-10-28 NOTE — PROGRESS NOTES
Cardiology Progress Note      10/28/2021 1:26 PM    Admit Date: 10/22/2021          Subjective: No new c/o. Tolerating diltiazem. D/c options being evaluated         Visit Vitals  BP (!) 156/90   Pulse 66   Temp 97.9 °F (36.6 °C)   Resp 18   Ht 4' 11\" (1.499 m)   Wt 62 kg (136 lb 11 oz)   SpO2 97%   BMI 27.61 kg/m²     10/26 1901 - 10/28 0700  In: 56 [P.O.:840; I.V.:50]  Out: 1450 [Urine:1450]        Objective:      Physical Exam:  VS as above     Data Review:   Labs:      Hgb 12.2  BUN 24  Creat 2.1     Telemetry:       Assessment:     1. Encephalopathy; resolved  2. Urinary tract infection- better   3. Chronic atrial fibrillation, some 2-3 sec pauses - better off metoprolol  4. CAD s/p prior MI.  Echo 4/20 w/ EF 55-60%, mod LAE  5. Hyperlipidemia  6. Hypertension  7. . CKD  8.  Chronic anemia. 9.  Prior history of cerebrovascular accident with expressive aphasia. 10. History of ovarian cancer status post surgery. 11. Hypothyroidism  12. Moderate to severe mitral stenosis, nl EF by echo     Plan:  Cardiac status stable.  Will see back prn

## 2021-10-28 NOTE — PROGRESS NOTES
Hospitalist Progress Note    NAME: Radha Villafana   :  1950   MRN:  [id number]     Admit date: 10/22/2021    Today's date: 10/28/21    PCP: Magdalena Francis MD      Anticipated discharge date:unknown    Barriers:  Medical issues, ? placement    Assessment / Plan:    Acute metabolic encephalopathy POA  E coli UTI POA  ? Dementia POA  · Sent in by home health aide with emesis, tachycardia  · MS reported as poor insight, unclear what baseline is  · Patient clinically does not have aphasia at this point, she is alert awake and oriented to name, date of birth and her address  · She is not oriented to current year and situation  · MRI brain with no acute stroke   Chronic left tempoparietal CVA, chronic left basal ganglia infarct   Small vessel ischemic infarct  · Normal B12   · UA with cloudy urine, nitrite +, 20-50 WBC, 0-5 RBC, 3+ bacteria  · Urine culture with  E Coli  · Status post IV ceftriaxone  · PT/OT consults  · ASA, statin, eliquis  · PT/OT recommends SNF, pt wants to go home  · Cannot find any relatives, see below, concerns about her decision making capacity  · Complex Case management working on next of kin determination, working in collaboration with APS for safe discharge plan    Bradycardia with pauses to 2.5-3.0 secs 10/25 on diltiazem/metoprolol XL  Moderate to severe mitral stenosis POA likely prior rheumatic fever  Permament atrial fibrillation with RVR ([facility] [address] (2020) POA  · ?  Compliance with meds at home, noted with RVR at home  · Admit EKG with HR 160s, atrial fib  · Cardiac monitoring  · Was initially on Cardizem drip which was weaned off  · Noted to have pauses on telemetry on Cardizem and Toprol  · Went into rapid A. fib again on stopping Cardizem and Toprol  · -Restart Cardizem only for now on 10/27 ,rate controlled for now  · Cardiology consult appreciated  · Continue ASA, eliquis, cozaar  · Echo LVEF 55-60%, mod to severe mitral stenosis  · Ideally needs coumadin for stroke prevention = high risk give MS and prior strokes   Cards concerns re compliance with follow up noted   Eliquis for now  · No need for permanent pacemaker per cardiology as patient's pauses were less than 3 seconds       CKD stage 4 POA  · Baseline creatinine 1.8 to 2.0 range, creatinine at baseline  · Avoid nephrotoxic medications  · Status post IVF  · Appears stable       Diastolic CHF, chronic ([address] (2020) POA    Hypertensive urgency (10/22/2021) 202/174 POA  · No evidence of end organ damage  · Continue home losartan, increase dose as BP uncontrolled  · Continue pravastatin  · BP improved on home medications, ? Compliance  · PRN hydralazine       Impaired insight (10/22/2021)    Noncompliance (10/22/2021)  · CT of head was negative for acute hemorrhage or process  · MRI of brain with old strokes  · Some expressive aphasia, friend notes word finding difficulties chronically  · Normal B-12  · Case management  · Medical TDO petitioned in ED now , ?? suicidial  · One-on-one  removed after admit    Seen by psychiatry   She repeatedly denies she is suicidial  · Psychiatry consult appreciated  · Treated atrial fib and UTI, no significant improvement  · Still with confusion, oriented x 2 today, insight is not good  · Concerned about decision making capacity and safety at home, not able to reach family  · Tried to call daughter. left message with my cell phone  · ? Needs a guardian appointed  · Spoke with friend Michelle Tablert [phone]   He drives her to store and checks on her along with a neighbor   Reports she often has trouble getting her words out   He is not aware of any safety concerns about her living alone   He does not know how to reach any family    Hypothyroidism POA  · TSH mildly increased 6.90  · Continue levothyroxine    Overweight POA Body mass index is 27.61 kg/m².     Code Status: Full code    Surrogate Decision Maker: None, not able to reach daughter     DVT Prophylaxis: Eliquis  GI Prophylaxis: Protonix     Activity at baseline: Ambulates without assistive device     Lives with: Self with home health/private duty nurse visiting    Subjective:     Chief Complaint / Reason for Physician Visit f/u atrial fib with RVR  Patient wants to go home, she is alert awake and oriented to name, date of birth and her address. She is unaware of situation, current year and person  Complex case management still working on discharge planning    Review of Systems:  Symptom Y/N Comments  Symptom Y/N Comments   Fever/Chills n   Chest Pain n    Poor Appetite    Edema     Cough n   Abdominal Pain n    Sputum    Joint Pain     SOB/RIVERS n   Headache     Nausea/vomit n   Tolerating PT/OT     Diarrhea n   Tolerating Diet y    Constipation    Other       Could NOT obtain due to:      Objective:     VITALS:   Last 24hrs VS reviewed since prior progress note.  Most recent are:  Patient Vitals for the past 24 hrs:   Temp Pulse Resp BP SpO2   10/28/21 1108 97.9 °F (36.6 °C) 66 18 (!) 156/90 97 %   10/28/21 0946  83  (!) 154/83    10/28/21 0803 97.4 °F (36.3 °C) 90 18 (!) 112/92 95 %   10/28/21 0313 98 °F (36.7 °C) 83 18 135/84 95 %   10/27/21 2314 97.5 °F (36.4 °C) 81 22 (!) 158/81 97 %   10/27/21 1952 97.9 °F (36.6 °C) 80 18 (!) 154/95 94 %   10/27/21 1536 98.6 °F (37 °C) 72 20 125/73 94 %       Intake/Output Summary (Last 24 hours) at 10/28/2021 1352  Last data filed at 10/28/2021 0804  Gross per 24 hour   Intake 360 ml   Output 500 ml   Net -140 ml        Wt Readings from Last 12 Encounters:   10/27/21 62 kg (136 lb 11 oz)   10/12/21 63.5 kg (140 lb)   05/11/21 63.6 kg (140 lb 2 oz)   03/10/21 64 kg (141 lb)   10/26/20 62.1 kg (136 lb 12.8 oz)   05/02/20 73.5 kg (161 lb 15.9 oz)   11/10/19 71.7 kg (158 lb)   10/01/19 71.8 kg (158 lb 4 oz)   03/26/19 68 kg (150 lb)   05/22/18 72.9 kg (160 lb 12.8 oz)   01/12/18 73.1 kg (161 lb 3.2 oz)   11/14/17 71.2 kg (157 lb)       PHYSICAL EXAM:    I had a face to face encounter and independently examined this patient on 10/28/21 as outlined below:    General: WD, WN. Alert, cooperative, no acute distress    EENT:  PERRL. Anicteric sclerae. MMM  Resp:  CTA bilaterally, no wheezing or rales. No accessory muscle use  CV:  Irregular  rhythm,  No edema  GI:  Soft, Non distended, Non tender. +Bowel sounds, no rebound  Neurologic:  Alert to B-day and year, word finding difficulties, non focal motor exam  Psych:   Not anxious nor agitated  Skin:  No rashes. No jaundice    Reviewed most current lab test results and cultures  YES  Reviewed most current radiology test results   YES  Review and summation of old records today    NO  Reviewed patient's current orders and MAR    YES  PMH/SH reviewed - no change compared to H&P  ________________________________________________________________________  Care Plan discussed with:    Comments   Patient x    Family      RN x    Care Manager     Consultant                       x Multidiciplinary team rounds were held today with , nursing, pharmacist and clinical coordinator. Patient's plan of care was discussed; medications were reviewed and discharge planning was addressed. ________________________________________________________________________      Comments   >50% of visit spent in counseling and coordination of care     ________________________________________________________________________  Marycruz Rico MD     Procedures: see electronic medical records for all procedures/Xrays and details which were not copied into this note but were reviewed prior to creation of Plan. LABS:  I reviewed today's most current labs and imaging studies.   Pertinent labs include:  Recent Labs     10/28/21  0350 10/27/21  0016   WBC 7.8 6.8   HGB 12.2 12.3   HCT 38.1 37.3    268     Recent Labs     10/28/21  0350 10/27/21  0016 10/26/21  0344    139 140   K 3.7 3.9 3.6   * 112* 114*   CO2 17* 21 19*   * 102* 131*   BUN 24* 21* 21*   CREA 2.10* 1.99* 2.15*   CA 9.4 9.8 9.5

## 2021-10-29 LAB
ANION GAP SERPL CALC-SCNC: 7 MMOL/L (ref 5–15)
BUN SERPL-MCNC: 26 MG/DL (ref 6–20)
BUN/CREAT SERPL: 14 (ref 12–20)
CALCIUM SERPL-MCNC: 9.4 MG/DL (ref 8.5–10.1)
CHLORIDE SERPL-SCNC: 114 MMOL/L (ref 97–108)
CO2 SERPL-SCNC: 19 MMOL/L (ref 21–32)
CREAT SERPL-MCNC: 1.89 MG/DL (ref 0.55–1.02)
ERYTHROCYTE [DISTWIDTH] IN BLOOD BY AUTOMATED COUNT: 14.8 % (ref 11.5–14.5)
GLUCOSE SERPL-MCNC: 126 MG/DL (ref 65–100)
HCT VFR BLD AUTO: 33.9 % (ref 35–47)
HGB BLD-MCNC: 10.9 G/DL (ref 11.5–16)
MCH RBC QN AUTO: 29.9 PG (ref 26–34)
MCHC RBC AUTO-ENTMCNC: 32.2 G/DL (ref 30–36.5)
MCV RBC AUTO: 92.9 FL (ref 80–99)
NRBC # BLD: 0 K/UL (ref 0–0.01)
NRBC BLD-RTO: 0 PER 100 WBC
PLATELET # BLD AUTO: 243 K/UL (ref 150–400)
PMV BLD AUTO: 9.9 FL (ref 8.9–12.9)
POTASSIUM SERPL-SCNC: 4 MMOL/L (ref 3.5–5.1)
RBC # BLD AUTO: 3.65 M/UL (ref 3.8–5.2)
SODIUM SERPL-SCNC: 140 MMOL/L (ref 136–145)
WBC # BLD AUTO: 5.6 K/UL (ref 3.6–11)

## 2021-10-29 PROCEDURE — 74011250637 HC RX REV CODE- 250/637: Performed by: INTERNAL MEDICINE

## 2021-10-29 PROCEDURE — 74011250636 HC RX REV CODE- 250/636: Performed by: INTERNAL MEDICINE

## 2021-10-29 PROCEDURE — 65660000001 HC RM ICU INTERMED STEPDOWN

## 2021-10-29 PROCEDURE — 74011250637 HC RX REV CODE- 250/637: Performed by: NURSE PRACTITIONER

## 2021-10-29 PROCEDURE — 85027 COMPLETE CBC AUTOMATED: CPT

## 2021-10-29 PROCEDURE — 74011250637 HC RX REV CODE- 250/637: Performed by: GENERAL ACUTE CARE HOSPITAL

## 2021-10-29 PROCEDURE — 80048 BASIC METABOLIC PNL TOTAL CA: CPT

## 2021-10-29 PROCEDURE — 36415 COLL VENOUS BLD VENIPUNCTURE: CPT

## 2021-10-29 PROCEDURE — 97535 SELF CARE MNGMENT TRAINING: CPT

## 2021-10-29 RX ORDER — DILTIAZEM HYDROCHLORIDE 120 MG/1
120 CAPSULE, COATED, EXTENDED RELEASE ORAL DAILY
Status: DISCONTINUED | OUTPATIENT
Start: 2021-10-29 | End: 2021-11-02 | Stop reason: HOSPADM

## 2021-10-29 RX ADMIN — ASPIRIN 81 MG: 81 TABLET, COATED ORAL at 09:32

## 2021-10-29 RX ADMIN — LOSARTAN POTASSIUM 50 MG: 50 TABLET, FILM COATED ORAL at 09:33

## 2021-10-29 RX ADMIN — Medication 10 ML: at 14:00

## 2021-10-29 RX ADMIN — DILTIAZEM HYDROCHLORIDE 120 MG: 120 CAPSULE, COATED, EXTENDED RELEASE ORAL at 18:11

## 2021-10-29 RX ADMIN — Medication 2000 UNITS: at 09:32

## 2021-10-29 RX ADMIN — PANTOPRAZOLE SODIUM 40 MG: 40 TABLET, DELAYED RELEASE ORAL at 09:33

## 2021-10-29 RX ADMIN — APIXABAN 5 MG: 5 TABLET, FILM COATED ORAL at 09:33

## 2021-10-29 RX ADMIN — CETIRIZINE HYDROCHLORIDE 10 MG: 10 TABLET, FILM COATED ORAL at 09:32

## 2021-10-29 RX ADMIN — PRAVASTATIN SODIUM 80 MG: 40 TABLET ORAL at 09:32

## 2021-10-29 RX ADMIN — LEVOTHYROXINE SODIUM 100 MCG: 0.1 TABLET ORAL at 05:27

## 2021-10-29 RX ADMIN — FLUTICASONE PROPIONATE 2 SPRAY: 50 SPRAY, METERED NASAL at 09:00

## 2021-10-29 RX ADMIN — HYDRALAZINE HYDROCHLORIDE 20 MG: 20 INJECTION INTRAMUSCULAR; INTRAVENOUS at 15:34

## 2021-10-29 RX ADMIN — Medication 10 ML: at 23:10

## 2021-10-29 RX ADMIN — APIXABAN 5 MG: 5 TABLET, FILM COATED ORAL at 18:11

## 2021-10-29 RX ADMIN — TOPIRAMATE 50 MG: 25 TABLET, FILM COATED ORAL at 23:10

## 2021-10-29 RX ADMIN — Medication 10 ML: at 05:27

## 2021-10-29 RX ADMIN — TRAZODONE HYDROCHLORIDE 50 MG: 50 TABLET ORAL at 23:10

## 2021-10-29 NOTE — PROGRESS NOTES
Transition of Care Plan:     RUR: 14%- Low Risk  Disposition: Home with [address] [facility]. Fax d/c summary to Shen Heike Wong- [phone]  CM to f/u Hattie Trinh [phone]  [facility]. Follow up appointments: PCP  DME needed: none, pt has no DME needs  [facility]portation at [facility] to provide transportation  [address] or means to access home:      Pt has access to home  IM Medicare Letter: Given  Is patient a BCPI-A Bundle:      n/a                 If yes, was Bundle Letter given?:     Caregiver Contact: Pt has support of neighbors and friends,Tony [phone] and Cheryle Overland [phone]. CM has made several attempts to contact family with no success. Discharge Caregiver contacted prior to discharge?     11:30am- Pt will not be discharge today. Possible cath on Monday. 10:40am- CM called Heike Chacko- [phone]. No answer. CM unable to leave voice message due to mailbox being full. 10:38am- CM met with pt at bedside to discuss d/c plan. CM informed pt that [facility] has been arranged with [facility]. Medicare pt has received, reviewed, and signed 2nd  letter informing them of their right to appeal the discharge. Signed copy has been placed on pt bedside chart. CM informed pt that her friend Bev Piedra will transport her home. 10:33am- CM called pt's friend Bev Piedra via phone. CM inquired with pt's friend as to whether he will be able to transport pt home. Pt's friend stated that he will be able to transport pt at 12:30pm    10:28am- CM called pt's PCP to schedule pt's follow-up appointment. Appointment scheduled for 11/3/2021 at 9:30am.    10:25am- [facility] accepted referral for [facility].    9:00am- CM sent referral to multiple [facility] agencies for [facility] such as [facility], [facility], [facility] and [facility] via [employer].      Gopal De La O [address]  [phone]

## 2021-10-29 NOTE — PROGRESS NOTES
Transition of Care Plan:     RUR: 14%- Low Risk  Disposition: Home with [facility] - referral pending. [facility] [phone]  CM to f/u with Alejo Tavera [phone]  with [facility]. Follow up appointments: PCP  DME needed: none, pt has no DME needs  Transportation at Discharge: Max Camacho to provide transportation  [address] or means to access home:      Pt has access to home  IM Medicare Letter: 2nd IM Letter at d/c  Is patient a BCPI-A Bundle:      n/a                 If yes, was Bundle Letter given?:     Caregiver Contact: Pt has support of neighbors and friends,Mary [phone] and Davy berger [phone]. CM has made several attempts to contact family with no success. Discharge Caregiver contacted prior to discharge? 6:30pm- CM submitted UAI for processing.     5:00pm- CM called Alejo Tavera  [facility] -[phone] via phone to discuss d/c plan. CM inquired with Jonel Earl about APS investigation. Jonel Earl stated that they are unable to do an investigation due to pt being in the hospital. Jonel Earl stated that their is an issue with pt being home by herself and not taking her medication. Jonel Earl mentioned that she met with pt last year and numerous times but pt did not remember who she was. Jonel Earl stated that their have been numerous APS such her falling, pt's daughter requesting financial assistance to assist with paying bills. Jonel Earl mentioned that she does not believe that he neighbors are checking on her daily. Jonel Earl stated that since psych deemed her competent to make her decisions that she can assist with getting caregivers in the home. CM informed Sasha that CM will complete UAI. CM will continue to follow patient for discharge planning needs and arrange for services as deemed necessary.     Gopal Marie [address] [place] [address]  [phone]

## 2021-10-29 NOTE — PROGRESS NOTES
Problem: Falls - Risk of  Goal: *Absence of Falls  Description: Document Karinde Counts Fall Risk and appropriate interventions in the flowsheet.   Outcome: Progressing Towards Goal  Note: Fall Risk Interventions:       Mentation Interventions: Bed/chair exit alarm    Medication Interventions: Bed/chair exit alarm    Elimination Interventions: Bed/chair exit alarm, Call light in reach    History of Falls Interventions: Bed/chair exit alarm         Problem: Patient Education: Go to Patient Education Activity  Goal: Patient/Family Education  Outcome: Progressing Towards Goal

## 2021-10-29 NOTE — PROGRESS NOTES
End of Shift Note    Bedside shift change report given to Wendy DELGADO (oncoming nurse) by Anuja Levy (offgoing nurse). Report included the following information SBAR and Kardex    Shift worked:  2843-7781     Shift summary and any significant changes:     uneventful shift     Concerns for physician to address: none   Zone phone for oncoming shift:  None     Activity:  Activity Level: Up with Assistance  Number times ambulated in hallways past shift: 0  Number of times OOB to chair past shift: 0    Cardiac:   Cardiac Monitoring: Yes      Cardiac Rhythm: Atrial Fib    Access:   Current line(s): PIV     Genitourinary:   Urinary status: voiding    Respiratory:   O2 Device: None (Room air)  Chronic home O2 use?: NO  Incentive spirometer at bedside: YES     GI:  Last Bowel Movement Date: 10/26/21  Current diet:  ADULT DIET Regular  Passing flatus: YES  Tolerating current diet: YES       Pain Management:   Patient states pain is manageable on current regimen: YES    Skin:  Aldo Score: 18  Interventions: increase time out of bed    Patient Safety:  Fall Score:  Total Score: 3  Interventions: bed/chair alarm, gripper socks and pt to call before getting OOB  High Fall Risk: Yes    Length of Stay:  Expected LOS: 3d 19h  Actual LOS: [address]

## 2021-10-29 NOTE — PROGRESS NOTES
0710: Bedside shift change report given to Elle RN (oncoming nurse) by Lashon Barber RN (offgoing nurse). Report included the following information SBAR, Kardex, ED Summary and Intake/Output. 1534: Gave PRN Hydralazine for a BP of 191/106.     1708: Paged cardiology for HR sustaining in the 160s. PO Cardizem was DC'd earlier today, patient has a PRN order for IV Cardizem but patient has been having 3-4 second pauses in their rhythm which is why the PO Cardizem was stopped. Waiting for a call back. 1800: Received an order from Dr. Imelda Johnson for 120mg PO of Cardizem daily. 1811: Pt received Cardizem dose.

## 2021-10-29 NOTE — PROGRESS NOTES
End of Shift Note    Bedside shift change report given to Carla Tovar RN (oncoming nurse) by Traci Hill RN (offgoing nurse). Report included the following information SBAR, Kardex, ED Summary and Intake/Output    Shift worked:  Day     Shift summary and any significant changes:     Please refer to other progress note. Detailed account of day is in there. Concerns for physician to address:       Zone phone for oncoming shift:          Activity:  Activity Level: Up with Assistance  Number times ambulated in hallways past shift: 0  Number of times OOB to chair past shift: 3    Cardiac:   Cardiac Monitoring: Yes      Cardiac Rhythm: Atrial Fib    Access:   Current line(s): PIV     Genitourinary:   Urinary status: voiding    Respiratory:   O2 Device: None (Room air)  Chronic home O2 use?: N/A  Incentive spirometer at bedside: YES     GI:  Last Bowel Movement Date: 10/26/21  Current diet:  ADULT ORAL NUTRITION SUPPLEMENT Breakfast, Lunch, Dinner; Standard High Calorie/High Protein  ADULT DIET Regular; Strawberry Ensure  Passing flatus: YES  Tolerating current diet: YES       Pain Management:   Patient states pain is manageable on current regimen: N/A    Skin:  Aldo Score: 18  Interventions: increase time out of bed and PT/OT consult    Patient Safety:  Fall Score:  Total Score: 3  Interventions: bed/chair alarm, gripper socks and pt to call before getting OOB  High Fall Risk: Yes    Length of Stay:  Expected LOS: 3d 19h  Actual LOS: Joshua Khan RN

## 2021-10-29 NOTE — PROGRESS NOTES
Problem: Falls - Risk of  Goal: *Absence of Falls  Description: Document Elan Agudelo Fall Risk and appropriate interventions in the flowsheet. Outcome: Progressing Towards Goal  Note: Fall Risk Interventions:       Mentation Interventions: Adequate sleep, hydration, pain control, Bed/chair exit alarm, Door open when patient unattended    Medication Interventions: Bed/chair exit alarm, Patient to call before getting OOB, Teach patient to arise slowly    Elimination Interventions: Bed/chair exit alarm, Call light in reach, Toilet paper/wipes in reach, Toileting schedule/hourly rounds, Patient to call for help with toileting needs    History of Falls Interventions: Bed/chair exit alarm, Door open when patient unattended, Room close to nurse's station         Problem: Pressure Injury - Risk of  Goal: *Prevention of pressure injury  Description: Document Aldo Scale and appropriate interventions in the flowsheet.   Outcome: Progressing Towards Goal  Note: Pressure Injury Interventions:  Sensory Interventions: Assess changes in LOC, Assess need for specialty bed    Moisture Interventions: Absorbent underpads, Apply protective barrier, creams and emollients    Activity Interventions: Assess need for specialty bed, Increase time out of bed, Pressure redistribution bed/mattress(bed type), PT/OT evaluation    Mobility Interventions: Assess need for specialty bed, Pressure redistribution bed/mattress (bed type), PT/OT evaluation    Nutrition Interventions: Document food/fluid/supplement intake    Friction and Shear Interventions: Minimize layers

## 2021-10-29 NOTE — PROGRESS NOTES
Problem: Self Care Deficits Care Plan (Adult)  Goal: *Acute Goals and Plan of Care (Insert Text)  Description:   FUNCTIONAL STATUS PRIOR TO ADMISSION: Patient questionable historian secondary to confusion and aphasia. Patient reported she was independent for self-care and functional mobility but stated she had a friend  her to appointments and stores. Per chart review, patient had home [facility]azar aide who assist a few times during the week. HOME SUPPORT: The patient lived alone with no local support. Caregiver assisted patient occasionally during week and daughter did not want to be involved per chart review. Occupational Therapy Goals  Initiated 10/24/2021; Weekly Re-assessment 10/29/21  1. Patient will perform grooming standing at sink with supervision/set-up within 7 day(s). -Met and upgraded to Mod [place]  2. Patient will perform lower body dressing with supervision/set-up within 7 day(s). -Met and upgraded to Mod [place]  3. Patient will perform seated sponge bathing with supervision/set-up within 7 day(s). -Met and upgraded to Mod [place]  4. Patient will perform toilet transfers with supervision/set-up within 7 day(s). -Met and upgraded to Mod [place]  5. Patient will perform all aspects of toileting with supervision/set-up within 7 day(s). -Met and upgraded to Mod [place]  6. Patient will utilize energy conservation techniques ans safety awareness during functional activities with verbal cues within 7 day(s). -Met and discharged    Outcome: Progressing Towards Goal    OCCUPATIONAL THERAPY TREATMENT/WEEKLY RE-ASSESSMENT  Patient: Brenda Gonzalez (84 y.o. female)  Date: 10/29/2021  Diagnosis: Hypertensive emergency [I16.1]  Atrial fibrillation with RVR ([address] [I48.91] Atrial fibrillation with RVR ([address]       Precautions: Fall, Bed Alarm  Chart, occupational therapy assessment, plan of care, and goals were reviewed.     ASSESSMENT  Patient continues with skilled OT services and is progressing towards goals. Pt noted with good functional progression through active therapeutic engagement, with pt demonstrating increased full body reaching, progressive mobility/balance, strength/endurance and activity tolerance, functionally evidenced with pt now completing dynamic ADL routine, without DME, and Supervision (intermittent impulsivity noted, with Min cues for safety); all STG's met and upgraded. Per chart, D/C postponed secondary to pt having 4 second pause, with Cardiology following and possible procedure this upcoming Monday. Pt continues to benefit from skilled OT to address functional deficits during acute hospitalization, with reporting therapist [facility] and ADL/IADL Supervision; however, if Supervision not available, pt would benefit from rehab upon discharge. Current Level of Function Impacting Discharge (ADLs): Supervision    Other factors to consider for discharge: lives alone, intermittent impulsivity, unknown level of assist available         PLAN :  Goals have been updated based on progression since last assessment. Patient continues to benefit from skilled intervention to address the above impairments. Continue to follow patient 3 times a week to address goals. Recommend with staff: OOB meals, Active ADL engagement, Supervision for ADL completion    Recommend next OT session: POC progression    Recommendation for discharge: (in order for the patient to meet his/her long term goals)  To be determined: HHOT and ADL/IADL Supervision, if Supervision isn't available, rehab    This discharge recommendation:  Has been made in collaboration with the attending provider and/or case management    IF patient discharges home will need the following DME: none       SUBJECTIVE:   Patient stated I want to go home.     OBJECTIVE DATA SUMMARY:   Cognitive/Behavioral Status:  Neurologic State: Alert  Orientation Level: Oriented to person;Oriented to place  Cognition: Follows commands; Impulsive  Perception: Appears intact  Perseveration: No perseveration noted  Safety/Judgement: Awareness of environment    Functional Mobility and Transfers for ADLs:  Bed Mobility:  Rolling: Modified independent  Supine to Sit: Modified independent    Transfers:  Sit to Stand: Supervision  Functional Transfers  Bathroom Mobility: Supervision/set up       Balance:  Sitting: Intact  Standing: Intact  Standing - Static: Good  Standing - Dynamic : Good    ADL Intervention:  Grooming  Grooming Assistance: Supervision  Position Performed: Standing  Washing Face: Supervision  Washing Hands: Supervision  Brushing Teeth: Supervision  Brushing/Combing Hair: Supervision    Lower Body Dressing Assistance  Dressing Assistance: Supervision  Pants With Elastic Waist: Supervision  Socks: Independent  Leg Crossed Method Used: Yes  Position Performed: Seated in chair    Cognitive Retraining  Safety/Judgement: Awareness of environment      Pain:  Pt with c/o R ankle pain this date, did not quantify; nursing aware and following    Activity Tolerance:   Good    After treatment patient left in no apparent distress:   Sitting in chair, Call bell within reach, and Bed / chair alarm activated    COMMUNICATION/COLLABORATION:   The patients plan of care was discussed with: Physical therapy assistant and Registered nurse.      Donna Ram OT  Time Calculation: 24 mins

## 2021-10-29 NOTE — PROGRESS NOTES
Comprehensive Nutrition Assessment    Type and Reason for Visit: Initial, RD nutrition re-screen/LOS    Nutrition Recommendations/Plan:   Continue regular diet  Add Ensure enlive TID  Please document % meals and supplements consumed in flowsheet I/O's under intake     Nutrition Assessment:      Chart reviewed for LOS and case discussed during IDR. Pt noted for acute metabolic encephalopathy, UTI, possible underlying dementia, moderate to severe mitral stenosiis, afib w/ RVR, CKD4, diastolic CHF, noncompliance, hypothyroid. RD visited pt at bedside. Pt reports eating well PTA. She could not remember if she had breakfast this morning, but reports not eating lunch. She says her appetite has been down since yesterday, but mostly low PO documented throughout her admission. Pt agreeable to try Ensures to promote better intake. Menu at bedside and pt encouraged to order preferences. Plans for pacemaker next week. Will continue monitoring. Patient Vitals for the past 168 hrs:   % Diet Eaten   10/29/21 1051 26 - 50%   10/28/21 1730 26 - 50%   10/28/21 0803 26 - 50%   10/27/21 1858 51 - 75%   10/27/21 0756 51 - 75%   10/26/21 1750 51 - 75%   10/26/21 1502 76 - 100%   10/26/21 0806 1 - 25%   10/25/21 1806 76 - 100%   10/25/21 1527 0%   10/25/21 0726 0%   10/24/21 1512 1 - 25%   10/24/21 1209 1 - 25%   10/24/21 0920 1 - 25%     Wt Readings from Last 5 Encounters:   10/29/21 62.3 kg (137 lb 5.6 oz)   10/12/21 63.5 kg (140 lb)   05/11/21 63.6 kg (140 lb 2 oz)   03/10/21 64 kg (141 lb)   10/26/20 62.1 kg (136 lb 12.8 oz)   ]    Estimated Daily Nutrient Needs:  Energy (kcal): 1341 kcals (BMR x 1. 3AF); Weight Used for Energy Requirements: Current  Protein (g): 50-62g (0.8-1.0g/kg); Weight Used for Protein Requirements: Current  Fluid (ml/day): 1300mL; Method Used for Fluid Requirements: 1 ml/kcal      Nutrition Related Findings:  Labs: reviewed. Meds: D3, synthroid. BM 10/26.       Wounds:    None       Current Nutrition Therapies:  ADULT DIET Regular    Anthropometric Measures:  · Height:  4' 11\" (149.9 cm)  · Current Body Wt:  62.3 kg (137 lb 5.6 oz)   · Ideal Body Wt:  95 lbs:  144.6 %   · BMI Category: Overweight (BMI 25.0-29. 9)       Nutrition Diagnosis:   · Inadequate protein-energy intake related to cognitive or neurological impairment (decreased appetite, possible underlying dementia) as evidenced by intake 0-25%, intake 26-50%      Nutrition Interventions:   Food and/or Nutrient Delivery: Continue current diet, Start oral nutrition supplement  Nutrition Education and Counseling: No recommendations at this time  Coordination of Nutrition Care: Continue to monitor while inpatient, Interdisciplinary rounds    Goals:  PO intake >50% meals and ONS next 2-4 days       Nutrition Monitoring and Evaluation:   Behavioral-Environmental Outcomes: None identified  Food/Nutrient Intake Outcomes: Food and nutrient intake, Supplement intake  Physical Signs/Symptoms Outcomes: Biochemical data, GI status, Weight    Discharge Planning:    Continue oral nutrition supplement, Continue current diet     Electronically signed by Zeinab Lawrence RD on 10/29/2021 at 2:15 PM    Contact: [facility]-[id number]

## 2021-10-29 NOTE — PROGRESS NOTES
Physical therapy    Chart reviewed- noted pt yesterday had a ~4s pause and possible procedure on Monday. Will defer PT at this time and follow up post sx.      Professionally,     Doug Elizabeth, PTA

## 2021-10-29 NOTE — PROGRESS NOTES
Cardiology Progress Note      10/29/2021 10:09 AM    Admit Date: 10/22/2021          Subjective: Asked to see again. Had approx 4 sec pause this am on telemetry. She does not recall any Sx         Visit Vitals  /68 (BP 1 [facility]: Left upper arm)   Pulse 67   Temp 97.6 °F (36.4 °C)   Resp 19   Ht 4' 11\" (1.499 m)   Wt 62.3 kg (137 lb 5.6 oz)   SpO2 95%   BMI 27.74 kg/m²     10/27 1901 - 10/29 0700  In: 300 [P.O.:300]  Out: 300 [Urine:300]        Objective:      Physical Exam:  VS as above  Chest CTa  Card Irreg irreg no gallop     Data Review:   Labs:    Hgb 10.9  BUN 26  Creat 1.9     Telemetry: afib R 60 -70 but with freq pauses       Assessment:     1. Encephalopathy; resolved  2. Urinary tract infection- better   3. Chronic atrial fibrillation, still with pauses off metoprolol   4. CAD s/p prior MI.  Echo 4/20 w/ EF 55-60%, mod LAE  5. Hyperlipidemia  6. Hypertension  7. . CKD  8.  Chronic anemia. 9.  Prior history of cerebrovascular accident with expressive aphasia. 10. History of ovarian cancer status post surgery. 11. Hypothyroidism  12. Moderate to severe mitral stenosis, nl EF by echo     Plan: Will d/c diltiazem. At this point a VVI pacer should be considered.  Could do Monday but would need consent from [address] [place] [address]

## 2021-10-29 NOTE — PROGRESS NOTES
Hospitalist Progress Note    NAME: Ramon Washington   :  1950   MRN:  [id number]     Admit date: 10/22/2021    Today's date: 10/29/21    PCP: Bethel Esquivel MD      Anticipated discharge date:unknown    Barriers:  Medical issues, ? placement    Assessment / Plan:    Acute metabolic encephalopathy POA  E coli UTI POA  ? Dementia POA  · Sent in by home health aide with emesis, tachycardia  · MS reported as poor insight, unclear what baseline is  · Patient clinically does not have aphasia at this point, she is alert awake and oriented to name, date of birth and her address  · She is not oriented to current year and situation  · MRI brain with no acute stroke   Chronic left tempoparietal CVA, chronic left basal ganglia infarct   Small vessel ischemic infarct  · Normal B12   · UA with cloudy urine, nitrite +, 20-50 WBC, 0-5 RBC, 3+ bacteria  · Urine culture with  E Coli  · Status post IV ceftriaxone  · PT/OT consults  · ASA, statin, eliquis  · PT/OT recommends SNF, pt wants to go home  · Cannot find any relatives, see below, concerns about her decision making capacity  · Mentation is improving, plan is to send home with home health and support from neighbors with APS    Bradycardia with pauses to 2.5-3.9 secs 10/28 on diltiazem/metoprolol XL  Moderate to severe mitral stenosis POA likely prior rheumatic fever  Permament atrial fibrillation with RVR ([facility] (2020) POA  · ?  Compliance with meds at home, noted with RVR at home  · Admit EKG with HR 160s, atrial fib  · Cardiac monitoring  · Was initially on Cardizem drip which was weaned off  · Noted to have pauses on telemetry on Cardizem and Toprol  · Went into rapid A. fib again on stopping Cardizem and Toprol  · -Restart Cardizem only for now on 10/27 ,rate controlled for now  · A pause of 3.9-second was again noted 10/29  · Continue ASA, eliquis, cozaar  · Echo LVEF 55-60%, mod to severe mitral stenosis  · Ideally needs coumadin for stroke prevention = high risk give MS and prior strokes   Cards concerns re compliance with follow up noted   Eliquis for now  · Cardiology reevaluation recommended pacemaker insertion, likely plan on Monday per cardiology       CKD stage 4 POA  · Baseline creatinine 1.8 to 2.0 range, creatinine at baseline  · Avoid nephrotoxic medications  · Status post IVF  · Appears stable       Diastolic CHF, chronic ([address] (2020) POA    Hypertensive urgency (10/22/2021) 202/174 POA  · No evidence of end organ damage  · Continue home losartan, increase dose as BP uncontrolled  · Continue pravastatin  · BP improved on home medications, ? Compliance  · PRN hydralazine       Impaired insight (10/22/2021)    Noncompliance (10/22/2021)  · CT of head was negative for acute hemorrhage or process  · MRI of brain with old strokes  · Some expressive aphasia, friend notes word finding difficulties chronically  · Normal B-12  · Case management  · Medical TDO petitioned in ED now , ?? suicidial  · One-on-one  removed after admit    Seen by psychiatry   She repeatedly denies she is suicidial  · Psychiatry consult appreciated  · Treated atrial fib and UTI, no significant improvement  · Still with confusion, oriented x 2 today, insight is not good  · Concerned about decision making capacity and safety at home, not able to reach family  · Tried to call daughter. left message with my cell phone  · ? Needs a guardian appointed  · Spoke with friend Michelle Talbert [phone]   He drives her to store and checks on her along with a neighbor   Reports she often has trouble getting her words out   He is not aware of any safety concerns about her living alone   He does not know how to reach any family    Hypothyroidism POA  · TSH mildly increased 6.90  · Continue levothyroxine    Overweight POA Body mass index is 27.74 kg/m².     Code Status: Full code    Surrogate Decision Maker: None, not able to reach daughter     DVT Prophylaxis: Eliquis  GI Prophylaxis: Protonix     Activity at baseline: Ambulates without assistive device     Lives with: Self with home health/private duty nurse visiting    Subjective:     Chief Complaint / Reason for Physician Visit f/u atrial fib with RVR  Denies any active complaints, clinically mentation is improving. Noted to have pauses on telemetry, longest pause was 3.9 seconds    Review of Systems:  Symptom Y/N Comments  Symptom Y/N Comments   Fever/Chills n   Chest Pain n    Poor Appetite    Edema     Cough n   Abdominal Pain n    Sputum    Joint Pain     SOB/RIVERS n   Headache     Nausea/vomit n   Tolerating PT/OT     Diarrhea n   Tolerating Diet y    Constipation    Other       Could NOT obtain due to:      Objective:     VITALS:   Last 24hrs VS reviewed since prior progress note. Most recent are:  Patient Vitals for the past 24 hrs:   Temp Pulse Resp BP SpO2   10/29/21 1153  89 20 (!) 160/79    10/29/21 0804 97.6 °F (36.4 °C) 67 19 126/68 95 %   10/29/21 0332 97.4 °F (36.3 °C) 69 18 130/66 95 %   10/28/21 2340 97.4 °F (36.3 °C) 72 20 (!) 153/85 97 %   10/28/21 1943 97.6 °F (36.4 °C) 96 20 (!) 148/72 97 %   10/28/21 1517 97.9 °F (36.6 °C) 92 18 (!) 151/90 96 %       Intake/Output Summary (Last 24 hours) at 10/29/2021 1236  Last data filed at 10/29/2021 1051  Gross per 24 hour   Intake 660 ml   Output 200 ml   Net 460 ml        Wt Readings from Last 12 Encounters:   10/29/21 62.3 kg (137 lb 5.6 oz)   10/12/21 63.5 kg (140 lb)   05/11/21 63.6 kg (140 lb 2 oz)   03/10/21 64 kg (141 lb)   10/26/20 62.1 kg (136 lb 12.8 oz)   05/02/20 73.5 kg (161 lb 15.9 oz)   11/10/19 71.7 kg (158 lb)   10/01/19 71.8 kg (158 lb 4 oz)   03/26/19 68 kg (150 lb)   05/22/18 72.9 kg (160 lb 12.8 oz)   01/12/18 73.1 kg (161 lb 3.2 oz)   11/14/17 71.2 kg (157 lb)       PHYSICAL EXAM:    I had a face to face encounter and independently examined this patient on 10/29/21 as outlined below:    General: WD, WN.  Alert, cooperative, no acute distress    EENT:  PERRL. Anicteric sclerae. MMM  Resp:  CTA bilaterally, no wheezing or rales. No accessory muscle use  CV:  Irregular  rhythm,  No edema  GI:  Soft, Non distended, Non tender. +Bowel sounds, no rebound  Neurologic:  Alert to B-day and year, word finding difficulties, non focal motor exam  Psych:   Not anxious nor agitated  Skin:  No rashes. No jaundice    Reviewed most current lab test results and cultures  YES  Reviewed most current radiology test results   YES  Review and summation of old records today    NO  Reviewed patient's current orders and MAR    YES  PMH/SH reviewed - no change compared to H&P  ________________________________________________________________________  Care Plan discussed with:    Comments   Patient x    Family      RN x    Care Manager     Consultant                       x Multidiciplinary team rounds were held today with , nursing, pharmacist and clinical coordinator. Patient's plan of care was discussed; medications were reviewed and discharge planning was addressed. ________________________________________________________________________      Comments   >50% of visit spent in counseling and coordination of care     ________________________________________________________________________  Gomez Senior MD     Procedures: see electronic medical records for all procedures/Xrays and details which were not copied into this note but were reviewed prior to creation of Plan. LABS:  I reviewed today's most current labs and imaging studies.   Pertinent labs include:  Recent Labs     10/29/21  0316 10/28/21  0350 10/27/21  0016   WBC 5.6 7.8 6.8   HGB 10.9* 12.2 12.3   HCT 33.9* 38.1 37.3    266 268     Recent Labs     10/29/21  0316 10/28/21  0350 10/27/21  0016    140 139   K 4.0 3.7 3.9   * 113* 112*   CO2 19* 17* 21   * 116* 102*   BUN 26* 24* 21*   CREA 1.89* 2.10* 1.99*   CA 9.4 9.4 9.8

## 2021-10-30 LAB
ANION GAP SERPL CALC-SCNC: 9 MMOL/L (ref 5–15)
BUN SERPL-MCNC: 26 MG/DL (ref 6–20)
BUN/CREAT SERPL: 15 (ref 12–20)
CALCIUM SERPL-MCNC: 9.7 MG/DL (ref 8.5–10.1)
CHLORIDE SERPL-SCNC: 114 MMOL/L (ref 97–108)
CO2 SERPL-SCNC: 17 MMOL/L (ref 21–32)
CREAT SERPL-MCNC: 1.79 MG/DL (ref 0.55–1.02)
ERYTHROCYTE [DISTWIDTH] IN BLOOD BY AUTOMATED COUNT: 14.8 % (ref 11.5–14.5)
GLUCOSE SERPL-MCNC: 137 MG/DL (ref 65–100)
HCT VFR BLD AUTO: 36.3 % (ref 35–47)
HGB BLD-MCNC: 12 G/DL (ref 11.5–16)
MCH RBC QN AUTO: 30.5 PG (ref 26–34)
MCHC RBC AUTO-ENTMCNC: 33.1 G/DL (ref 30–36.5)
MCV RBC AUTO: 92.1 FL (ref 80–99)
NRBC # BLD: 0 K/UL (ref 0–0.01)
NRBC BLD-RTO: 0 PER 100 WBC
PLATELET # BLD AUTO: 271 K/UL (ref 150–400)
PMV BLD AUTO: 9.9 FL (ref 8.9–12.9)
POTASSIUM SERPL-SCNC: 4 MMOL/L (ref 3.5–5.1)
RBC # BLD AUTO: 3.94 M/UL (ref 3.8–5.2)
SODIUM SERPL-SCNC: 140 MMOL/L (ref 136–145)
WBC # BLD AUTO: 6.3 K/UL (ref 3.6–11)

## 2021-10-30 PROCEDURE — 80048 BASIC METABOLIC PNL TOTAL CA: CPT

## 2021-10-30 PROCEDURE — 65660000000 HC RM CCU STEPDOWN

## 2021-10-30 PROCEDURE — 74011250637 HC RX REV CODE- 250/637: Performed by: GENERAL ACUTE CARE HOSPITAL

## 2021-10-30 PROCEDURE — 74011250637 HC RX REV CODE- 250/637: Performed by: INTERNAL MEDICINE

## 2021-10-30 PROCEDURE — 74011250637 HC RX REV CODE- 250/637: Performed by: NURSE PRACTITIONER

## 2021-10-30 PROCEDURE — 36415 COLL VENOUS BLD VENIPUNCTURE: CPT

## 2021-10-30 PROCEDURE — 85027 COMPLETE CBC AUTOMATED: CPT

## 2021-10-30 RX ADMIN — CETIRIZINE HYDROCHLORIDE 10 MG: 10 TABLET, FILM COATED ORAL at 09:39

## 2021-10-30 RX ADMIN — ASPIRIN 81 MG: 81 TABLET, COATED ORAL at 09:39

## 2021-10-30 RX ADMIN — PANTOPRAZOLE SODIUM 40 MG: 40 TABLET, DELAYED RELEASE ORAL at 09:39

## 2021-10-30 RX ADMIN — Medication 2000 UNITS: at 09:39

## 2021-10-30 RX ADMIN — Medication 10 ML: at 21:03

## 2021-10-30 RX ADMIN — Medication 10 ML: at 17:27

## 2021-10-30 RX ADMIN — TOPIRAMATE 50 MG: 25 TABLET, FILM COATED ORAL at 21:03

## 2021-10-30 RX ADMIN — TRAZODONE HYDROCHLORIDE 50 MG: 50 TABLET ORAL at 21:03

## 2021-10-30 RX ADMIN — APIXABAN 5 MG: 5 TABLET, FILM COATED ORAL at 09:38

## 2021-10-30 RX ADMIN — LOSARTAN POTASSIUM 50 MG: 50 TABLET, FILM COATED ORAL at 09:39

## 2021-10-30 RX ADMIN — LEVOTHYROXINE SODIUM 100 MCG: 0.1 TABLET ORAL at 05:16

## 2021-10-30 RX ADMIN — DILTIAZEM HYDROCHLORIDE 120 MG: 120 CAPSULE, COATED, EXTENDED RELEASE ORAL at 12:25

## 2021-10-30 RX ADMIN — Medication 10 ML: at 05:17

## 2021-10-30 RX ADMIN — PRAVASTATIN SODIUM 80 MG: 40 TABLET ORAL at 09:38

## 2021-10-30 NOTE — PROGRESS NOTES
TRANSFER - OUT REPORT:    Verbal report given to Ruby(name) on Mandi Padron  being transferred to IVCU(unit) for routine progression of care       Report consisted of patients Situation, Background, Assessment and   Recommendations(SBAR). Information from the following report(s) SBAR, Kardex, ED Summary, Procedure Summary, Intake/Output, MAR, Accordion, Recent Results, Med Rec Status and Cardiac Rhythm a-fib was reviewed with the receiving nurse. Lines:   Peripheral IV 10/24/21 Anterior;Distal;Right Forearm (Active)   Site Assessment Clean, dry, & intact 10/30/21 0800   Phlebitis Assessment 0 10/30/21 0800   Infiltration Assessment 0 10/30/21 0800   Dressing Status Clean, dry, & intact 10/30/21 0800   Dressing Type Tape;Transparent 10/30/21 0800   Hub Color/Line Status Blue;Flushed 10/30/21 0800   Action Taken Open ports on tubing capped 10/29/21 1930   Alcohol Cap Used Yes 10/28/21 0803        Opportunity for questions and clarification was provided.       Patient transported with:   Monitor  Tech

## 2021-10-30 NOTE — PROGRESS NOTES
0730  Report received from [facility]. SBAR, Kardex, ED Summary, Procedure Summary, Intake/Output, MAR, Accordion, Recent Results, Med Rec Status and Cardiac Rhythm a-fib were discussed. Gina Knight    0930  Reviewed medications with Dr. Kate Hernandez. Discussed the pauses when patient given Cardizem. Patient started on a lower dose yesterday evening and dose given at 1811, okay to give today's dose at [facility] and then be on the regular daily dose tomorrow.

## 2021-10-30 NOTE — PROGRESS NOTES
10/29/21 1930   Vitals   Temp 97.5 °F (36.4 °C)   Temp Source Oral   Pulse (Heart Rate) (!) 115   Heart Rate Source Monitor   Resp Rate 16   O2 Sat (%) 97 %   Level of Consciousness Alert (0)   BP (!) 165/83   MAP (Monitor) 101   MAP (Calculated) 110   MEWS Score 3   Oxygen Therapy   Pulse via Oximetry 125 beats per minute   Pt was up to commode - has been tachy throughout the day. She is otherwise asymptomatic. Charge nurse notified. Will continue to monitor.

## 2021-10-30 NOTE — PROGRESS NOTES
Cardiology Progress Note      10/30/2021 10:09 AM    Admit Date: 10/22/2021          Subjective: Asked to see again. Had approx 4 sec pause this am on telemetry. She does not recall any Sx         Visit Vitals  /62   Pulse 84   Temp 98.3 °F (36.8 °C)   Resp 18   Ht 4' 11\" (1.499 m)   Wt 63 kg (138 lb 14.2 oz)   SpO2 95%   BMI 28.05 kg/m²     10/28 1901 - 10/30 0700  In: 480 [P.O.:480]  Out: 200 [Urine:200]        Objective:      Physical Exam:  VS as above  Chest CTa  Card Irreg irreg no gallop     Data Review:   Labs:    Hgb 12  BUN   Creat 1.8     Telemetry:  A  Fib    HR low 100s. Assessment:     1. Encephalopathy; resolved  2. Urinary tract infection- better   3. Chronic atrial fibrillation, still with pauses off metoprolol   4. CAD s/p prior MI.  Echo 4/20 w/ EF 55-60%, mod LAE  5. Hyperlipidemia  6. Hypertension  7. . CKD  8.  Chronic anemia. 9.  Prior history of cerebrovascular accident with expressive aphasia. 10. History of ovarian cancer status post surgery. 11. Hypothyroidism  12. Moderate to severe mitral stenosis, nl EF by echo     10/30    Diltiazem was stopped yesterday due to 4 second pause. So had not received a dose since AM 10/28 . Called last night with persistent  . I gave order for Diltiazem 120 daily. First dose last night . HR is better. Low 100s this AM .  No further pauses. Will continue this daily. Likely needs the pacer and Dr Katina Lopez will talk with POA Monday and try to get that done . Will make NPO Sunday night .

## 2021-10-30 NOTE — PROGRESS NOTES
Spiritual Care Assessment/Progress Note  [facility]      NAME: Clarence Sebastian      MRN: [id number]  AGE: 66 y.o.  SEX: female  Yazidism Affiliation: Spiritism   Language: English     10/30/2021     Total Time (in minutes): 5     Spiritual Assessment begun in [facility] through conversation with:         [x]Patient        [] Family    [] Friend(s)        Reason for Consult: Initial/Spiritual assessment, patient floor     Spiritual beliefs: (Please include comment if needed)     [x] Identifies with a peter tradition:         [] Supported by a peter community:            [] Claims no spiritual orientation:           [] Seeking spiritual identity:                [] Adheres to an individual form of spirituality:           [] Not able to assess:                           Identified resources for coping:      [x] Prayer                               [] Music                  [] Guided Imagery     [] Family/friends                 [] Pet visits     [] Devotional reading                         [] Unknown     [] Other:                                               Interventions offered during this visit: (See comments for more details)    Patient Interventions: Initial/Spiritual assessment, patient floor, Affirmation of emotions/emotional suffering, Coping skills reviewed/reinforced, Prayer (assurance of), Prayer (actual)           Plan of Care:     [x] Support spiritual and/or cultural needs    [] Support AMD and/or advance care planning process      [] Support grieving process   [] Coordinate Rites and/or Rituals    [] Coordination with community clergy   [] No spiritual needs identified at this time   [] Detailed Plan of Care below (See Comments)  [] Make referral to Music Therapy  [] Make referral to Pet Therapy     [] Make referral to Addiction services  [] Make referral to [facility]  [] Make referral to Spiritual Care Partner  [] No future visits requested        [x] Follow up upon further referrals     Comments: Provided support for this pt in [facility]. Pt was awakened by this visit. Reviewed pt's chart prior to this visit to augment any observed or expressed support needs this pt may have. Facilitated life review to assess potential support needs or coping strategies. Pt offered review of current situation as best as possible. Pt offered a disclaimer during this visit stating it is hard for her to get her words out. Provided pastoral support for pt through active listening. Pt did attend a Rastafari Buddhist but hasn't been able to attend lately. Pt requested prayer and [address] [place] [address] offered prayer for this pt. Assured pt of prayers and affirmed ongoing availability of support. Carrie Barry MDiv.  Staff   Request  Support/Spiritual Care Services via [facility]

## 2021-10-30 NOTE — PROGRESS NOTES
Hospitalist Progress Note    NAME: Yu Balbuena   :  1950   MRN:  [id number]     Admit date: 10/22/2021    Today's date: 10/30/21    PCP: Fred Albert MD      Anticipated discharge date:unknown    Barriers:  Medical issues, ? placement    Assessment / Plan:    Acute metabolic encephalopathy POA  E coli UTI POA  ?  Dementia POA  · Sent in by home health aide with emesis, tachycardia  · MS reported as poor insight, unclear what baseline is  · Patient clinically does not have aphasia at this point, she is alert awake and oriented to name, date of birth and her address  · She is not oriented to current year and situation  · MRI brain with no acute stroke   Chronic left tempoparietal CVA, chronic left basal ganglia infarct   Small vessel ischemic infarct  · Normal B12   · UA with cloudy urine, nitrite +, 20-50 WBC, 0-5 RBC, 3+ bacteria  · Urine culture with  E Coli  · Status post IV ceftriaxone  · PT/OT consults  · ASA, statin, eliquis  · PT/OT recommends SNF, pt wants to go home  · Cannot find any relatives, see below, concerns about her decision making capacity  · Mentation is improving, plan is to send home with home health and support from neighbors with APS    Bradycardia with pauses to 2.5-3.9 secs 10/28 on diltiazem/metoprolol XL  Moderate to severe mitral stenosis POA likely prior rheumatic fever  Permament atrial fibrillation with RVR ([facility] [address] (2020) POA  · Patient has been having intermittent rapid A. fib/bradycardia with pauses  · -She was on beta-blocker and Cardizem at home  · Due to intermittent pauses in recurrent tachycardia her medications being adjusted  · Went into rapid A. fib again last night and restarted on Cardizem at a lower dose of 120  · Heart rate better controlled today  · Plan for pacemaker insertion on Monday  · We will hold off on Eliquis for now for possible pacemaker insertion       CKD stage 4 POA  · Baseline creatinine 1.8 to 2.0 range, creatinine at baseline  · Avoid nephrotoxic medications  · Status post IVF  · Appears stable       Diastolic CHF, chronic ([address] (2020) POA    Hypertensive urgency (10/22/2021) 202/174 POA  · No evidence of end organ damage  · Continue home losartan, increase dose as BP uncontrolled  · Continue pravastatin  · BP improved on home medications, ? Compliance  · PRN hydralazine       Impaired insight (10/22/2021)    Noncompliance (10/22/2021)  · CT of head was negative for acute hemorrhage or process  · MRI of brain with old strokes  · Some expressive aphasia, friend notes word finding difficulties chronically  · Normal B-12  · Case management  · Medical TDO petitioned in ED now , ?? suicidial  · One-on-one  removed after admit    Seen by psychiatry   She repeatedly denies she is suicidial  · Psychiatry consult appreciated  · Treated atrial fib and UTI, no significant improvement  · Still with confusion, oriented x 2 today, insight is not good  · Concerned about decision making capacity and safety at home, not able to reach family  · Tried to call daughter. left message with my cell phone  · ? Needs a guardian appointed  · Spoke with friend Hector Maldonado [phone]   He drives her to store and checks on her along with a neighbor   Reports she often has trouble getting her words out   He is not aware of any safety concerns about her living alone   He does not know how to reach any family    Hypothyroidism POA  · TSH mildly increased 6.90  · Continue levothyroxine    Overweight POA Body mass index is 28.05 kg/m².     Code Status: Full code    Surrogate Decision Maker: None, not able to reach daughter     DVT Prophylaxis: Eliquis  GI Prophylaxis: Protonix     Activity at baseline: Ambulates without assistive device     Lives with: Self with home health/private duty nurse visiting    Subjective:     Chief Complaint / Reason for Physician Visit f/u atrial fib with RVR  Denies any active complaints, clinically mentation is improving. Was noted to be in rapid A. fib again in Cardizem restarted at a lower dose of 120    Review of Systems:  Symptom Y/N Comments  Symptom Y/N Comments   Fever/Chills n   Chest Pain n    Poor Appetite    Edema     Cough n   Abdominal Pain n    Sputum    Joint Pain     SOB/RIVERS n   Headache     Nausea/vomit n   Tolerating PT/OT     Diarrhea n   Tolerating Diet y    Constipation    Other       Could NOT obtain due to:      Objective:     VITALS:   Last 24hrs VS reviewed since prior progress note. Most recent are:  Patient Vitals for the past 24 hrs:   Temp Pulse Resp BP SpO2   10/30/21 0937  84  126/62    10/30/21 0800 98.3 °F (36.8 °C) 80 18 109/66 95 %   10/30/21 0254 97.8 °F (36.6 °C) 78 16 116/60 95 %   10/29/21 2230 98.1 °F (36.7 °C) 94 19 (!) 150/81 98 %   10/29/21 2205 98 °F (36.7 °C) 81 20 131/68 99 %   10/29/21 2032  (!) 125      10/29/21 1930 97.5 °F (36.4 °C) (!) 115 16 (!) 165/83 97 %   10/29/21 1811  (!) 126  (!) 153/86    10/29/21 1538  82  (!) 190/104 99 %   10/29/21 1536  (!) 107  (!) 191/106 98 %   10/29/21 1535  90  (!) 173/106 98 %   10/29/21 1534  95  (!) 191/106 98 %   10/29/21 1532 97.5 °F (36.4 °C) 83 20 (!) 137/121 98 %   10/29/21 1153 97.5 °F (36.4 °C) 89 20 (!) 160/79      No intake or output data in the 24 hours ending 10/30/21 1114     Wt Readings from Last 12 Encounters:   10/30/21 63 kg (138 lb 14.2 oz)   10/12/21 63.5 kg (140 lb)   05/11/21 63.6 kg (140 lb 2 oz)   03/10/21 64 kg (141 lb)   10/26/20 62.1 kg (136 lb 12.8 oz)   05/02/20 73.5 kg (161 lb 15.9 oz)   11/10/19 71.7 kg (158 lb)   10/01/19 71.8 kg (158 lb 4 oz)   03/26/19 68 kg (150 lb)   05/22/18 72.9 kg (160 lb 12.8 oz)   01/12/18 73.1 kg (161 lb 3.2 oz)   11/14/17 71.2 kg (157 lb)       PHYSICAL EXAM:    I had a face to face encounter and independently examined this patient on 10/30/21 as outlined below:    General: WD, WN. Alert, cooperative, no acute distress    EENT:  PERRL.  Anicteric sclerae. MMM  Resp:  CTA bilaterally, no wheezing or rales. No accessory muscle use  CV:  Irregular  rhythm,  No edema  GI:  Soft, Non distended, Non tender. +Bowel sounds, no rebound  Neurologic:  Alert to B-day and year, word finding difficulties, non focal motor exam  Psych:   Not anxious nor agitated  Skin:  No rashes. No jaundice    Reviewed most current lab test results and cultures  YES  Reviewed most current radiology test results   YES  Review and summation of old records today    NO  Reviewed patient's current orders and MAR    YES  PMH/SH reviewed - no change compared to H&P  ________________________________________________________________________  Care Plan discussed with:    Comments   Patient x    Family      RN x    Care Manager     Consultant                       x Multidiciplinary team rounds were held today with , nursing, pharmacist and clinical coordinator. Patient's plan of care was discussed; medications were reviewed and discharge planning was addressed. ________________________________________________________________________      Comments   >50% of visit spent in counseling and coordination of care     ________________________________________________________________________  Jennifer Bryant MD     Procedures: see electronic medical records for all procedures/Xrays and details which were not copied into this note but were reviewed prior to creation of Plan. LABS:  I reviewed today's most current labs and imaging studies.   Pertinent labs include:  Recent Labs     10/30/21  0455 10/29/21  0316 10/28/21  0350   WBC 6.3 5.6 7.8   HGB 12.0 10.9* 12.2   HCT 36.3 33.9* 38.1    243 266     Recent Labs     10/30/21  0455 10/29/21  0316 10/28/21  0350    140 140   K 4.0 4.0 3.7   * 114* 113*   CO2 17* 19* 17*   * 126* 116*   BUN 26* 26* 24*   CREA 1.79* 1.89* 2.10*   CA 9.7 9.4 9.4

## 2021-10-31 LAB
ANION GAP SERPL CALC-SCNC: 8 MMOL/L (ref 5–15)
BUN SERPL-MCNC: 33 MG/DL (ref 6–20)
BUN/CREAT SERPL: 17 (ref 12–20)
CALCIUM SERPL-MCNC: 10.3 MG/DL (ref 8.5–10.1)
CHLORIDE SERPL-SCNC: 110 MMOL/L (ref 97–108)
CO2 SERPL-SCNC: 20 MMOL/L (ref 21–32)
CREAT SERPL-MCNC: 1.99 MG/DL (ref 0.55–1.02)
ERYTHROCYTE [DISTWIDTH] IN BLOOD BY AUTOMATED COUNT: 14.6 % (ref 11.5–14.5)
GLUCOSE SERPL-MCNC: 125 MG/DL (ref 65–100)
HCT VFR BLD AUTO: 38.1 % (ref 35–47)
HGB BLD-MCNC: 12.2 G/DL (ref 11.5–16)
MCH RBC QN AUTO: 29.7 PG (ref 26–34)
MCHC RBC AUTO-ENTMCNC: 32 G/DL (ref 30–36.5)
MCV RBC AUTO: 92.7 FL (ref 80–99)
NRBC # BLD: 0 K/UL (ref 0–0.01)
NRBC BLD-RTO: 0 PER 100 WBC
PLATELET # BLD AUTO: 302 K/UL (ref 150–400)
PMV BLD AUTO: 10.1 FL (ref 8.9–12.9)
POTASSIUM SERPL-SCNC: 4.1 MMOL/L (ref 3.5–5.1)
RBC # BLD AUTO: 4.11 M/UL (ref 3.8–5.2)
SODIUM SERPL-SCNC: 138 MMOL/L (ref 136–145)
WBC # BLD AUTO: 7.4 K/UL (ref 3.6–11)

## 2021-10-31 PROCEDURE — 65660000000 HC RM CCU STEPDOWN

## 2021-10-31 PROCEDURE — 74011250637 HC RX REV CODE- 250/637: Performed by: INTERNAL MEDICINE

## 2021-10-31 PROCEDURE — 80048 BASIC METABOLIC PNL TOTAL CA: CPT

## 2021-10-31 PROCEDURE — 85027 COMPLETE CBC AUTOMATED: CPT

## 2021-10-31 PROCEDURE — 74011250637 HC RX REV CODE- 250/637: Performed by: NURSE PRACTITIONER

## 2021-10-31 PROCEDURE — 74011250637 HC RX REV CODE- 250/637: Performed by: GENERAL ACUTE CARE HOSPITAL

## 2021-10-31 PROCEDURE — 36415 COLL VENOUS BLD VENIPUNCTURE: CPT

## 2021-10-31 RX ADMIN — Medication 2000 UNITS: at 09:10

## 2021-10-31 RX ADMIN — CETIRIZINE HYDROCHLORIDE 10 MG: 10 TABLET, FILM COATED ORAL at 09:10

## 2021-10-31 RX ADMIN — Medication 10 ML: at 06:09

## 2021-10-31 RX ADMIN — Medication 10 ML: at 21:51

## 2021-10-31 RX ADMIN — TOPIRAMATE 50 MG: 25 TABLET, FILM COATED ORAL at 21:51

## 2021-10-31 RX ADMIN — LOSARTAN POTASSIUM 50 MG: 50 TABLET, FILM COATED ORAL at 09:10

## 2021-10-31 RX ADMIN — POLYETHYLENE GLYCOL 3350 17 G: 17 POWDER, FOR SOLUTION ORAL at 00:28

## 2021-10-31 RX ADMIN — TRAZODONE HYDROCHLORIDE 50 MG: 50 TABLET ORAL at 21:51

## 2021-10-31 RX ADMIN — DILTIAZEM HYDROCHLORIDE 120 MG: 120 CAPSULE, COATED, EXTENDED RELEASE ORAL at 09:10

## 2021-10-31 RX ADMIN — PRAVASTATIN SODIUM 80 MG: 40 TABLET ORAL at 09:10

## 2021-10-31 RX ADMIN — ASPIRIN 81 MG: 81 TABLET, COATED ORAL at 09:10

## 2021-10-31 RX ADMIN — LEVOTHYROXINE SODIUM 100 MCG: 0.1 TABLET ORAL at 06:09

## 2021-10-31 RX ADMIN — PANTOPRAZOLE SODIUM 40 MG: 40 TABLET, DELAYED RELEASE ORAL at 09:10

## 2021-10-31 NOTE — PROGRESS NOTES
Received call from Telemetry unit for 3.22 seconds pause on the monitor. RN notified day shift nurse Worley Dakin, RN).

## 2021-10-31 NOTE — ROUTINE PROCESS
Patient continues to be in afib and rate increases to 150 when up to bathroom. It also went up when she was talking about her daughters arguing over her in the past. It immediately comes down to around 100 when she gets back in bed. Prn was not given for this as it was short lived.

## 2021-10-31 NOTE — PROGRESS NOTES
Cardiology Progress Note      10/31/2021 10:09 AM    Admit Date: 10/22/2021          Subjective: no new sxs. Visit Vitals  BP (!) 156/87 (BP 1 [facility]: Left arm, BP Patient Position: At rest)   Pulse 78   Temp 97.9 °F (36.6 °C)   Resp 16   Ht 4' 11\" (1.499 m)   Wt 64.7 kg (142 lb 10.2 oz)   SpO2 98%   BMI 28.81 kg/m²     10/29 1901 - 10/31 0700  In: 450 [P.O.:450]  Out: 200 [Urine:200]        Objective:      Physical Exam:  VS as above  Chest CTa  Card Irreg irreg no gallop     Data Review:   Labs:    Hgb 12  BUN   Creat 1.8     Telemetry:  A  Fib    HR low 100s. Assessment:     1. Encephalopathy; resolved  2. Urinary tract infection- better   3. Chronic atrial fibrillation, still with pauses off metoprolol   4. CAD s/p prior MI.  Echo 4/20 w/ EF 55-60%, mod LAE  5. Hyperlipidemia  6. Hypertension  7. . CKD  8.  Chronic anemia. 9.  Prior history of cerebrovascular accident with expressive aphasia. 10. History of ovarian cancer status post surgery. 11. Hypothyroidism  12. Moderate to severe mitral stenosis, nl EF by echo     10/30    Diltiazem was stopped yesterday due to 4 second pause. So had not received a dose since AM 10/28 . Called last night with persistent  . I gave order for Diltiazem 120 daily. First dose last night . HR is better. Low 100s this AM .  No further pauses. Will continue this daily. Likely needs the pacer and Dr Lizet Christianson will talk with POA Monday and try to get that done . Will make NPO Sunday night . 10/31   Remains in A fib with rate controlled. Low dose Cardizem. No further pauses. Dr Monet Iraheta will see her in AM and decide about pacer with POA   NPO after midnight in case.

## 2021-10-31 NOTE — PROGRESS NOTES
Hospitalist Progress Note    NAME: Clarence Sebastian   :  1950   MRN:  [id number]     Admit date: 10/22/2021    Today's date: 10/31/21    PCP: Meche Nowak MD      Anticipated discharge date:unknown    Barriers:  Medical issues, ? placement    Assessment / Plan:    Acute metabolic encephalopathy POA  E coli UTI POA  ?  Dementia POA  · Sent in by home health aide with emesis, tachycardia  · MS reported as poor insight, unclear what baseline is  · Patient clinically does not have aphasia at this point, she is alert awake and oriented to name, date of birth and her address  · She is not oriented to current year and situation  · MRI brain with no acute stroke   Chronic left tempoparietal CVA, chronic left basal ganglia infarct   Small vessel ischemic infarct  · Normal B12   · UA with cloudy urine, nitrite +, 20-50 WBC, 0-5 RBC, 3+ bacteria  · Urine culture with  E Coli  · Status post IV ceftriaxone  · PT/OT consults  · ASA, statin, eliquis  · PT/OT recommends SNF, pt wants to go home  · Cannot find any relatives, see below, concerns about her decision making capacity  · Mentation is improving, plan is to send home with home health and support from neighbors with APS    Bradycardia with pauses to 2.5-3.9 secs 10/28 on diltiazem/metoprolol XL  Moderate to severe mitral stenosis POA likely prior rheumatic fever  Permament atrial fibrillation with RVR ([facility] [address] (2020) POA  · Patient has been having intermittent rapid A. fib/bradycardia with pauses  · -She was on beta-blocker and Cardizem at home  · Due to intermittent pauses in recurrent tachycardia her medications being adjusted  · Went into rapid A. fib again last night and restarted on Cardizem at a lower dose of 120, was noted to have another pause of 3.2 seconds last night  · Heart rate better controlled today  · Plan for pacemaker insertion on Monday  · We will hold off on Eliquis for now for possible pacemaker insertion       CKD stage 4 POA  · Baseline creatinine 1.8 to 2.0 range, creatinine at baseline  · Avoid nephrotoxic medications  · Status post IVF  · Appears stable       Diastolic CHF, chronic ([facility] [address] (2020) POA    Hypertensive urgency (10/22/2021) 202/174 POA  · No evidence of end organ damage  · Continue home losartan, increase dose as BP uncontrolled  · Continue pravastatin  · BP improved on home medications, ? Compliance  · PRN hydralazine       Impaired insight (10/22/2021)    Noncompliance (10/22/2021)  · CT of head was negative for acute hemorrhage or process  · MRI of brain with old strokes  · Some expressive aphasia, friend notes word finding difficulties chronically  · Normal B-12  · Case management  · Medical TDO petitioned in ED now , ?? suicidial  · One-on-one  removed after admit    Seen by psychiatry   She repeatedly denies she is suicidial  · Psychiatry consult appreciated  · Treated atrial fib and UTI, no significant improvement  · Still with confusion, oriented x 2 today, insight is not good  · Concerned about decision making capacity and safety at home, not able to reach family  · Tried to call daughter. left message with my cell phone  · ? Needs a guardian appointed  · Spoke with friend Luis Guido [phone]   He drives her to store and checks on her along with a neighbor   Reports she often has trouble getting her words out   He is not aware of any safety concerns about her living alone   He does not know how to reach any family    Hypothyroidism POA  · TSH mildly increased 6.90  · Continue levothyroxine    Overweight POA Body mass index is 28.81 kg/m².     Code Status: Full code    Surrogate Decision Maker: None, not able to reach daughter     DVT Prophylaxis: Eliquis  GI Prophylaxis: Protonix     Activity at baseline: Ambulates without assistive device     Lives with: Self with home health/private duty nurse visiting    Subjective:     Chief Complaint / Reason for Physician Visit f/u atrial fib with RVR  Denies any active complaints, clinically mentation is improving. Asymptomatic, plan for pacemaker tomorrow  Review of Systems:  Symptom Y/N Comments  Symptom Y/N Comments   Fever/Chills n   Chest Pain n    Poor Appetite    Edema     Cough n   Abdominal Pain n    Sputum    Joint Pain     SOB/RIVERS n   Headache     Nausea/vomit n   Tolerating PT/OT     Diarrhea n   Tolerating Diet y    Constipation    Other       Could NOT obtain due to:      Objective:     VITALS:   Last 24hrs VS reviewed since prior progress note. Most recent are:  Patient Vitals for the past 24 hrs:   Temp Pulse Resp BP SpO2   10/31/21 0842 97.9 °F (36.6 °C) 78 16 (!) 156/87 98 %   10/31/21 0314 97.7 °F (36.5 °C) 97 18 117/73 96 %   10/30/21 2305 98.2 °F (36.8 °C) 85 18 130/72 98 %   10/30/21 1950 98.7 °F (37.1 °C) 92 18 (!) 124/58 92 %   10/30/21 1741 97.6 °F (36.4 °C) (!) 101 18 (!) 153/72 100 %   10/30/21 1513 98 °F (36.7 °C) 95 18 127/84 96 %   10/30/21 1225  84      10/30/21 1154 97.4 °F (36.3 °C) 81 18 136/86 98 %       Intake/Output Summary (Last 24 hours) at 10/31/2021 1106  Last data filed at 10/31/2021 0642  Gross per 24 hour   Intake 450 ml   Output 200 ml   Net 250 ml        Wt Readings from Last 12 Encounters:   10/31/21 64.7 kg (142 lb 10.2 oz)   10/12/21 63.5 kg (140 lb)   05/11/21 63.6 kg (140 lb 2 oz)   03/10/21 64 kg (141 lb)   10/26/20 62.1 kg (136 lb 12.8 oz)   05/02/20 73.5 kg (161 lb 15.9 oz)   11/10/19 71.7 kg (158 lb)   10/01/19 71.8 kg (158 lb 4 oz)   03/26/19 68 kg (150 lb)   05/22/18 72.9 kg (160 lb 12.8 oz)   01/12/18 73.1 kg (161 lb 3.2 oz)   11/14/17 71.2 kg (157 lb)       PHYSICAL EXAM:    I had a face to face encounter and independently examined this patient on 10/31/21 as outlined below:    General: WD, WN. Alert, cooperative, no acute distress    EENT:  PERRL. Anicteric sclerae. MMM  Resp:  CTA bilaterally, no wheezing or rales.   No accessory muscle use  CV:  Irregular  rhythm,  No edema  GI:  Soft, Non distended, Non tender. +Bowel sounds, no rebound  Neurologic:  Alert to B-day and year, word finding difficulties, non focal motor exam  Psych:   Not anxious nor agitated  Skin:  No rashes. No jaundice    Reviewed most current lab test results and cultures  YES  Reviewed most current radiology test results   YES  Review and summation of old records today    NO  Reviewed patient's current orders and MAR    YES  PMH/SH reviewed - no change compared to H&P  ________________________________________________________________________  Care Plan discussed with:    Comments   Patient x    Family      RN x    Care Manager     Consultant                        Multidiciplinary team rounds were held today with , nursing, pharmacist and clinical coordinator. Patient's plan of care was discussed; medications were reviewed and discharge planning was addressed. ________________________________________________________________________      Comments   >50% of visit spent in counseling and coordination of care     ________________________________________________________________________  Jaci Tolentino MD     Procedures: see electronic medical records for all procedures/Xrays and details which were not copied into this note but were reviewed prior to creation of Plan. LABS:  I reviewed today's most current labs and imaging studies.   Pertinent labs include:  Recent Labs     10/31/21  0331 10/30/21  0455 10/29/21  0316   WBC 7.4 6.3 5.6   HGB 12.2 12.0 10.9*   HCT 38.1 36.3 33.9*    271 243     Recent Labs     10/31/21  0331 10/30/21  0455 10/29/21  0316    140 140   K 4.1 4.0 4.0   * 114* 114*   CO2 20* 17* 19*   * 137* 126*   BUN 33* 26* 26*   CREA 1.99* 1.79* 1.89*   CA 10.3* 9.7 9.4

## 2021-10-31 NOTE — ROUTINE PROCESS
End of Shift Note    Bedside shift change report given to na  (oncoming nurse) by Gaviota Dai RN (offgoing nurse). Report included the following information SBAR    Shift worked:  afternoon      Shift summary and any significant changes:     received report for this afternoon. Patient resting. She does not have DVT prophylaxis, has some difficulty getting from lying to standing but steady on her feet. She communicates better when talking about something she is familiar with such as \"when did you last see your daughters? \" she has more difficulty with orientation questions, finding the right word. Poor appetite. Concerns for physician to address:  see above. Zone phone for oncoming shift:   na        Activity:  Activity Level: Up with Assistance  Number times ambulated in hallways past shift: 0  Number of times OOB to chair past shift: 0    Cardiac:   Cardiac Monitoring: Yes      Cardiac Rhythm: Atrial Fib    Access:   Current line(s): PIV     Genitourinary:   Urinary status: voiding    Respiratory:   O2 Device: None (Room air)  Chronic home O2 use?: NO  Incentive spirometer at bedside: NO     GI:  Last Bowel Movement Date: 10/31/21  Current diet:  ADULT ORAL NUTRITION SUPPLEMENT Breakfast, Lunch, Dinner; Standard High Calorie/High Protein  ADULT DIET Regular; Strawberry Ensure  Passing flatus: NO  Tolerating current diet: NO       Pain Management:   Patient states pain is manageable on current regimen: N/A    Skin:  Aldo Score: 19  Interventions: na Patient Safety:  Fall Score:  Total Score: 3  Interventions: pt to call before getting OOB  High Fall Risk: Yes    Length of Stay:  Expected LOS: 3d 19h  Actual LOS: 9      English Yaw Parham RN

## 2021-10-31 NOTE — PROGRESS NOTES
Problem: Communication Impaired (Adult)  Goal: *Speech Goal: (INSERT TEXT)  Outcome: Progressing Towards Goal  Note: Assess understanding of communication. Clarify answers,offer paper for patient to write answers if possible.

## 2021-11-01 ENCOUNTER — APPOINTMENT (OUTPATIENT)
Dept: GENERAL RADIOLOGY | Age: 71
DRG: 242 | End: 2021-11-01
Attending: INTERNAL MEDICINE
Payer: MEDICARE

## 2021-11-01 LAB
ANION GAP SERPL CALC-SCNC: 7 MMOL/L (ref 5–15)
BUN SERPL-MCNC: 41 MG/DL (ref 6–20)
BUN/CREAT SERPL: 19 (ref 12–20)
CALCIUM SERPL-MCNC: 9.7 MG/DL (ref 8.5–10.1)
CHLORIDE SERPL-SCNC: 111 MMOL/L (ref 97–108)
CO2 SERPL-SCNC: 21 MMOL/L (ref 21–32)
CREAT SERPL-MCNC: 2.11 MG/DL (ref 0.55–1.02)
ERYTHROCYTE [DISTWIDTH] IN BLOOD BY AUTOMATED COUNT: 14.5 % (ref 11.5–14.5)
GLUCOSE SERPL-MCNC: 125 MG/DL (ref 65–100)
HCT VFR BLD AUTO: 34.3 % (ref 35–47)
HGB BLD-MCNC: 11.1 G/DL (ref 11.5–16)
MCH RBC QN AUTO: 30 PG (ref 26–34)
MCHC RBC AUTO-ENTMCNC: 32.4 G/DL (ref 30–36.5)
MCV RBC AUTO: 92.7 FL (ref 80–99)
NRBC # BLD: 0 K/UL (ref 0–0.01)
NRBC BLD-RTO: 0 PER 100 WBC
PLATELET # BLD AUTO: 276 K/UL (ref 150–400)
PMV BLD AUTO: 10.3 FL (ref 8.9–12.9)
POTASSIUM SERPL-SCNC: 4.1 MMOL/L (ref 3.5–5.1)
RBC # BLD AUTO: 3.7 M/UL (ref 3.8–5.2)
SODIUM SERPL-SCNC: 139 MMOL/L (ref 136–145)
WBC # BLD AUTO: 6.2 K/UL (ref 3.6–11)

## 2021-11-01 PROCEDURE — 33207 INSERT HEART PM VENTRICULAR: CPT | Performed by: INTERNAL MEDICINE

## 2021-11-01 PROCEDURE — 99153 MOD SED SAME PHYS/QHP EA: CPT | Performed by: INTERNAL MEDICINE

## 2021-11-01 PROCEDURE — 65660000000 HC RM CCU STEPDOWN

## 2021-11-01 PROCEDURE — 77030002996 HC SUT SLK J&J -A: Performed by: INTERNAL MEDICINE

## 2021-11-01 PROCEDURE — 36415 COLL VENOUS BLD VENIPUNCTURE: CPT

## 2021-11-01 PROCEDURE — 74011250636 HC RX REV CODE- 250/636: Performed by: INTERNAL MEDICINE

## 2021-11-01 PROCEDURE — 74011250637 HC RX REV CODE- 250/637: Performed by: NURSE PRACTITIONER

## 2021-11-01 PROCEDURE — 74011000250 HC RX REV CODE- 250: Performed by: INTERNAL MEDICINE

## 2021-11-01 PROCEDURE — 71045 X-RAY EXAM CHEST 1 VIEW: CPT

## 2021-11-01 PROCEDURE — C1894 INTRO/SHEATH, NON-LASER: HCPCS | Performed by: INTERNAL MEDICINE

## 2021-11-01 PROCEDURE — 0JH604Z INSERTION OF PACEMAKER, SINGLE CHAMBER INTO CHEST SUBCUTANEOUS TISSUE AND FASCIA, OPEN APPROACH: ICD-10-PCS | Performed by: INTERNAL MEDICINE

## 2021-11-01 PROCEDURE — 74011000272 HC RX REV CODE- 272: Performed by: INTERNAL MEDICINE

## 2021-11-01 PROCEDURE — 74011250637 HC RX REV CODE- 250/637: Performed by: GENERAL ACUTE CARE HOSPITAL

## 2021-11-01 PROCEDURE — C1892 INTRO/SHEATH,FIXED,PEEL-AWAY: HCPCS | Performed by: INTERNAL MEDICINE

## 2021-11-01 PROCEDURE — 99152 MOD SED SAME PHYS/QHP 5/>YRS: CPT | Performed by: INTERNAL MEDICINE

## 2021-11-01 PROCEDURE — 74011250637 HC RX REV CODE- 250/637: Performed by: INTERNAL MEDICINE

## 2021-11-01 PROCEDURE — 74011000636 HC RX REV CODE- 636: Performed by: INTERNAL MEDICINE

## 2021-11-01 PROCEDURE — 77030031139 HC SUT VCRL2 J&J -A: Performed by: INTERNAL MEDICINE

## 2021-11-01 PROCEDURE — C1898 LEAD, PMKR, OTHER THAN TRANS: HCPCS | Performed by: INTERNAL MEDICINE

## 2021-11-01 PROCEDURE — 80048 BASIC METABOLIC PNL TOTAL CA: CPT

## 2021-11-01 PROCEDURE — 85027 COMPLETE CBC AUTOMATED: CPT

## 2021-11-01 PROCEDURE — C1786 PMKR, SINGLE, RATE-RESP: HCPCS | Performed by: INTERNAL MEDICINE

## 2021-11-01 PROCEDURE — 02HK3JZ INSERTION OF PACEMAKER LEAD INTO RIGHT VENTRICLE, PERCUTANEOUS APPROACH: ICD-10-PCS | Performed by: INTERNAL MEDICINE

## 2021-11-01 DEVICE — PACEMAKER
Type: IMPLANTABLE DEVICE | Status: FUNCTIONAL
Brand: ACCOLADE™ MRI SR

## 2021-11-01 DEVICE — PACE/SENSE LEAD
Type: IMPLANTABLE DEVICE | Status: FUNCTIONAL
Brand: INGEVITY™+

## 2021-11-01 RX ORDER — IODIXANOL 320 MG/ML
INJECTION, SOLUTION INTRAVASCULAR AS NEEDED
Status: DISCONTINUED | OUTPATIENT
Start: 2021-11-01 | End: 2021-11-01

## 2021-11-01 RX ORDER — HEPARIN SODIUM 200 [USP'U]/100ML
INJECTION, SOLUTION INTRAVENOUS
Status: DISCONTINUED | OUTPATIENT
Start: 2021-11-01 | End: 2021-11-01

## 2021-11-01 RX ORDER — LIDOCAINE HYDROCHLORIDE AND EPINEPHRINE 10; 10 MG/ML; UG/ML
INJECTION, SOLUTION INFILTRATION; PERINEURAL AS NEEDED
Status: DISCONTINUED | OUTPATIENT
Start: 2021-11-01 | End: 2021-11-01

## 2021-11-01 RX ORDER — FENTANYL CITRATE 50 UG/ML
INJECTION, SOLUTION INTRAMUSCULAR; INTRAVENOUS AS NEEDED
Status: DISCONTINUED | OUTPATIENT
Start: 2021-11-01 | End: 2021-11-01

## 2021-11-01 RX ORDER — MIDAZOLAM HYDROCHLORIDE 1 MG/ML
INJECTION, SOLUTION INTRAMUSCULAR; INTRAVENOUS AS NEEDED
Status: DISCONTINUED | OUTPATIENT
Start: 2021-11-01 | End: 2021-11-01

## 2021-11-01 RX ADMIN — LOSARTAN POTASSIUM 50 MG: 50 TABLET, FILM COATED ORAL at 08:29

## 2021-11-01 RX ADMIN — Medication 10 ML: at 07:27

## 2021-11-01 RX ADMIN — Medication 2000 UNITS: at 08:29

## 2021-11-01 RX ADMIN — LEVOTHYROXINE SODIUM 100 MCG: 0.1 TABLET ORAL at 07:26

## 2021-11-01 RX ADMIN — Medication 10 ML: at 23:29

## 2021-11-01 RX ADMIN — TRAZODONE HYDROCHLORIDE 50 MG: 50 TABLET ORAL at 23:29

## 2021-11-01 RX ADMIN — PANTOPRAZOLE SODIUM 40 MG: 40 TABLET, DELAYED RELEASE ORAL at 07:26

## 2021-11-01 RX ADMIN — TOPIRAMATE 50 MG: 25 TABLET, FILM COATED ORAL at 23:29

## 2021-11-01 RX ADMIN — DILTIAZEM HYDROCHLORIDE 120 MG: 120 CAPSULE, COATED, EXTENDED RELEASE ORAL at 08:29

## 2021-11-01 RX ADMIN — ASPIRIN 81 MG: 81 TABLET, COATED ORAL at 08:29

## 2021-11-01 RX ADMIN — CETIRIZINE HYDROCHLORIDE 10 MG: 10 TABLET, FILM COATED ORAL at 08:29

## 2021-11-01 RX ADMIN — PRAVASTATIN SODIUM 80 MG: 40 TABLET ORAL at 08:29

## 2021-11-01 NOTE — PROGRESS NOTES
Hospitalist Progress Note    NAME: Graeme Wilder   :  1950   MRN:  [id number]     Admit date: 10/22/2021    Today's date: 21    PCP: Narayan Vogel MD      Anticipated discharge date:unknown    Barriers:  Medical issues, ? placement    Assessment / Plan:    Acute metabolic encephalopathy POA  E coli UTI POA  ?  Dementia POA  · Sent in by home health aide with emesis, tachycardia  · MS reported as poor insight, unclear what baseline is  · Patient clinically does not have aphasia at this point, she is alert awake and oriented to name, date of birth and her address  · She is not oriented to current year and situation  · MRI brain with no acute stroke   Chronic left tempoparietal CVA, chronic left basal ganglia infarct   Small vessel ischemic infarct  · Normal B12   · UA with cloudy urine, nitrite +, 20-50 WBC, 0-5 RBC, 3+ bacteria  · Urine culture with  E Coli  · Status post IV ceftriaxone  · PT/OT consults  · ASA, statin, eliquis  · PT/OT recommends SNF, pt wants to go home      Bradycardia with pauses to 2.5-3.9 secs 10/28 on diltiazem/metoprolol XL  Moderate to severe mitral stenosis POA likely prior rheumatic fever  Permament atrial fibrillation with RVR ([facility] [address] (2020) POA  · Patient has been having intermittent rapid A. fib/bradycardia with pauses  · -She was on beta-blocker and Cardizem at home  · Due to intermittent pauses in recurrent tachycardia her medications being adjusted  · Went into rapid A. fib again last night and restarted on Cardizem at a lower dose of 120, was noted to have another pause of 3.2 seconds last night  · Heart rate better controlled today  · Plan for pacemaker insertion today  · We will hold off on Eliquis for now for possible pacemaker insertion       CKD stage 4 POA  · Baseline creatinine 1.8 to 2.0 range, creatinine at baseline  · Avoid nephrotoxic medications  · Status post IVF  · Appears stable       Diastolic CHF, chronic ([address] (2020) POA    Hypertensive urgency (10/22/2021) 202/174 POA  · No evidence of end organ damage  · Continue home losartan, increase dose as BP uncontrolled  · Continue pravastatin  · BP improved on home medications, ? Compliance  · PRN hydralazine       Impaired insight (10/22/2021)    Noncompliance (10/22/2021)  · CT of head was negative for acute hemorrhage or process  · MRI of brain with old strokes  · Some expressive aphasia, friend notes word finding difficulties chronically  · Normal B-12  · Case management  · Medical TDO petitioned in ED now , ?? suicidial  · One-on-one  removed after admit    Seen by psychiatry   She repeatedly denies she is suicidial  · Psychiatry consult appreciated  · Treated atrial fib and UTI, no significant improvement  · Still with confusion, oriented x 2 today, insight is not good  · Concerned about decision making capacity and safety at home, not able to reach family  · Tried to call daughter. left message with my cell phone  · ? Needs a guardian appointed  · Spoke with friend Diana Ibrara [phone]   He drives her to store and checks on her along with a neighbor   Reports she often has trouble getting her words out   He is not aware of any safety concerns about her living alone   He does not know how to reach any family    Hypothyroidism POA  · TSH mildly increased 6.90  · Continue levothyroxine    Overweight POA Body mass index is 29.12 kg/m². Code Status: Full code    Surrogate Decision Maker: None, not able to reach daughter     DVT Prophylaxis: Eliquis  GI Prophylaxis: Protonix     Activity at baseline: Ambulates without assistive device     Lives with: Self with home health/private duty nurse visiting    Subjective:     Chief Complaint / Reason for Physician Visit f/u atrial fib with RVR  Denies any active complaints, clinically mentation is improving.  Asymptomatic, plan for pacemaker today  Review of Systems:  Symptom Y/N Comments  Symptom Y/N Comments Fever/Chills n   Chest Pain n    Poor Appetite    Edema     Cough n   Abdominal Pain n    Sputum    Joint Pain     SOB/RIVERS n   Headache     Nausea/vomit n   Tolerating PT/OT     Diarrhea n   Tolerating Diet y    Constipation    Other       Could NOT obtain due to:      Objective:     VITALS:   Last 24hrs VS reviewed since prior progress note. Most recent are:  Patient Vitals for the past 24 hrs:   Temp Pulse Resp BP SpO2   11/01/21 1200 97.6 °F (36.4 °C) 83 14 130/83 98 %   11/01/21 0700 97.4 °F (36.3 °C) 83 16 139/80 98 %   11/01/21 0345 97.6 °F (36.4 °C) 76 18 (!) 142/68 99 %   11/01/21 0010 98.2 °F (36.8 °C) 94 18 (!) 139/53 96 %   10/31/21 1940 97.7 °F (36.5 °C) (!) 107 18 (!) 156/74 100 %   10/31/21 1501 98 °F (36.7 °C) (!) 104 16 (!) 148/63 100 %     No intake or output data in the 24 hours ending 11/01/21 1259     Wt Readings from Last 12 Encounters:   11/01/21 65.4 kg (144 lb 2.9 oz)   10/12/21 63.5 kg (140 lb)   05/11/21 63.6 kg (140 lb 2 oz)   03/10/21 64 kg (141 lb)   10/26/20 62.1 kg (136 lb 12.8 oz)   05/02/20 73.5 kg (161 lb 15.9 oz)   11/10/19 71.7 kg (158 lb)   10/01/19 71.8 kg (158 lb 4 oz)   03/26/19 68 kg (150 lb)   05/22/18 72.9 kg (160 lb 12.8 oz)   01/12/18 73.1 kg (161 lb 3.2 oz)   11/14/17 71.2 kg (157 lb)       PHYSICAL EXAM:    I had a face to face encounter and independently examined this patient on 11/01/21 as outlined below:    General: WD, WN. Alert, cooperative, no acute distress    EENT:  PERRL. Anicteric sclerae. MMM  Resp:  CTA bilaterally, no wheezing or rales. No accessory muscle use  CV:  Irregular  rhythm,  No edema  GI:  Soft, Non distended, Non tender. +Bowel sounds, no rebound  Neurologic:  Alert to B-day and year, word finding difficulties, non focal motor exam  Psych:   Not anxious nor agitated  Skin:  No rashes.   No jaundice    Reviewed most current lab test results and cultures  YES  Reviewed most current radiology test results   YES  Review and summation of old records today    NO  Reviewed patient's current orders and MAR    YES  PMH/SH reviewed - no change compared to H&P  ________________________________________________________________________  Care Plan discussed with:    Comments   Patient x    Family      RN x    Care Manager     Consultant                        Multidiciplinary team rounds were held today with , nursing, pharmacist and clinical coordinator. Patient's plan of care was discussed; medications were reviewed and discharge planning was addressed. ________________________________________________________________________      Comments   >50% of visit spent in counseling and coordination of care     ________________________________________________________________________  Amara Hopkins MD     Procedures: see electronic medical records for all procedures/Xrays and details which were not copied into this note but were reviewed prior to creation of Plan. LABS:  I reviewed today's most current labs and imaging studies.   Pertinent labs include:  Recent Labs     11/01/21  0316 10/31/21  0331 10/30/21  0455   WBC 6.2 7.4 6.3   HGB 11.1* 12.2 12.0   HCT 34.3* 38.1 36.3    302 271     Recent Labs     11/01/21  0316 10/31/21  0331 10/30/21  0455    138 140   K 4.1 4.1 4.0   * 110* 114*   CO2 21 20* 17*   * 125* 137*   BUN 41* 33* 26*   CREA 2.11* 1.99* 1.79*   CA 9.7 10.3* 9.7 Referred To Mid-Level For Closure Text (Leave Blank If You Do Not Want): After obtaining clear surgical margins the patient was sent to a mid-level provider for surgical repair.  The patient understands they will receive post-surgical care and follow-up from the mid-level provider.

## 2021-11-01 NOTE — PROGRESS NOTES
Cardiology Progress Note      11/1/2021 8:40 AM    Admit Date: 10/22/2021          Subjective: Intermittant tachycardia over the weekend. Now on reduced dose of diltiazem          Visit Vitals  /80 (BP 1 [facility]: Left upper arm, BP Patient Position: Semi fowlers)   Pulse 83   Temp 97.4 °F (36.3 °C)   Resp 16   Ht 4' 11\" (1.499 m)   Wt 65.4 kg (144 lb 2.9 oz)   SpO2 98%   BMI 29.12 kg/m²     10/30 1901 - 11/01 0700  In: 450 [P.O.:450]  Out: -         Objective:      Physical Exam:  VS as above  Chest CTA  Card Irregh irreg no changes     Data Review:   Labs:    creat 2.1  Hgb 11.1     Telemetry: afib R        Assessment:           1. Encephalopathy; resolved  2. Urinary tract infection- better   3. Chronic atrial fibrillation, still with pauses off metoprolol   4. CAD s/p prior MI.  Echo 4/20 w/ EF 55-60%, mod LAE  5. Hyperlipidemia  6. Hypertension  7. CKD  8.  Chronic anemia. 9.  Prior history of cerebrovascular accident with expressive aphasia. 10. History of ovarian cancer status post surgery. 11. Hypothyroidism  12. Moderate to severe mitral stenosis, nl EF by echo       Plan: Discussed options with pt I feel that pacer indicated given AV node disease and difficulty with rate control.  Will proceed with single chamber pacer given her chronic afib

## 2021-11-01 NOTE — PROGRESS NOTES
PT session held due to:  []  Nausea/vomiting  [x]  RN Communication/ suggestion  [x]  Awaiting pacemaker placement (Today)  []  Dialysis treatment in progress. Will f/u, tomorrow. Thank you.   Guzman Lindsay, PTA

## 2021-11-01 NOTE — PROGRESS NOTES
Single chamber ventricular pacer placed via rt subclavian s complic   [place]     Full report to follow.

## 2021-11-01 NOTE — PROGRESS NOTES
0700 Received report from night shift nurse  0730 Assessed pt and obtained vital signs. Pt is alert and oriented this morning. Pt complains of no pain and VSS. Pt is in afib. 1015 Attempted to reach POA and got no response. Pt is cleared for pacemaker sx but MD requests POA give consent with concern for pt CVA residual.   1500 Pt returned from cath lab where she received a single chamber pacemaker. Pt is alert and oriented and c/o no pain. Pts sx is CDI. Pt is resting comfortably.

## 2021-11-02 VITALS
HEIGHT: 59 IN | BODY MASS INDEX: 29.07 KG/M2 | TEMPERATURE: 97.6 F | DIASTOLIC BLOOD PRESSURE: 61 MMHG | RESPIRATION RATE: 16 BRPM | SYSTOLIC BLOOD PRESSURE: 133 MMHG | WEIGHT: 144.18 LBS | OXYGEN SATURATION: 97 % | HEART RATE: 101 BPM

## 2021-11-02 LAB
ANION GAP SERPL CALC-SCNC: 7 MMOL/L (ref 5–15)
BUN SERPL-MCNC: 35 MG/DL (ref 6–20)
BUN/CREAT SERPL: 19 (ref 12–20)
CALCIUM SERPL-MCNC: 9.1 MG/DL (ref 8.5–10.1)
CHLORIDE SERPL-SCNC: 114 MMOL/L (ref 97–108)
CO2 SERPL-SCNC: 19 MMOL/L (ref 21–32)
CREAT SERPL-MCNC: 1.82 MG/DL (ref 0.55–1.02)
ERYTHROCYTE [DISTWIDTH] IN BLOOD BY AUTOMATED COUNT: 14.4 % (ref 11.5–14.5)
GLUCOSE SERPL-MCNC: 135 MG/DL (ref 65–100)
HCT VFR BLD AUTO: 33.7 % (ref 35–47)
HGB BLD-MCNC: 10.9 G/DL (ref 11.5–16)
MCH RBC QN AUTO: 29.5 PG (ref 26–34)
MCHC RBC AUTO-ENTMCNC: 32.3 G/DL (ref 30–36.5)
MCV RBC AUTO: 91.3 FL (ref 80–99)
NRBC # BLD: 0 K/UL (ref 0–0.01)
NRBC BLD-RTO: 0 PER 100 WBC
PLATELET # BLD AUTO: 255 K/UL (ref 150–400)
PMV BLD AUTO: 10.1 FL (ref 8.9–12.9)
POTASSIUM SERPL-SCNC: 4.3 MMOL/L (ref 3.5–5.1)
RBC # BLD AUTO: 3.69 M/UL (ref 3.8–5.2)
SODIUM SERPL-SCNC: 140 MMOL/L (ref 136–145)
WBC # BLD AUTO: 7.8 K/UL (ref 3.6–11)

## 2021-11-02 PROCEDURE — 74011250637 HC RX REV CODE- 250/637: Performed by: INTERNAL MEDICINE

## 2021-11-02 PROCEDURE — 85027 COMPLETE CBC AUTOMATED: CPT

## 2021-11-02 PROCEDURE — 74011250636 HC RX REV CODE- 250/636: Performed by: INTERNAL MEDICINE

## 2021-11-02 PROCEDURE — 80048 BASIC METABOLIC PNL TOTAL CA: CPT

## 2021-11-02 PROCEDURE — 74011250637 HC RX REV CODE- 250/637: Performed by: GENERAL ACUTE CARE HOSPITAL

## 2021-11-02 PROCEDURE — 74011250637 HC RX REV CODE- 250/637: Performed by: NURSE PRACTITIONER

## 2021-11-02 PROCEDURE — 36415 COLL VENOUS BLD VENIPUNCTURE: CPT

## 2021-11-02 RX ORDER — SODIUM CHLORIDE 0.9 % (FLUSH) 0.9 %
5-40 SYRINGE (ML) INJECTION AS NEEDED
Status: DISCONTINUED | OUTPATIENT
Start: 2021-11-02 | End: 2021-11-02 | Stop reason: HOSPADM

## 2021-11-02 RX ORDER — SODIUM CHLORIDE 0.9 % (FLUSH) 0.9 %
5-40 SYRINGE (ML) INJECTION EVERY 8 HOURS
Status: DISCONTINUED | OUTPATIENT
Start: 2021-11-02 | End: 2021-11-02 | Stop reason: HOSPADM

## 2021-11-02 RX ORDER — DILTIAZEM HYDROCHLORIDE 30 MG/1
60 TABLET, FILM COATED ORAL ONCE
Status: COMPLETED | OUTPATIENT
Start: 2021-11-02 | End: 2021-11-02

## 2021-11-02 RX ORDER — ACETAMINOPHEN 325 MG/1
650 TABLET ORAL
Status: DISCONTINUED | OUTPATIENT
Start: 2021-11-02 | End: 2021-11-02 | Stop reason: HOSPADM

## 2021-11-02 RX ORDER — SODIUM CHLORIDE 9 MG/ML
100 INJECTION, SOLUTION INTRAVENOUS CONTINUOUS
Status: DISCONTINUED | OUTPATIENT
Start: 2021-11-02 | End: 2021-11-02 | Stop reason: HOSPADM

## 2021-11-02 RX ORDER — CEPHALEXIN 250 MG/1
250 CAPSULE ORAL 3 TIMES DAILY
Status: DISCONTINUED | OUTPATIENT
Start: 2021-11-02 | End: 2021-11-02 | Stop reason: HOSPADM

## 2021-11-02 RX ORDER — METOPROLOL SUCCINATE 50 MG/1
100 TABLET, EXTENDED RELEASE ORAL DAILY
Status: DISCONTINUED | OUTPATIENT
Start: 2021-11-02 | End: 2021-11-02 | Stop reason: HOSPADM

## 2021-11-02 RX ADMIN — LEVOTHYROXINE SODIUM 100 MCG: 0.1 TABLET ORAL at 06:11

## 2021-11-02 RX ADMIN — SODIUM CHLORIDE 100 ML/HR: 9 INJECTION, SOLUTION INTRAVENOUS at 11:16

## 2021-11-02 RX ADMIN — DILTIAZEM HYDROCHLORIDE 120 MG: 120 CAPSULE, COATED, EXTENDED RELEASE ORAL at 08:30

## 2021-11-02 RX ADMIN — METOPROLOL SUCCINATE 100 MG: 50 TABLET, EXTENDED RELEASE ORAL at 11:12

## 2021-11-02 RX ADMIN — DILTIAZEM HYDROCHLORIDE 60 MG: 30 TABLET, FILM COATED ORAL at 11:13

## 2021-11-02 RX ADMIN — ASPIRIN 81 MG: 81 TABLET, COATED ORAL at 08:30

## 2021-11-02 RX ADMIN — CEPHALEXIN 250 MG: 250 CAPSULE ORAL at 10:25

## 2021-11-02 RX ADMIN — Medication 2000 UNITS: at 08:30

## 2021-11-02 RX ADMIN — APIXABAN 5 MG: 5 TABLET, FILM COATED ORAL at 08:30

## 2021-11-02 RX ADMIN — PANTOPRAZOLE SODIUM 40 MG: 40 TABLET, DELAYED RELEASE ORAL at 07:32

## 2021-11-02 RX ADMIN — PRAVASTATIN SODIUM 80 MG: 40 TABLET ORAL at 08:30

## 2021-11-02 RX ADMIN — LOSARTAN POTASSIUM 50 MG: 50 TABLET, FILM COATED ORAL at 08:30

## 2021-11-02 RX ADMIN — CETIRIZINE HYDROCHLORIDE 10 MG: 10 TABLET, FILM COATED ORAL at 08:30

## 2021-11-02 RX ADMIN — Medication 10 ML: at 06:12

## 2021-11-02 NOTE — PROGRESS NOTES
0700 received report from night shift nurse Randee. 0730 Assessed pt and obtained vs. Pt is alert and oriented and complains of no pain  1015 paged nancy and left a message in regards to pts tachy when sitting up or ambulating and saying pt is not safe to be discharged. Nancy said we need to increase her cardizem before discharge and to recommend I reach out to Kee Ahumada. I spoke with Kee Ahumada this morning and he expressed she is safe for discharge and that homehealth will be set up for her.   1200 Pt was given cardizem 60mg NS and metoprolol 100mg after paging the MD Severiano Guthrie and Vern Mckenzie) about pts tachycardia upon exertion. 200 Pt took off her telemetry leads and walked to the room sofa and stated, \"I want to go home, and I don't care if my heart is unstable. I'd rather die than stay here any longer. \" I explained to the patient the reason why we are monitoring her a little while longer is to make sure the new meds have time to kick in and in order to see if her HR stabilizes. Pt was non compliant and refused to put on IV, telemetry, or take anymore meds. Kee Ahumada and Vern Mckenzie have both signed off on her discharge. Will continue with discharge. CM said they are going to contact her neighbor to pick her up.

## 2021-11-02 NOTE — DISCHARGE INSTRUCTIONS
DISCHARGE INSTRUCTIONS FOR PATIENTS WITH PACEMAKERS    1. Remember to call for an appointment in 2-4 weeks [phone] to check healing and implant programming. 2. Medic Alert Bracelets are available from your pharmacist to wear at all times if you choose to wear one. 3. Carry your ID card for pacemaker with you at all times. This card will be given to you in the hospital or mailed to you. 4. The pacemaker will bulge slightly under your skin. The bulge will decrease in size over the next few weeks. Please notify the doctor's office if you notice any of the following around your site:   A.  A bruise that does not go away. B.  Soreness or yellow, green, or brown drainage from the site. C. Any swelling from the site. D. If you have a fever of 100 degrees or higher that lasts for a few days. INCISION CARE       1.  Leave paper strips  over your site until it starts to fall off, usually in a few weeks. 2.  You may shower after 3 days as long as your incision isnt submerged or directly sprayed upon until well healed. 3.  For comfort, wear loose fitting clothing. 4.  Report any signs of infection, fever, pain, swelling, redness, oozing, or heat at site especially if these symptoms increase after the first 3 to 4 days. ACTIVITY PRECAUTIONS     1. Avoid rough contact with the implant site. 2. No driving for 14 days. 3. Avoid lifting your arm over your head, carrying anything on the affected side, or lifting over 10 pounds for 30 days. For the first 2 days only bend your arm at the elbow. 4. Any extreme activity such as golf, weight lifting or exercise biking should be restricted for 60 days. 5. Do not carry objects by holding them against your implant site. 6.  No shooting rifles or any type of gun with the affected shoulder permanently. SPECIAL PRECAUTIONS     1. You should avoid all strong magnetic fields, such as arc welding, large transformers, large motors.     2.  You may not have an MRI which uses a strong magnet to take pictures. 3.  Treatments or surgery that requires diathermy or electrocautery should be discussed with your doctor before scheduled. 4. Avoid radio frequency transmitters, including radar. 5. Advise dentist or other medical personnel you see that you have a pacemaker. 6.  Cell phones and microwave oven use is okay. 7.  If you plan to move or take a trip to a new area, the doctor's office will give you a name of a doctor to contact for any problems. ANTIBIOTIC THERAPY    During the first 8 weeks after your pacemaker insertion, you may need antibiotics before any dental work or certain tests or operations. Let the dentist or doctor who is caring for you know that you have had an implanted device. You will be given a prescription for a prophylactic antibiotic called cephalexin to take for a few days after your procedure. HOSPITALIST DISCHARGE INSTRUCTIONS    NAME: Sherley Sky   :  1950   MRN:  [id number]     Date/Time:  2021 9:00 AM    ADMIT DATE: 10/22/2021     DISCHARGE DATE: 2021     DISCHARGE DIAGNOSIS:  UTI with Altered mentation  Afib with pauses s/p pacemaker insertion    MEDICATIONS:  · It is important that you take the medication exactly as they are prescribed. · Keep your medication in the bottles provided by the pharmacist and keep a list of the medication names, dosages, and times to be taken in your wallet. · Do not take other medications without consulting your doctor. Pain Management: per above medications    What to do at Home    Recommended diet:  Regular Diet    Recommended activity: Activity as tolerated    If you have questions regarding the hospital related prescriptions or hospital related issues please call [facility] at [phone].     If you experience any of the following symptoms then please call your primary care physician or return to the emergency room if you cannot get hold of your doctor:  Fever, chills, nausea, vomiting, diarrhea, change in mentation, falling, bleeding, shortness of breath    Follow Up:  Dr. Tony Walsh MD  you are to call and set up an appointment to see them in 7-10 days. Information obtained by :  I understand that if any problems occur once I am at home I am to contact my physician. I understand and acknowledge receipt of the instructions indicated above.                                                                                                                                            Physician's or R.N.'s Signature                                                                  Date/Time                                                                                                                                              Patient or Representative Signature                                                          Date/Time

## 2021-11-02 NOTE — PROGRESS NOTES
Pts ivs have now been discontinued. Pt is alert an oriented and adamant about going home. Discussed discharge with pt and pt has no further questions in regards to discharge.

## 2021-11-02 NOTE — PROGRESS NOTES
Discontinued pts iv's. Discussed discharge materials with patient. Pt has no further questions in regards to discharge at this time.

## 2021-11-02 NOTE — DISCHARGE SUMMARY
Hospitalist Discharge Summary     Patient ID:  Bayron Meehan  [id number]  60 y.o.  1950  10/22/2021    PCP on record: Michelle Franklin MD    Admit date: 10/22/2021  Discharge date and time: 11/2/2021    DISCHARGE DIAGNOSIS:  Acute metabolic encephalopathy POA  E coli UTI POA  Mild cognitive impairment  Bradycardia with pauses to 2.5-3.9 secs 10/28 on diltiazem/metoprolol XL  Moderate to severe mitral stenosis POA likely prior rheumatic fever  Permament atrial fibrillation with RVR ([facility] [address] (4/23/2020) POA  CKD stage 4 POA    Diastolic CHF, chronic ([address] (4/30/2020) POA    Hypertensive urgency (10/22/2021) 202/174 POA    Impaired insight (10/22/2021)    Noncompliance (10/22/2021)  Hypothyroidism POA    CONSULTATIONS:  IP CONSULT TO CARDIOLOGY  IP CONSULT TO NEUROLOGY  IP CONSULT TO PSYCHIATRY    Excerpted HPI from H&P of Gulshan Fleming MD:    Bayron Meehan is a 66 y.o. DECLINED female with medical history including but not limited to chronic kidney disease, atrial fibrillation, congestive heart failure, hypertension, hyperlipidemia, history of CVA presented to the emergency room via ambulance for atrial fibrillation with RVR. Patient reportedly had not been feeling well since this morning and when her home health aide arrived she found that patient had vomited and had a very rapid heart rate and therefore alerted EMS. .  Reportedly paramedics found numerous bottles of medication which were full and had not been used by patient and therefore concluded that patient has been noncompliant with all her meds. Also reportedly patient's home health aide had tried to get APS involved due to patient's poor home situation. Patient was evaluated in the emergency department, she was found to be confused and lacked poor insight and judgment to her medical condition. Patient attempted to leave AMA.   A TDO petition was done by emergency physician.     When I evaluated patient, I found patient not to have any insight into her severity of medical condition. Patient was very and uninterested and her prognosis and stated to me multiple times that she does not care if she dies as she is 66years old and what needs to happen happens. She is also very confused and cannot hold the same subject of conversation for longer than 5 minutes. ______________________________________________________________________  DISCHARGE SUMMARY/HOSPITAL COURSE:  for full details see H&P, daily progress notes, labs, consult notes. Acute metabolic encephalopathy POA  E coli UTI POA  ?  Dementia POA  · Sent in by home health aide with emesis, tachycardia  · MS reported as poor insight, unclear what baseline is  · Patient clinically does not have aphasia at this point, she is alert awake and oriented to name, date of birth and her address  · Mentation has significantly improved and patient is AAAOX4  · MRI brain with no acute stroke             Chronic left tempoparietal CVA, chronic left basal ganglia infarct             Small vessel ischemic infarct  · Normal B12   · UA with cloudy urine, nitrite +, 20-50 WBC, 0-5 RBC, 3+ bacteria  · Urine culture with  E Coli  · Status post IV ceftriaxone  · PT/OT consults  · ASA, statin, eliquis  DC home with home health, APS involved as well      Bradycardia with pauses to 2.5-3.9 secs 10/28 on diltiazem/metoprolol XL  Moderate to severe mitral stenosis POA likely prior rheumatic fever  Permament atrial fibrillation with RVR ([facility] [address] (4/23/2020) POA  · Patient has been having intermittent rapid A. fib/bradycardia with pauses  · -She was on beta-blocker and Cardizem at home  · Due to intermittent pauses in recurrent tachycardia her medications being adjusted  · s/p pacemaker insertion 11/1  · Resume Eliquis tomorrow       CKD stage 4 POA  · Baseline creatinine 1.8 to 2.0 range, creatinine at baseline  · Avoid nephrotoxic medications  · Status post IVF  · Appears stable       Diastolic CHF, chronic ([facility] [address] (2020) POA    Hypertensive urgency (10/22/2021) 202/174 POA  · No evidence of end organ damage  · Continue home losartan, increase dose as BP uncontrolled  · Continue pravastatin  · BP improved on home medications, ? Compliance  · PRN hydralazine       Impaired insight (10/22/2021)    Noncompliance (10/22/2021)  · CT of head was negative for acute hemorrhage or process  · MRI of brain with old strokes  · Some expressive aphasia, friend notes word finding difficulties chronically  · Normal B-12  · Case management  · Medical TDO petitioned in ED now , ?? suicidial  · One-on-one  removed after admit        Seen by psychiatry             She repeatedly denies she is suicidial  · Psychiatry consult appreciated  · Treated atrial fib and UTI, no significant improvement  · resolved confusion, oriented x 4 today,  · Tried to call daughter. left message with my cell phone  · Spoke with friend Merced Mckeon [phone]             He drives her to store and checks on her along with a neighbor             Reports she often has trouble getting her words out             He is not aware of any safety concerns about her living alone             He does not know how to reach any family     Hypothyroidism POA  · TSH mildly increased 6.90  · Continue levothyroxine        _______________________________________________________________________  Patient seen and examined by me on discharge day. Pertinent Findings:  Gen:    Not in distress  Chest: Clear lungs  CVS:   Regular rhythm. No edema  Abd:  Soft, not distended, not tender  Neuro:  Alert,oriented x4  _______________________________________________________________________  DISCHARGE MEDICATIONS:   Current Discharge Medication List      CONTINUE these medications which have NOT CHANGED    Details   levothyroxine (SYNTHROID) 100 mcg tablet Take 1 Tablet by mouth Daily (before breakfast).   Qty: 90 Tablet, Refills: 1    Associated Diagnoses: Acquired hypothyroidism      losartan (COZAAR) 25 mg tablet Take 1 Tablet by mouth daily. Qty: 90 Tablet, Refills: 1      metoprolol succinate (TOPROL-XL) 50 mg XL tablet Take 1 Tablet by mouth daily. Qty: 90 Tablet, Refills: 1    Comments: To replace the bid dosing with once a day dosing  Associated Diagnoses: Essential hypertension; Chronic a-fib (HCC)      fluticasone propionate (FLONASE) 50 mcg/actuation nasal spray 2 Sprays by Both Nostrils route daily. Qty: 1 Each, Refills: 5    Associated Diagnoses: Seasonal allergic rhinitis due to other allergic trigger      loratadine (Claritin) 10 mg tablet Take 1 Tablet by mouth daily. Qty: 30 Tablet, Refills: 5    Associated Diagnoses: Seasonal allergic rhinitis due to other allergic trigger      Eliquis 5 mg tablet TAKE 1 TABLET BY MOUTH TWICE DAILY  Qty: 180 Tablet, Refills: 1      topiramate (TOPAMAX) 50 mg tablet TAKE 1 TABLET BY MOUTH EVERY NIGHT  Qty: 90 Tablet, Refills: 1    Associated Diagnoses: Nonintractable headache, unspecified chronicity pattern, unspecified headache type      traZODone (DESYREL) 50 mg tablet TAKE 1 TABLET BY MOUTH EVERY NIGHT  Qty: 90 Tablet, Refills: 1    Associated Diagnoses: Insomnia, unspecified type      pravastatin (PRAVACHOL) 80 mg tablet TAKE 1 TABLET BY MOUTH DAILY  Qty: 90 Tablet, Refills: 1    Associated Diagnoses: Pure hypercholesterolemia      pantoprazole (PROTONIX) 40 mg tablet TAKE 1 TABLET BY MOUTH DAILY BEFORE BREAKFAST  Qty: 90 Tablet, Refills: 1      cholecalciferol (VITAMIN D3) (1000 Units /25 mcg) tablet Take 2 Tabs by mouth daily. Qty: 180 Tab, Refills: 3    Associated Diagnoses: Vitamin D deficiency      ipratropium-albuteroL (Combivent Respimat)  mcg/actuation inhaler Take 1 Puff by inhalation every six (6) hours.   Qty: 3 Inhaler, Refills: 2      albuterol (PROVENTIL HFA, VENTOLIN HFA, PROAIR HFA) 90 mcg/actuation inhaler Take 2 Puffs by inhalation every four (4) hours as needed for Shortness of Breath or Respiratory Distress. Qty: 1 Inhaler, Refills: 2      dilTIAZem ER (DILACOR XR) 180 mg capsule Take 1 Cap by mouth daily. Qty: 90 Cap, Refills: 1    Comments: To replace the bid dosing with once a day dosing  Associated Diagnoses: Essential hypertension; Chronic a-fib (HCC)      aspirin delayed-release (ADULT LOW DOSE ASPIRIN) 81 mg tablet Take 1 Tab by mouth daily. Qty: 90 Tab, Refills: 3    Associated Diagnoses: Cerebrovascular accident (CVA), unspecified mechanism ([address]               Patient Follow Up Instructions: Activity: Activity as tolerated  Diet: Cardiac Diet  Wound Care: None needed    Follow-up with PCP and cardiology in 1-2 weeks  Follow-up tests/labs none  Follow-up Information     Follow up With Specialties Details Why Jose Raul Slaughter MD Pediatric Medicine, Internal Medicine Go on 11/3/2021 Follow-up appointment 9:30am [facility] [address] [id number]  [phone]      [address] [place] [address]  This is your home health agency.  Start of care 11/1/2021 [address] [address]  Carlos Alberto Whitt MD Pediatric Medicine, Internal Medicine In 1 week  [facility] [address]  [phone]      Elisha Wen MD Cardiology In 2 weeks  [address]  [id number]  [address] [address] [phone]          ________________________________________________________________    Risk of deterioration: High    Condition at Discharge:  Stable  __________________________________________________________________    Disposition  Home with family and home health services    ____________________________________________________________________    Code Status: Full Code  ___________________________________________________________________      Total time in minutes spent coordinating this discharge (includes going over instructions, follow-up, prescriptions, and preparing report for sign off to her PCP) :  40 minutes    Signed:  Nick Crowe MD

## 2021-11-02 NOTE — PROGRESS NOTES
Cardiology Progress Note      11/2/2021 8:55 AM    Admit Date: 10/22/2021          Subjective: Stable overnight. Pacer check ok          Visit Vitals  /61 (BP 1 [facility]: Left upper arm, BP Patient Position: Lying)   Pulse (!) 101   Temp 97.6 °F (36.4 °C)   Resp 16   Ht 4' 11\" (1.499 m)   Wt 65.4 kg (144 lb 2.9 oz)   SpO2 97%   BMI 29.12 kg/m²     No intake/output data recorded. Objective:      Physical Exam:  VS as above  Chest CTA  Card Irreg irreg no changes  Pacer site ok     Data Review:   Labs:    Hgb 10.9  Creat 1.8     Telemetry: afib R 80       Assessment:           1. Encephalopathy; resolved  2. Urinary tract infection- better   3. Chronic atrial fibrillation, still with pauses off metoprolol   4. CAD s/p prior MI.  Echo 4/20 w/ EF 55-60%, mod LAE  5. Hyperlipidemia  6. Hypertension  7. CKD  8.  Chronic anemia. 9.  Prior history of cerebrovascular accident with expressive aphasia. 10. History of ovarian cancer status post surgery. 11. Hypothyroidism  12. Moderate to severe mitral stenosis, nl EF by echo          Plan: Would hold Eliquis until tomm. Could be d/cd from my standpoint .  Keflex for 3 days post pacer

## 2021-11-03 ENCOUNTER — PATIENT OUTREACH (OUTPATIENT)
Dept: CASE MANAGEMENT | Age: 71
End: 2021-11-03

## 2021-11-03 DIAGNOSIS — R41.89 COGNITIVE IMPAIRMENT: ICD-10-CM

## 2021-11-03 DIAGNOSIS — Z86.73 HISTORY OF CVA (CEREBROVASCULAR ACCIDENT): ICD-10-CM

## 2021-11-03 DIAGNOSIS — Z00.00 ROUTINE PHYSICAL EXAMINATION: Primary | ICD-10-CM

## 2021-11-03 NOTE — PROGRESS NOTES
Social Work Note  11/3/2021    Patient discharged from [facility] 11/2/21. Reviewed IP After Visit Summary and discharge summary. Patient scheduled for hospital follow-up 11/3/21 at 9:30 am.   Confirmed with PCP office that visit needs to be in office vs virtual.   SW placed referral for CTN follow-up through Ambulatory Care Management Team.     8:27 am - Attempted to reach patient to provide reminder for LETA FERNANDEZ PCP visit. Message left on voicemail reminding of appointment and requesting return call to confirm patient ability to attend appointment. 8:35 am - Attempted to reach Lou Stoner,  with [facility] APS. VM left requesting return call. 8:40 am - Spoke with Lambert at [facility]/intake. Advised of patient's discharge and confirmed that they have referral.  Dahiana Kraus advised that the agency was not aware of discharge and needs discharge summary faxed. 8:46 am -  Spoke with Vinh Cobb, inpatient CM at [facility]. Advised that home health needs copy of discharge summary to open. Ms. Giulia Macias advised that she has completed [facility] and will forward to APS worker and Medicaid Care Coordinator to assist with long term care planning. 10:00 am - Spoke with Lou Stoner at [facility]. Advised of patient discharge, missed TANYA visit with PCP, and SW inability to reach patient. She stated that she will work with patient to try and secure personal care aide or possibly nursing home placement if patient is willing. Discussed need for long term plan due to limited patient support. SW voiced concern with patient ability to self-manage at home, especially related to medication administration. Advised that patient is not independent with scheduling appointments or transportation. Confirmed that [facility] has accepted patient and that this SW will continue to try and reach patient to reschedule PCP visit and transportation.   Offered assistance as needed with APS coordination of services. 11:00 am - Patient status discussed with Morteza Andersen, RN, CTN - Ambulatory Care Management. 1:35 pm - Rescheduled missed TANYA appointment from 11/3 to 11/9 at 11:30 am.     1:40 pm - Transportation for LETA FERNANDEZ appointment arranged through [facility] ([phone]).  time: 10:45 am (patient's home)    Return trip:  1:00 pm in front of doctor's office    Reference number:   [id number]    2:05 pm - Attempted to reach patient. VM left with follow-up appointment information and transportation arrangements. 2:05 pm - Spoke with Ken Mendez [employer] Medicaid Coordinator re: patient discharge and follow-up plans. 2:15 pm - Spoke with Lindsey at [facility] to schedule 2 week hospital follow-up. Nurse to return call to  to schedule.       Plan:  · Assist with coordination of follow-up appointments and transportation  · Follow-up with patient re: need for aide services or additional long term care  · Follow-up with Lauren DANG re: assessment    Maureen Sinclair, MSW, ACSW, [facility]    Ambulatory Care Management   [phone]

## 2021-11-03 NOTE — PROGRESS NOTES
Pt not likely competent to make her own medical decisions. Agree with referral to neuropsych to help confirm cognitive status.

## 2021-11-03 NOTE — PROGRESS NOTES
Care Transitions Initial Call whenever I want to    Call within 2 business days of discharge: Yes     Patient: Gregg Calhoun Patient : 1950 MRN: [id number]    Last Discharge [address]       Complaint Diagnosis Description Type Department Provider    10/22/21 Rapid Heart Rate Atrial fibrillation with rapid ventricular response ([facility] [address] . .. ED to Hosp-Admission (Discharged) (ADMIT) Martha Claudio MD; Inga Parikh, [facility]...        11/3/2021  Initial outreach attempt unsuccessful, [address] contacted [place], [phone]. States he saw patient this morning. She was up and dressed. CTN asked [place] if he might stop by her house to inform her that I had attempted to contact her. He stated that he will do so later today. [place] was given the contact information for CTN. 2021  Second outreach attempt unsuccessful, [employer], [phone]. States he stopped by patient's house yesterday to deliver message. States he will stop by there again today to advise patient that I am trying to reach her. 1153  Incoming call received from patient. ID x2   Reports feeling fine. States \"patch on her neck\"was irritating her, then phone disconnected. Multiple attempts made to call patient back, call went straight to . LMTCB    1326  Incoming call received from patient. Reports battery . Phone fully charged. Sob with activity appetite is fine coffee    Was this an external facility discharge? No     Challenges to be reviewed by the provider   Additional needs identified to be addressed with provider:     Poor appetite, only had coffee today  Pt had hard time finding her words. For example, \"My cars are running like crazy. \" She meant to say, her nose is running. Upon med review, patient does not have inhalers, ASA, Vit. D, Diltiazem, Levothyroxine, Claritin, Losartan. Will confirm with HH SN. Pt reports she has not taken any medicines today.  Could not provide reason    Patient aware of PCP appt and pickup time on  at 1030     Method of communication with provider : chart routing    Discussed COVID-19 related testing which was not done at this time. Advance Care Planning:   Does patient have an Advance Directive:  yes; reviewed and needs to be updated Pt wants to add Tony to ACP    Inpatient Readmission Risk score: Unplanned Readmit Risk Score: 16.4    Was this a readmission? no   Patient stated reason for the admission: \"dont know\" \"I didn't think I needed to come\"    Patients top risk factors for readmission: ineffective coping, lack of knowledge about disease, level of motivation, medical condition-afib and medication management   Interventions to address risk factors: Scheduled appointment with PCP-    Care Transition Nurse (CTN) contacted the patient by telephone to perform post hospital discharge assessment. Verified name and  with patient as identifiers. Provided introduction to self, and explanation of the CTN role. Reports doing fine. Denies chest pain, fever, abd pain, swelling. Reports sob with exertion. Reports PM site without evidence of infection. Bandage intact. No bowel /bladder concerns. Poor appetite. CTN received verbal authorization from patient to speak with her friend, Meghan Franklin [phone]    CTN reviewed discharge instructions, medical action plan and red flags with patient who verbalized understanding. Were discharge instructions available to patient? yes. Reviewed appropriate site of care based on symptoms and resources available to patient including: PCP. Patient given an opportunity to ask questions and does not have any further questions or concerns at this time. The NA agrees to contact the PCP office for questions related to their healthcare. Medication reconciliation was performed with patient, who verbalizes understanding of administration of home medications.     Referral to Pharm D needed: no     Home Health/Outpatient orders at discharge: home health care  [address]: [facility]  Date of initial visit: NA, call placed to [address] [place] [address] with [employer] for return call    Durable Medical Equipment ordered at discharge: None    Was patient discharged with a pulse oximeter? no.       Discussed follow-up appointments. If no appointment was previously scheduled, appointment scheduling offered: no. Is follow up appointment scheduled within 7 days of discharge? yes. [facility] follow up appointment(s):   Future Appointments   Date Time Provider Maecy Roa   11/9/2021 11:30 AM Papa Parisi MD [facility] BS AMB   12/14/2021  3:00 PM Papa Parisi MD [facility] BS AMB     Non-[facility] follow up appointment(s): NA    Plan for follow-up call in 3-5 days based on severity of symptoms and risk factors. Plan for next call: medication management-compliance and community resources-  CTN provided contact information for future needs. Goals Addressed                 This Visit's Progress     Attend follow up appointments on schedule        11/04/21   Will attend follow up appt with PCP scheduled 11/9       Prevent complications post hospitalization. 11/04/21   Will take medications as prescribed   Will monitor for infection at PM site   Pt will remain out of the hospital or ER for remainder of TANYA period.

## 2021-11-03 NOTE — PROGRESS NOTES
Transition of Care Plan:    RUR:  16%  Disposition: Home with  - [facility]  Follow up appointments: PCP, specialist  DME needed:none  Transportation at Discharge:  Evelyn Martinez to provide transportation  [address] or means to access home:  Pt has access  IM Medicare Letter: 2nd IM Letter at d/c  Is patient a BCPI-A Bundle:        n/a   If yes, was Bundle Letter given?:     Caregiver Contact: None identified  Discharge Caregiver contacted prior to discharge? 12:00n  CM received call from nurse that pt wants to leave. CM went to room to discuss with pt that there were concerns about her medical condition that required more monitoring. Pt stated she was ready to go home. CM discussed d/c plan with pt. Pt agreed with plan. No questions or concerns at this time. Evelyn Martinez to provide transportation. Medicare pt has received, reviewed, and signed 2nd IM letter informing them of their right to appeal the discharge. Signed copied has been placed on pt bedside chart. 11:00am  CM meet with pt to complete a new ACP with a new decision maker because daughters have shared per documentation that they do not want to involved or pt is estranged from her daughters per daughter Jessica Fierro. When CM asked patient and shared that daughter are not returning calls or have chosen not to be involved, pt stated she would like her sister, Mi Colon to make decisions for her. CM called sister and left message for return call to discuss with pt. CM called and spoke with Daughter Jessica Fierro, who shared that her and her sister have chosen not to be decision makers for pt. She shared that it may be best if Cayla who lives in [facility] is. CM informed her that a message was left and waiting on a return call to discuss. Care Management Interventions  PCP Verified by CM: Yes  Mode of Transport at Discharge:  Other (see comment) (Pt's friend will transport at d/c. )  Transition of Care Consult (BALDEV Consult): Home Health, Discharge Planning  [facility] [address] [address]: No  Reason Outside [place]: Unable to staff case  Discharge Durable Medical Equipment: No  Physical Therapy Consult: Yes  Occupational Therapy Consult: Yes  Speech Therapy Consult: No  Support Systems: Friend/Neighbor  Confirm Follow Up Transport: Friends  The Plan for Transition of Care is Related to the Following Treatment Goals : Home Health   The Patient and/or Patient Representative was Provided with a Choice of Provider and Agrees with the Discharge Plan?: Yes  Name of the Patient Representative Who was Provided with a Choice of Provider and Agrees with the Discharge Plan: Self  Freedom of Choice List was Provided with Basic Dialogue that Supports the Patient's Individualized Plan of Care/Goals, Treatment Preferences and Shares the Quality Data Associated with the Providers?: Yes  [place] Resource Information Provided?: No  Discharge [facility]  Discharge Placement: Home with home health      [facility]  Phone: [phone]

## 2021-11-05 ENCOUNTER — PATIENT OUTREACH (OUTPATIENT)
Dept: CASE MANAGEMENT | Age: 71
End: 2021-11-05

## 2021-11-05 NOTE — PROGRESS NOTES
CTN contacted [facility] to confirm if patient had visit yesterday. Spoke to Laura Morales, states patient saw [facility] PT yesterday and is scheduled to see [facility] SN tomorrow  Laura Morales provided CTN with contact information to the local branch in [place] [phone]  CTN contacted the local [facility] branch in [place], spoke to [facility] who will place a note in patients chart to have [facility] SN to contact this writer on Monday to review her medications.

## 2021-11-08 ENCOUNTER — PATIENT OUTREACH (OUTPATIENT)
Dept: CASE MANAGEMENT | Age: 71
End: 2021-11-08

## 2021-11-08 ENCOUNTER — DOCUMENTATION ONLY (OUTPATIENT)
Dept: INTERNAL MEDICINE CLINIC | Age: 71
End: 2021-11-08

## 2021-11-08 NOTE — PROGRESS NOTES
Care Transitions Follow Up Call    Challenges to be reviewed by the provider   Additional needs identified to be addressed with provider:    Questionable compliance with medications. CTN spoke to Al Trujillo, who plans to see patient again today. States she saw patient over the weekend and patients blood pressures were elevated. Kelsey Vogel will provide CTN with patients BPs reading today. Kelsey Vogel said patient had full medication bottles. Kelsey Vogel plans to set up pill box weekly         Method of communication with provider : chart routing    Care Transition Nurse (CTN) contacted the patient by telephone to follow up after admission on 10/22/2021. Patient states she took her medications today. She reports she is eating. Preparing green beans and potatoes. States she does not eat \"much meat\"  Addressed changes since last contact: SN- CTN provided contact information to [facility] Kelsey SAAVEDRA  Follow up appointment completed? no.   Was follow up appointment scheduled within 7 days of discharge? yes. CTN reviewed medical action plan and red flags with patient and discussed any barriers to care and/or understanding of plan of care after discharge. Discussed appropriate site of care based on symptoms and resources available to patient including: PCP. The patient agrees to contact the PCP office for questions related to their healthcare. Patients top risk factors for readmission: lack of knowledge about disease, level of motivation, medical condition-afib and medication management   Interventions to address risk factors: Scheduled appointment with PCP-11/9    [address] follow up appointment(s):   Future Appointments   Date Time Provider Macey Roa   11/9/2021 11:30 AM Delphine Johnson MD [facility] AMB   12/14/2021  3:00 PM Delphine Johnson MD [facility] AMB     Non-[facility] follow up appointment(s): NA    CTN provided contact information for future needs.  Plan for follow-up call in 5-7 days based on severity of symptoms and risk factors.   Plan for next call: medication management-compliance     Goals Addressed                 This Visit's Progress     Attend follow up appointments on schedule        11/04/21   Will attend follow up appt with PCP scheduled 11/9 11/08/21   Confirmed follow up appt with PCP tomorrow

## 2021-11-08 NOTE — PROGRESS NOTES
[facility]#[id number] signed 11/8/21 by provider, faxed 11/8/21 to [phone]; order and confirmation scanned into cc

## 2021-11-09 ENCOUNTER — PATIENT OUTREACH (OUTPATIENT)
Dept: CASE MANAGEMENT | Age: 71
End: 2021-11-09

## 2021-11-09 ENCOUNTER — OFFICE VISIT (OUTPATIENT)
Dept: INTERNAL MEDICINE CLINIC | Age: 71
End: 2021-11-09
Payer: MEDICARE

## 2021-11-09 VITALS
OXYGEN SATURATION: 98 % | TEMPERATURE: 97.7 F | WEIGHT: 137.8 LBS | HEART RATE: 89 BPM | DIASTOLIC BLOOD PRESSURE: 115 MMHG | HEIGHT: 59 IN | BODY MASS INDEX: 27.78 KG/M2 | SYSTOLIC BLOOD PRESSURE: 190 MMHG

## 2021-11-09 DIAGNOSIS — I48.91 ATRIAL FIBRILLATION WITH RVR (HCC): ICD-10-CM

## 2021-11-09 DIAGNOSIS — I48.20 CHRONIC A-FIB (HCC): ICD-10-CM

## 2021-11-09 DIAGNOSIS — E78.00 PURE HYPERCHOLESTEROLEMIA: ICD-10-CM

## 2021-11-09 DIAGNOSIS — R41.89 IMPAIRED INSIGHT: ICD-10-CM

## 2021-11-09 DIAGNOSIS — Z86.73 HISTORY OF CVA (CEREBROVASCULAR ACCIDENT): ICD-10-CM

## 2021-11-09 DIAGNOSIS — Z95.0 PACEMAKER: ICD-10-CM

## 2021-11-09 DIAGNOSIS — E03.9 ACQUIRED HYPOTHYROIDISM: ICD-10-CM

## 2021-11-09 DIAGNOSIS — E55.9 VITAMIN D DEFICIENCY: ICD-10-CM

## 2021-11-09 DIAGNOSIS — I10 ESSENTIAL HYPERTENSION: Primary | ICD-10-CM

## 2021-11-09 PROCEDURE — G8400 PT W/DXA NO RESULTS DOC: HCPCS | Performed by: INTERNAL MEDICINE

## 2021-11-09 PROCEDURE — G8510 SCR DEP NEG, NO PLAN REQD: HCPCS | Performed by: INTERNAL MEDICINE

## 2021-11-09 PROCEDURE — G9711 PT HX TOT COL OR COLON CA: HCPCS | Performed by: INTERNAL MEDICINE

## 2021-11-09 PROCEDURE — 99215 OFFICE O/P EST HI 40 MIN: CPT | Performed by: INTERNAL MEDICINE

## 2021-11-09 PROCEDURE — 1090F PRES/ABSN URINE INCON ASSESS: CPT | Performed by: INTERNAL MEDICINE

## 2021-11-09 PROCEDURE — G8419 CALC BMI OUT NRM PARAM NOF/U: HCPCS | Performed by: INTERNAL MEDICINE

## 2021-11-09 PROCEDURE — 1101F PT FALLS ASSESS-DOCD LE1/YR: CPT | Performed by: INTERNAL MEDICINE

## 2021-11-09 PROCEDURE — G8755 DIAS BP > OR = 90: HCPCS | Performed by: INTERNAL MEDICINE

## 2021-11-09 PROCEDURE — G8536 NO DOC ELDER MAL SCRN: HCPCS | Performed by: INTERNAL MEDICINE

## 2021-11-09 PROCEDURE — 1111F DSCHRG MED/CURRENT MED MERGE: CPT | Performed by: INTERNAL MEDICINE

## 2021-11-09 PROCEDURE — G8753 SYS BP > OR = 140: HCPCS | Performed by: INTERNAL MEDICINE

## 2021-11-09 PROCEDURE — G8427 DOCREV CUR MEDS BY ELIG CLIN: HCPCS | Performed by: INTERNAL MEDICINE

## 2021-11-09 NOTE — PROGRESS NOTES
HPI:  established patient  Presents for f/u HTN, Afib, hospital f/u, etc    Pt presents alone  Transportation arranged by [facility] in hospital much better controlled  Med bottles at home found to be full - suggests non compliance    Pt reports good med compliance, though. Med box with her today and Sun PM through now untouched, meds still in box. Pt complains about having 3 different groups coming to her home with [facility]  She is overwhelmed. Past medical, Social, and Family history reviewed    Prior to Admission medications    Medication Sig Start Date End Date Taking? Authorizing Provider   fluticasone propionate (FLONASE) 50 mcg/actuation nasal spray 2 Sprays by Both Nostrils route daily. 9/28/21  Yes Chanda Cedillo MD   Eliquis 5 mg tablet TAKE 1 TABLET BY MOUTH TWICE DAILY 9/22/21  Yes Chanda Cedillo MD   topiramate (TOPAMAX) 50 mg tablet TAKE 1 TABLET BY MOUTH EVERY NIGHT 9/22/21  Yes Chanda Cedillo MD   traZODone (DESYREL) 50 mg tablet TAKE 1 TABLET BY MOUTH EVERY NIGHT 9/22/21  Yes Chanda Cedillo MD   pantoprazole (PROTONIX) 40 mg tablet TAKE 1 TABLET BY MOUTH DAILY BEFORE BREAKFAST 9/16/21  Yes Chanda Cedillo MD   cholecalciferol (VITAMIN D3) (1000 Units /25 mcg) tablet Take 2 Tabs by mouth daily. 5/12/21  Yes Chanda Cedillo MD   ipratropium-albuteroL (Combivent Respimat)  mcg/actuation inhaler Take 1 Puff by inhalation every six (6) hours. 3/10/21  Yes Chanda Cedillo MD   albuterol (PROVENTIL HFA, VENTOLIN HFA, PROAIR HFA) 90 mcg/actuation inhaler Take 2 Puffs by inhalation every four (4) hours as needed for Shortness of Breath or Respiratory Distress. 3/10/21  Yes Chanda Cedillo MD   aspirin delayed-release (ADULT LOW DOSE ASPIRIN) 81 mg tablet Take 1 Tab by mouth daily. 3/26/19  Yes Chanda Cedillo MD   levothyroxine (SYNTHROID) 100 mcg tablet Take 1 Tablet by mouth Daily (before breakfast).   Patient not taking: Reported on 11/9/2021 10/13/21   Lavon Jamshid Mauricio MD   losartan (COZAAR) 25 mg tablet Take 1 Tablet by mouth daily. 10/12/21   Miah Hill MD   metoprolol succinate (TOPROL-XL) 50 mg XL tablet Take 1 Tablet by mouth daily. 10/12/21   Miah Hill MD   loratadine (Claritin) 10 mg tablet Take 1 Tablet by mouth daily. Patient not taking: Reported on 11/9/2021 9/28/21   Miah Hill MD   pravastatin (PRAVACHOL) 80 mg tablet TAKE 1 TABLET BY MOUTH DAILY 9/16/21   Miah Hill MD   dilTIAZem ER (DILACOR XR) 180 mg capsule Take 1 Cap by mouth daily. 3/10/21   Miah Hill MD          ROS  Complete ROS reviewed and negative or stable except as noted in HPI. Physical Exam  Vitals and nursing note reviewed. Constitutional:       General: She is not in acute distress. HENT:      Head: Normocephalic and atraumatic. Eyes:      General: No scleral icterus. Pupils: Pupils are equal, round, and reactive to light. Neck:      Comments: No bruits  Cardiovascular:      Rate and Rhythm: Normal rate and regular rhythm. Heart sounds: Normal heart sounds. No murmur heard. No friction rub. No gallop. Pulmonary:      Effort: Pulmonary effort is normal. No respiratory distress. Breath sounds: Normal breath sounds. No wheezing or rales. Abdominal:      General: There is no distension. Palpations: Abdomen is soft. Tenderness: There is no abdominal tenderness. Musculoskeletal:         General: Normal range of motion. Cervical back: Normal range of motion and neck supple. Skin:     General: Skin is warm. Findings: No rash. Neurological:      Mental Status: She is alert. Motor: No abnormal muscle tone. Comments:   +dysarthria. Poor word finding. Marked HTN    Reviewed for today's visit:   Labs    Imaging    Specialist Notes    Diagnostic Tests    Discharge Summary  Hospital Notes         Assessment/Plan:    Non compliance due to cognitive impairment and lack of insight. ICD-10-CM ICD-9-CM    1. Essential hypertension  I10 401.9    2. Chronic a-fib (HCC)  I48.20 427.31    3. Acquired hypothyroidism  E03.9 244.9    4. Pure hypercholesterolemia  E78.00 272.0    5. Vitamin D deficiency  E55.9 268.9    6. History of CVA (cerebrovascular accident)  Z86.73 V12.54    7. Atrial fibrillation with RVR (HCC)  I48.91 427.31    8. Pacemaker  Z95.0 V45.01    9. Impaired insight  G31.84 331.83      Follow-up and Dispositions    · Return in about 5 weeks (around 12/14/2021), or if symptoms worsen or fail to improve, for as scheduled. results and schedule of future studies reviewed with patient  reviewed diet and weight   cardiovascular risk and specific lipid/LDL goals reviewed  reviewed medications and side effects in detail    Ultimately, the only way she gets the best management is if someone is present to manage her meds for her -  ?in home care aid vs assisted living facility, etc  Pt may not have the resources for this and I don't know how much APS can do either. To send note to     Pt declines flu shot. Continue current medications   Continue HH care - consider less frequent visits except for nursing for med management. An electronic signature was used to authenticate this note.   -- Emily Mayer MD

## 2021-11-09 NOTE — PROGRESS NOTES
Social Work Note  11/9/2021      · Follow-up call to patient. Patient voiced awareness of PCP appointment today and transportation arrangements. Reviewed arrangements with patient and reminded her to keep phone with her for transportation provider call. · Staff message sent to Maycol Vidal LPN at [facility]. Advised of patient's transportation arrangements for return trip home and provided SW contact information for any questions. · Follow-up call to [facility] re: scheduling hospital follow-up. Appointment scheduled with Todd Patel NP on 11/30/21 at 3:15 pm.  [facility] location. 11/10/21  · Contacted office of Dr. Chico Borges per PCP request to schedule neuropsych evaluation. Per Yoanna Resendiz, Dr. Chico Borges is scheduling for May 2022. Will research [facility] panel for neuropsychology to determine if earlier appointment is available.      JET Trinidad, ACSW, [facility]    Ambulatory Care Management   [phone]

## 2021-11-09 NOTE — PROGRESS NOTES
RM 14    Chief Complaint   Patient presents with   [facility] Follow Up     [address] 10/22/21- 11/2/21. had a pacemaker placed        Visit Vitals  BP (!) 190/115   Pulse 89   Temp 97.7 °F (36.5 °C)   Ht 4' 11\" (1.499 m)   Wt 137 lb 12.8 oz (62.5 kg)   SpO2 98%   BMI 27.83 kg/m²       3 most recent PHQ Screens 11/9/2021   Little interest or pleasure in doing things Not at all   Feeling down, depressed, irritable, or hopeless Not at all   Total Score PHQ 2 0         1. Have you been to the ER, urgent care clinic since your last visit? Hospitalized since your last visit? Noted in chart. [facility]M 10/22/21 - 11/2/21    2. Have you seen or consulted any other health care providers outside of the [address] [place] [address] since your last visit? Include any pap smears or colon screening. No    Health Maintenance Due   Topic Date Due    COVID-19 Vaccine (1) Never done    DTaP/Tdap/Td series (1 - Tdap) Never done    Colorectal Cancer Screening Combo  Never done    Breast Cancer Screen Mammogram  Never done    Bone Densitometry (Dexa) Screening  Never done    Pneumococcal 65+ yrs at Risk Vaccine (1 of 2 - PCV13) Never done       Learning Assessment 5/22/2018   PRIMARY LEARNER Patient   HIGHEST LEVEL OF EDUCATION - PRIMARY LEARNER  GRADUATED HIGH SCHOOL OR GED   BARRIERS PRIMARY LEARNER COGNITIVE   PRIMARY LANGUAGE ENGLISH   LEARNER PREFERENCE PRIMARY PICTURES   ANSWERED BY Elza Schilder   RELATIONSHIP SELF       AVS  education, follow up, and recommendations provided and addressed with patient.

## 2021-11-10 ENCOUNTER — DOCUMENTATION ONLY (OUTPATIENT)
Dept: INTERNAL MEDICINE CLINIC | Age: 71
End: 2021-11-10

## 2021-11-10 DIAGNOSIS — R41.3 MEMORY CHANGES: ICD-10-CM

## 2021-11-10 DIAGNOSIS — Z86.73 HISTORY OF CVA (CEREBROVASCULAR ACCIDENT): Primary | ICD-10-CM

## 2021-11-10 NOTE — PROGRESS NOTES
[facility] order # [id number] signed 11/8/21 by provider, faxed 11/10/21 to [phone]; order and confirmation scanned into cc

## 2021-11-11 ENCOUNTER — TELEPHONE (OUTPATIENT)
Dept: INTERNAL MEDICINE CLINIC | Age: 71
End: 2021-11-11

## 2021-11-11 DIAGNOSIS — R51.9 NONINTRACTABLE HEADACHE, UNSPECIFIED CHRONICITY PATTERN, UNSPECIFIED HEADACHE TYPE: ICD-10-CM

## 2021-11-11 RX ORDER — BUTALBITAL, ACETAMINOPHEN AND CAFFEINE 50; 325; 40 MG/1; MG/1; MG/1
TABLET ORAL
Qty: 20 TABLET | Refills: 1 | Status: CANCELLED | OUTPATIENT
Start: 2021-11-11

## 2021-11-11 RX ORDER — BUTALBITAL, ACETAMINOPHEN AND CAFFEINE 50; 325; 40 MG/1; MG/1; MG/1
TABLET ORAL
Qty: 20 TABLET | Refills: 1 | Status: SHIPPED | OUTPATIENT
Start: 2021-11-11

## 2021-11-11 NOTE — TELEPHONE ENCOUNTER
Dr. Valentino [facility] is not covered by pt's current insurance plan. Please review and prescribe alternate therapy or obtain a prior authorization. Thank you.      Requested Prescriptions     Pending Prescriptions Disp Refills    butalbital-acetaminophen-caffeine (FIORICET, ESGIC) -40 mg per tablet 20 Tablet 1

## 2021-11-15 ENCOUNTER — PATIENT OUTREACH (OUTPATIENT)
Dept: CASE MANAGEMENT | Age: 71
End: 2021-11-15

## 2021-11-15 NOTE — PROGRESS NOTES
Care Transitions Follow Up Call    Care Transition Nurse (CTN) contacted the patient by telephone to follow up after admission on 10/22/2021. Addressed changes since last contact: medications- HH SN set up pill box weekly for pt  Follow up appointment completed? yes. Was follow up appointment scheduled within 7 days of discharge? yes. CTN reviewed medical action plan and red flags with patient and discussed any barriers to care and/or understanding of plan of care after discharge. Discussed appropriate site of care based on symptoms and resources available to patient including: Home Health. Patients top risk factors for readmission: medication management   Interventions to address risk factors: Communication with home health agencies or other community services the patient is currently using-HHSN/OT/PT    [address] follow up appointment(s):   Future Appointments   Date Time Provider Macey Roa   12/14/2021  3:00 PM Mariluz Boland MD [facility]     Non-[facility] follow up appointment(s): [facility]    CTN provided contact information for future needs. Plan for follow-up call in 7-10 days based on severity of symptoms and risk factors. Plan for next call: self management-BP monitoring and medication management-compliance     Goals Addressed                 This Visit's Progress     Attend follow up appointments on schedule        11/04/21   Will attend follow up appt with PCP scheduled 11/9 11/08/21   Confirmed follow up appt with PCP tomorrow    11/15/21   attended       Prevent complications post hospitalization. 11/04/21   Will take medications as prescribed   Will monitor for infection at PM site   Pt will remain out of the hospital or ER for remainder of TANYA period.     11/15/21   Reports compliance to taking medications as prescribed   Will monitor BP at least daily

## 2021-11-16 ENCOUNTER — DOCUMENTATION ONLY (OUTPATIENT)
Dept: INTERNAL MEDICINE CLINIC | Age: 71
End: 2021-11-16

## 2021-11-16 NOTE — PROGRESS NOTES
[address] order # [id number] and order# [id number], signed 11/15/21 by provider, faxed 11/16/21to [phone]; orders and confirmations scanned into cc.

## 2021-11-18 ENCOUNTER — DOCUMENTATION ONLY (OUTPATIENT)
Dept: INTERNAL MEDICINE CLINIC | Age: 71
End: 2021-11-18

## 2021-11-18 NOTE — PROGRESS NOTES
[facility] order# [id number] signed 11/16/21 by provider, faxed 11/17/21 to [phone]; order and confirmation scanned into cc. [facility] order# [id number] signed 11/17/21 by provider, faxed 11/18/21 to [phone]; order and confirmation scanned into cc.

## 2021-11-18 NOTE — TELEPHONE ENCOUNTER
PA completed for fioricet for pt. \"This request has received a Favorable outcome. Please note any additional information provided by [employer] at the bottom of this request.\"    tiara has been made aware that PA is done. No further action required.

## 2021-11-22 ENCOUNTER — PATIENT OUTREACH (OUTPATIENT)
Dept: CASE MANAGEMENT | Age: 71
End: 2021-11-22

## 2021-11-22 NOTE — PROGRESS NOTES
Social Work Note  11/22/2021      Call to patient  Follow-up call to patient. She stated \"I feel good\". Patient reported that she just went to the grocery store with Yeesnia Linda and \"did laundry, fed the cats, and vacuumed. \"   Patient stated that she was \"sitting and waiting for someone to come talk about it\". Asked additional questions and patient confirmed that she is waiting for the physical therapist.   She denied using any type of walker. When asked whether she is taking her medications, she responded \"Oh yeah\". Patient stated that she is going to fill her pill box today. She indicated that someone usually fills it for her, but they have not come to visit today. Advised patient of Cardiology appointment with REEMA Turcios on 11/30/21 at 3:15 pm.  Advised that SW will arrange transportation. Call with Luis Almeida APS  Spoke with Ms. Marichuy Wei[facility]. She stated that she went to patient's home on 11/18, but patient did not answer front or back door or phone. Per Ms. Marichuy Wei, she contacted Yesenia Linda, patient's friend, who stated that when he saw patient earlier in day, she was asleep. APS worker left card and requested that Yesenia Linda ask patient to contact her. Patient has not contacted Ms. Marichuy Wei  Updated patient phone number with Ms. Marichuy Wei and advised that patient will answer phone. Ms. Marichuy Wei stated that she will attempt to visit patient and discuss option of personal care aide services. [facility] screening completed by hospital on 10/29/21.      Transportation arrangements  Arranged transportation for Cardiology follow-up with REEMA Turcios on 11/30/21 at 3:15 pm.    Trip ID #[id number]  Number to call if patient needs to make changes or cancel:  [phone], option 2  Arrangements:    at home: 2:40 pm (can arrive 15 minutes prior or after)    from doctor's office:  4:15 pm      Call to patient  Patient advised of transportation arrangements for 11/30/21 appointment with REEMA Resendiz.   Patient stated that she wrote information down and repeated information back to [address]:  · Follow-up with patient re: transportation    JET Sheikh, ACSW, [facility]    Ambulatory Care Management   [phone]

## 2021-11-23 ENCOUNTER — DOCUMENTATION ONLY (OUTPATIENT)
Dept: INTERNAL MEDICINE CLINIC | Age: 71
End: 2021-11-23

## 2021-11-23 NOTE — PROGRESS NOTES
[facility] [place] order# [id number] signed 11/22/21, faxed 11/23/21 to [phone]; order and confirmation scanned into cc.

## 2021-11-29 ENCOUNTER — PATIENT OUTREACH (OUTPATIENT)
Dept: CASE MANAGEMENT | Age: 71
End: 2021-11-29

## 2021-11-29 ENCOUNTER — DOCUMENTATION ONLY (OUTPATIENT)
Dept: INTERNAL MEDICINE CLINIC | Age: 71
End: 2021-11-29

## 2021-11-29 NOTE — PROGRESS NOTES
[facility] order # [id number] and order # [id number] signed 11/24/21 by provider, faxed 11/29/21 to [phone]; order and confirmation scanned into cc

## 2021-11-29 NOTE — PROGRESS NOTES
Social Work Note  11/29/2021      Curtis Eldridge at [facility] ([facility]) - [phone]  Return trip  time changed to 4:30 pm at Cardiology office. Contacted patient for follow-up. She stated that she is ready for her appointment tomorrow and confirmed time with SW. Spoke with Stevei Johnston, [address] at [facility] re: neuropsychologist in [facility] network. Name provided:  Philippe Salinas MD.   Message sent to PCP re: in-network provider.      ROSA Plan:  · Assist with scheduling of Neuropsych evaluation  · Follow-up after cardiology appointment    JET Esquivel, ACSW, [facility]    Ambulatory Care Management   [phone]

## 2021-12-02 ENCOUNTER — PATIENT OUTREACH (OUTPATIENT)
Dept: CASE MANAGEMENT | Age: 71
End: 2021-12-02

## 2021-12-02 NOTE — PROGRESS NOTES
Contacted patient for a follow and to resolve TANYA episode  Spoke to patient, states she has a toothache on the left side of her mouth  Reports the pain is not bad right now  CTN recommended patient to reach out to a dentist for follow up  Patient verbalized her understanding  Patient began to talk about \"stars\" and admits to her confusion, \"I'm confused\"  I asked patient if she can call Ancelmo Rendon, she states, Octavio Zapata is working full time\" and is unsure when he will stop by  CTN spoke with Alonso Asher, who recommended that the patient contact [address] services to inquire about any dental coverage. Updated patient. Patient reports, [facility] PT came yesterday and they walked outside and patient reports she is doing great  CTN plans to resolve TANYA episode next week. Goals Addressed                 This Visit's Progress     COMPLETED: Attend follow up appointments on schedule        11/04/21   Will attend follow up appt with PCP scheduled 11/9 11/08/21   Confirmed follow up appt with PCP tomorrow    11/15/21   attended            Goals      Patient will receive supportive services in appropriate living environment      03/10/21  Transportation arrangements made on 03/08/21 for PCP appointment (3/10/21). Message left on patient's voicemail this am as reminder. AML    01/04/20  Assessment:  Transportation arrangements made through [facility] for PCP appt today. AML    10/02/20  Assessment:  Per patient, medications are now being delivered. Transportation arrangements made for 10/5/20 PCP appointment. AML    08/28/20  Assessment:  Call to patient's daughter for update on status of home delivery of medications. Transportation for appointment cannot be scheduled until 30 days out from appointment - will schedule after 09/05/20. AML    08/24/20  Assessment:  Patient is continuing to receive home health nursing through [facility]. Daughters have arranged grocery delivery through [employer].   Daughter Kun Carlson will be setting up home delivery of medications through [employer]. Patient has transportation through [facility] for medical appointments. SW Plan:    Follow up with daughter re: medication delivery  Schedule transportation for appointment on 10/5/20  Send directions to patient's daughters to for scheduling transportation. AML      06/26/20   Assessment:  Initial SW evaluation completed with patient's daughter, Alis Hernandez. She reported that patient requires assistance in the home to remain independent and has been open to Adult Protective Services in past.     SW Plan:  Complete SW evaluation with patient to determine needs and plan of care. AML       Prevent complications post hospitalization. 11/04/21   Will take medications as prescribed   Will monitor for infection at PM site   Pt will remain out of the hospital or ER for remainder of TANYA period.     11/15/21   Reports compliance to taking medications as prescribed   Will monitor BP at least daily

## 2021-12-09 ENCOUNTER — PATIENT OUTREACH (OUTPATIENT)
Dept: CASE MANAGEMENT | Age: 71
End: 2021-12-09

## 2021-12-09 NOTE — PROGRESS NOTES
Patient has graduated from the Transitions of Care Coordination  program on 12/9/2021. Patient/family has the ability to self-manage at this time Care management goals have been completed. Patient was ROSA Lynne) [facility] for further management. Patient reports she attended cardiology appt last Tuesday. Goals Addressed                 This Visit's Progress     COMPLETED: Prevent complications post hospitalization. 11/04/21   Will take medications as prescribed   Will monitor for infection at PM site   Pt will remain out of the hospital or ER for remainder of TANYA period. 11/15/21   Reports compliance to taking medications as prescribed   Will monitor BP at least daily            Patient has Care Transition Nurse's contact information for any further questions, concerns, or needs.   Patients upcoming visits:    Future Appointments   Date Time Provider Macey Roa   12/14/2021  3:00 PM Maren Panchal MD [facility] AM[facility]

## 2021-12-13 ENCOUNTER — PATIENT OUTREACH (OUTPATIENT)
Dept: CASE MANAGEMENT | Age: 71
End: 2021-12-13

## 2021-12-13 DIAGNOSIS — Z86.73 HISTORY OF CVA (CEREBROVASCULAR ACCIDENT): Primary | ICD-10-CM

## 2021-12-13 DIAGNOSIS — R41.0 DISORIENTED: ICD-10-CM

## 2021-12-13 DIAGNOSIS — R41.3 MEMORY CHANGES: ICD-10-CM

## 2021-12-13 NOTE — PROGRESS NOTES
Social Work Note  12/13/2021    Patient scheduled for appointment with PCP on 12/14/21  3:00 pm  · Transportation arranged through [address] Medicaid:   Jes Cone up time (home):  2:15 pm     time for return trip (office):  4:15 pm   Reference number:  [id number]   Number for transportation:  [phone]    · Spoke with patient. Reminded patient of appointment and advised of transportation arrangements. Patient reminded to have cell phone nearby for  to call. Contacted [employer] ([phone]). Message left requesting return call to schedule patient for testing/evaluation. Received return call. Neuropsychology intake appointment scheduled with Dr. Aidan Bartlett. 02/07/22  1:00 pm.   Testing to follow at separate appointment several weeks after intake. Notified Dr. Linda Rossi of appointment.      Method of communication with provider : chart routing       Lexa Madrid MSW, ACSW, [facility]    Ambulatory Care Management   [phone]

## 2022-02-01 ENCOUNTER — PATIENT OUTREACH (OUTPATIENT)
Dept: CASE MANAGEMENT | Age: 72
End: 2022-02-01

## 2022-02-01 NOTE — PROGRESS NOTES
Social Work Note  2/1/2022      Call to patient  Follow-up call to patient. Patient stated \"I have a cold - just a bad cold\". SW attempted to ask about recent contacts with other people due to concerns for COVID, and patient stated \"it's just a bad cold\". ROSA reminded patient of appointment with Dr. Nicole Vanegas at [facility] on 2/7/22. Advised that SW was calling to discuss transportation. Patient stated \"I don't want to see the one with the star\". Patient repeated that she did not want to see \"the one with the star\". Patient has had difficulty with word finding in past.  SW attempted to determine which medical provider patient was referring to without success. Patient not oriented to day of week, month, or date. She stated that she wanted to \"get up and get together\" and would \"call patient back\" then hung up. Call to [facility]  Upon chart review, noted new scanned document with name/contact information [facility]. Contacted RN Clinical Coordinator Ellyn Patel ([phone]). VM left requesting return call. Received return call from Ellyn Patel who advised that Nicole Watkins is working with patient ([phone]). Contacted Nicole aWtkins. She stated that she works telephonically with patient and has not been in contact with her since November. Discussed patient status and concerns. Advised of appointment with Nicole Vanegas MD on 2/7/22. Call to [place], patient's friend  Clarita Muñoz, patient's friend who visits and assists with groceries. [place] stated that he usually stops by every day. He confirmed that patient has a cold and stated that she was a \"little tired\" last Thursday, but appeared better on Friday. He stated that she is taking her medications. ROSA advised that patient has appointment with doctor on Monday, 2/7/22. [place] stated that he could take patient to appointment. Appointment details provided. Call to [facility]istair  Attempted to confirm patient's insurance coverage and need for pre-authorization for Neuropsych appointment. Contacted [facility] [employer] plan. Per automated system, patient is not enrolled as of 1/1/22. No card on file to verify coverage. Message left for Breanna Moreno at [facility] to discuss coverage. SW Plan:   Follow-up with Care Coordinator, continue assessment of needs.      Naz Phan MSW, ACSW, [facility]    Ambulatory Care Management   [phone]

## 2022-02-11 ENCOUNTER — PATIENT OUTREACH (OUTPATIENT)
Dept: CASE MANAGEMENT | Age: 72
End: 2022-02-11

## 2022-02-11 NOTE — PROGRESS NOTES
Social Work Note  2/11/2022      Follow-up call to patient. She confirmed that she saw \"the lady doctor\" on Monday and that she told her that patient did not need to return. Patient stated that she is doing fine and friend, Christopher Durant checks on her daily. Asked patient if she is interested in aide services through Medicaid to help her at home. Patient stated that she is not interested in the services. She voiced concern with paying bills, but stated that Christopher Durant helps her. SW offered referral to [employer] for assistance with bill paying. Patient declined and stated that she has talked to them previously. Received message from Maurizio Villa, Care Coordinator with [facility] Medicaid. Returned call and provided patient update. Conference call completed with patient and Care Coordinator at [facility]. SW explained roles of Belem Moseley and [employer]. No concerns noted by patient at this time. SW continuing to follow. Awaiting report from Dr. Geraldo King.      Luis Wright, MSW, ACSW, [facility]    Ambulatory Care Management   [phone]

## 2022-03-02 ENCOUNTER — TELEPHONE (OUTPATIENT)
Dept: INTERNAL MEDICINE CLINIC | Age: 72
End: 2022-03-02

## 2022-03-02 NOTE — TELEPHONE ENCOUNTER
Please reach out to Dr. Navarro Shelter office - [facility] [facility] neuropsych - to get report from pt's 2/7/22 appt.

## 2022-03-03 NOTE — TELEPHONE ENCOUNTER
GREGORIO with [facility] requesting that records from last visit be faxed to our office. Provided fax and call back number. Faxed request in writing as well to [phone]. Transmission log received and placed in scanning.

## 2022-03-08 ENCOUNTER — PATIENT OUTREACH (OUTPATIENT)
Dept: CASE MANAGEMENT | Age: 72
End: 2022-03-08

## 2022-03-08 NOTE — PROGRESS NOTES
Social Work Note  3/8/2022    Received call from Tildon Olszewski, patient's care coordinator with [address] [address] re: patient status. She stated that she has been unable to reach patient. SW advised that PCP office is still waiting on report from Neuropsychology. 11:25 am - Attempted to reach patient for follow-up. VM left requesting return call. 03/09/22  4:40 pm - Follow-up call to patient. She stated that she is \"doing fine\" with her medications. SW asked about appointment with Dr. Yifan Jefferson in February. Patient stated that she has \"been through 1st and is waiting for 2nd\". Patient stated that she thinks she is supposed to see \"Dr. Shelia Purdy". Patient requested that SW follow-up with friend, Luis Guido, to clarify appointments. She advised that she cannot remember who the doctor is and the purpose of the appointment. SW asked about contact with family. Per patient, she talks with daughter Christine Calabrese in a while\" and has not talked to daughter, Radha Elise.  Patient stated that Daysi To is helping with cost of rent. Patient denied needing assistance with food/meals. 4:45 pm - Contacted patient's friend - Luis Guido. He stated that only follow-up patient has is appointment at [address] [place] [address] and Facial Surgery ([address] [place] [address], [address]) on 04/05/22 at 1:50 pm.  Per Luis Guido, no follow-up requested by Dr. Yifan Jefferson. No concerns noted re: patient by friend. ROSA Plan:   Follow-up with PCP re: receipt of report from Neuropsychology and date of next PCP follow-up.      Camila Bejarano, MSW, ACSW, [facility]    Ambulatory Care Management   [phone]

## 2022-03-11 ENCOUNTER — PATIENT OUTREACH (OUTPATIENT)
Dept: CASE MANAGEMENT | Age: 72
End: 2022-03-11

## 2022-03-11 NOTE — PROGRESS NOTES
Social Work Note  3/11/2022    Message received from Dayana, patient's friend who visits patient daily. Returned call. Mr. Jorje Doherty stated that patient has an appointment with [facility] and the date has been changed. He requested assistance with transportation. Advised that SW can assist with transportation and teach Mr. Jorje Doherty and patient how to scheduled for future trips as well. Mr. Jorje Doherty stated that he will contact SW on Monday 3/14 when he is visiting patient to discuss.      Dallas Gaming, MSW, ACSW, [facility]    Ambulatory Care Management   [phone]

## 2022-03-14 ENCOUNTER — PATIENT OUTREACH (OUTPATIENT)
Dept: CASE MANAGEMENT | Age: 72
End: 2022-03-14

## 2022-03-14 NOTE — PROGRESS NOTES
Social Work Note  3/14/2022    Received message from patient. Returned call - VM left.     03/15/2022  Attempted to reach patient. VM left for return call.      JET Ayala, ACSW, [facility]    Ambulatory Care Management   [phone]

## 2022-03-16 ENCOUNTER — PATIENT OUTREACH (OUTPATIENT)
Dept: CASE MANAGEMENT | Age: 72
End: 2022-03-16

## 2022-03-17 NOTE — PROGRESS NOTES
Social Work Note      3/16/22  Received call from Nati Neal, friend of patient, with patient present. Mr. Niles Lyons requested assistance in locating new dentist as Pending sale to [facility] is not in patient's insurance network. Asked Mr. Yaw Cabrera to confirm insurance. Advised that patient does not have current insurance card on file. Patient's friend advised that patient has several cards in her purse including Original Medicare card (with Social Security number, not more recently issued card), [employer], [employer], [employer] Dual Complete Card, [employer] [place].  SW unable to determine current insurance, but consent provided to talk with care manager at [employer] 3/17/22  Contacted Rosalene Kanner, care manager with [facility] Medicaid [phone]. She advised that [employer] is secondary, but that patient is no longer in the [facility] program.  She is currently in the regular Medicaid program effective 3/1/22. Ms. Abdirahman Peguero stated that she received a message from Kia Camarillo at [employer] re: patient care coordination. Message left by  for Ms. Tori Green at [employer] ([phone]) to return call. Requested assistance with clarifying insurance. 03/18/22  Spoke with Kia Camarillo at [email or web address]. Confirmed that patient's primary insurance is through [employer] Complete. VY#[id number]. Member services number: [phone].       Plan:    · Assist patient in locating new in-network dental provider  · Scheduled PCP follow-up appointment as indicated    JET Swenson, ACSW, [facility]    Ambulatory Care Management   [phone]

## 2022-03-18 DIAGNOSIS — I10 ESSENTIAL HYPERTENSION: ICD-10-CM

## 2022-03-18 DIAGNOSIS — I48.20 CHRONIC A-FIB (HCC): ICD-10-CM

## 2022-03-18 PROBLEM — E78.5 HYPERLIPIDEMIA: Status: ACTIVE | Noted: 2020-04-30

## 2022-03-18 PROBLEM — R41.89 IMPAIRED INSIGHT: Status: ACTIVE | Noted: 2021-10-22

## 2022-03-18 PROBLEM — Z71.89 GOALS OF CARE, COUNSELING/DISCUSSION: Status: ACTIVE | Noted: 2020-05-01

## 2022-03-18 PROBLEM — Z91.199 NONCOMPLIANCE: Status: ACTIVE | Noted: 2021-10-22

## 2022-03-18 PROBLEM — I16.1 HYPERTENSIVE EMERGENCY: Status: ACTIVE | Noted: 2021-10-22

## 2022-03-18 PROBLEM — Z53.20 MAMMOGRAM DECLINED: Status: ACTIVE | Noted: 2018-05-22

## 2022-03-19 PROBLEM — I48.20 CHRONIC A-FIB (HCC): Status: ACTIVE | Noted: 2020-05-29

## 2022-03-19 PROBLEM — R05.9 COUGH: Status: ACTIVE | Noted: 2020-05-01

## 2022-03-19 PROBLEM — N25.81 HYPERPARATHYROIDISM, SECONDARY (HCC): Status: ACTIVE | Noted: 2017-07-11

## 2022-03-19 PROBLEM — N25.81 SECONDARY HYPERPARATHYROIDISM, RENAL (HCC): Status: ACTIVE | Noted: 2017-07-11

## 2022-03-19 PROBLEM — R45.851 PASSIVE SUICIDAL IDEATIONS: Status: ACTIVE | Noted: 2021-10-22

## 2022-03-19 PROBLEM — Z28.21 PNEUMOCOCCAL VACCINATION DECLINED: Status: ACTIVE | Noted: 2018-05-22

## 2022-03-19 PROBLEM — R41.0 CONFUSION: Status: ACTIVE | Noted: 2021-10-22

## 2022-03-19 PROBLEM — R51.9 CHRONIC INTRACTABLE HEADACHE: Status: ACTIVE | Noted: 2017-07-11

## 2022-03-19 PROBLEM — G89.29 CHRONIC INTRACTABLE HEADACHE: Status: ACTIVE | Noted: 2017-07-11

## 2022-03-19 PROBLEM — N18.9 CKD (CHRONIC KIDNEY DISEASE): Status: ACTIVE | Noted: 2017-07-11

## 2022-03-19 PROBLEM — J18.9 CAP (COMMUNITY ACQUIRED PNEUMONIA): Status: ACTIVE | Noted: 2020-04-30

## 2022-03-19 PROBLEM — R06.02 SOB (SHORTNESS OF BREATH): Status: ACTIVE | Noted: 2020-05-01

## 2022-03-19 PROBLEM — R53.83 FATIGUE: Status: ACTIVE | Noted: 2020-05-01

## 2022-03-19 PROBLEM — I50.32 DIASTOLIC CHF, CHRONIC (HCC): Status: ACTIVE | Noted: 2020-04-30

## 2022-03-19 PROBLEM — Z95.0 PACEMAKER: Status: ACTIVE | Noted: 2021-11-09

## 2022-03-19 PROBLEM — I48.91 ATRIAL FIBRILLATION WITH RVR (HCC): Status: ACTIVE | Noted: 2020-04-23

## 2022-03-19 RX ORDER — DILTIAZEM HYDROCHLORIDE 180 MG/1
CAPSULE, EXTENDED RELEASE ORAL
Qty: 30 CAPSULE | Refills: 5 | Status: SHIPPED | OUTPATIENT
Start: 2022-03-19 | End: 2022-09-19 | Stop reason: SDUPTHER

## 2022-03-20 PROBLEM — R80.9 PROTEINURIA: Status: ACTIVE | Noted: 2017-07-11

## 2022-03-20 PROBLEM — Z86.73 HISTORY OF CVA (CEREBROVASCULAR ACCIDENT): Status: ACTIVE | Noted: 2017-07-11

## 2022-03-20 PROBLEM — Z53.20 COLONOSCOPY REFUSED: Status: ACTIVE | Noted: 2018-05-22

## 2022-03-29 ENCOUNTER — PATIENT OUTREACH (OUTPATIENT)
Dept: CASE MANAGEMENT | Age: 72
End: 2022-03-29

## 2022-03-30 NOTE — PROGRESS NOTES
Social Work Note      03/29/2022  Received messages from patient and friend, Ryne Gentile. Returned call to patient. She stated that she needs transportation to dental appointment on 4/5/22. Attempted to explain process of scheduling Medicaid transportation. Patient had difficulty with word finding and requested that SW contact friend, Ryne Gentile to assist.     03/30/22  Contacted patient's friend, Ryne Gentile, per request.  He stated that patient needs transportation to dental appointment on Monday, 4/5/22. Provided instructions to friend for scheduling transportation: contact member services on back of patient's Medicaid card, provide date/time/location of appointment and  time for return trip. Patient's friend advised that patient received $450 electric bill and she does not know who has been paying her bills. Per friend, patient has not received electric bill in past.  Advised that SW is not aware that anyone other than patient is paying her bills. Advised of assistance available for electric bill. Friend stated that he noticed the contact information on the bill and will contact them with patient. 1:20 pm - SW contacted [address] (transportation with [address]) to confirm patient contact numbers on file as SW was previously contacted instead of patient. Updated patient's record to correct cell number of [phone].      ROSA Plan:   · Follow-up with patient re: dental appointment  · Explore community options for assistance with bills    JET Hernández, ACSW, [facility]    Ambulatory Care Management   [phone]

## 2022-04-07 ENCOUNTER — PATIENT OUTREACH (OUTPATIENT)
Dept: CASE MANAGEMENT | Age: 72
End: 2022-04-07

## 2022-04-07 NOTE — PROGRESS NOTES
Social Work Note  4/7/2022    Received message from patient. Returned call - VM left. 11:00 am  Received call from Gurpreet Faustin, Care Manager at [employer]. Patient verified with two identifiers. Ms. Georgie Hill stated that she spoke briefly with patient awhile back, but has had to leave messages recently. She stated that she is trying to complete annual assessment. Discussed patient status including difficulty with word finding and concern with medication compliance. Ms. Georgie Hill stated that she will continue to reach out to patient and will be in touch with patient's Medicaid coordinator, Aidan Dorman.      Maru Ruiz, MSW, ACSW, [facility]    Ambulatory Care Management   [phone]

## 2022-04-18 ENCOUNTER — PATIENT OUTREACH (OUTPATIENT)
Dept: CASE MANAGEMENT | Age: 72
End: 2022-04-18

## 2022-04-26 ENCOUNTER — PATIENT OUTREACH (OUTPATIENT)
Dept: CASE MANAGEMENT | Age: 72
End: 2022-04-26

## 2022-04-26 NOTE — PROGRESS NOTES
Social Work Note  4/26/2022    Call from patient:  Received call from patient. She stated \"I'm confused\" and attempted to convey her concern to SW, but had difficulty with word finding. Patient stated that it had to do with the \"department of service\". Patient passed phone to [facility], representative of [facility], and requested that SW speak with her. Information provided by [facility]:  · Rent relief is ending and patient will need to resume paying her rent  · Patient may have an increase in rent and patient's new agreement would be coming up for signature soon  · [facility] advised that she has worked for [employer] for 16 years and has been checking on patient. She voiced concern with patient's ability to manage. · Saundra requested that SW contact Ricci Rojo, manager at [facility] to discuss further. Spoke with patient after talking to [facility]. Discussed conversation with [facility] in simple terms. Patient stated that she only receives $730 in rent, current rent is $780 and she was told it would go up to $900. SW asked patient about contact with daughters and whether daughter, Delilah Ahuja, still assists with rent. Patient stated that she does not talk with her daughters and they usually hang up when she calls. She reported that daughter no longer helps with rent. Patient did not appear to understand implications of rent relief ending and increase in rent. Call - office of Yuriy Barksdale:  Spoke with Danna Padron at Dr. Wilner Suazo office. Requested copy of report/office note for patient's appointment in February. Danna Padron stated that he will follow-up with Dr. Kimberly Gallardo. He advised that it did not appear that patient completed any testing and may not have completed the intake. He stated that he will obtain information and send to Dr. Ronnie Gale. Call - Ricci Members, Manager of [facility]  ([phone])  [facility]'s situation.   Information provided by Mr. Chavo Renae:  · He stated that chen and Mina Shannon have been assisting patient with rent relief applications and patient has a pending application that was submitted and should cover previous balances. He stated that patient signed attestation to allow he and Mina Shannon to assist.  He voiced concern with patient's ability to understand situation/documents and stated that she would \"sign anything that was put in front of her\". · Per Mr. Chavo Renae, no renewal offers have been made at this time, but he wanted someone to be aware of patient's situation. He stated that the rent will be going up (he would not provide amount) and also advised that the residents will now be responsible for water, , and trash going forward. He stated that residents are currently on a month to month lease, so they can move out without breaking a lease. Per Mr. Chavo Renae, residents with outstanding balances will not have leases renewed and 30 day notice required for moving. · Mr. Chavo Renae stated that he and Mina Shannon are concerned that patient does not have funds to remain in apartment and concerned with patient's ability to safely live there and care for self.      SW Plan:  · Contact patient's daughters for assistance with housing, continued care    JET Grissom, ACSW, [facility]    Ambulatory Care Management   [phone]

## 2022-04-27 ENCOUNTER — PATIENT OUTREACH (OUTPATIENT)
Dept: CASE MANAGEMENT | Age: 72
End: 2022-04-27

## 2022-04-27 NOTE — PROGRESS NOTES
Social Work Note  4/27/2022      Attempted to reach patient's daughter, Jerald Martins.  left requesting return call to discuss patient. 2:35 pm  [address] Adult [place]IN Aggarwal. APS report filed due to concern for patient's capacity to manage financial issues such as day to day money management, insurance, rental lease agreements, bills. Advised    SW contact information provided. 3:25 pm  Updated Evin Vallejo, [facility] Care Coordinator, re: patient status.      SW Plan:  · Follow-up with patient re: housing   · Attempt to reach daughters for assistance  · Follow-up with APS    JET Wu, ACSW, [place]    Ambulatory Care Management   [phone]

## 2022-05-02 ENCOUNTER — PATIENT OUTREACH (OUTPATIENT)
Dept: CASE MANAGEMENT | Age: 72
End: 2022-05-02

## 2022-05-02 NOTE — PROGRESS NOTES
Social Work Note  5/2/2022    Received message from patient stating that she \"lost another tooth\". She appeared confused as to next steps. Returned call to patient. Patient stated that tooth broke off last night. Asked patient if she contacted her dentist.  Patient asked if SW contacted PCP. SW advised that patient needs to speak to dentist re: tooth. Encouraged her to ask friend, Tobin Mask, to assist with call and scheduling of appointment. Patient stated that she would call the dentist.     Patient also expressed that \"my whole world is falling apart\" but could not elaborate further. She stated that \"two girls are supposed to come do something\". SW with patient further and patient mentioned social workers from [employer]. [APS referral made last week]. SW encouraged patient to talk with SW about current situation including housing concerns. Patient stated that she would.      SW Plan:   · Follow-up with patient, Tony  · Attempt to reach daughters  · Follow-up with Lauren Holley, MSW, ACSW, [facility]    Ambulatory Care Management   [phone]

## 2022-05-12 ENCOUNTER — PATIENT OUTREACH (OUTPATIENT)
Dept: CASE MANAGEMENT | Age: 72
End: 2022-05-12

## 2022-05-12 DIAGNOSIS — R51.9 NONINTRACTABLE HEADACHE, UNSPECIFIED CHRONICITY PATTERN, UNSPECIFIED HEADACHE TYPE: ICD-10-CM

## 2022-05-12 NOTE — PROGRESS NOTES
Social Work Note  5/12/2022      · Follow-up call to patient:   11:15 am  - Follow-up call to patient. Patient observed by SW to be SOB upon answering phone. Patient stated that she was [place] a hard time breathing\" and stated it started approximately 1 hour ago. SW also observed patient coughing. Patient denied chest pain, swelling, or other body pain. Patient stated that she was \"trying to catch her cat\". Patient stated that she was \"fine\" and did not take her medications until on phone with Tisha Ibanez suggested evaluation by Jacinto Farley or ED evaluation if symptoms do not improved. Patient declined assistance with scheduling [facility] appointment. SOB appeared to improve slightly during conversation. Difficulty with word finding noted. Spoke with patient and asked if SW from [facility] has been to see her. She stated that two SW came to see her and want to \"take her to four houses\". Patient stated that she is \"not ready for all that\" and that her \"house is fine\". SW reminded patient that her rent is going up and her income is not enough to cover the cost.  Patient stated that she was going to stay and appeared to lack insight re: finances, change in rent, and possible eviction if rent is not paid. · Consult with RN  Consulted with Saniya Leung RN, ACM assigned to patient's PCP practice re: patient symptoms and history. · Call - PCP office  Spoke with Galen Villalpando re: need for follow-up appointment due to SOB.   Patient scheduled with Dr. Phyllis Vasquez  - 5/16/22  9:30 am.      · Call - Phill Dickerson ([address] transportation)  [phone] with "[employer]"    Transportation arranged:  Appointment:  5/16/22  9:30 am  :  8:30 am - 9:00 am  Return trip:  Patient to be picked up at office at 10:45 am.    (**If time needs to be moved back on day of appointment, please call [phone])  Reference number:  [id number]    · Call to patient and friend, Theodore Veras  Provided patient with appointment and transportation information. Confirmed patient understanding of information using [employer]back. Confirmed [facility] as pharmacy. She receives medications pre-packed. Contacted friend, Rudolph Banuelos and provided appointment and transportation information. He will remind patient of appointment. · Lauren APS  VM left with [facility] APS requesting return call to discuss coordination of services for patient.      SW Plan:   · Follow-up with APS  · Follow-up with patient after appointment    JET Resendiz, ACSW, [facility]    Ambulatory Care Management   [phone]

## 2022-05-13 ENCOUNTER — PATIENT OUTREACH (OUTPATIENT)
Dept: CASE MANAGEMENT | Age: 72
End: 2022-05-13

## 2022-05-13 NOTE — PROGRESS NOTES
Social Work Note  5/13/2022    Received return call from Nora at [address]. She advised that patient's assigned  is Asa Antunez ([phone]). Voicemail left for return call from Ms. Michelle Greenberg.       JET Langford, ACSW, [facility]    Ambulatory Care Management   [phone]

## 2022-05-16 ENCOUNTER — OFFICE VISIT (OUTPATIENT)
Dept: INTERNAL MEDICINE CLINIC | Age: 72
End: 2022-05-16
Payer: MEDICARE

## 2022-05-16 ENCOUNTER — PATIENT OUTREACH (OUTPATIENT)
Dept: CASE MANAGEMENT | Age: 72
End: 2022-05-16

## 2022-05-16 VITALS
DIASTOLIC BLOOD PRESSURE: 90 MMHG | OXYGEN SATURATION: 98 % | BODY MASS INDEX: 27.42 KG/M2 | SYSTOLIC BLOOD PRESSURE: 138 MMHG | WEIGHT: 136 LBS | HEART RATE: 74 BPM | HEIGHT: 59 IN | TEMPERATURE: 98.6 F

## 2022-05-16 DIAGNOSIS — N18.4 STAGE 4 CHRONIC KIDNEY DISEASE (HCC): ICD-10-CM

## 2022-05-16 DIAGNOSIS — E53.8 B12 DEFICIENCY: ICD-10-CM

## 2022-05-16 DIAGNOSIS — I48.20 CHRONIC A-FIB (HCC): ICD-10-CM

## 2022-05-16 DIAGNOSIS — R41.89 IMPAIRED INSIGHT: ICD-10-CM

## 2022-05-16 DIAGNOSIS — E03.9 ACQUIRED HYPOTHYROIDISM: ICD-10-CM

## 2022-05-16 DIAGNOSIS — Z95.0 PACEMAKER: ICD-10-CM

## 2022-05-16 DIAGNOSIS — N25.81 HYPERPARATHYROIDISM, SECONDARY (HCC): ICD-10-CM

## 2022-05-16 DIAGNOSIS — J30.89 SEASONAL ALLERGIC RHINITIS DUE TO OTHER ALLERGIC TRIGGER: ICD-10-CM

## 2022-05-16 DIAGNOSIS — R47.01 EXPRESSIVE APHASIA: ICD-10-CM

## 2022-05-16 DIAGNOSIS — E55.9 VITAMIN D DEFICIENCY: ICD-10-CM

## 2022-05-16 DIAGNOSIS — R73.9 HYPERGLYCEMIA: ICD-10-CM

## 2022-05-16 DIAGNOSIS — N18.4 CKD (CHRONIC KIDNEY DISEASE) STAGE 4, GFR 15-29 ML/MIN (HCC): ICD-10-CM

## 2022-05-16 DIAGNOSIS — Z53.20 MAMMOGRAM DECLINED: ICD-10-CM

## 2022-05-16 DIAGNOSIS — Z28.21 PNEUMOCOCCAL VACCINATION DECLINED: ICD-10-CM

## 2022-05-16 DIAGNOSIS — I69.322 DYSARTHRIA AS LATE EFFECT OF CEREBROVASCULAR ACCIDENT (CVA): ICD-10-CM

## 2022-05-16 DIAGNOSIS — R41.3 MEMORY CHANGES: ICD-10-CM

## 2022-05-16 DIAGNOSIS — Z53.20 COLONOSCOPY REFUSED: ICD-10-CM

## 2022-05-16 DIAGNOSIS — E78.00 PURE HYPERCHOLESTEROLEMIA: ICD-10-CM

## 2022-05-16 DIAGNOSIS — Z86.73 HISTORY OF CVA (CEREBROVASCULAR ACCIDENT): ICD-10-CM

## 2022-05-16 DIAGNOSIS — I50.32 DIASTOLIC CHF, CHRONIC (HCC): ICD-10-CM

## 2022-05-16 DIAGNOSIS — I10 ESSENTIAL HYPERTENSION: Primary | ICD-10-CM

## 2022-05-16 DIAGNOSIS — R73.02 IGT (IMPAIRED GLUCOSE TOLERANCE): ICD-10-CM

## 2022-05-16 PROCEDURE — G8432 DEP SCR NOT DOC, RNG: HCPCS | Performed by: INTERNAL MEDICINE

## 2022-05-16 PROCEDURE — G8754 DIAS BP LESS 90: HCPCS | Performed by: INTERNAL MEDICINE

## 2022-05-16 PROCEDURE — G8427 DOCREV CUR MEDS BY ELIG CLIN: HCPCS | Performed by: INTERNAL MEDICINE

## 2022-05-16 PROCEDURE — G8400 PT W/DXA NO RESULTS DOC: HCPCS | Performed by: INTERNAL MEDICINE

## 2022-05-16 PROCEDURE — 1101F PT FALLS ASSESS-DOCD LE1/YR: CPT | Performed by: INTERNAL MEDICINE

## 2022-05-16 PROCEDURE — G8752 SYS BP LESS 140: HCPCS | Performed by: INTERNAL MEDICINE

## 2022-05-16 PROCEDURE — G8536 NO DOC ELDER MAL SCRN: HCPCS | Performed by: INTERNAL MEDICINE

## 2022-05-16 PROCEDURE — 1090F PRES/ABSN URINE INCON ASSESS: CPT | Performed by: INTERNAL MEDICINE

## 2022-05-16 PROCEDURE — G9711 PT HX TOT COL OR COLON CA: HCPCS | Performed by: INTERNAL MEDICINE

## 2022-05-16 PROCEDURE — G8419 CALC BMI OUT NRM PARAM NOF/U: HCPCS | Performed by: INTERNAL MEDICINE

## 2022-05-16 PROCEDURE — 99215 OFFICE O/P EST HI 40 MIN: CPT | Performed by: INTERNAL MEDICINE

## 2022-05-16 NOTE — PROGRESS NOTES
RM 15    Chief Complaint   Patient presents with    Follow-up    Hypertension       Visit Vitals  BP (!) 166/90   Pulse 74   Temp 98.6 °F (37 °C)   Ht 4' 11\" (1.499 m)   Wt 136 lb (61.7 kg)   SpO2 98%   BMI 27.47 kg/m²       3 most recent PHQ Screens 11/9/2021   Little interest or pleasure in doing things Not at all   Feeling down, depressed, irritable, or hopeless Not at all   Total Score PHQ 2 0         1. Have you been to the ER, urgent care clinic since your last visit? Hospitalized since your last visit? No    2. Have you seen or consulted any other health care providers outside of the [address] [place] [address] since your last visit? Include any pap smears or colon screening. No    Health Maintenance Due   Topic Date Due    COVID-19 Vaccine (1) Never done    Pneumococcal 65+ years (1 - PCV) Never done    DTaP/Tdap/Td series (1 - Tdap) Never done    Colorectal Cancer Screening Combo  Never done    Breast Cancer Screen Mammogram  Never done    Bone Densitometry (Dexa) Screening  Never done       Learning Assessment 5/22/2018   PRIMARY LEARNER Patient   HIGHEST LEVEL OF EDUCATION - PRIMARY LEARNER  GRADUATED HIGH SCHOOL OR GED   BARRIERS PRIMARY LEARNER COGNITIVE   PRIMARY LANGUAGE ENGLISH   LEARNER PREFERENCE PRIMARY PICTURES   ANSWERED BY Quinn Gregory   RELATIONSHIP SELF         AVS  education, follow up, and recommendations provided and addressed with patient.   services used to advise patient -no

## 2022-05-16 NOTE — PROGRESS NOTES
Social Work Note  5/16/2022      8:35 am  Contacted patient. Reminded her of appointment, transportation, and to bring insurance cards. Patient voiced understanding. 05/18/22  Spoke with ROSA Pierce with [facility] APS. She advised that she visited patient in person on 4/29/22 and been in contact with her via phone since that time. APS worker spoke with patient about need to look for alternate housing as rent is increasing and is more than patient can afford. APS worker stated that she has contacted multiple senior living facilities but all have long waiting lists. She stated that she has attempted to reach daughter, Fanta Asher, without success. Provided contact information for patient's daughter, Michelle Santos. Ms. Ryann Aldana stated that she will attempt to reach her. SW advised that daughter's did provide assistance with rent in past, but SW uncertain as to when this arrangement ended. Advised that landlord has been assisting patient with rent relief applications to cover rent. This SW expressed concern with patient ability to manage finances and initiate search for new housing. Inquired about option of representative payee. APS worker to investigate options. Reviewed PCP visit notes. Will assist patient with coordination of follow-up appointments with Cardiology, Renal, and new appointment with Dr. Sherley Fierro (neuropsychology).      JET Koch, ACSW, [facility]    Ambulatory Care Management   [phone] 27-Dec-2021 20:47

## 2022-05-16 NOTE — PROGRESS NOTES
HPI:  established patient  Presents for f/u HTN, lipids, etc    Pt reports doing OK    C/o feeling tired and bored. Pt reports good med compliance   [facility] is packaging meds  Pt reports this arrangement has helped her with med compliance    Some RIVERS - unchanged    Pt reports home      Caring for 6 cats in her apartment. No neuropsych testing performed . Past medical, Social, and Family history reviewed    Prior to Admission medications    Medication Sig Start Date End Date Taking? Authorizing Provider   DILT- mg capsule TAKE ONE CAPSULE EVERY DAY. 3/19/22  Yes Napoleon Valladares MD   butalbital-acetaminophen-caffeine (FIORICET, ESGIC) -40 mg per tablet TAKE 1 TABLET BY MOUTH EVERY SIX HOURS AS NEEDED FOR HEADACHE 11/11/21  Yes Napoleon Valladares MD   levothyroxine (SYNTHROID) 100 mcg tablet Take 1 Tablet by mouth Daily (before breakfast). 10/13/21  Yes Napoleon Valladares MD   losartan (COZAAR) 25 mg tablet Take 1 Tablet by mouth daily. 10/12/21  Yes Napoleon Valladares MD   metoprolol succinate (TOPROL-XL) 50 mg XL tablet Take 1 Tablet by mouth daily. 10/12/21  Yes Napoleon Valladares MD   fluticasone propionate (FLONASE) 50 mcg/actuation nasal spray 2 Sprays by Both Nostrils route daily. 9/28/21  Yes Napoleon Valladares MD   Eliquis 5 mg tablet TAKE 1 TABLET BY MOUTH TWICE DAILY 9/22/21  Yes Napoleon Valladares MD   topiramate (TOPAMAX) 50 mg tablet TAKE 1 TABLET BY MOUTH EVERY NIGHT 9/22/21  Yes Napoleon Valladares MD   traZODone (DESYREL) 50 mg tablet TAKE 1 TABLET BY MOUTH EVERY NIGHT 9/22/21  Yes Napoleon Valladares MD   pravastatin (PRAVACHOL) 80 mg tablet TAKE 1 TABLET BY MOUTH DAILY 9/16/21  Yes Napoleon Valladares MD   pantoprazole (PROTONIX) 40 mg tablet TAKE 1 TABLET BY MOUTH DAILY BEFORE BREAKFAST 9/16/21  Yes Napoleon Valladares MD   ipratropium-albuteroL (Combivent Respimat)  mcg/actuation inhaler Take 1 Puff by inhalation every six (6) hours.  3/10/21  Yes Osmin Ricketts Karley Narvaez MD   albuterol (PROVENTIL HFA, VENTOLIN HFA, PROAIR HFA) 90 mcg/actuation inhaler Take 2 Puffs by inhalation every four (4) hours as needed for Shortness of Breath or Respiratory Distress. 3/10/21  Yes Peyton Segura MD   loratadine (Claritin) 10 mg tablet Take 1 Tablet by mouth daily. Patient not taking: Reported on 11/9/2021 9/28/21   Peyton Segura MD   cholecalciferol (VITAMIN D3) (1000 Units /25 mcg) tablet Take 2 Tabs by mouth daily. Patient not taking: Reported on 5/16/2022 5/12/21   Peyton Segura MD   aspirin delayed-release (ADULT LOW DOSE ASPIRIN) 81 mg tablet Take 1 Tab by mouth daily. Patient not taking: Reported on 5/16/2022 3/26/19   Peyton Segura MD          ROS  Complete ROS reviewed and negative or stable except as noted in HPI. Physical Exam  Vitals and nursing note reviewed. Constitutional:       General: She is not in acute distress. HENT:      Head: Normocephalic and atraumatic. Eyes:      General: No scleral icterus. Pupils: Pupils are equal, round, and reactive to light. Neck:      Comments: No bruits  Cardiovascular:      Rate and Rhythm: Normal rate. Rhythm irregular. Heart sounds: Normal heart sounds. No murmur heard. No friction rub. No gallop. Comments: Mild tenderness to pacemaker incision - right anterior chest.  Pulmonary:      Effort: Pulmonary effort is normal. No respiratory distress. Breath sounds: Normal breath sounds. No stridor. No wheezing, rhonchi or rales. Abdominal:      General: There is no distension. Palpations: Abdomen is soft. Tenderness: There is no abdominal tenderness. Musculoskeletal:         General: Normal range of motion. Cervical back: Normal range of motion and neck supple. Skin:     General: Skin is warm. Findings: No rash. Neurological:      Mental Status: She is alert. Motor: No abnormal muscle tone. Comments:   +dysarthria. Poor word finding. Psychiatric:         Mood and Affect: Affect is tearful. Cognition and Memory: Memory is impaired. Comments: Poor insight            Prior labs reviewed. Assessment/Plan:    ICD-10-CM ICD-9-CM    1. Essential hypertension  I10 401.9 CBC WITH AUTOMATED DIFF      CBC WITH AUTOMATED DIFF   2. Stage 4 chronic kidney disease (HCC)  N18.4 585.4 REFERRAL TO NEPHROLOGY   3. CKD (chronic kidney disease) stage 4, GFR 15-29 ml/min (HCC)  N18.4 585.4 REFERRAL TO NEPHROLOGY   4. Diastolic CHF, chronic ([facility])  I50.32 428.32 REFERRAL TO CARDIOLOGY     428.0    5. Chronic a-fib ([facility])  I48.20 427.31 REFERRAL TO CARDIOLOGY   6. Hyperparathyroidism, secondary ([facility] [address]  N25.81 588.81    7. Seasonal allergic rhinitis due to other allergic trigger  J30.89 477.8    8. Hyperglycemia  R73.9 790.29    9. History of CVA (cerebrovascular accident)  Z86.73 V12.54    10. Acquired hypothyroidism  E03.9 244.9 TSH 3RD GENERATION      T4, FREE      T4, FREE      TSH 3RD GENERATION   11. Pure hypercholesterolemia  E78.00 923.1 CK      METABOLIC PANEL, COMPREHENSIVE      LIPID PANEL      LIPID PANEL      METABOLIC PANEL, COMPREHENSIVE      CK   12. Vitamin D deficiency  E55.9 268.9 VITAMIN D, 25 HYDROXY      VITAMIN D, 25 HYDROXY   13. B12 deficiency  E53.8 266.2 VITAMIN B12      VITAMIN B12   14. Impaired insight  G31.84 331.83 REFERRAL TO NEUROPSYCHOLOGY   15. Pacemaker  Z95.0 V45.01 REFERRAL TO CARDIOLOGY   16. Dysarthria as late effect of cerebrovascular accident (CVA)  O24.791 438.13    17. Memory changes  R41.3 780.93 REFERRAL TO NEUROPSYCHOLOGY   18. Expressive aphasia  R47.01 784.3    19. Mammogram declined  Z53.20 V64.2    20. Pneumococcal vaccination declined  Z28.21 V64.06    21. Colonoscopy refused  Z53.20 V64.2    22.  IGT (impaired glucose tolerance)  R73.02 790.22 HEMOGLOBIN A1C WITH EAG      HEMOGLOBIN A1C WITH EAG     Follow-up and Dispositions    · Return in about 3 months (around 8/16/2022), or if symptoms worsen or fail to improve, for blood pressure, thyroid. results and schedule of future studies reviewed with patient  reviewed diet and weight   cardiovascular risk and specific lipid/LDL goals reviewed  reviewed medications and side effects in detail    Pt declines screenings, HM items  Needs to follow up with Cardiology re: Afib and pacemaker  Needs Renal follow up as well. Labs today  Continue current medications   Needs APS review and clarify resources for pt's residence since rent is going up and pt's income is fixed and she would not be able to afford the current apartment, etc.  Ref to [facility] neuropsych - Dr. Nathaniel Soler. On this date 05/16/2022 I have spent 45 minutes reviewing previous notes, test results and face to face with the patient discussing the diagnosis and importance of compliance with the treatment plan as well as documenting on the day of the visit. An electronic signature was used to authenticate this note.   -- Ferny Ramirez MD

## 2022-05-17 ENCOUNTER — TELEPHONE (OUTPATIENT)
Dept: INTERNAL MEDICINE CLINIC | Age: 72
End: 2022-05-17

## 2022-05-17 LAB
25(OH)D3+25(OH)D2 SERPL-MCNC: 34.7 NG/ML (ref 30–100)
ALBUMIN SERPL-MCNC: 4.5 G/DL (ref 3.7–4.7)
ALBUMIN/GLOB SERPL: 1.7 {RATIO} (ref 1.2–2.2)
ALP SERPL-CCNC: 136 IU/L (ref 44–121)
ALT SERPL-CCNC: 9 IU/L (ref 0–32)
AST SERPL-CCNC: 14 IU/L (ref 0–40)
BASOPHILS # BLD AUTO: 0.1 X10E3/UL (ref 0–0.2)
BASOPHILS NFR BLD AUTO: 1 %
BILIRUB SERPL-MCNC: 0.5 MG/DL (ref 0–1.2)
BUN SERPL-MCNC: 20 MG/DL (ref 8–27)
BUN/CREAT SERPL: 12 (ref 12–28)
CALCIUM SERPL-MCNC: 9.8 MG/DL (ref 8.7–10.3)
CHLORIDE SERPL-SCNC: 109 MMOL/L (ref 96–106)
CHOLEST SERPL-MCNC: 130 MG/DL (ref 100–199)
CK SERPL-CCNC: 54 U/L (ref 32–182)
CO2 SERPL-SCNC: 16 MMOL/L (ref 20–29)
CREAT SERPL-MCNC: 1.71 MG/DL (ref 0.57–1)
EOSINOPHIL # BLD AUTO: 0.2 X10E3/UL (ref 0–0.4)
EOSINOPHIL NFR BLD AUTO: 2 %
ERYTHROCYTE [DISTWIDTH] IN BLOOD BY AUTOMATED COUNT: 14.4 % (ref 11.7–15.4)
EST. AVERAGE GLUCOSE BLD GHB EST-MCNC: 123 MG/DL
GLOBULIN SER CALC-MCNC: 2.6 G/DL (ref 1.5–4.5)
GLUCOSE SERPL-MCNC: 109 MG/DL (ref 65–99)
HBA1C MFR BLD: 5.9 % (ref 4.8–5.6)
HCT VFR BLD AUTO: 33.4 % (ref 34–46.6)
HDLC SERPL-MCNC: 50 MG/DL
HGB BLD-MCNC: 10.9 G/DL (ref 11.1–15.9)
IMM GRANULOCYTES # BLD AUTO: 0 X10E3/UL (ref 0–0.1)
IMM GRANULOCYTES NFR BLD AUTO: 0 %
LDLC SERPL CALC-MCNC: 62 MG/DL (ref 0–99)
LYMPHOCYTES # BLD AUTO: 1.5 X10E3/UL (ref 0.7–3.1)
LYMPHOCYTES NFR BLD AUTO: 23 %
MCH RBC QN AUTO: 28.6 PG (ref 26.6–33)
MCHC RBC AUTO-ENTMCNC: 32.6 G/DL (ref 31.5–35.7)
MCV RBC AUTO: 88 FL (ref 79–97)
MONOCYTES # BLD AUTO: 0.8 X10E3/UL (ref 0.1–0.9)
MONOCYTES NFR BLD AUTO: 12 %
NEUTROPHILS # BLD AUTO: 4.1 X10E3/UL (ref 1.4–7)
NEUTROPHILS NFR BLD AUTO: 62 %
PLATELET # BLD AUTO: 265 X10E3/UL (ref 150–450)
POTASSIUM SERPL-SCNC: 4.3 MMOL/L (ref 3.5–5.2)
PROT SERPL-MCNC: 7.1 G/DL (ref 6–8.5)
RBC # BLD AUTO: 3.81 X10E6/UL (ref 3.77–5.28)
SODIUM SERPL-SCNC: 142 MMOL/L (ref 134–144)
T4 FREE SERPL-MCNC: 2.57 NG/DL (ref 0.82–1.77)
TRIGL SERPL-MCNC: 97 MG/DL (ref 0–149)
TSH SERPL DL<=0.005 MIU/L-ACNC: 0.01 UIU/ML (ref 0.45–4.5)
VIT B12 SERPL-MCNC: 488 PG/ML (ref 232–1245)
VLDLC SERPL CALC-MCNC: 18 MG/DL (ref 5–40)
WBC # BLD AUTO: 6.6 X10E3/UL (ref 3.4–10.8)

## 2022-05-17 NOTE — TELEPHONE ENCOUNTER
----- Message from JET Mendenhall sent at 5/16/2022  8:38 AM EDT -----  Regarding: Dr. Monika Chacon' 9:30 am appointment  Marty Segura morning! Ms. Delbert Amador is coming in to see Dr. Monika Chacon at 9:30 am this morning and I need some help with two things. 1.  Can someone have her sign an updated HIPAA? She needs to add her daughters and her friend Krystal Garcia so that the ambulatory care management team can talk with them to coordinate care. 2.  Can someone make sure that they get her updated insurance cards? She may not know which ones they are, but she has [employer] Dual Complete (Medicare and Medicaid). Thank you for your help -  I REALLY appreciate it! Ms. Delbert Amador has not been in for an appointment recently and I am working with Adult AUSTIN Aggarwal, so this will be very helpful. Please call with any questions!     Sidney Cooley  [phone]

## 2022-05-23 ENCOUNTER — PATIENT OUTREACH (OUTPATIENT)
Dept: CASE MANAGEMENT | Age: 72
End: 2022-05-23

## 2022-05-24 NOTE — PROGRESS NOTES
Social Work Note  5/24/2022    Chart reviewed following PCP visit. Received message from Basilia Bernardo 12. Returned call. Advised of recent PCP follow-up and APS visit. She will continue to monitor patient as well. Attempted to reach patient to discuss follow-up appointments recommended by PCP. VM left requesting return call.      Julian Bar, MSW, ACSW, [facility]    Ambulatory Care Management   [phone]

## 2022-05-24 NOTE — TELEPHONE ENCOUNTER
Medication refill authorized. Quality 111:Pneumonia Vaccination Status For Older Adults: Pneumococcal vaccine administered on or after patient’s 60th birthday and before the end of the measurement period Quality 47: Advance Care Plan: Advance Care Planning discussed and documented in the medical record; patient did not wish or was not able to name a surrogate decision maker or provide an advance care plan. Quality 110: Preventive Care And Screening: Influenza Immunization: Influenza Immunization Administered during Influenza season Detail Level: Detailed Quality 130: Documentation Of Current Medications In The Medical Record: Current Medications Documented

## 2022-06-01 DIAGNOSIS — E03.9 ACQUIRED HYPOTHYROIDISM: ICD-10-CM

## 2022-06-01 RX ORDER — LEVOTHYROXINE SODIUM 88 UG/1
88 TABLET ORAL
Qty: 90 TABLET | Refills: 1 | Status: SHIPPED | OUTPATIENT
Start: 2022-06-01

## 2022-06-01 NOTE — PROGRESS NOTES
Please notify pt of results    Thyroid dose appears too high. Reduce to 88 mcg per day. Repeat labs at 3 month follow up appt.   Other labs are either normal or stable and at goal.  Continue other current medications

## 2022-06-02 NOTE — PROGRESS NOTES
Called and discussed lab results with pt. Advised that new script for lower dose thryroid med was sent to pharm. Understanding was expressed by pt. Requested that writer call her back in a couple hours to schedule 3 month follow up apt. Will call pt back later this morning.

## 2022-06-02 NOTE — PROGRESS NOTES
Pt scheduled for 3 month follow up.      Future Appointments  9/6/2022   9:00 AM    Gisela Mcrae MD [facility]                BS AMB  10/4/2022  11:00 AM   Tonio Jones                BS AMB

## 2022-06-14 DIAGNOSIS — R51.9 NONINTRACTABLE HEADACHE, UNSPECIFIED CHRONICITY PATTERN, UNSPECIFIED HEADACHE TYPE: ICD-10-CM

## 2022-06-14 DIAGNOSIS — G47.00 INSOMNIA, UNSPECIFIED TYPE: ICD-10-CM

## 2022-06-14 DIAGNOSIS — E78.00 PURE HYPERCHOLESTEROLEMIA: ICD-10-CM

## 2022-06-14 RX ORDER — TRAZODONE HYDROCHLORIDE 50 MG/1
TABLET ORAL
Qty: 90 TABLET | Refills: 1 | Status: SHIPPED | OUTPATIENT
Start: 2022-06-14

## 2022-06-14 RX ORDER — TOPIRAMATE 50 MG/1
50 TABLET, FILM COATED ORAL
Qty: 90 TABLET | Refills: 1 | Status: SHIPPED | OUTPATIENT
Start: 2022-06-14

## 2022-06-14 RX ORDER — PRAVASTATIN SODIUM 80 MG/1
80 TABLET ORAL DAILY
Qty: 90 TABLET | Refills: 1 | Status: SHIPPED | OUTPATIENT
Start: 2022-06-14

## 2022-06-23 ENCOUNTER — PATIENT OUTREACH (OUTPATIENT)
Dept: CASE MANAGEMENT | Age: 72
End: 2022-06-23

## 2022-06-23 NOTE — PROGRESS NOTES
Social Work Note  6/23/2022    4:20 pm  Received call from patient who stated that she thought she might have a \"bladder\" infection. Patient stated that she has not urinated more than a \"dribbles\" over the past few days. Patient reported drinking some water. Patient stated that she has not had difficulty with bowel movements. SW consulted Shasta Astorga RN, ACM. Patient history and current symptoms reviewed. ED visit recommended. 4:40 pm  SW contacted patient to advise of nursing recommendation. Patient stated that she drank a few more cups of water and urinated a '\"little bit\". Asked about water intake. Patient could not provide detailed description of intake, but stated she was drinking \"2 or 3 bottles\" and now she is trying to drink \"5-6 cups\". When asked, patient stated that she has had trouble urinating for 5 days. Asked about pain. Patient stated that the bladder pain is \"not as bad now\". Patient denied current swelling, but indicated she had some in the past.  Patient voiced that she has had some SOB. SW instructed patient to contact 911 for evaluation in emergency room. SW asked about friend, Radha Sauceda who assists her. She stated that he is currently out of town on vacation. SW offered to assist with contacting 911. Patient stated that she would call. Patient asked about transport home. SW advised that hospital could arrange transport home if needed.      ROSA Plan:    · Follow-up with patient re: ED visit    JET Resendiz, ACSW, [facility]    Ambulatory Care Management   [phone]

## 2022-06-24 ENCOUNTER — PATIENT OUTREACH (OUTPATIENT)
Dept: CASE MANAGEMENT | Age: 72
End: 2022-06-24

## 2022-06-24 NOTE — PROGRESS NOTES
6/24/2022   Follow up  This ROSA is covering for pt's assigned JET Estrada.  SW contacted patient for follow up from previous call to inquire about outcome of ED evaluation. Pt stated she did not contact 911 for ED evaluation because she is feeling a little better today than yesterday. She continues to drink water and hot tea. She reported having frequent urination with increased amounts of urine. She denied having any bladder pain and swelling. She indicated that she would contact 911 for ED evaluation if needed. ROSA updated Madison Figueroa on information reported by patient and advised a follow up call for further assessment. Per Priscila Jackson, she provided pt with contact information to [facility] if needed. Pt requested that ACM contact her next week for further assessment. SW will follow up at later date. Matthew Curtis [place] [address] Ambulatory Care Coordination Team  Phone: [phone]             6/23/2022     4:20 pm  Received call from patient who stated that she thought she might have a \"bladder\" infection. Patient stated that she has not urinated more than a \"dribbles\" over the past few days. Patient reported drinking some water. Patient stated that she has not had difficulty with bowel movements.      SW consulted Delores Cross RN, ACM. Patient history and current symptoms reviewed. ED visit recommended.      4:40 pm  SW contacted patient to advise of nursing recommendation. Patient stated that she drank a few more cups of water and urinated a '\"little bit\". Asked about water intake. Patient could not provide detailed description of intake, but stated she was drinking \"2 or 3 bottles\" and now she is trying to drink \"5-6 cups\". When asked, patient stated that she has had trouble urinating for 5 days. Asked about pain. Patient stated that the bladder pain is \"not as bad now\".   Patient denied current swelling, but indicated she had some in the past.  Patient voiced that she has had some SOB. SW instructed patient to contact 911 for evaluation in emergency room. SW asked about friend, Mak Jackson who assists her. She stated that he is currently out of town on vacation. SW offered to assist with contacting 911. Patient stated that she would call. Patient asked about transport home.   SW advised that hospital could arrange transport home if needed.      ROSA Plan:    · Follow-up with patient re: ED visit     Libra Maldonado MSW, ACSW, [facility]    Ambulatory Care Management   [phone]

## 2022-06-24 NOTE — PROGRESS NOTES
Ambulatory Care Management Note    Date/Time:  6/24/2022 3:44 PM    This patient was received as a referral from . Ambulatory Care Manager outreached to patient today to offer care management services. Introduction to self and role of care manager provided. Patient accepted care management services at this time. Follow up call scheduled at this time. Patient has Ambulatory Care Manager's contact number for for any questions or concerns.  [place] (covering  for [employer]) asked this ACM to check on pt. Pt states she is urinating more often and states she is having more output. She denies any burning or smell at this time, though she did have these symptoms 2 days ago; states her urine is a clear yellow. She denies any abd tenderness or fullness, she said her abd is smaller now, she said she feels like she is emptying her bladder completely. Gave her number and information for [facility], told her to pin this to her refrigerator to keep handy; gave her the hours of operation as well. She states she is drinking plenty of water though she couldn't say how much. She did admit that she has not eaten in 2 days, she did say she has plenty of food and she has a pot pie in the oven. She said she does go through spells like this where she just is not hungry. Suggested during these times to drink Ensure/Boost/Lucien Instant Breakfast.    Will follow up with pt in 3-4 days.  -MLL

## 2022-06-27 ENCOUNTER — PATIENT OUTREACH (OUTPATIENT)
Dept: CASE MANAGEMENT | Age: 72
End: 2022-06-27

## 2022-06-27 NOTE — PROGRESS NOTES
Ambulatory Care Management Note    Date/Time:  6/27/2022 3:56 PM    This Ambulatory Care Manager (ACM) reviewed and updated the following screenings during this call; general assessment, self management assessment and SDOH assessments    Patient's challenges to self-management identified:   depression, functional cognitive ability, functional physical ability, lack of knowledge about disease, level of motivation, medical condition, PCP relationship, polypharmacy, support system and transportation      Medication Management:  good adherence and poor understanding    Advance Care Planning:   Does patient have an Advance Directive:  deferred til later call    [employer], Referrals, and Durable Medical Equipment: none      Health Maintenance Due   Topic Date Due    COVID-19 Vaccine (1) Never done    Pneumococcal 65+ years (1 - PCV) Never done    DTaP/Tdap/Td series (1 - Tdap) Never done    Colorectal Cancer Screening Combo  Never done    Breast Cancer Screen Mammogram  Never done    Bone Densitometry (Dexa) Screening  Never done     Health Maintenance Reviewed: yes. Pt admits she does not like to go to doctors. Pt still states she has no appetite, said she has not eaten anything today and has had 3 cups of water today. She states she is taking all of her medications. At first, she said her urine output has improved and she denied any burning, urgency, frequency, or foul smelling urine. Then she mentioned that she has had back pain for the past week. Discussed how this could be pain related to her kidneys/bladder infection. Pt then started to get very frustrated and had a hard time expressing what she wanted to say. She kept referring to me/this ACM and what I do, she wanted me to come to her house. I asked her about the conversation we had on Friday regarding [facility] and she said yes that she does want them to come and see her.  This ACM registered pt online for a [place] visit & [employer] [facility] called this ACM, spoke with Tez Agustin. Provided Tez Agustin with pt's Medicare number, scanned card 3/10/2021. Medicaid card was not valid & could not find valid card on file. Ivy Mckenzie spoke with pt to obtain consent and review policies. No available appointments today.  will visit pt b/t 2-4 pm tomorrow, Katharine. Pt asked this ACM to call her after their visit. Pt then started to ask this ACM about another card she has that she has not used,  [employer] stamp card? Told pt to call number on the card to inquire if it was [employer] [place] and if so how much is on the card. [address] to update her on events as well as inquiring about valid Medicaid card. This ACM will follow up with pt tomorrow after [facility] visit. Patient was asked to consider health care goals that they would like to focus on with this ACM. ACM will follow up with patient to discuss goals and establish care plan in the next 7-14 days.        PCP/Specialist follow up:   Future Appointments   Date Time Provider Macey Roa   9/6/2022  9:00 AM MD PARADISE Lucia AMB   10/4/2022 11:00 AM Tonio Bennett AMB

## 2022-06-28 ENCOUNTER — PATIENT OUTREACH (OUTPATIENT)
Dept: CASE MANAGEMENT | Age: 72
End: 2022-06-28

## 2022-06-28 NOTE — PROGRESS NOTES
06/28/22  1642-  [facility] currently seeing pt Isabell REEMA Kennedy called this ACM to ask patient's history. Per NP-  pt does have UTI & pt  did have to drink 2 bottles of water to be able to void. Provided NP with known history and recent labs as well as her weight. [address] [facility], they will be able to deliver prescribed antibiotic tomorrow  1658Isabell Kennedy NP called this ACM. She did administer Rocephin IM and e-scribed antibiotic to Cheri. 1705- Attempted to outreach to pt to see how pt was after her visit with [facility] and to reinforce education regarding treatment for UTI.   1713- Spoke with pt, she was taking a walk. Encouraged her to continue to drink water, she asked about gingerale. She admitted she has not eaten much today though she was getting ready to fix something. Told her she may take a tylenol if needed for aches/pains/fever. Will follow up with her tomorrow afternoon to discuss her antibiotic.  -MLL

## 2022-06-28 NOTE — PROGRESS NOTES
Social Work Note      06/27/22  · Patient status discussed with David Alston, RN, ACM. She advised that she contacted patient and assisted with [facility] appointment for 06/28/22. She advised that [facility] Farley had difficulty confirming insurance. Advised that patient has made several insurance changes. SW to confirm insurance coverage. · [address] and confirmed patient's primary coverage:   [employer] HMO (Medicare)    ID [id number]   Effective date 1/1/22   Contact number: [phone]    · Contacted Liberty Mccain, patient's Medicare Care Coordinator though [facility] Better Health Medicaid CCCP program.  Confirmed patient's secondary coverage:   [employer] Better Health Medicaid CCCP   ID [id number]       06/28/22  · [address] and updated patient's health insurance information for appointment this afternoon.  contact information provided. · [address] and [facility] and spoke with Customer Service. Updated patient's health insurance information. · VM left for Noa Hendrix ([phone]), patient's APS worker through [facility]. · 10 am - VM left for patient requesting return call. · [facility] to schedule patient with Dr. Halima Palacios per PCP request.  Per [facility] Confer, office needs labwork and office notes faxed to [facility] to review. They will then schedule patient. Message sent to Kaela Steiner at [facility] re: information needed for referral.     · Follow-up appointment scheduled with NP Shwetha Luz at [facility]:    07/13/22  11:15 am - [place] [facility], [address] [facility], [address]     Plan:   · Follow-up with APS re: status of involvement  · Follow-up with patient re: upcoming appointment, arrange transportation.     Benitez Gooden MSW, ACSW, [facility]    Ambulatory Care Management   [phone]

## 2022-06-29 ENCOUNTER — PATIENT OUTREACH (OUTPATIENT)
Dept: CASE MANAGEMENT | Age: 72
End: 2022-06-29

## 2022-06-29 NOTE — PROGRESS NOTES
Ambulatory Care Management Note      Date/Time:  6/29/2022 4:12 PM    This patient was received as a referral from . Top Challenges reviewed with the provider   · Diagnosed with UTI on 6/28 - 1 dose of IM Rocephin f/b Omnicef 300 mg daily for 10 days  · Poor appetite, pt reports not eating for days at a time  · Dehydration, had to drink couple bottles of water to produce urine sample for [facility]  · Memory concerns  · Dysarthia & difficulties with finding her words, leads to frustration             Ambulatory  contacted patient for discussion and case management of assist with managing UTI, food insecurities?, questionable safety concerns due to memory   Summary of patients top problems:   1. UTI - SW consulted with this ACM regarding pts symptoms. Symptoms progressed to include back pain and she was seen by [facility] yesterday, prescribed Omnicef 300 mg daily. Encouraged to drink plenty of fluids and eat small meals. Medication was delivered by [employer] to her mailbox today, told pt it was in her mailbox. Instructed her to start medication this evening with her evening meds. 2. Memory concerns - Pt is challenging to assess on the phone, she has a difficult time answering questions and becomes very frustrated with herself and her inability to find the words she wants to say. When this ACM is able to prompt her she is able to answer the question easier. She had difficult time reading her insurance cards; she has changed her coverage recently (SW updated this in EMR). She does have appoint with Dr Herber Brock in October. Donna Koehler helps pt out some; no family support. 3. Self care/eating/hydration - Pt has admitted several times to this ACM that she has not eaten in days, she said this is her normal, she said she does not have an appetite. She denies any food insecurities, said she has plenty of food.  She has been drinking water and some gingerale, though admits she is not drinking enough. Is her memory affecting her eating habits? Is her memory affecting her taking her medications? She does claim to take all of her meds daily (they are in pill packs). Apparently her rent is going up which she cannot afford. APS is already on board with SW, is she safe to live by herself? Patient's challenges to self management identified:   depression, financial, functional cognitive ability, lack of knowledge about disease, level of motivation, medical condition, medication management, multi health system providers, polypharmacy, support system and transportation      Medication Management:  poor adherence and poor understanding    Advance Care Planning:   Does patient have an Advance Directive:  has ACP docs, HCDMs need to be updated    [employer], Referrals, and Durable Medical Equipment: none    PCP/Specialist follow up:   Future Appointments   Date Time Provider Macey Roa   9/6/2022  9:00 AM Yu Gilliland MD [facility] BS AMB   10/4/2022 11:00 AM Tonio Holbrook BS AMB          Goals      Patient will receive supportive services in appropriate living environment      03/10/21  Transportation arrangements made on 03/08/21 for PCP appointment (3/10/21). Message left on patient's voicemail this am as reminder. AML    01/04/20  Assessment:  Transportation arrangements made through [facility] for PCP appt today. AML    10/02/20  Assessment:  Per patient, medications are now being delivered. Transportation arrangements made for 10/5/20 PCP appointment. AML    08/28/20  Assessment:  Call to patient's daughter for update on status of home delivery of medications. Transportation for appointment cannot be scheduled until 30 days out from appointment - will schedule after 09/05/20. AML    08/24/20  Assessment:  Patient is continuing to receive home health nursing through [facility]. Daughters have arranged grocery delivery through [employer].   Daughter Miley Freeman will be setting up home delivery of medications through [place]. Patient has transportation through [place] for medical appointments. SW Plan:    Follow up with daughter re: medication delivery  Schedule transportation for appointment on 10/5/20  Send directions to patient's daughters to for scheduling transportation. AML      06/26/20   Assessment:  Initial SW evaluation completed with patient's daughter, Louis Aguilar. She reported that patient requires assistance in the home to remain independent and has been open to Adult Protective Services in past.     SW Plan:  Complete SW evaluation with patient to determine needs and plan of care. AML       Takes all medications as ordered      06/29/22  Pt was diagnosed with UTI by Jacinto Farley yesterday. She has been symptoms for 1 week: urinary frequency, not emptying bladder all the way, back/flank pain, low grade fever. She was prescribed Omnicef 300 mg once daily for 10 days by Nicci Garcia, NP with [facility]. Instructed pt to take this once daily with her evening medications (usual meds in pill packs) until complete. Encouraged to drink plenty of water and eat small meals (appetite has been poor and reports eating very little). Pt will:  [employer] and take until complete (should finish on Sat Jul 9)   Drink 6-8 glassed water daily  This ACM will follow up with pt in 1 week. Pt does have this ACMs contact info. Patient verbalized understanding of all information discussed. Patient has this Ambulatory Care Manager's contact information for any further questions, concerns, or needs.

## 2022-07-06 ENCOUNTER — PATIENT OUTREACH (OUTPATIENT)
Dept: CASE MANAGEMENT | Age: 72
End: 2022-07-06

## 2022-07-07 ENCOUNTER — PATIENT OUTREACH (OUTPATIENT)
Dept: CASE MANAGEMENT | Age: 72
End: 2022-07-07

## 2022-07-07 NOTE — PROGRESS NOTES
07/07/22  ROSA Lovelace reached out to this ACM with concerns for pt regarding fluid retention. Attempted to outreach with pt, left message to return this ACM's call. -MLL    Goals Addressed                 This Visit's Progress     Patient verbalizes understanding of self-management of chronic disease. 07/08/22  Pt has had a CVA in the past and has had some mental decline with poor word finding. She gets easily frustrated when she is unable to verbalize what she is thinking. She does live by herself with no support of family, she does have a friend who checks on her and helps her out some. She does have an appoint with neuropsych in Oct, no previous testing noted. SW is also working with pt assisting with appoints/transportation as well as working with APS, pts rent is increasing and she may not be able to afford it. Her medications are prepared for her by Jeffrey Quiles in pill packets which she says she is compliant with. She is completing an antibiotic course for UTI and is on schedule to complete this tomorrow. She often tells this ACM that she is not hungry and has not eaten in a couple of days, she reports that she has been better with drinking fluids lately. She does like to walk outside in her neighborhood/complex. Pt currently has a sore/swollen left big toe. No further swelling or pain; she was unable to say if there is any bruising. She is not sure if she stubbed her toe or how she has hurt it. Advised her to not go walking while it is swollen. Discussed elevating foot on pillows while on the couch and applying ice over a small towel several times a day. Pt states her understanding and said she will do this. Pt will:   Elevate left foot 4-5 times a day until improved without swelling/tenderness   Use ice pack over towel on left foot 4-5 times a day until swelling and tenderness has improved  This ACM will follow up with pt in 1 week. Pt does have this ACM's contact info.  -MLL       COMPLETED: Takes all medications as ordered        07/08/22   Pt reports she has one more day of antibiotics to take   She states she is eating and drinking plenty of water  Pt denies any UTI symptoms and feels she is able to empty her bladder completely. -[facility]    06/29/22  Pt was diagnosed with UTI by [facility] yesterday. She has been symptoms for 1 week: urinary frequency, not emptying bladder all the way, back/flank pain, low grade fever. She was prescribed Omnicef 300 mg once daily for 10 days by Alex Russell NP with [facility]. Instructed pt to take this once daily with her evening medications (usual meds in pill packs) until complete. Encouraged to drink plenty of water and eat small meals (appetite has been poor and reports eating very little). Pt will:  [employer] and take until complete (should finish on Sat Jul 9)   Drink 6-8 glassed water daily  This ACM will follow up with pt in 1 week. Pt does have this ACMs contact info.  -[facility]

## 2022-07-07 NOTE — PROGRESS NOTES
Social Work Note  7/7/2022    · Call with patient  Follow-up call to patient on 7/6/22 -  left requesting return call. Additional call made today. Patient stated that she \"hurt her leg\", but could not state how she did it. Patient reported that she did not fall. Patient stated that she did not feel as well today and appeared to have increased difficulty with word finding. SW asked about pain, swelling. Patient reported that her \"toes\" were swelling and that when \"she walks more it goes away\". Patient stated issues started yesterday. She denied SOB and leg swelling. Advised patient of Cardiology appointment on 7/13/22 and that  will coordinate transportation. Patient advised that she has not heard from Dr. Ghislaine Rivera office re: appointment. SW also inquired as to status of patient's apartment/rent. Patient stated that she has not heard anything from her landlord. · Call - [place] Coordinator ([phone])  Spoke with Philip Martinez at transportation coordination office. Arranged transport for 7/13/22 appointment with Jr Macdonald NP at [facility]. Date: 7/13/22    time:  10:43 am  ( may arrive 15 minutes before or 15 minutes after)   Appointment time:  11:15 am   Return trip from office scheduled for:  12:30 pm  ( may arrive 15 minutes before or 15   minutes after)   Trip reference number:  [id number]    · Call from [place] (patient's friend) and patient  [place] contacted  to inquire about recent call. Advised of patient's appointment next week and transportation arrangements. [place] stated that he will make sure patient is ready. Asked [place] and patient to describe patient's leg concerns. Tony stated that patient's left big toe is swollen in addition to the instep. He stated that patient's ankles are not swollen. · Call - Cristiano Zaman, RN, ACM  Patient status discussed with Cristiano Zaman, RN, ACM. Advised of patient complaint related to toe. · Call - [facility]  Message left for Manpreet Escobar, patient's Adult Protective Services  through [facility]. Requested return call re: status of APS assistance with patient. SW Plan:  Follow-up to remind patient of transportation/appointment. Follow-up with APS.     JET Mendenhall, ACSW, [facility]    Ambulatory Care Management   [phone]

## 2022-07-18 ENCOUNTER — PATIENT OUTREACH (OUTPATIENT)
Dept: CASE MANAGEMENT | Age: 72
End: 2022-07-18

## 2022-07-18 NOTE — PROGRESS NOTES
Social Work Note  7/18/2022    Received call from patient. She stated that she needs to have her toe evaluated because it is still \"swollen\" and \"throbbing\". SW confirmed that it is patient's Right Big Toe, the toe throbs when walking, and is red. Patient stated she woke up with swollen toe 10 days ago. Patient status discussed with Laura Monique RN, ACM.     4:00 pm  [facility] referral placed through [address] [place] [address]. Awaiting return call. VM left for patient advising of referral.       4:46 pm  Received online notification that [address] attempted to reach patient, but patient did not answer. SW attempted to reach patient, voicemail left advising of [address] [place], [place] [address]jen[place]  attempt to call and requested that patient contact SW.      Tyson Norton, MSW, ACSW, [facility]    Ambulatory Care Management   [phone]

## 2022-07-22 ENCOUNTER — PATIENT OUTREACH (OUTPATIENT)
Dept: CASE MANAGEMENT | Age: 72
End: 2022-07-22

## 2022-07-22 NOTE — PROGRESS NOTES
07/22/22  Attempted to outreach with pt for CM follow up. Left message on both of her numbers to return this call.  -MLL

## 2022-07-22 NOTE — PROGRESS NOTES
Social Work Note  7/22/2022    Follow-up call to patient. She stated that she was at the store. Patient to contact  when she returns home. [place] [address] worker, Alex Balwindertraci ([phone]).  left requesting return call. Received return call from patient. Information discussed:  She stated that her toe is better and she stated that \"I'll manage\" and did not wish to be seen by provider  Asked about Cardiology appointment. Patient stated that she was not prescribed new medications and pacemaker is doing \"ok\". No follow-up appointment information provided. Asked patient about stats of apartment. She stated that \"everything's fine\". She reported that she has not heard anything further. Patient asked date of next appointment with PCP. Provided date for September and asked patient to talk with friend, Lord Ahuja, about setting up transportation to her appointment.  Plan: Will continue to monitor patient needs.    Follow-up with [place] APS Abundio Runner, JET, ACSW, [facility]    Ambulatory Care Management   [phone]

## 2022-07-27 ENCOUNTER — PATIENT OUTREACH (OUTPATIENT)
Dept: CASE MANAGEMENT | Age: 72
End: 2022-07-27

## 2022-07-27 NOTE — PROGRESS NOTES
07/27/22  Attempted to outreach with pt for CM follow up. Left message to return this call on both of her numbers.  -MLL

## 2022-08-16 ENCOUNTER — PATIENT OUTREACH (OUTPATIENT)
Dept: CASE MANAGEMENT | Age: 72
End: 2022-08-16

## 2022-08-16 NOTE — PROGRESS NOTES
Goals Addressed                   This Visit's Progress     Patient verbalizes understanding of self-management of chronic disease. On track     08/16/22  Pt states she is \"fine\" today and has no concerns  States her food is healed  Denies any further UTI symptoms  States she is eating well and drinking plenty of fluids  Need to establish care with new PCP  Pt unable to talk at this time, will follow up with her in 1 week. -MLL    07/08/22  Pt has had a CVA in the past and has had some mental decline with poor word finding. She gets easily frustrated when she is unable to verbalize what she is thinking. She does live by herself with no support of family, she does have a friend who checks on her and helps her out some. She does have an appoint with neuropsych in Oct, no previous testing noted. SW is also working with pt assisting with appoints/transportation as well as working with APS, pts rent is increasing and she may not be able to afford it. Her medications are prepared for her by Baljinder Valadez in pill packets which she says she is compliant with. She is completing an antibiotic course for UTI and is on schedule to complete this tomorrow. She often tells this ACM that she is not hungry and has not eaten in a couple of days, she reports that she has been better with drinking fluids lately. She does like to walk outside in her neighborhood/complex. Pt currently has a sore/swollen left big toe. No further swelling or pain; she was unable to say if there is any bruising. She is not sure if she stubbed her toe or how she has hurt it. Advised her to not go walking while it is swollen. Discussed elevating foot on pillows while on the couch and applying ice over a small towel several times a day. Pt states her understanding and said she will do this.   Pt will:  Elevate left foot 4-5 times a day until improved without swelling/tenderness  Use ice pack over towel on left foot 4-5 times a day until swelling and tenderness has improved  This ACM will follow up with pt in 1 week. Pt does have this ACM's contact info. -MGP           0424- Spoke with Fidel Barthel, SW regarding establishing care with new PCP. Canceled 9/6 appointment with Dr Dustin James and scheduled appointment with Dr Manda Mandujano for 11/16. Pt called and notified; she does not wish to discuss transportation at this time or even write appointment down. She was thankful for the assistance.

## 2022-08-17 DIAGNOSIS — I48.20 CHRONIC A-FIB (HCC): ICD-10-CM

## 2022-08-17 DIAGNOSIS — I10 ESSENTIAL HYPERTENSION: ICD-10-CM

## 2022-08-17 DIAGNOSIS — I48.91 ATRIAL FIBRILLATION WITH RVR (HCC): Primary | ICD-10-CM

## 2022-08-17 NOTE — TELEPHONE ENCOUNTER
Pt has an appointment with Dr. Ludy Crowder on 11/16/2022. Last visit 05/16/2022 MD Johnny Anton   Next appointment 11/16/2022 MD Laura Day   Previous refill encounter(s)   09/22/2021 Eliquis #180 with 1 refill. For Pharmacy Admin Tracking Only    Intervention Detail: New Rx: 1, reason: Patient Preference  Time Spent (min): 5        Requested Prescriptions     Pending Prescriptions Disp Refills    apixaban (Eliquis) 5 mg tablet 180 Tablet 0     Sig: Take 1 Tablet by mouth two (2) times a day.

## 2022-08-31 ENCOUNTER — PATIENT OUTREACH (OUTPATIENT)
Dept: CASE MANAGEMENT | Age: 72
End: 2022-08-31

## 2022-09-01 ENCOUNTER — PATIENT OUTREACH (OUTPATIENT)
Dept: CASE MANAGEMENT | Age: 72
End: 2022-09-01

## 2022-09-01 NOTE — PROGRESS NOTES
Social Work Note  9/1/2022    Follow-up call to patient. She stated that she is doing \"fine\" and is walking to grocery store with friend, Aura Higuera. Advised patient that transportation to new PCP appointments needs to be discussed. Patient stated that she will contact SW when she returns home. 12:55 pm  VM left for Armanda Krabbe, APS worker with [facility]. Requested return call re: status of APS involvement with patient. Patient status discussed with Whit Curry RN, ACM.      Brock Govea MSW, ACSW, [facility]    Ambulatory Care Management   [phone]

## 2022-09-08 ENCOUNTER — PATIENT OUTREACH (OUTPATIENT)
Dept: CASE MANAGEMENT | Age: 72
End: 2022-09-08
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
## 2022-09-14 ENCOUNTER — PATIENT OUTREACH (OUTPATIENT)
Dept: CASE MANAGEMENT | Age: 72
End: 2022-09-14

## 2022-09-14 NOTE — PROGRESS NOTES
Goals Addressed                   This Visit's Progress     Patient verbalizes understanding of self-management of chronic disease. On track     09/14/22  Pt reports she is having some dental issues, has at least 4 loose teeth  Spoke with friend Yesenia Linda and gave him names of 3 low cost/SS dental clinics  [facility] [address] to call pt back to let her know that the info was shared with Tony  This ACM will follow up with pt in 7-10 days. -[facility]    08/16/22  Pt states she is \"fine\" today and has no concerns  States her food is healed  Denies any further UTI symptoms  States she is eating well and drinking plenty of fluids  Need to establish care with new PCP  Pt unable to talk at this time, will follow up with her in 1 week. -[facility]    07/08/22  Pt has had a CVA in the past and has had some mental decline with poor word finding. She gets easily frustrated when she is unable to verbalize what she is thinking. She does live by herself with no support of family, she does have a friend who checks on her and helps her out some. She does have an appoint with neuropsych in Oct, no previous testing noted. SW is also working with pt assisting with appoints/transportation as well as working with APS, pts rent is increasing and she may not be able to afford it. Her medications are prepared for her by Mary Mendoza in pill packets which she says she is compliant with. She is completing an antibiotic course for UTI and is on schedule to complete this tomorrow. She often tells this ACM that she is not hungry and has not eaten in a couple of days, she reports that she has been better with drinking fluids lately. She does like to walk outside in her neighborhood/complex. Pt currently has a sore/swollen left big toe. No further swelling or pain; she was unable to say if there is any bruising. She is not sure if she stubbed her toe or how she has hurt it.  Advised her to not go walking while it is swollen. Discussed elevating foot on pillows while on the couch and applying ice over a small towel several times a day. Pt states her understanding and said she will do this. Pt will:  Elevate left foot 4-5 times a day until improved without swelling/tenderness  Use ice pack over towel on left foot 4-5 times a day until swelling and tenderness has improved  This ACM will follow up with pt in 1 week. Pt does have this ACM's contact info.  -MLL

## 2022-09-19 DIAGNOSIS — I48.20 CHRONIC A-FIB (HCC): ICD-10-CM

## 2022-09-19 DIAGNOSIS — I10 ESSENTIAL HYPERTENSION: ICD-10-CM

## 2022-09-19 NOTE — TELEPHONE ENCOUNTER
Pt has an appointment with Dr. Laurie Estevez on 11/16/2022. Last visit 05/16/2022 MD Torres Steiner   Next appointment 11/16/2022 MD Yogesh De La Torre   Previous refill encounter(s)   09/16/2021 Protonix #90 with 1 refill,   03/19/2022 Dilt-XR #30 with 5 refills. For Pharmacy Admin Tracking Only    Intervention Detail: New Rx: 2, reason: Patient Preference  Time Spent (min): 5      Requested Prescriptions     Pending Prescriptions Disp Refills    dilTIAZem ER (DILT-XR) 180 mg capsule 90 Capsule 0     Sig: TAKE ONE CAPSULE EVERY DAY. pantoprazole (PROTONIX) 40 mg tablet 90 Tablet 0     Sig: Take 1 Tablet by mouth Daily (before breakfast).

## 2022-09-21 RX ORDER — DILTIAZEM HYDROCHLORIDE 180 MG/1
CAPSULE, EXTENDED RELEASE ORAL
Qty: 90 CAPSULE | Refills: 0 | Status: SHIPPED | OUTPATIENT
Start: 2022-09-21

## 2022-09-21 RX ORDER — PANTOPRAZOLE SODIUM 40 MG/1
40 TABLET, DELAYED RELEASE ORAL
Qty: 90 TABLET | Refills: 0 | Status: SHIPPED | OUTPATIENT
Start: 2022-09-21

## 2022-09-29 ENCOUNTER — PATIENT OUTREACH (OUTPATIENT)
Dept: CASE MANAGEMENT | Age: 72
End: 2022-09-29

## 2022-09-29 NOTE — PROGRESS NOTES
Social Work Note  9/29/2022     Goals Addressed                      This Visit's Progress       General      Attend Neuropsychology appointments (pt-stated)         09/29/22  Follow-up call to patient to discuss appointment with Dr. Wale Duncan on 10/4/22. Reviewed patient appointment time and purpose of appointment. Inquired whether friend, Yessica Seats, could attend with her. Patient stated that Yessica Seats was unavailable as he is currently hospitalized. Patient does not have anyone to attend appointment with her or make transportation arrangements for appointments. Transportation arranged for appointment with Dr. Wale Duncan 10/4/22:  [facility] up time:  9:58 am - 10:20 am  Arrival time:  10:45 am   Appointment: 11:00 am  Return trip home: 1:00 pm  Reference number: [id number]  If questions regarding transportation, call insurance: [phone] left for patient with directions/instructions for transportation on 10/4/22. SW requested return call to confirm receipt of information. Contacted Catalino Yang at [address], message left re: appointment on 10/4/22. SW Plan:  Follow-up with patient to confirm receipt of transportation information.  DIPAK Rodney, MSW, ACSW, [facility]    Ambulatory Care Management   [phone]

## 2022-10-04 ENCOUNTER — PATIENT OUTREACH (OUTPATIENT)
Dept: CASE MANAGEMENT | Age: 72
End: 2022-10-04

## 2022-10-04 NOTE — PROGRESS NOTES
Goals Addressed                   This Visit's Progress     Patient verbalizes understanding of self-management of chronic disease. On track     10/04/22  Phyllis Hernandez called this ACM with concerns regarding pt's health today  Pt c/o being tired & cold (52 degrees outside, she has not yet turned heat on), she is using a blanket  She thinks she may have a fever, denies chills  She does have a cough  She c/o body aches all over  She denies any N&V or diarrhea  She does have a poor appetite, though as she said she is not a big eater, kriss for breakfast  Discussed drinking some hot tea & plenty of fluids, she said she will have some toast and wants to go back to bed  She said she does not feel like she needs to be seen by [facility]  She did say her dtr Ortiz Dinning will be coming by today to check on her  Will follow up later this afternoon to see if her condition warrants being seen by [facility]  1540 Update   Pt resting, said she is doing ok. Reiterated importance of plenty of fluids  May take OTC Tylenol for aches/pains/ha/fever  Dtr was unable to come over, she has to work late  Pt denies need to be seen by [facility]  Will follow up with pt in 1 week or as needed. -[facility]      09/14/22  Pt reports she is having some dental issues, has at least 4 loose teeth  Spoke with friend Jamal Speaks and gave him names of 3 low cost/SS dental clinics  [facility] [address] [address] to call pt back to let her know that the info was shared with Tony  This ACM will follow up with pt in 7-10 days. -[facility]    08/16/22  Pt states she is \"fine\" today and has no concerns  States her food is healed  Denies any further UTI symptoms  States she is eating well and drinking plenty of fluids  Need to establish care with new PCP  Pt unable to talk at this time, will follow up with her in 1 week. -[facility]    07/08/22  Pt has had a CVA in the past and has had some mental decline with poor word finding.  She gets easily frustrated when she is unable to verbalize what she is thinking. She does live by herself with no support of family, she does have a friend who checks on her and helps her out some. She does have an appoint with neuropsych in Oct, no previous testing noted. SW is also working with pt assisting with appoints/transportation as well as working with [employer], pts rent is increasing and she may not be able to afford it. Her medications are prepared for her by Penelope Tejeda in pill packets which she says she is compliant with. She is completing an antibiotic course for UTI and is on schedule to complete this tomorrow. She often tells this ACM that she is not hungry and has not eaten in a couple of days, she reports that she has been better with drinking fluids lately. She does like to walk outside in her neighborhood/complex. Pt currently has a sore/swollen left big toe. No further swelling or pain; she was unable to say if there is any bruising. She is not sure if she stubbed her toe or how she has hurt it. Advised her to not go walking while it is swollen. Discussed elevating foot on pillows while on the couch and applying ice over a small towel several times a day. Pt states her understanding and said she will do this. Pt will:  Elevate left foot 4-5 times a day until improved without swelling/tenderness  Use ice pack over towel on left foot 4-5 times a day until swelling and tenderness has improved  This ACM will follow up with pt in 1 week. Pt does have this ACM's contact info.  -[facility]

## 2022-10-04 NOTE — PROGRESS NOTES
Social Work Note  10/4/2022    Received message from patient. Returned call. Patient stated that she was sick and would not be able to attend appointment with Dr. Bianca Friedman. She reported cough since Friday 9/30, aches, feeling \"cold\", possible fever, and stated that she \"just wants to sleep\". Patient stated that she has not felt like eating. She stated that she did talk with her daughter Marco Harris". SW advised that appointments/transportation will be canceled and that SW will have RN ACM follow-up for further assessment. Spoke with [facility] at Dr. Bautista Madden office and advised of need to reschedule. Patient rescheduled:  Thursday, 10/13/22, 11:45 am - initial appointment  Monday, 10/17/22, 1:00 pm - testing. [address] transportation and canceled patient's ride for today (Res #[id number]). Patient status discussed with Gloria Lam, RN, ACM.     SW Plan:   Reschedule transportation and advise patient  Follow-up on patient health status, family visit    Obdulia Voss, MSW, ACSW, [facility]    Ambulatory Care Management   [phone]

## 2022-10-05 ENCOUNTER — PATIENT OUTREACH (OUTPATIENT)
Dept: CASE MANAGEMENT | Age: 72
End: 2022-10-05

## 2022-10-05 NOTE — PROGRESS NOTES
Social Work Note  10/5/2022     Goals Addressed                      This Visit's Progress       General      Attend Neuropsychology appointments (pt-stated)         10/05/22  Follow-up call to patient to check on health status as she was sick on 10/4 and unable to attend appointment with Dr. Sarah Morales. Patient stated that she is feeling better. SW advised of rescheduling of appointment on 10/13/22 and scheduling of testing appointment on 10/17/22. Explained purpose of testing appointment to patient. Transportation arrangements made with [employer] for appointments ([phone])  10/13/22:    time at home:  11:13 (+/- 15 minutes)  Appointment:  11:45 am  Return trip :  1:15 pm  Reference number for trip:  [id number]  10/17/22:  Joao Coronaoln up time at home:  12:13 pm (+/- 15 minutes)  Appointment 1:00 pm  Return trip :  4:15 pm  Reference number for trip:  [address]  ROSA Plan:  Follow-up with patient re: transportation arrangements. AML    09/29/22  Follow-up call to patient to discuss appointment with Dr. Sarah Morales on 10/4/22. Reviewed patient appointment time and purpose of appointment. Inquired whether friend, Gibson Mcbride, could attend with her. Patient stated that Gibson Mcbride was unavailable as he is currently hospitalized. Patient does not have anyone to attend appointment with her or make transportation arrangements for appointments. Transportation arranged for appointment with Dr. Sarah Morales 10/4/22:  Joao Macdonald up time:  9:58 am - 10:20 am  Arrival time:  10:45 am   Appointment: 11:00 am  Return trip home: 1:00 pm  Reference number: [id number]  If questions regarding transportation, call insurance: [phone] left for patient with directions/instructions for transportation on 10/4/22. SW requested return call to confirm receipt of information. Contacted Vinay Dickerson at [address], message left re: appointment on 10/4/22.    ROSA Plan:  Follow-up with patient to confirm receipt of transportation information.  JET Bender, ACSW, [facility]    Ambulatory Care Management   [phone]

## 2022-10-06 DIAGNOSIS — I10 ESSENTIAL HYPERTENSION: ICD-10-CM

## 2022-10-06 DIAGNOSIS — I48.20 CHRONIC A-FIB (HCC): ICD-10-CM

## 2022-10-06 RX ORDER — METOPROLOL SUCCINATE 50 MG/1
50 TABLET, EXTENDED RELEASE ORAL DAILY
Qty: 90 TABLET | Refills: 0 | Status: SHIPPED | OUTPATIENT
Start: 2022-10-06

## 2022-10-06 RX ORDER — LOSARTAN POTASSIUM 25 MG/1
25 TABLET ORAL DAILY
Qty: 90 TABLET | Refills: 0 | Status: SHIPPED | OUTPATIENT
Start: 2022-10-06

## 2022-10-06 NOTE — TELEPHONE ENCOUNTER
Pt has an appointment with Dr. Sol Ambriz on 11/16/2022. Has not yet established care with Dr. Della Keller. Last visit 05/16/2022 MD Blake Patient   Next appointment 11/16/2022 MD Della Keller   Previous refill encounter(s)   10/12/2021:  - Toprol-XL #90 with 1 refill,   - Cozaar #90 with 1 refill. For Pharmacy Admin Tracking Only  Intervention Detail: New Rx: 2, reason: Patient Preference  Time Spent (min): 5      Requested Prescriptions     Pending Prescriptions Disp Refills    metoprolol succinate (TOPROL-XL) 50 mg XL tablet 90 Tablet 0     Sig: Take 1 Tablet by mouth daily. losartan (COZAAR) 25 mg tablet 90 Tablet 0     Sig: Take 1 Tablet by mouth daily.

## 2022-10-11 ENCOUNTER — PATIENT OUTREACH (OUTPATIENT)
Dept: CASE MANAGEMENT | Age: 72
End: 2022-10-11

## 2022-10-11 NOTE — PROGRESS NOTES
Social Work Note  10/11/2022    Follow-up call to patient. Patient stated that she is feeling better. Reminded of appointments with Dr. Glenn Tenorio - 10/13 and 10/17. Provided information regarding transportation arrangements. Received voicemail from Derrick Encarnacion 105 worker with [facility]. Returned call - left VM requesting call back. 10:39 am:  Received call from patient. She advised that she would like to talk with Ria Stephens at [address]. She stated that Ms. Jojo Lombardo has talked to her about \"going to a nursing home\" but patient stated that she is \"not ready for a nursing home\". SW spoke with patient at length regarding concern for housing. Reminded patient that her income does not meet the cost for rent/utilities, etc.. Asked if patient has been paying rent. Patient stated that \"everything's fine\". SW asked patient to elaborate. Patient stated that [facility] [in rental office] and I have an understanding\" and Ricci Wilder is doing whatever he is doing\" to cover. SW expressed concern to patient that patient is at risk of eviction in future due to unpaid rent. Patient confirmed that she has not been paying rent. SW asked about payment for utilities.  ([facility] recently assisted with utility bill). Patient stated that she has been \"paying whatever she can afford\" on the utility bills. Patient expressed worry related to rent and utilities. SW asked if she would be open to someone assisting her in paying bills and she stated \"yes\". Per patient, Violeta Lucero had been helping with bills, but is no longer able to assist due to his hospitalization. Patient advised that she has been worrying about housing, utilities. SW spoke with patient about option of living with daughter, [facility]. She stated that she thinks that Tonya \"would be there\" if she lost her housing. She stated that daughterYvon Dys 5 bedrooms, but will not assist\".   Patient shared that she and daughters have difficult relationship.    SW asked patient to talk with [facility] to see if she will assist her with housing issues and bills. Patient appeared hesitant to do so, but stated that she would. Advised patient that SW left previous VM with Ms. Cain Gabrielle at [address] and will talk with her about patient needs.       JET James, ACSW, [facility]    Ambulatory Care Management   [phone]

## 2022-10-13 ENCOUNTER — PATIENT OUTREACH (OUTPATIENT)
Dept: CASE MANAGEMENT | Age: 72
End: 2022-10-13

## 2022-10-13 ENCOUNTER — OFFICE VISIT (OUTPATIENT)
Dept: NEUROLOGY | Age: 72
End: 2022-10-13
Payer: MEDICARE

## 2022-10-13 DIAGNOSIS — F01.B3 MODERATE VASCULAR DEMENTIA WITH MOOD DISTURBANCE: ICD-10-CM

## 2022-10-13 DIAGNOSIS — F33.1 MODERATE EPISODE OF RECURRENT MAJOR DEPRESSIVE DISORDER (HCC): ICD-10-CM

## 2022-10-13 DIAGNOSIS — Z86.73 HISTORY OF CVA (CEREBROVASCULAR ACCIDENT): Primary | ICD-10-CM

## 2022-10-13 PROCEDURE — 96136 PSYCL/NRPSYC TST PHY/QHP 1ST: CPT | Performed by: CLINICAL NEUROPSYCHOLOGIST

## 2022-10-13 PROCEDURE — 1123F ACP DISCUSS/DSCN MKR DOCD: CPT | Performed by: CLINICAL NEUROPSYCHOLOGIST

## 2022-10-13 PROCEDURE — 96132 NRPSYC TST EVAL PHYS/QHP 1ST: CPT | Performed by: CLINICAL NEUROPSYCHOLOGIST

## 2022-10-13 PROCEDURE — 90791 PSYCH DIAGNOSTIC EVALUATION: CPT | Performed by: CLINICAL NEUROPSYCHOLOGIST

## 2022-10-13 PROCEDURE — 96133 NRPSYC TST EVAL PHYS/QHP EA: CPT | Performed by: CLINICAL NEUROPSYCHOLOGIST

## 2022-10-13 NOTE — PROGRESS NOTES
Social Work Note  10/13/2022      10:10 am - Spoke with patient and confirmed that she is ready and waiting for her transportation to appointmetn with Dr. Mahesh Larios. Returned call to Hermes Jeffries at Dr. Tam Links office to advise that patient will be at her appointment. Received message from Ann Steven at [address]. Returned call and left message. Received patient update following Neuropsychology appointment, information reviewed. Will follow-up with patient to discuss Power of  status. 10/14/22  VM left for Hermes Jeffries at [facility] Neuro[facility]. Requested call to clarify need for appointment on 10/17/22.      JET Riggins, ACSW, [facility]    Ambulatory Care Management   [phone] c/o headache and high blood pressure at 5 pm B/P of 180 /59 at home and rt. eye blurry since yesterday as per daughter

## 2022-10-13 NOTE — PROGRESS NOTES
Neuropsychological Evaluation Report      Patient Name: Clarence Sebastian  YOB: 1950    Age: 67 y.o. Date of Intake: 10/13/2022   Education: 11 Date of Testing: 10/13/2022   Gender: Female Ethnicity: White     Referring Provider: Dr. Supriya Selby:  Clarence Sebastian  was referred for neuropsychological evaluation by her Primary care provider to obtain a quantitative assessment of her current level of neurocognitive functioning, assist in differential diagnosis, and aid in individualized treatment planning. The patient understood the rationale and procedures for evaluation, as well as the limits to confidentiality, and they agreed to participate. The patient consented to have this report made available to her  treating providers through her  electronic medical records. This evaluation was completed with the patient by Heavenly Chaves. History Sources: Patient, Medical Records, Rating Scales, and Assessment Instruments    SUMMARY AND IMPRESSION:  The following section is a summary of the patient's pertinent history, test results, and impressions. A more thorough review of the patient's background and test scores can be found below. The patient is a 42-year-old woman with medical history significant for stage IV chronic kidney disease, secondary hyperparathyroidism, chronic intractable headache, cerebrovascular accident, atrial fibrillation with RVR, diastolic congestive heart failure, hypertension, hyperlipidemia, and s/p pacemaker placement. The patient's insight and ability as a historian appeared to be reduced, limiting the availability of clinical history. However, she did indicate she has experienced changes in her cognitive functioning following a stroke. While the patient stated she remains independent, medical records document history of noncompliance with medication.     The patient participated in brief cognitive testing that revealed impairment suggestive of moderate dementia. Due to this level of impairment, more comprehensive evaluation is not indicated. Assessment of psychopathology also revealed symptoms of moderate depression. Regarding the etiology of the patient's cognitive dysfunction, vascular dementia is suspected. Based upon the patient's level of impairment, her limited insight, her reduced ability as a historian, and her responses to questions assessing her capacity to make medical decisions, she does not maintain capacity to make well reasoned decisions regarding her medical care. ASSESSMENT:  Vascular dementia  Major depressive disorder, moderate    RECOMMENDATIONS:  Prior to the patient's departure, results were reviewed, I offered recommendations (see below), and she was provided with the opportunity to ask questions. The patient will be encouraged to review the results of this evaluation with her providers and discuss potential implications for treatment. Specifically:  If desired and not already considered, the patient may benefit from a conversation regarding the trial of a disease course altering medication, which may be of use to slow the progression of her cognitive difficulties. Testing revealed moderate symptoms of depression. The patient may benefit from a medication consult to discuss the trial of antidepressant medication. At this time, the patient's neuropsychological evaluation suggests she does not maintain capacity to make decisions. If not already addressed, the patient and her family are encouraged to discuss legal issues such as power of  to assist the patient in future financial and healthcare decisions. Information regarding these issues can be found at www.caringinfo. org or www.agingwithdignity.org/5wishes.html  As the patient is currently in the Moderate stage of dementia, daily supervision and assistance is recommended.  In general, the patient will benefit from a structured, routine environment that will provide assistance with complex activities requiring memory, organization, and planning (e.g., meal preparation, medication management) while allowing them to maintain independence in other basic activities of daily living (e.g., cleaning, dressing, feeding) for as long as possible. Given the patient's current level of functioning, it is anticipated they presently need less than 24-hour care; however she likely needs assistance during daytime hours and her needs for supervision should be periodically reassessed. Continued monitoring and treatment of the patient's neuropsychiatric symptoms/dementia is recommended. Due to their level of cognitive impairment, they will benefit from a combination of psychopharmacology, environmental management, and concrete behaviorally-focused interventions. Examples of environmental strategies include:  Keep a steady routine environment and using visual cues, such as whiteboards, to help provide reminders of tasks. Take additional time to complete tasks and limit the amount of external distracters in the environment when having to focus on a task;  Break larger tasks into smaller ones and take frequent, regularly scheduled breaks; Write down information as soon as possible and to utilize a day planner/calendar, especially when recording doctor's appointments and medicine regimens;  Development of a memory book may be beneficial. Memory books typically contain a day planner/calendar component; however, they also contain organized sections for recording frequently asked questions and information about routines that the patient will be able to access and use independently. Pair behaviors or items to be remembered with well learned patterns or routines.  For example, place medications by your toothbrush so that brushing your teeth will cue you to take your medication; and,  Designate a memory place in which you keep all of your important personal belongings (e.g. wallet, keys) when not in use. The patient's family and caretakers may benefit from reaching out to local support groups in the community. These resources may be able to provide medical assistance or support and reduce the risk of caregiver burnout. The Alzheimer's Association (Phone: 932.935.4899; Website: Faheem Van) provides general information about cognitive decline in late life, along with local resources. The book The 36 Hour Day: A Family Guide to Caring for Persons with Alzheimer's Disease, Related Dementing Illnesses, and Memory Loss in Later Life, by Penny Shore and Ronita Ahumada. HISTORY OF PRESENT ILLNESS:  The patient is a 48-year-old woman with medical history significant for stage IV chronic kidney disease, secondary hyperparathyroidism, chronic intractable headache, cerebrovascular accident, atrial fibrillation with RVR, diastolic congestive heart failure, hypertension, hyperlipidemia, and s/p pacemaker placement. She presented independently for clinical interview and appeared to be a questionable historian with likely reduced insight. Per the patient's report, approximately 15 years ago, she sustained a stroke that produced changes in her cognitive functioning. The patient's most recent MRI brain (10/23/2021) revealed \"large chronic infarct in the left temporoparietal lobe. Moderate to severe chronic microvascular ischemic disease. Chronic infarct in the left basal ganglia. \"  The patient described her cognitive problems to include expressive language impairment such as difficulty finding the words she wants to use, difficulty generating the words after she thought of them, word substitution errors, and paraphasic errors. The patient denied the presence of declines in her memory functioning and reported \"it is actually better than it used to be. \"    Pertaining to the patient's psychiatric history, she denied previous diagnosis or treatment.   She described her recent mood to be \"normal\" but repeatedly alluded to being ready to die. The patient acknowledged experiencing passive thoughts of death but reported \"God must not be done with me yet. \"  She denied the presence of active suicidal ideation, intent, or plan. The patient also denied history of symptoms potentially suggestive of a formal thought disorder. With regard to daily functioning, the patient indicated she discontinued driving after her stroke. She denied problems in other domains of functioning and reported she remains independent. However, reviewing the patient's medical records, there is documentation of impaired medication compliance    PERTINENT MEDICAL HISTORY:  Patient Active Problem List   Diagnosis Code    Lumbar stenosis M48.061    CKD (chronic kidney disease) N18.9    Proteinuria R80.9    Secondary hyperparathyroidism, renal ([facility]) N25.81    Chronic intractable headache R51.9, G89.29    History of CVA (cerebrovascular accident) Z86.73    Hyperparathyroidism, secondary ([facility] N25.81    Pneumococcal vaccination declined Z28.21    Mammogram declined Z53.20    Colonoscopy refused Z53.20    Atrial fibrillation with RVR ([facility] I48.91    CAP (community acquired pneumonia) Z69.2    Diastolic CHF, chronic ([facility] I50.32    Hypertension I10    Hyperlipidemia E78.5    SOB (shortness of breath) R06.02    Cough R05.9    Fatigue R53.83    Goals of care, counseling/discussion Z71.89    Chronic a-fib ([facility]) I48.20    Hypertensive emergency I16.1    Impaired insight R41.89    Noncompliance Z91.199    Passive suicidal ideations R45.851    Confusion R41.0    Pacemaker Z95.0    Stage 4 chronic kidney disease ([facility] N18.4    CKD (chronic kidney disease) stage 4, GFR 15-29 ml/min ([facility]) N18.4      Pertaining to the patient's other medical and physical functioning, she describes her sleep to be \"pretty good. \"  She reported going to bed around 2:00 AM to 3:00 AM and waking up around 10:00 AM.  The patient indicated she likes to stay up late to watch television.   She denied history of recent falls and dismissed concerns regarding symptoms potentially suggestive of parkinsonism, autonomic dysfunction, or sensory changes. Family neurological history: Reportedly unremarkable    Current Outpatient Medications:     metoprolol succinate (TOPROL-XL) 50 mg XL tablet, Take 1 Tablet by mouth daily. , Disp: 90 Tablet, Rfl: 0    losartan (COZAAR) 25 mg tablet, Take 1 Tablet by mouth daily. , Disp: 90 Tablet, Rfl: 0    dilTIAZem ER (DILT-XR) 180 mg capsule, TAKE ONE CAPSULE EVERY DAY., Disp: 90 Capsule, Rfl: 0    pantoprazole (PROTONIX) 40 mg tablet, Take 1 Tablet by mouth Daily (before breakfast). , Disp: 90 Tablet, Rfl: 0    apixaban (Eliquis) 5 mg tablet, Take 1 Tablet by mouth two (2) times a day., Disp: 180 Tablet, Rfl: 0    topiramate (TOPAMAX) 50 mg tablet, Take 1 Tablet by mouth nightly., Disp: 90 Tablet, Rfl: 1    traZODone (DESYREL) 50 mg tablet, TAKE 1 TABLET BY MOUTH EVERY NIGHT, Disp: 90 Tablet, Rfl: 1    pravastatin (PRAVACHOL) 80 mg tablet, Take 1 Tablet by mouth daily. , Disp: 90 Tablet, Rfl: 1    levothyroxine (SYNTHROID) 88 mcg tablet, Take 1 Tablet by mouth Daily (before breakfast). , Disp: 90 Tablet, Rfl: 1    butalbital-acetaminophen-caffeine (FIORICET, ESGIC) -40 mg per tablet, TAKE 1 TABLET BY MOUTH EVERY SIX HOURS AS NEEDED FOR HEADACHE, Disp: 20 Tablet, Rfl: 1    fluticasone propionate (FLONASE) 50 mcg/actuation nasal spray, 2 Sprays by Both Nostrils route daily. , Disp: 1 Each, Rfl: 5    loratadine (Claritin) 10 mg tablet, Take 1 Tablet by mouth daily. (Patient not taking: Reported on 11/9/2021), Disp: 30 Tablet, Rfl: 5    cholecalciferol (VITAMIN D3) (1000 Units /25 mcg) tablet, Take 2 Tabs by mouth daily. (Patient not taking: Reported on 5/16/2022), Disp: 180 Tab, Rfl: 3    ipratropium-albuteroL (Combivent Respimat)  mcg/actuation inhaler, Take 1 Puff by inhalation every six (6) hours. , Disp: 3 Inhaler, Rfl: 2    albuterol (PROVENTIL HFA, VENTOLIN HFA, PROAIR HFA) 90 mcg/actuation inhaler, Take 2 Puffs by inhalation every four (4) hours as needed for Shortness of Breath or Respiratory Distress. , Disp: 1 Inhaler, Rfl: 2    aspirin delayed-release (ADULT LOW DOSE ASPIRIN) 81 mg tablet, Take 1 Tab by mouth daily. (Patient not taking: Reported on 5/16/2022), Disp: 90 Tab, Rfl: 3    Pertinent Neurological History:  Seizure: Never  TBI: Never    Pertinent Labs:  Lab Date Result   TSH 5/16/2022 Low at: 0.009 uIU/mL   Vitamin B12 5/16/2022 Within reference range   T4, free 5/16/2022 High at: 2.57 ng/dL   Vitamin D 5/16/2022 Within reference range   A1c 5/16/2022 High at 5.9%     PSYCHOSOCIAL HISTORY:  Birth/Development: The patient was born and raised in [place]  Language: [place]  Education: Completed the 11th grade. 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 One of her daughters reportedly has power of     Substance Use:  Alcohol: Denied  Nicotine: Denied  Recreational/Illicit Substances: Denied  Treatment: Denied    BEHAVIORAL OBSERVATIONS:  Appearance: Casually dressed, Well-groomed, and Appeared stated age  Orientation: Person, Place, Time, and Situation  Motor: Ambulated independently, Adequate gait, Adequate posture, No involuntary movements, and Adequate strength  Thought Processes: Clear, Coherent, Logical, and Tangential  Hearing and Vision: Adequate  Speech: Noted for consistent expressive aphasia. Comprehension: Adequate  Interpersonal Skills: Adequate  Affect: Depressed  Ability as Historian: Fair  Insight: Inadequate  Judgment: Inadequate  Testing Behaviors: The patient remained pleasant and cooperative throughout testing, though she frequently commented about the difficulty of the tasks.     TESTING  Measures administered by provider:  Flo Orosco Cognitive Assessment (MoCA) and Geriatric Depression Scale - Short Form (GDS-SF)    Global Cognitive Functioning (MoCA):  Visuospatial/executive: 1/5; earning point(s) for clock contour  Namin/3; earning point(s) for lion and camel, though she initially called the camel \"giraffe. \"  Memory (learning):  First trial: 3  Second trial: 3  Attention: 0/6  Language: 0/3  Abstraction: 0/2  Memory (delayed recall):  Free recall: 0/5  Category cue: 0/5  Multiple-choice: 4/5  Orientation: 4/6; earning point(s) for  Total:  (with one-point correction for education)  Capacity:  Medical capacity: To the best of the patient's knowledge, her current health is only significant for a backache and a problem with her arm. She acknowledged that at one point she had high blood pressure but reported \"that is it. \"  She indicated \"I have never been sick sick\" and to the best of her knowledge, she does not require any current treatment. She does not believe she needs treatment and she indicated she is not ready for assistance or a higher level of care. She denied possessing Yarsani or personal beliefs about sickness, dying, or healthcare decision-making. She could not think of any circumstance where she would not want medical treatment to keep her alive. The patient identified her daughter Bonnie Haile is her power of  and she would like either Tonya or her other daughter, Silvino Harris, to make medical decisions for her. She did not identify any individuals whom she would like to be excluded from her medical decision making.   She indicated that she and her daughters had a difference of opinion regarding her care, they should follow what they believe is best.  Psychiatric/Sleep:   Depression: Moderate    TIME:  Clinical Interview: 1150 - 1210 = 20 minutes  Testing and scoring by provider: 16 minutes  Neuropsychological testing evaluation services*: 100 minutes + 14 minutes feedback = 114 minutes total    BILLING:  30900 x 1 Unit  96136 x 1 Unit  27781 x 1 Unit  96133 x 1 Units    *Neuropsychological testing evaluation services include: Integration of patient data, interpretation of standardized test results and clinical data, clinical decision-making, treatment planning and report, and interactive feedback to the patient, family member(s) or caregiver(s), when performed.

## 2022-10-17 ENCOUNTER — PATIENT OUTREACH (OUTPATIENT)
Dept: CASE MANAGEMENT | Age: 72
End: 2022-10-17

## 2022-10-19 NOTE — PROGRESS NOTES
Social Work Note  10/17/22    Spoke with Phyllis Aldana at [employer] and confirmed that 10/17/22 appointment is not needed. [address] and canceled transportation arrangements.      Toño Ferro MSW, ACSW, [facility]    Ambulatory Care Management   [phone]

## 2022-10-21 ENCOUNTER — PATIENT OUTREACH (OUTPATIENT)
Dept: CASE MANAGEMENT | Age: 72
End: 2022-10-21

## 2022-10-21 NOTE — PROGRESS NOTES
Goals Addressed                   This Visit's Progress     Patient verbalizes understanding of self-management of chronic disease. On track     10/21/22  Pt states she is feeling well, though still has a cough  Has not yet checked into dental clinics, friend Juan Winnie has been ill & busy; she reports seeing dtr occ, though they are busy as well  Pt state she is eating and drinking well  Dr Yaneth Dick report from 10/13 noted: \"does not maintain capacity to make decisions\" & he recommends \"daily supervision  This ACM will follow up with pt in 10-14 days. Avelina Sanabria, MSN, RN - Ambulatory Care Manager - [phone]       10/04/22  Fuller Friday called this ACM with concerns regarding pt's health today  Pt c/o being tired & cold (52 degrees outside, she has not yet turned heat on), she is using a blanket  She thinks she may have a fever, denies chills  She does have a cough  She c/o body aches all over  She denies any N&V or diarrhea  She does have a poor appetite, though as she said she is not a big eater, kriss for breakfast  Discussed drinking some hot tea & plenty of fluids, she said she will have some toast and wants to go back to bed  She said she does not feel like she needs to be seen by [facility]  She did say her dtr Elizabeth Gaytan will be coming by today to check on her  Will follow up later this afternoon to see if her condition warrants being seen by [facility]  1540 Update   Pt resting, said she is doing ok. Reiterated importance of plenty of fluids  May take OTC Tylenol for aches/pains/ha/fever  Dtr was unable to come over, she has to work late  Pt denies need to be seen by [facility]  Will follow up with pt in 1 week or as needed.  -MLL      09/14/22  Pt reports she is having some dental issues, has at least 4 loose teeth  Spoke with friend Juan De Paze and gave him names of 3 low cost/SS dental clinics  [facility] [address] to call pt back to let her know that the info was shared with Tony  This ACM will follow up with pt in 7-10 days. -MLL    08/16/22  Pt states she is \"fine\" today and has no concerns  States her food is healed  Denies any further UTI symptoms  States she is eating well and drinking plenty of fluids  Need to establish care with new PCP  Pt unable to talk at this time, will follow up with her in 1 week. -MLL    07/08/22  Pt has had a CVA in the past and has had some mental decline with poor word finding. She gets easily frustrated when she is unable to verbalize what she is thinking. She does live by herself with no support of family, she does have a friend who checks on her and helps her out some. She does have an appoint with neuropsych in Oct, no previous testing noted. SW is also working with pt assisting with appoints/transportation as well as working with APS, pts rent is increasing and she may not be able to afford it. Her medications are prepared for her by Gerald Kenny in pill packets which she says she is compliant with. She is completing an antibiotic course for UTI and is on schedule to complete this tomorrow. She often tells this ACM that she is not hungry and has not eaten in a couple of days, she reports that she has been better with drinking fluids lately. She does like to walk outside in her neighborhood/complex. Pt currently has a sore/swollen left big toe. No further swelling or pain; she was unable to say if there is any bruising. She is not sure if she stubbed her toe or how she has hurt it. Advised her to not go walking while it is swollen. Discussed elevating foot on pillows while on the couch and applying ice over a small towel several times a day. Pt states her understanding and said she will do this. Pt will:  Elevate left foot 4-5 times a day until improved without swelling/tenderness  Use ice pack over towel on left foot 4-5 times a day until swelling and tenderness has improved  This ACM will follow up with pt in 1 week. Pt does have this ACM's contact info.  -MLL

## 2022-11-01 ENCOUNTER — PATIENT OUTREACH (OUTPATIENT)
Dept: CASE MANAGEMENT | Age: 72
End: 2022-11-01

## 2022-11-01 NOTE — PROGRESS NOTES
Social Work Note  11/1/2022    Received message from patient returned call. [facility], Hector. Per Hector, Ms. Peg Weaver has a court date for eviction proceedings on 11/14/22 at 10:30 am.  SW asked if patient has been served a pay or quit notice. Per Hector, that paperwork was served several weeks ago. Patient appeared upset at news of eviction. Spoke with patient re: need to talk with daughters and Ms. Taran Lombardi at [address]. [facility] representative stated that she spoke with Ms. Taran Lombardi this morning. SW spoke with patient at later time without Hector present. SW reviewed information from report by Dr. Tamir Blackwell. Patient able to convey that Dr. Tamir Blackwell spoke with her about her \"brain\". Spoke with patient re: need for someone else to assist with decision making due to patient's memory loss. Patient appeared to understand and when asked, stated that her daughter, Deshaun Cali, is her POA. Per patient, Deshaun Cali is not involved, and SW will need to speak with Herbert Schirmer, her other daughter. Patient stated that she left message for her this morning. SW spoke with patient about option of assisted living through [place] and reinforced that even if she moved to assisted living, she would be able to maintain independence in self-care, but that she would have assistance with bills, medications, meals, etc..  Patient agreeable to SW talking with daughters and coordinating plan with [facility] [place] worker. VM left for Jovana Hampton, Adult Protective Services worker for [facility]. Patient is well known to her. Addendum:  Received call from Jovana Hampton.  Discussed patient's housing situation and options for care. Advised of evaluation by Neuropsychology and report findings, including lack of capacity for decision making. Faxed report to Adult Protective Services per request to assist with coordination of services for patient.   Ms. Taran Lombardi to reach out to patient's daughters, Chon Guzman and Lin Cummings.  Discussed need to establish and confirm whether one of the daughters has power of  for patient. SW to review options for assisted living through [facility] and [facility]. Will follow-up with APS to coordinate care. Patient status discussed with Yenny Holman RN, ACM.       SW Plan:   Follow-up with APS to discuss coordination of services  Follow-up with patient and daughters to assist with decision making, housing concerns    JET Bardales, ACSW, [facility]    Ambulatory Care Management   [phone]

## 2022-11-03 ENCOUNTER — PATIENT OUTREACH (OUTPATIENT)
Dept: CASE MANAGEMENT | Age: 72
End: 2022-11-03

## 2022-11-03 NOTE — PROGRESS NOTES
Goals Addressed                   This Visit's Progress     Patient verbalizes understanding of self-management of chronic disease. On track     11/03/22  Reviewed ROSA Ayala's notes  Pt states she has spoken with both of her daughters, they are unwilling to provide assistance   Pt is unsure where daughters live or when she has last seen them  Pt reports she has no other family/friends. She hasn't spoken with friend Lizandro Santiago \"in a few days\"  Pt states she is fine, \"is that all\", she did not feel like talking  This ACM will follow up with pt in 1 week or as needed. Billy Schwab, MSN, RN - Ambulatory Care Manager - [phone]     10/21/22  Pt states she is feeling well, though still has a cough  Has not yet checked into dental clinics, friend Lizandro Santiago has been ill & busy; she reports seeing dtr occ, though they are busy as well  Pt state she is eating and drinking well  Dr Viv Little report from 10/13 noted: \"does not maintain capacity to make decisions\" & he recommends \"daily supervision\"  This ACM will follow up with pt in 10-14 days. Billy Schwab, MSN, RN - Ambulatory Care Manager - [phone]       10/04/22  Jose Luis Courtney called this ACM with concerns regarding pt's health today  Pt c/o being tired & cold (52 degrees outside, she has not yet turned heat on), she is using a blanket  She thinks she may have a fever, denies chills  She does have a cough  She c/o body aches all over  She denies any N&V or diarrhea  She does have a poor appetite, though as she said she is not a big eater, kriss for breakfast  Discussed drinking some hot tea & plenty of fluids, she said she will have some toast and wants to go back to bed  She said she does not feel like she needs to be seen by [facility]  She did say her dtr Shaunna Merritt will be coming by today to check on her  Will follow up later this afternoon to see if her condition warrants being seen by [facility]  1540 Update   Pt resting, said she is doing ok.   Reiterated importance of plenty of fluids  May take OTC Tylenol for aches/pains/ha/fever  Dtr was unable to come over, she has to work late  Pt denies need to be seen by [facility]  Will follow up with pt in 1 week or as needed. -[facility]      09/14/22  Pt reports she is having some dental issues, has at least 4 loose teeth  Spoke with friend Gio Phillips and gave him names of 3 low cost/SS dental clinics  [facility] [address] to call pt back to let her know that the info was shared with Tony  This ACM will follow up with pt in 7-10 days. -[facility]    08/16/22  Pt states she is \"fine\" today and has no concerns  States her food is healed  Denies any further UTI symptoms  States she is eating well and drinking plenty of fluids  Need to establish care with new PCP  Pt unable to talk at this time, will follow up with her in 1 week. -[facility]    07/08/22  Pt has had a CVA in the past and has had some mental decline with poor word finding. She gets easily frustrated when she is unable to verbalize what she is thinking. She does live by herself with no support of family, she does have a friend who checks on her and helps her out some. She does have an appoint with neuropsych in Oct, no previous testing noted. SW is also working with pt assisting with appoints/transportation as well as working with APS, pts rent is increasing and she may not be able to afford it. Her medications are prepared for her by Mayito Pace in pill packets which she says she is compliant with. She is completing an antibiotic course for UTI and is on schedule to complete this tomorrow. She often tells this ACM that she is not hungry and has not eaten in a couple of days, she reports that she has been better with drinking fluids lately. She does like to walk outside in her neighborhood/complex. Pt currently has a sore/swollen left big toe. No further swelling or pain; she was unable to say if there is any bruising.  She is not sure if she stubbed her toe or how she has hurt it. Advised her to not go walking while it is swollen. Discussed elevating foot on pillows while on the couch and applying ice over a small towel several times a day. Pt states her understanding and said she will do this. Pt will:  Elevate left foot 4-5 times a day until improved without swelling/tenderness  Use ice pack over towel on left foot 4-5 times a day until swelling and tenderness has improved  This ACM will follow up with pt in 1 week. Pt does have this ACM's contact info.  -MLL

## 2022-11-04 NOTE — PROGRESS NOTES
Social Work Note  11/4/2022 11/03/22   Call with patient  Spoke with patient. Asked if she has talked with daughters. Patient stated that daughter, Lin Cummings, is not in the picture. Asked if daughter, Jamal Mcclellan, can assist with making plans for move. Patient indicated that Jamal Mcclellan has her own things going on.  SW asked about patient's friend, Danie Dunham, and possibility of staying with her. Patient stated that Danie Dunham lives in [place] and she cannot stay with her as she has no room. Patient appeared agitated and upset re: housing situation. Patient expressed worry about \"being on the street\" but has not initiated calls to look for housing, making moving plans, etc.   SW asked patient if she has spoken with APS worker, Ms. Josh Green.  Patient stated she has not talked to Ms. Josh Green.       11/04/22  Call with patient ([phone])  Advised patient that SW has not heard from daughter, Jamal Mcclellan. Patient stated that daughter is waiting for SW to call. Call to daughter Elvia Ariza ([phone])    VM requesting return call ASAP   Call to Paulo Salazar ([phone])  Message left with staff member. Requested call from  to discuss availability at assisted living. Received return message from Edmundo Esteves,  ([phone])  Call to [facility] ([phone])   No answer or voicemail    Call with Thierry Estrada APS ([phone])  Spoke with Malissa Sanchez at length re: patient needs. She has requested UAI screening for nursing home placement and has talked with patient's new Medicaid , Luz Garzon ([phone]). Jb Rockwell is beginning the search for a nursing facility. SW advised of attempted contact with daughter, Jamal Mcclellan, and patient's desire to cancel appointment with new PCP. Discussed possibility of asking court for extension on patient's time in apartment until placement is arranged.    Ms. Josh Green advised that she received message from patient's friend, Hector Maldonado who stated that patient was not satisfied with the doctor that did her neuropsychology exam and that she (patient) wants a second opinion. Ms. Deny Bojorquez advised that she is reaching out to insurance CM to see if second evaluation would be covered by insurance. Advised of attempted evaluation with Kristen Demarco, Neuropsychology, on 2/7/22. Per Ms. Deny Bojorquez, friend Hector Maldonado also advised that patient's application for rental assistance was not denied, but it did not have the documentation to support it. Ms. Deny Bojorquez stated that she will see if she can locate the needed documents - bills with patient's address. Call with Roxane Rios RN, ACM. Patient status discussed. SW Plan:   Continue to attempt to reach family  Continue collaboration with RN FAHAD, APS, and Insurance CM to arrange services for patient  Follow-up with patient. Offer resources as needed.      Confirm new PCP appointment and discuss transportation    JET Knox, ACSW, [facility]    Ambulatory Care Management   [phone]

## 2022-11-08 ENCOUNTER — PATIENT OUTREACH (OUTPATIENT)
Dept: CASE MANAGEMENT | Age: 72
End: 2022-11-08

## 2022-11-08 NOTE — PROGRESS NOTES
Social Work Note  11/8/2022    Attempted to reach patient x 2 today (1045 am and 320 pm). VM left requesting return call. 3:20 pm - VM left for Lauren Panda. 3:25 pm - Call patient's daughter, Emma Wylie. Patient confirmed with two identifiers. Introduced self, role, and purpose of call. Per daughter, patient contacted her yesterday and daughter is aware of impending eviction. Daughter stated that she has difficulty understanding patient due to speech. Asked if any family members are available to assist.  Daughter stated \"no\". Patient has 3 daughters - Isabel Miner, and Skyler - and they are all estranged from patient. Emma Wylie is only daughter in contact with patient and she cannot assist.  Asked about POA and Emma Wylie stated that patient does not have one. Emma Wylie stated that HEATHER Miller at [address] advised of court date and neuropsychology report.   SW asked daughter to contact this worker if family members are willing to assist.     ROSA Plan:   Follow-up with patient  Follow-up with [place] Saint Iha, MSW, ACSW, [facility]    Ambulatory Care Management   [phone]

## 2022-11-09 ENCOUNTER — PATIENT OUTREACH (OUTPATIENT)
Dept: CASE MANAGEMENT | Age: 72
End: 2022-11-09

## 2022-11-09 NOTE — PROGRESS NOTES
Social Work Note  11/9/2022    10:30 am - Call to patient  Spoke with patient re: to discuss new PCP appointment on 11/14. Patient stated that she will be going to court that morning re: eviction. SW asked if anyone is accompanying her. Patient had difficulty with word finding, but indicated that she hoped that APS SW would be going with her to court. Patient stated that she has her apartment packed in boxes. 10:45 am - Call to [facility]  Rescheduled patient's 11/16/22 appointment with Dr. Sindhu Earl due to uncertainty surrounding patient's housing status. New appointment scheduled for 12/14/22 at 11:15 am.     11:10 am - Call to [address] [place] [address] left for Violeta Berkowitz of conversation with patient's daughter, Mariana Wayne, on 11/9/22 and patient's lack of power of . Also advised of rescheduling of PCP appointment with Dr. Sindhu Earl to 12/14/22. Requested return call. Received message from David Aguilar.  She [facility] and is attempting to locate pieces of information needed to complete patient's application for rental assistance. Per Ms. Richards, 3 months of 3 bills are needed. She stated that she has obtained [employer] but is attempting to locate other bills for patient. SW left additional message for Ms. Jeff Lozoya.     Patient update provided to Gloria Lam, RN, ACM.     Obdulia Voss, MSW, ACSW, [facility]    Ambulatory Care Management   [phone]

## 2022-11-11 ENCOUNTER — PATIENT OUTREACH (OUTPATIENT)
Dept: CASE MANAGEMENT | Age: 72
End: 2022-11-11

## 2022-11-11 NOTE — PROGRESS NOTES
Social Work Note  11/11/2022    9:40 am - Attempted to reach patient. VM left requesting return call. Also left updated PCP appointment information.      JET Arnold, ACSW, [facility]    Ambulatory Care Management   [phone]

## 2022-11-15 ENCOUNTER — PATIENT OUTREACH (OUTPATIENT)
Dept: CASE MANAGEMENT | Age: 72
End: 2022-11-15

## 2022-11-15 NOTE — PROGRESS NOTES
Goals Addressed                   This Visit's Progress     Patient verbalizes understanding of self-management of chronic disease. On track     11/15/22  Spoke with pt, she did attend court yesterday. She does not know what was decided. She is unsure if a decision was made or if she does not remember the decision. She is unsure if she needs to pack or unpack. She admits she \"feels lost\". She said someone did attend court with her, she thinks it was [place].  Spoke with ROSA Ayala  This ACM plans follow up in 1 week. Roxi Almonte, MSN, RN - [facility] - [phone]       11/03/22  Reviewed ROSA Ayala's notes  Pt states she has spoken with both of her daughters, they are unwilling to provide assistance   Pt is unsure where daughters live or when she has last seen them  Pt reports she has no other family/friends. She hasn't spoken with friend Lizandro Santiago \"in a few days\"  Pt states she is fine, \"is that all\", she did not feel like talking  This ACM will follow up with pt in 1 week or as needed. CECELIA He, RN - Ambulatory Care Manager - [phone]     10/21/22  Pt states she is feeling well, though still has a cough  Has not yet checked into dental clinics, friend Lizandro Santiago has been ill & busy; she reports seeing dtr occ, though they are busy as well  Pt state she is eating and drinking well  Dr Viv Little report from 10/13 noted: \"does not maintain capacity to make decisions\" & he recommends \"daily supervision\"  This ACM will follow up with pt in 10-14 days.    Roxi Almonte, MSN, RN - Ambulatory Care Manager - [phone]       10/04/22  Jose Luis Courtney called this ACM with concerns regarding pt's health today  Pt c/o being tired & cold (52 degrees outside, she has not yet turned heat on), she is using a blanket  She thinks she may have a fever, denies chills  She does have a cough  She c/o body aches all over  She denies any N&V or diarrhea  She does have a poor appetite, though as she said she is not a big eater, kriss for breakfast  Discussed drinking some hot tea & plenty of fluids, she said she will have some toast and wants to go back to bed  She said she does not feel like she needs to be seen by [facility]  She did say her dtr Simone Briggs will be coming by today to check on her  Will follow up later this afternoon to see if her condition warrants being seen by [facility]  1540 Update   Pt resting, said she is doing ok. Reiterated importance of plenty of fluids  May take OTC Tylenol for aches/pains/ha/fever  Dtr was unable to come over, she has to work late  Pt denies need to be seen by [facility]  Will follow up with pt in 1 week or as needed. -[facility]      09/14/22  Pt reports she is having some dental issues, has at least 4 loose teeth  Spoke with friend Trevon Valenzuela and gave him names of 3 low cost/ dental clinics  [employer] [facility] [address] to call pt back to let her know that the info was shared with Tony  This ACM will follow up with pt in 7-10 days. -[facility]    08/16/22  Pt states she is \"fine\" today and has no concerns  States her food is healed  Denies any further UTI symptoms  States she is eating well and drinking plenty of fluids  Need to establish care with new PCP  Pt unable to talk at this time, will follow up with her in 1 week. -[facility]    07/08/22  Pt has had a CVA in the past and has had some mental decline with poor word finding. She gets easily frustrated when she is unable to verbalize what she is thinking. She does live by herself with no support of family, she does have a friend who checks on her and helps her out some. She does have an appoint with neuropsych in Oct, no previous testing noted. SW is also working with pt assisting with appoints/transportation as well as working with APS, pts rent is increasing and she may not be able to afford it.  Her medications are prepared for her by Corby Cano in pill packets which she says she is compliant with. She is completing an antibiotic course for UTI and is on schedule to complete this tomorrow. She often tells this ACM that she is not hungry and has not eaten in a couple of days, she reports that she has been better with drinking fluids lately. She does like to walk outside in her neighborhood/complex. Pt currently has a sore/swollen left big toe. No further swelling or pain; she was unable to say if there is any bruising. She is not sure if she stubbed her toe or how she has hurt it. Advised her to not go walking while it is swollen. Discussed elevating foot on pillows while on the couch and applying ice over a small towel several times a day. Pt states her understanding and said she will do this. Pt will:  Elevate left foot 4-5 times a day until improved without swelling/tenderness  Use ice pack over towel on left foot 4-5 times a day until swelling and tenderness has improved  This ACM will follow up with pt in 1 week. Pt does have this ACM's contact info.  -MLL

## 2022-11-15 NOTE — PROGRESS NOTES
Social Work Note  11/15/2022    Attempted to reach patient for follow-up after housing court date on 11/14/22. VM left requesting return call. Patient status discussed with Valarie Bruce, RN, ACM. Spoke with Amanda Encarnacion. She advised that she accompanied patient to court yesterday and that  has entered judgment re: monies owed for housing. Per Ms. Jojo Lombardo, arrangements will need to be made with apartment complex re: status moving forward. She stated that she will talk with them re: patient. Patient is scheduled to have UAI assessment for long term placement completed next week. Per Ms. Jojo Lombardo, workers scheduled to complete assessment have reached out to daughter, Suella Bamberger, re: need for her to be present at assessment in order to sign paperwork. Ms. Jojo Lombardo indicated that daughter has not returned call to . Discussed future planning and need for placement. Reviewed need for a daughter/family member being willing to assist with signatures on paperwork for placement. Advised Ms. Jojo Lombardo of new PCP appointment 12/14/22. Offered assistance with medical paperwork for placement as needed. [facility] [facility] -  with [address] Medicaid. Introduced self, role, and purpose of call. Provided this SW contact information for future collaboration re: patient. SW Plan:   Follow-up with patient re: UAI screening  Follow-up with APS re: need for physical for long term care placement  Assist as needed with LTC placement process.      Brant Meigs, MSW, ACSW, [facility]    Ambulatory Care Management   [phone]

## 2022-11-22 DIAGNOSIS — I48.91 ATRIAL FIBRILLATION WITH RVR (HCC): ICD-10-CM

## 2022-11-25 ENCOUNTER — PATIENT OUTREACH (OUTPATIENT)
Dept: CASE MANAGEMENT | Age: 72
End: 2022-11-25

## 2022-11-25 NOTE — PROGRESS NOTES
Goals Addressed                   This Visit's Progress     Patient verbalizes understanding of self-management of chronic disease. On track     11/25/22  Pt said she has not heard any verdict regarding her apartment. She has not spoken with her daughters. She had difficulties with her word finding and expressing her thoughts. She did say she was alone with her 4 cats for Thanksgiving. She reports she feels fine and has plenty of food in her apartment. Magno Zavala, CECELIA, RN - [facility] - [phone]     11/15/22  Spoke with pt, she did attend court yesterday. She does not know what was decided. She is unsure if a decision was made or if she does not remember the decision. She is unsure if she needs to pack or unpack. She admits she \"feels lost\". She said someone did attend court with her, she thinks it was [place].  Spoke with ROSA Mixon  This ACM plans follow up in 1 week. CECELIA Solo, RN - [facility] - [phone]       11/03/22  Reviewed ROSA Mixon's notes  Pt states she has spoken with both of her daughters, they are unwilling to provide assistance   Pt is unsure where daughters live or when she has last seen them  Pt reports she has no other family/friends. She hasn't spoken with friend Vishal Gilliam \"in a few days\"  Pt states she is fine, \"is that all\", she did not feel like talking  This ACM will follow up with pt in 1 week or as needed. CECELIA Solo, RN - Ambulatory Care Manager - [phone]     10/21/22  Pt states she is feeling well, though still has a cough  Has not yet checked into dental clinics, friend Vishal Gilliam has been ill & busy; she reports seeing dtr occ, though they are busy as well  Pt state she is eating and drinking well  Dr Arti Zabala report from 10/13 noted: \"does not maintain capacity to make decisions\" & he recommends \"daily supervision\"  This ACM will follow up with pt in 10-14 days.    -Magno Zavala, MSN, RN - Ambulatory Care Manager - [phone]       10/04/22  Elizabeth Purdy called this ACM with concerns regarding pt's health today  Pt c/o being tired & cold (52 degrees outside, she has not yet turned heat on), she is using a blanket  She thinks she may have a fever, denies chills  She does have a cough  She c/o body aches all over  She denies any N&V or diarrhea  She does have a poor appetite, though as she said she is not a big eater, kriss for breakfast  Discussed drinking some hot tea & plenty of fluids, she said she will have some toast and wants to go back to bed  She said she does not feel like she needs to be seen by [facility]  She did say her dtr [facility] will be coming by today to check on her  Will follow up later this afternoon to see if her condition warrants being seen by [facility]  1540 Update   Pt resting, said she is doing ok. Reiterated importance of plenty of fluids  May take OTC Tylenol for aches/pains/ha/fever  Dtr was unable to come over, she has to work late  Pt denies need to be seen by [facility]  Will follow up with pt in 1 week or as needed. -[facility]      09/14/22  Pt reports she is having some dental issues, has at least 4 loose teeth  Spoke with friend Sarah Bai and gave him names of 3 low cost/SS dental clinics  [employer] [facility] [address] to call pt back to let her know that the info was shared with Tony  This ACM will follow up with pt in 7-10 days. -[facility]    08/16/22  Pt states she is \"fine\" today and has no concerns  States her food is healed  Denies any further UTI symptoms  States she is eating well and drinking plenty of fluids  Need to establish care with new PCP  Pt unable to talk at this time, will follow up with her in 1 week. -[facility]    07/08/22  Pt has had a CVA in the past and has had some mental decline with poor word finding. She gets easily frustrated when she is unable to verbalize what she is thinking.  She does live by herself with no support of family, she does have a friend who checks on her and helps her out some. She does have an appoint with neuropsych in Oct, no previous testing noted. SW is also working with pt assisting with appoints/transportation as well as working with APS, pts rent is increasing and she may not be able to afford it. Her medications are prepared for her by [employer] in pill packets which she says she is compliant with. She is completing an antibiotic course for UTI and is on schedule to complete this tomorrow. She often tells this ACM that she is not hungry and has not eaten in a couple of days, she reports that she has been better with drinking fluids lately. She does like to walk outside in her neighborhood/complex. Pt currently has a sore/swollen left big toe. No further swelling or pain; she was unable to say if there is any bruising. She is not sure if she stubbed her toe or how she has hurt it. Advised her to not go walking while it is swollen. Discussed elevating foot on pillows while on the couch and applying ice over a small towel several times a day. Pt states her understanding and said she will do this. Pt will:  Elevate left foot 4-5 times a day until improved without swelling/tenderness  Use ice pack over towel on left foot 4-5 times a day until swelling and tenderness has improved  This ACM will follow up with pt in 1 week. Pt does have this ACM's contact info.  -MLL

## 2022-12-05 ENCOUNTER — PATIENT OUTREACH (OUTPATIENT)
Dept: CASE MANAGEMENT | Age: 72
End: 2022-12-05

## 2022-12-05 NOTE — PROGRESS NOTES
Goals Addressed                   This Visit's Progress     Patient verbalizes understanding of self-management of chronic disease. On track     22  Pt is tired, she just woke up (1030 am). She denies any pain, SOB, or edema. She said she has plenty of food and is eating fair. Heart Failure Education outreach Date/Time: 2022 10:31 AM    Ambulatory Care Manager (ACM) contacted the patient by telephone to perform Ambulatory Care Coordination. Verified name and  with patient as identifiers. Provided introduction to self, and explanation of the Ambulatory Care Manager's role. ACM reviewed that a Health Healthy tips packet for the Holiday has been mailed to the them. ACM reviewed CHF zones, daily weights, the importance of low sodium diet, healthy tips packet with the patient. Instructed patient to call their Cardiologist if they have a weight gain of 3 lbs in 2 days or 5 lbs in a week. Patient reminded that there is a physician on call 24 hours a day / 7 days a week should the patient have questions or concerns. The patient verbalized understanding. Plan to follow up with pt in 2 weeks or sooner. CECELIA Solo, RN - [address] [facility] - [phone]     22  Pt said she has not heard any verdict regarding her apartment. She has not spoken with her daughters. She had difficulties with her word finding and expressing her thoughts. She did say she was alone with her 4 cats for Thanksgiving. She reports she feels fine and has plenty of food in her apartment. Magno Zavala MSN, RN - [facility] [facility] - [phone]     11/15/22  Spoke with pt, she did attend court yesterday. She does not know what was decided. She is unsure if a decision was made or if she does not remember the decision. She is unsure if she needs to pack or unpack. She admits she \"feels lost\".  She said someone did attend court with her, she thinks it was Maurizio Lizarraga.  Spoke with Mimi Franklin, ROSA  This ACM plans follow up in 1 week. Gogo Turcios, MSN, RN - [address] [facility] [phone]       11/03/22  Reviewed Terence Mosquera, ROSA's notes  Pt states she has spoken with both of her daughters, they are unwilling to provide assistance   Pt is unsure where daughters live or when she has last seen them  Pt reports she has no other family/friends. She hasn't spoken with friend Trevon Valenzuela \"in a few days\"  Pt states she is fine, \"is that all\", she did not feel like talking  This ACM will follow up with pt in 1 week or as needed. Gogo Turcios, MSN, RN - Ambulatory Care Manager - [phone]     10/21/22  Pt states she is feeling well, though still has a cough  Has not yet checked into dental clinics, friend Trevon Valenzuela has been ill & busy; she reports seeing dtr occ, though they are busy as well  Pt state she is eating and drinking well  Dr Tabby Conley report from 10/13 noted: \"does not maintain capacity to make decisions\" & he recommends \"daily supervision\"  This ACM will follow up with pt in 10-14 days. Gogo Turcios, CECELIA, RN - Ambulatory Care Manager - [phone]       10/04/22  Verónica Madrigal called this ACM with concerns regarding pt's health today  Pt c/o being tired & cold (52 degrees outside, she has not yet turned heat on), she is using a blanket  She thinks she may have a fever, denies chills  She does have a cough  She c/o body aches all over  She denies any N&V or diarrhea  She does have a poor appetite, though as she said she is not a big eater, kriss for breakfast  Discussed drinking some hot tea & plenty of fluids, she said she will have some toast and wants to go back to bed  She said she does not feel like she needs to be seen by [facility]  She did say her dtr Simone Briggs will be coming by today to check on her  Will follow up later this afternoon to see if her condition warrants being seen by [facility]  1540 Update   Pt resting, said she is doing ok.   Reiterated importance of plenty of fluids  May take OTC Tylenol for aches/pains/ha/fever  Dtr was unable to come over, she has to work late  Pt denies need to be seen by [facility]  Will follow up with pt in 1 week or as needed. -[facility]      09/14/22  Pt reports she is having some dental issues, has at least 4 loose teeth  Spoke with friend Fernando Thornton and gave him names of 3 low cost/SS dental clinics  [facility] [address] to call pt back to let her know that the info was shared with Tony  This ACM will follow up with pt in 7-10 days. -[facility]    08/16/22  Pt states she is \"fine\" today and has no concerns  States her food is healed  Denies any further UTI symptoms  States she is eating well and drinking plenty of fluids  Need to establish care with new PCP  Pt unable to talk at this time, will follow up with her in 1 week. -[facility]    07/08/22  Pt has had a CVA in the past and has had some mental decline with poor word finding. She gets easily frustrated when she is unable to verbalize what she is thinking. She does live by herself with no support of family, she does have a friend who checks on her and helps her out some. She does have an appoint with neuropsych in Oct, no previous testing noted. SW is also working with pt assisting with appoints/transportation as well as working with APS, pts rent is increasing and she may not be able to afford it. Her medications are prepared for her by Speedy Franz in pill packets which she says she is compliant with. She is completing an antibiotic course for UTI and is on schedule to complete this tomorrow. She often tells this ACM that she is not hungry and has not eaten in a couple of days, she reports that she has been better with drinking fluids lately. She does like to walk outside in her neighborhood/complex. Pt currently has a sore/swollen left big toe. No further swelling or pain; she was unable to say if there is any bruising.  She is not sure if she stubbed her toe or how she has hurt it. Advised her to not go walking while it is swollen. Discussed elevating foot on pillows while on the couch and applying ice over a small towel several times a day. Pt states her understanding and said she will do this. Pt will:  Elevate left foot 4-5 times a day until improved without swelling/tenderness  Use ice pack over towel on left foot 4-5 times a day until swelling and tenderness has improved  This ACM will follow up with pt in 1 week. Pt does have this ACM's contact info.  -MLL

## 2022-12-07 ENCOUNTER — PATIENT OUTREACH (OUTPATIENT)
Dept: CASE MANAGEMENT | Age: 72
End: 2022-12-07

## 2022-12-08 NOTE — PROGRESS NOTES
Social Work Note      12/07/2022  Received call from Derrick Frazier [address] worker with [employer]. Information discussed:    Housing:  Ms. Atul Alarcon advised that she is continuing to follow patient and will be in touch with landlord to determine patient's status in apartment. She voiced that she is uncertain whether patient has paid any rent. SW asked about status of utility bills, etc..   Status unknown at this time. Ms. Atul Alarcon advised that she spoke with patient's friend, Bev Piedra. Per friend, patient indicated that SW at [employer] attended court with her. Ms. Atul Alarcon stated that she advised friend that it was APS worker, not [facility], that attended court with patient. Long term care screening (UAI):  Ms. Atul Alarcon advised that patient was screened and did not meet the criteria for long term care. Patient able to independently manage self care - bathing, dressing, meals, etc.. Discussed with Ms. Atul Alarcon concern that patient is unable to manage bills, financial affairs, etc.. and does not have a power of . Medicaid :  Per request, provided name and number for Mayito Jose -  at [facility] ([phone]). 12/08/2022  Contacted [facility] to explore housing options and spoke with Lawrence echols. Units available in Independent Living, Assisted Living, Nursing. Monthly cost is outside of patient's income limit and they do not accept the [employer]. Reviewed Public Guardianship process. Referral process requires that referring agency \"must engage an  to initiate legal proceeding before a [address]" [email or web address]. VM with Cristin Chou, APS worker with [employer] re: process. Inquired as to whether agency could provide assistance with establishment of guardian.      Email sent to [address] [place] [address] to inquire as to available openings and acceptance of the [employer].      Brendon Patrick MSW, ACSW, [facility]    Ambulatory Care Management   [phone]

## 2022-12-20 ENCOUNTER — PATIENT OUTREACH (OUTPATIENT)
Dept: CASE MANAGEMENT | Age: 72
End: 2022-12-20

## 2022-12-29 ENCOUNTER — PATIENT OUTREACH (OUTPATIENT)
Dept: CASE MANAGEMENT | Age: 72
End: 2022-12-29

## 2023-01-12 ENCOUNTER — PATIENT OUTREACH (OUTPATIENT)
Dept: CASE MANAGEMENT | Age: 73
End: 2023-01-12

## 2023-01-12 NOTE — PROGRESS NOTES
Social Work Note  1/12/2023    11:42 am - Call to patient  Contacted patient. Initial call was answered, but no person spoke. Call then disconnected. Immediately called back. Patient answered. She stated that \"her phone was about to die\" and asked whether call was important. SW advised that call was important as patient missed doctor's appointment few weeks ago. Patient requested that SW call back in one hour. Call to [facility] APS  Message left for Mirella Banks, [facility] APS worker. Requested return call to discuss supportive services available/being provided to patient. Advised that patient did not show for important intake appointment with new PCP in December. 12:52 pm - Call to patient  Attempted 2nd call to patient. Message left. Received return call from patient. SW observed patient having more difficulty with word finding. Patient stated that she has noticed more difficulty with words. Information discussed with patient:  Missed doctor's appointment and lack of medication refills:   Patient was not aware she missed an appointment. SW discussed the importance of meeting with the new doctor as she needs to have her medicines refilled. Patient stated that her medicines are now coming from \"[place]\" not [place]. She confirmed that they are still bubble packed, but that \"some of the medications went down\". SW asked if she has less medicines in her bubble packs and she stated \"yes\". Assistance with scheduling appointments and transportation:   Patient could not name individual who she could rely on to assist with scheduling appointments or transportation. Advised that SW and RN care managers cannot assist on permanent basis. Patient could not produce name of her friend who works at the apartment complex and checks on her daily. SW asked if the person was \"Tony\" and she said yes. She said he cannot help because he \"works\". Housing:  Patient appeared focused upon housing.  She stated that \"it is the only thing she is waiting on\". She stated that \"they want her to go into a home\". Patient stated that she has not spoken to  with APS recently. Patient stated [place] are waiting for the weather to clear a little bit\". Bills:  Asked patient who is assisting her with bills. Patient stated that \"they come in and I write all the bills out and send them\". Asked about rent. Patient stated that she does not pay rent \"because they said not to\". (Note;  APS involved last fall as patient did not pay electric bill and service was cut off). Advised patient that  will follow-up with [facility]. Message with Lyn Velasco, RN, ACM  Patient status and concerns discussed. Also reviewed with JIMBO Davison, RN, CCM.       Plan:   Follow-up with [facility] APS to determine long term assistance available for patient re: management of doctor's appointments, transportation, medications, etc..     Leonela Nowak, MSW, ACSW, [facility]    Ambulatory Care Management   [phone]

## 2023-01-13 ENCOUNTER — PATIENT OUTREACH (OUTPATIENT)
Dept: CASE MANAGEMENT | Age: 73
End: 2023-01-13

## 2023-01-16 ENCOUNTER — PATIENT OUTREACH (OUTPATIENT)
Dept: CASE MANAGEMENT | Age: 73
End: 2023-01-16

## 2023-01-16 NOTE — PROGRESS NOTES
Social Work Note  1/16/2023      Appointment reschedule  Rescheduled patient's missed appointment from December 2022. New appointment with Dr. Félix Ivan - 3/7/23  8:30 am    Call to [facility] APS  VM left for [facility] APS. Requested return call to discuss patient needs. Patient status discussed with Vickie Senior, RN, Ambulatory Care Manager.      Jolly Toth MSW, ACSW, [facility]    Ambulatory Care Management   [phone]

## 2023-01-16 NOTE — PROGRESS NOTES
Goals Addressed                   This Visit's Progress     Patient verbalizes understanding of self-management of chronic disease. 23  Attempted to outreach with pt for CM follow up. LM to return this call. Did attempt to make sooner appoint with various PCP's to establish care, soonest is around same time of current appoint of 3/7 with Dr Dean Rai. Lizy Beckman MSN, RN - Ambulatory Care Manager - [phone]     22  Attempted to outreach with pt for CM follow up. LM to return this call. -MLL    22  Pt is tired, she just woke up (1030 am). She denies any pain, SOB, or edema. She said she has plenty of food and is eating fair. Heart Failure Education outreach Date/Time: 2022 10:31 AM    Ambulatory Care Manager (ACM) contacted the patient by telephone to perform Ambulatory Care Coordination. Verified name and  with patient as identifiers. Provided introduction to self, and explanation of the Ambulatory Care Manager's role. ACM reviewed that a Health Healthy tips packet for the Holiday has been mailed to the them. ACM reviewed CHF zones, daily weights, the importance of low sodium diet, healthy tips packet with the patient. Instructed patient to call their Cardiologist if they have a weight gain of 3 lbs in 2 days or 5 lbs in a week. Patient reminded that there is a physician on call 24 hours a day / 7 days a week should the patient have questions or concerns. The patient verbalized understanding. Plan to follow up with pt in 2 weeks or sooner. CECELIA Castro, RN - [facility] - [phone]     22  Pt said she has not heard any verdict regarding her apartment. She has not spoken with her daughters. She had difficulties with her word finding and expressing her thoughts. She did say she was alone with her 4 cats for Thanksgiving. She reports she feels fine and has plenty of food in her apartment.   Lizy Beckman, CECELIA, RN - Ambulatory Care Manager - [phone]     11/15/22  Spoke with pt, she did attend court yesterday. She does not know what was decided. She is unsure if a decision was made or if she does not remember the decision. She is unsure if she needs to pack or unpack. She admits she \"feels lost\". She said someone did attend court with her, she thinks it was [place].  Spoke with ROSA Kerr  This ACM plans follow up in 1 week. Barry Hill, MSN, RN - [address] [place] [address] - [phone]       11/03/22  Reviewed ROSA Kerr's notes  Pt states she has spoken with both of her daughters, they are unwilling to provide assistance   Pt is unsure where daughters live or when she has last seen them  Pt reports she has no other family/friends. She hasn't spoken with friend Saurabh Gamino \"in a few days\"  Pt states she is fine, \"is that all\", she did not feel like talking  This ACM will follow up with pt in 1 week or as needed. Barry Hill, MSN, RN - Ambulatory Care Manager - [phone]     10/21/22  Pt states she is feeling well, though still has a cough  Has not yet checked into dental clinics, friend Saurabh Gamino has been ill & busy; she reports seeing dtr occ, though they are busy as well  Pt state she is eating and drinking well  Dr Jose Alfredo Lundy report from 10/13 noted: \"does not maintain capacity to make decisions\" & he recommends \"daily supervision\"  This ACM will follow up with pt in 10-14 days.    Barry Hill, MSN, RN - Ambulatory Care Manager - [phone]       10/04/22  Will Sanananda called this ACM with concerns regarding pt's health today  Pt c/o being tired & cold (52 degrees outside, she has not yet turned heat on), she is using a blanket  She thinks she may have a fever, denies chills  She does have a cough  She c/o body aches all over  She denies any N&V or diarrhea  She does have a poor appetite, though as she said she is not a big eater, kriss for breakfast  Discussed drinking some hot tea & plenty of fluids, she said she will have some toast and wants to go back to bed  She said she does not feel like she needs to be seen by [facility]  She did say her dtr Nazia Baker will be coming by today to check on her  Will follow up later this afternoon to see if her condition warrants being seen by [facility]  1540 Update   Pt resting, said she is doing ok. Reiterated importance of plenty of fluids  May take OTC Tylenol for aches/pains/ha/fever  Dtr was unable to come over, she has to work late  Pt denies need to be seen by [facility]  Will follow up with pt in 1 week or as needed. -[facility]      09/14/22  Pt reports she is having some dental issues, has at least 4 loose teeth  Spoke with friend Marilyn Poon and gave him names of 3 low cost/SS dental clinics  [facility] [address] to call pt back to let her know that the info was shared with Tony  This ACM will follow up with pt in 7-10 days. -[facility]    08/16/22  Pt states she is \"fine\" today and has no concerns  States her food is healed  Denies any further UTI symptoms  States she is eating well and drinking plenty of fluids  Need to establish care with new PCP  Pt unable to talk at this time, will follow up with her in 1 week. -[facility]    07/08/22  Pt has had a CVA in the past and has had some mental decline with poor word finding. She gets easily frustrated when she is unable to verbalize what she is thinking. She does live by herself with no support of family, she does have a friend who checks on her and helps her out some. She does have an appoint with neuropsych in Oct, no previous testing noted. SW is also working with pt assisting with appoints/transportation as well as working with APS, pts rent is increasing and she may not be able to afford it. Her medications are prepared for her by Emmanuel Hernandez in pill packets which she says she is compliant with. She is completing an antibiotic course for UTI and is on schedule to complete this tomorrow. She often tells this ACM that she is not hungry and has not eaten in a couple of days, she reports that she has been better with drinking fluids lately. She does like to walk outside in her neighborhood/complex. Pt currently has a sore/swollen left big toe. No further swelling or pain; she was unable to say if there is any bruising. She is not sure if she stubbed her toe or how she has hurt it. Advised her to not go walking while it is swollen. Discussed elevating foot on pillows while on the couch and applying ice over a small towel several times a day. Pt states her understanding and said she will do this. Pt will:  Elevate left foot 4-5 times a day until improved without swelling/tenderness  Use ice pack over towel on left foot 4-5 times a day until swelling and tenderness has improved  This ACM will follow up with pt in 1 week. Pt does have this ACM's contact info.  -MLL

## 2023-01-17 ENCOUNTER — PATIENT OUTREACH (OUTPATIENT)
Dept: CASE MANAGEMENT | Age: 73
End: 2023-01-17

## 2023-01-17 NOTE — PROGRESS NOTES
Social Work Note  1/17/2023      New patient appointment scheduled with Ameena Stern DO at [facility]:  Friday, January 20, 2023 at 11:00 am    [employer] ([phone])  Trip arranged for PCP appointment with 10 - 10:30 am  time. 12:30 pm return from appointment. Trip UQ#[id number]    Contacted patient re: new PCP appointment. Provided date, time, name, and transportation arrangements. Reiterated time for  at home and time for return after appointment. Patient stated that the pharmacy contacted her to advise that she was out of some medications. Inquired about Eliquis prescription at [facility]. Patient unaware of medication. Asked patient if she has spoken to [employer] L[address] worker. She advised that she has left two messages without a return call. Spoke with Eveline at [address] [place] [address] ([phone]). Advised of new PCP appointment on Friday 1/20. She advised that some medications have been filled, but some do not have refills. [place] mentioned that patient is taking Trazodone, which is not on current med list.   Advised that SW or ACM will be in touch with Pharmacy to make sure new prescriptions are received. [place] advised that she will \"hold\" patients medications for updates so that bubble packed meds will be correct. Patient status discussed with Karolina Mendosa, RN - Ambulatory Care Manager. Attempted to reach patient's APS worker through [employer].  [place] left requesting return call. This is second attempt without return call in last week. SW Plan:   Follow-up with APS  Follow-up with patient re: PCP appointment  Assist with coordination of medication update with [facility] pharmacy as needed.    Continue to collaborate with Karolina Mendosa, [address] [place] [address] to assist patient with needs    Jeremy Fleming, MSW, ACSW, [facility]    Ambulatory Care Management   [phone]

## 2023-01-19 ENCOUNTER — PATIENT OUTREACH (OUTPATIENT)
Dept: CASE MANAGEMENT | Age: 73
End: 2023-01-19

## 2023-01-19 NOTE — PROGRESS NOTES
Goals Addressed                   This Visit's Progress     Patient verbalizes understanding of self-management of chronic disease. Not on track     01/19/23  Call received from [address] with concerns for pt's elevated BP, SOB, and not feeling well  Spoke with pt. She does have a dry cough which she said she has had \"all of her life\". She denies SOB. Denies CP. Denies ha or blurred vision. C/o occ dizziness, though said she is resting. And she has not eaten. Pt took BP while on the phone with this ACM, 176/99, HR 77. Noteworthy, pt has had difficulty reading and writing numbers in the past.  Pt said she did take her meds today though unsure what those meds were  Pt has only drank coffee today for breakfast, has not eaten anything. She said she does have food. She reports a poor appetite. This ACM asked pt to eat/drink something (juice & toast), pt said she will  Pt states \"I'm fine\" and \"you don't need to worry\". She said she will take it easy today and see the doctor tomorrow  Asked pt if she has heard anything from her TUNJI Midget, she replied Florindamatilde Conley is leaving me alone because there ar 3 others like me\". Asked pt if she has heard from [address] [place] [address] worker, Kendra Dennison is leaving me a lone too\"  Asked pt if she needs to be seen by urgent care and again she said \"I'm fine\"  Asked pt if she has seen her friend Morales Engel recently, she said she did see him yesterday though he is very busy  Jersey Brown, CECELIA, RN - [address] [place] [address] - [phone]     01/16/23  Attempted to outreach with pt for CM follow up. LM to return this call. Did attempt to make sooner appoint with various PCP's to establish care, soonest is around same time of current appoint of 3/7 with Dr Ruthie Landau. Jersey Brown, MSN, RN - Ambulatory Care Manager - [phone]     12/29/22  Attempted to outreach with pt for CM follow up. LM to return this call. -MLL    12/05/22  Pt is tired, she just woke up (1030 am).  She denies any pain, SOB, or edema. She said she has plenty of food and is eating fair. Heart Failure Education outreach Date/Time: 2022 10:31 AM    Ambulatory Care Manager (ACM) contacted the patient by telephone to perform Ambulatory Care Coordination. Verified name and  with patient as identifiers. Provided introduction to self, and explanation of the Ambulatory Care Manager's role. ACM reviewed that a Health Healthy tips packet for the Holiday has been mailed to the them. ACM reviewed CHF zones, daily weights, the importance of low sodium diet, healthy tips packet with the patient. Instructed patient to call their Cardiologist if they have a weight gain of 3 lbs in 2 days or 5 lbs in a week. Patient reminded that there is a physician on call 24 hours a day / 7 days a week should the patient have questions or concerns. The patient verbalized understanding. Plan to follow up with pt in 2 weeks or sooner. CECELIA Jarrell, RN - [facility] [facility] - [phone]     22  Pt said she has not heard any verdict regarding her apartment. She has not spoken with her daughters. She had difficulties with her word finding and expressing her thoughts. She did say she was alone with her 4 cats for Thanksgiving. She reports she feels fine and has plenty of food in her apartment. CECELIA Jarrell, RN - [address] [facility] - [phone]     11/15/22  Spoke with pt, she did attend court yesterday. She does not know what was decided. She is unsure if a decision was made or if she does not remember the decision. She is unsure if she needs to pack or unpack. She admits she \"feels lost\". She said someone did attend court with her, she thinks it was [place].  Spoke with ROSA Miranda  This ACM plans follow up in 1 week.  CECELIA Jarrell, RN - Ambulatory Care Manager - [phone]       22  Reviewed ROSA Miranda's notes  Pt states she has spoken with both of her daughters, they are unwilling to provide assistance   Pt is unsure where daughters live or when she has last seen them  Pt reports she has no other family/friends. She hasn't spoken with friend Lolis Her \"in a few days\"  Pt states she is fine, \"is that all\", she did not feel like talking  This ACM will follow up with pt in 1 week or as needed. Aysha Kenny MSN, RN - Ambulatory Care Manager - [phone]     10/21/22  Pt states she is feeling well, though still has a cough  Has not yet checked into dental clinics, friend Lolis Her has been ill & busy; she reports seeing dtr occ, though they are busy as well  Pt state she is eating and drinking well  Dr Joo Alvarez report from 10/13 noted: \"does not maintain capacity to make decisions\" & he recommends \"daily supervision\"  This ACM will follow up with pt in 10-14 days. CECELIA Banerjee, RN - Ambulatory Care Manager - [phone]       10/04/22  [facility] called this ACM with concerns regarding pt's health today  Pt c/o being tired & cold (52 degrees outside, she has not yet turned heat on), she is using a blanket  She thinks she may have a fever, denies chills  She does have a cough  She c/o body aches all over  She denies any N&V or diarrhea  She does have a poor appetite, though as she said she is not a big eater, kriss for breakfast  Discussed drinking some hot tea & plenty of fluids, she said she will have some toast and wants to go back to bed  She said she does not feel like she needs to be seen by [facility]  She did say her dtr Jorge Aguilar will be coming by today to check on her  Will follow up later this afternoon to see if her condition warrants being seen by [facility]  1540 Update   Pt resting, said she is doing ok. Reiterated importance of plenty of fluids  May take OTC Tylenol for aches/pains/ha/fever  Dtr was unable to come over, she has to work late  Pt denies need to be seen by [facility]  Will follow up with pt in 1 week or as needed.  -MLL      09/14/22  Pt reports she is having some dental issues, has at least 4 loose teeth  Spoke with friend Davin Eldridge and gave him names of 3 low cost/SS dental clinics  [facility] [address] to call pt back to let her know that the info was shared with Tony  This ACM will follow up with pt in 7-10 days. -[facility]    08/16/22  Pt states she is \"fine\" today and has no concerns  States her food is healed  Denies any further UTI symptoms  States she is eating well and drinking plenty of fluids  Need to establish care with new PCP  Pt unable to talk at this time, will follow up with her in 1 week. -[facility]    07/08/22  Pt has had a CVA in the past and has had some mental decline with poor word finding. She gets easily frustrated when she is unable to verbalize what she is thinking. She does live by herself with no support of family, she does have a friend who checks on her and helps her out some. She does have an appoint with neuropsych in Oct, no previous testing noted. SW is also working with pt assisting with appoints/transportation as well as working with APS, pts rent is increasing and she may not be able to afford it. Her medications are prepared for her by Mai Nathan in pill packets which she says she is compliant with. She is completing an antibiotic course for UTI and is on schedule to complete this tomorrow. She often tells this ACM that she is not hungry and has not eaten in a couple of days, she reports that she has been better with drinking fluids lately. She does like to walk outside in her neighborhood/complex. Pt currently has a sore/swollen left big toe. No further swelling or pain; she was unable to say if there is any bruising. She is not sure if she stubbed her toe or how she has hurt it. Advised her to not go walking while it is swollen. Discussed elevating foot on pillows while on the couch and applying ice over a small towel several times a day.  Pt states her understanding and said she will do this. Pt will:  Elevate left foot 4-5 times a day until improved without swelling/tenderness  Use ice pack over towel on left foot 4-5 times a day until swelling and tenderness has improved  This ACM will follow up with pt in 1 week. Pt does have this ACM's contact info.  -MLL

## 2023-01-19 NOTE — PROGRESS NOTES
Social Work Note  1/19/2023    8:55 am - Fourth VM left for Ida Sellers, APS worker with [facility]. Received message from her at 9:30 am.     9:55 am - Call with Alondra Childress at length with [facility] APS worker Ida Sellers:     Provided update re: patient's missed new provider appointment in December, lack of refills for medications, patient's lack of understanding re: importance of refills and appointments. Advised that patient does not have ability to initiate doctor's appointments or schedule transportation. Advised of new provide appointment with Dr. Mona Akhtar on 1/20/23 and that SW arranged transportation for patient. Housing:  Asked Ms. Raina Youngblood if she has been in touch with landlord or has update on housing. Worker stated that she has not talked with the landlord in a couple of weeks. She stated that the landlord applied for \"rental assistance\" for patient in the fall and needed copies of utility bills as verifications. Asked about the program as all COVID rental relief programs have ended. Also asked if patient is able to access her accounts and provide needed documents. Per Ms. Raina Youngblood, the landlord \"has possession\" and can evict patient at any time. SW asked about guardianship and advised that this is not something [employer] can do for patient. Ms. Raina Youngblood mentioned that she \"talked with her supervisor\" previously about it. Per Ms. Raina Youngblood, \"it would not be [place] that would file. They would have to contact [place] [address] [employer]\". SW asked APS worker to talk with supervisor ASAP to start the process. Discussed documentation from neuropsychologist that patient does not have capacity. SW asked how any decisions could be made or paperwork signed by patient as she lacks capacity. Assisted Living:  Per Ms. Raina Youngblood, patient only met criteria for assisted living during I screening on November 4, 2022.   She stated that patient met medical need for nursing home, but not functional need. Asked if screeners were aware of assessment by neuropsychologist and lack of patient ability to manage executive function responsibilities and lack of insight re: medical issues. Worker stated that patient has not agreed to assisted living and she cannot force her to go. SW inquired as to how APS handles patients with dementia that are functionally able to manage needs, but are unable to manage skills requiring cognitive function and decision making. Worker reiterated that patient did not meet functional criteria with screening. SW requested call from [facility] worker with update ASAP after conversation with her supervisor. [employer] [facility] [email or web address]  Email sent to Linnette Simmons, Public Guardianship Coordinator with [employer] (agency responsible for [facility]). Questions asked re: public guardianship program (process, wait times) and alternatives to the program.    [facility] left for Guss Schirmer ([phone]), long term care [facility] through [employer]. Requested return call. 12:40 pm  - Call to patient  Contacted patient. Reminded patient of PCP appointment tomorrow. Confirmed patient understanding of transportation pick-up time via [employer]. Patient asked if she could talk with PCP re: teeth. She stated that she needs to see a dentist because her [place] are really bad\" and they are \"swollen\" and she \"cannot eat\". SW asked if Damarisalexia Morgan could assist with talking with dentist as he helped in past.  She stated that \"Tony is busy\". She stated that she spoke with APS worker about need, but has not heard back. Patient reported that she \"feels yucky\". She stated that she feels \"weak, tired\" and answered yes when asked if she was SOB. SW observed patient with SOB when ambulating while on phone.   Patient stated that she has blood pressure cuff and her blood pressure was high.  She stated that she \"didn't have medication\". Patient took blood pressure while on phone with SW.  Per patient, reading was \"170/118\". SW expressed concern with number and advised that  ACM will be notified and will call patient. Call - Fallon Gallego RN ([address] [place] [address])  Advised of patient report of elevated BP during phone call with SW.  Provided information re: symptoms reported. ACM to contact patient. 4:05 pm - Call from [facility] call from [place] [address] worker, Vanessa Liu.  She stated that she spoke with her supervisor and they will not be filing for guardianship. She advised that her supervisor advised that there are \"a lot of citizens that are incapacitated that do not need guardians\". Ms. Khadar Scott also mentioned that the wait list is long for public guardianship and they would not \"push her [patient] to the front of the line\". Worker stated that she will talk with patient about assisted living care and work with her to try and arrange those services. She advised that patient will need to apply for the [employer] as well. SW asked how patient can be placed in prison if she can cannot sign the documents. Worker stated that some facilities will accept patients without a guardian.       SW Plan:    Follow-up with patient after PCP appointment  Assist with medication update as needed    JET Peres, ACSW, [facility]    Ambulatory Care Management   [phone]

## 2023-01-20 ENCOUNTER — PATIENT OUTREACH (OUTPATIENT)
Dept: CASE MANAGEMENT | Age: 73
End: 2023-01-20

## 2023-01-20 ENCOUNTER — OFFICE VISIT (OUTPATIENT)
Dept: PRIMARY CARE CLINIC | Age: 73
End: 2023-01-20
Payer: MEDICARE

## 2023-01-20 ENCOUNTER — TELEPHONE (OUTPATIENT)
Dept: PRIMARY CARE CLINIC | Age: 73
End: 2023-01-20

## 2023-01-20 VITALS
OXYGEN SATURATION: 98 % | SYSTOLIC BLOOD PRESSURE: 156 MMHG | RESPIRATION RATE: 18 BRPM | DIASTOLIC BLOOD PRESSURE: 79 MMHG | BODY MASS INDEX: 27.09 KG/M2 | HEART RATE: 69 BPM | WEIGHT: 134.4 LBS | TEMPERATURE: 97.5 F | HEIGHT: 59 IN

## 2023-01-20 DIAGNOSIS — R51.9 NONINTRACTABLE HEADACHE, UNSPECIFIED CHRONICITY PATTERN, UNSPECIFIED HEADACHE TYPE: ICD-10-CM

## 2023-01-20 DIAGNOSIS — I10 ESSENTIAL HYPERTENSION: ICD-10-CM

## 2023-01-20 DIAGNOSIS — G47.00 INSOMNIA, UNSPECIFIED TYPE: ICD-10-CM

## 2023-01-20 DIAGNOSIS — E78.00 PURE HYPERCHOLESTEROLEMIA: ICD-10-CM

## 2023-01-20 DIAGNOSIS — I50.22 CHRONIC SYSTOLIC (CONGESTIVE) HEART FAILURE (HCC): ICD-10-CM

## 2023-01-20 DIAGNOSIS — I48.20 CHRONIC A-FIB (HCC): ICD-10-CM

## 2023-01-20 DIAGNOSIS — E03.9 ACQUIRED HYPOTHYROIDISM: ICD-10-CM

## 2023-01-20 DIAGNOSIS — R73.02 IGT (IMPAIRED GLUCOSE TOLERANCE): Primary | ICD-10-CM

## 2023-01-20 DIAGNOSIS — I48.91 ATRIAL FIBRILLATION WITH RVR (HCC): ICD-10-CM

## 2023-01-20 RX ORDER — TRAZODONE HYDROCHLORIDE 50 MG/1
TABLET ORAL
Qty: 90 TABLET | Refills: 1 | Status: SHIPPED | OUTPATIENT
Start: 2023-01-20

## 2023-01-20 RX ORDER — PANTOPRAZOLE SODIUM 40 MG/1
40 TABLET, DELAYED RELEASE ORAL
Qty: 90 TABLET | Refills: 0 | Status: SHIPPED | OUTPATIENT
Start: 2023-01-20

## 2023-01-20 RX ORDER — LEVOTHYROXINE SODIUM 88 UG/1
88 TABLET ORAL
Qty: 90 TABLET | Refills: 1 | Status: SHIPPED | OUTPATIENT
Start: 2023-01-20 | End: 2023-01-20 | Stop reason: SDUPTHER

## 2023-01-20 RX ORDER — LEVOTHYROXINE SODIUM 88 UG/1
88 TABLET ORAL
Qty: 90 TABLET | Refills: 1 | Status: SHIPPED | OUTPATIENT
Start: 2023-01-20

## 2023-01-20 RX ORDER — PRAVASTATIN SODIUM 80 MG/1
80 TABLET ORAL DAILY
Qty: 90 TABLET | Refills: 1 | Status: SHIPPED | OUTPATIENT
Start: 2023-01-20

## 2023-01-20 RX ORDER — TRAZODONE HYDROCHLORIDE 50 MG/1
TABLET ORAL
Qty: 90 TABLET | Refills: 1 | Status: SHIPPED | OUTPATIENT
Start: 2023-01-20 | End: 2023-01-20 | Stop reason: SDUPTHER

## 2023-01-20 RX ORDER — DILTIAZEM HYDROCHLORIDE 180 MG/1
CAPSULE, EXTENDED RELEASE ORAL
Qty: 90 CAPSULE | Refills: 0 | Status: SHIPPED | OUTPATIENT
Start: 2023-01-20 | End: 2023-01-20 | Stop reason: SDUPTHER

## 2023-01-20 RX ORDER — TOPIRAMATE 50 MG/1
50 TABLET, FILM COATED ORAL
Qty: 90 TABLET | Refills: 1 | Status: SHIPPED | OUTPATIENT
Start: 2023-01-20 | End: 2023-01-20 | Stop reason: SDUPTHER

## 2023-01-20 RX ORDER — METOPROLOL SUCCINATE 50 MG/1
50 TABLET, EXTENDED RELEASE ORAL DAILY
Qty: 90 TABLET | Refills: 0 | Status: SHIPPED | OUTPATIENT
Start: 2023-01-20

## 2023-01-20 RX ORDER — ALBUTEROL SULFATE 90 UG/1
2 AEROSOL, METERED RESPIRATORY (INHALATION)
Qty: 1 EACH | Refills: 2 | Status: SHIPPED | OUTPATIENT
Start: 2023-01-20

## 2023-01-20 RX ORDER — PANTOPRAZOLE SODIUM 40 MG/1
40 TABLET, DELAYED RELEASE ORAL
Qty: 90 TABLET | Refills: 0 | Status: SHIPPED | OUTPATIENT
Start: 2023-01-20 | End: 2023-01-20 | Stop reason: SDUPTHER

## 2023-01-20 RX ORDER — METOPROLOL SUCCINATE 50 MG/1
50 TABLET, EXTENDED RELEASE ORAL DAILY
Qty: 90 TABLET | Refills: 0 | Status: SHIPPED | OUTPATIENT
Start: 2023-01-20 | End: 2023-01-20 | Stop reason: SDUPTHER

## 2023-01-20 RX ORDER — DILTIAZEM HYDROCHLORIDE 180 MG/1
CAPSULE, EXTENDED RELEASE ORAL
Qty: 90 CAPSULE | Refills: 0 | Status: SHIPPED | OUTPATIENT
Start: 2023-01-20

## 2023-01-20 RX ORDER — LOSARTAN POTASSIUM 25 MG/1
25 TABLET ORAL DAILY
Qty: 90 TABLET | Refills: 0 | Status: SHIPPED | OUTPATIENT
Start: 2023-01-20

## 2023-01-20 RX ORDER — TOPIRAMATE 50 MG/1
50 TABLET, FILM COATED ORAL
Qty: 90 TABLET | Refills: 1 | Status: SHIPPED | OUTPATIENT
Start: 2023-01-20

## 2023-01-20 RX ORDER — PRAVASTATIN SODIUM 80 MG/1
80 TABLET ORAL DAILY
Qty: 90 TABLET | Refills: 1 | Status: SHIPPED | OUTPATIENT
Start: 2023-01-20 | End: 2023-01-20 | Stop reason: SDUPTHER

## 2023-01-20 NOTE — PROGRESS NOTES
Chief Complaint   Patient presents with    Establish Care    Headache     Patient states her b/p was over 200 this morning and she took a b/p pill but doesn't recall name of medication. Patient lives alone. She has not been seen since last PCP . Visit Vitals  BP (!) 156/79 (BP 1 [facility]: Left upper arm, BP Patient Position: Sitting, BP Cuff Size: Small adult)   Pulse 69   Temp 97.5 °F (36.4 °C) (Temporal)   Resp 18   Ht 4' 11\" (1.499 m)   Wt 134 lb 6.4 oz (61 kg)   SpO2 98%   BMI 27.15 kg/m²     1. \"Have you been to the ER, urgent care clinic since your last visit? Hospitalized since your last visit? \" no    2. \"Have you seen or consulted any other health care providers outside of the [address] [place] [address] since your last visit? \" no     3. For patients aged 39-70: Has the patient had a colonoscopy / FIT/ Cologuard? never      If the patient is female:    4. For patients aged 41-77: Has the patient had a mammogram within the past 2 years?  Never

## 2023-01-20 NOTE — PROGRESS NOTES
Goals Addressed                   This Visit's Progress     Patient verbalizes understanding of self-management of chronic disease. 01/20/23  Attempted to outreach with pt for CM follow up & to follow up on yesterdays luis TANG to return this call. Pt did see new PCP today. Rx were reordered though sent to [address] not [place] (she uses the pill packing service provided by [facility]). Noted that ASA, Vit D, and Losartan were not re-ordered. SW has sent note to PCP. Stan Magdaleno, MSN, RN - [facility] [facility] - [phone]     01/19/23  Call received from [address] with concerns for pt's elevated BP, SOB, and not feeling well  Spoke with pt. She does have a dry cough which she said she has had \"all of her life\". She denies SOB. Denies CP. Denies ha or blurred vision. C/o occ dizziness, though said she is resting. And she has not eaten. Pt took BP while on the phone with this ACM, 176/99, HR 77. Noteworthy, pt has had difficulty reading and writing numbers in the past.  Pt said she did take her meds today though unsure what those meds were  Pt has only drank coffee today for breakfast, has not eaten anything. She said she does have food. She reports a poor appetite. This ACM asked pt to eat/drink something (juice & toast), pt said she will  Pt states \"I'm fine\" and \"you don't need to worry\". She said she will take it easy today and see the doctor tomorrow  Asked pt if she has heard anything from her Johana Macario, she replied Ananth Loren is leaving me alone because there ar 3 others like me\". Asked pt if she has heard from [facility] worker, Werner Grimaldo is leaving me a lone too\"  Asked pt if she needs to be seen by urgent care and again she said \"I'm fine\"  Asked pt if she has seen her friend Dale Vazquez recently, she said she did see him yesterday though he is very busy  CECELIA Townsend, RN - [facility] - [phone]     01/16/23  Attempted to outreach with pt for CM follow up.  LM to return this call. Did attempt to make sooner appoint with various PCP's to establish care, soonest is around same time of current appoint of 3/7 with Dr Lopez Bean. Antonella Love, CECELIA, RN - Ambulatory Care Manager - [phone]     22  Attempted to outreach with pt for CM follow up. LM to return this call. -MLL    22  Pt is tired, she just woke up (1030 am). She denies any pain, SOB, or edema. She said she has plenty of food and is eating fair. Heart Failure Education outreach Date/Time: 2022 10:31 AM    Ambulatory Care Manager (ACM) contacted the patient by telephone to perform Ambulatory Care Coordination. Verified name and  with patient as identifiers. Provided introduction to self, and explanation of the Ambulatory Care Manager's role. ACM reviewed that a Health Healthy tips packet for the Holiday has been mailed to the them. ACM reviewed CHF zones, daily weights, the importance of low sodium diet, healthy tips packet with the patient. Instructed patient to call their Cardiologist if they have a weight gain of 3 lbs in 2 days or 5 lbs in a week. Patient reminded that there is a physician on call 24 hours a day / 7 days a week should the patient have questions or concerns. The patient verbalized understanding. Plan to follow up with pt in 2 weeks or sooner. Antonella Love, CECELIA, RN - [address] [facility] - [phone]     22  Pt said she has not heard any verdict regarding her apartment. She has not spoken with her daughters. She had difficulties with her word finding and expressing her thoughts. She did say she was alone with her 4 cats for Thanksgiving. She reports she feels fine and has plenty of food in her apartment. Antonella Love, CECELIA, RN - [address] [facility] - [phone]     11/15/22  Spoke with pt, she did attend court yesterday. She does not know what was decided. She is unsure if a decision was made or if she does not remember the decision.  She is unsure if she needs to pack or unpack. She admits she \"feels lost\". She said someone did attend court with her, she thinks it was [place].  Spoke with ROSA Nance  This ACM plans follow up in 1 week. Manny Hines, MSN, RN - [address] [facility] - [phone]       11/03/22  Reviewed ROSA Nance's notes  Pt states she has spoken with both of her daughters, they are unwilling to provide assistance   Pt is unsure where daughters live or when she has last seen them  Pt reports she has no other family/friends. She hasn't spoken with friend Joe Sky \"in a few days\"  Pt states she is fine, \"is that all\", she did not feel like talking  This ACM will follow up with pt in 1 week or as needed. Manny Hines, MSN, RN - Ambulatory Care Manager - [phone]     10/21/22  Pt states she is feeling well, though still has a cough  Has not yet checked into dental clinics, friend Joe Sky has been ill & busy; she reports seeing dtr occ, though they are busy as well  Pt state she is eating and drinking well  Dr Marien Canavan report from 10/13 noted: \"does not maintain capacity to make decisions\" & he recommends \"daily supervision\"  This ACM will follow up with pt in 10-14 days.    Manny Hines, MSN, RN - Ambulatory Care Manager - [phone]       10/04/22  Oswaldo Whitney called this ACM with concerns regarding pt's health today  Pt c/o being tired & cold (52 degrees outside, she has not yet turned heat on), she is using a blanket  She thinks she may have a fever, denies chills  She does have a cough  She c/o body aches all over  She denies any N&V or diarrhea  She does have a poor appetite, though as she said she is not a big eater, kriss for breakfast  Discussed drinking some hot tea & plenty of fluids, she said she will have some toast and wants to go back to bed  She said she does not feel like she needs to be seen by [facility]  She did say her dtr Giuliana Pabon will be coming by today to check on her  Will follow up later this afternoon to see if her condition warrants being seen by [facility]  1540 Update   Pt resting, said she is doing ok. Reiterated importance of plenty of fluids  May take OTC Tylenol for aches/pains/ha/fever  Dtr was unable to come over, she has to work late  Pt denies need to be seen by [facility]  Will follow up with pt in 1 week or as needed. -[facility]      09/14/22  Pt reports she is having some dental issues, has at least 4 loose teeth  Spoke with friend Mar Cheema and gave him names of 3 low cost/SS dental clinics  [facility] [address] to call pt back to let her know that the info was shared with Tony  This ACM will follow up with pt in 7-10 days. -[facility]    08/16/22  Pt states she is \"fine\" today and has no concerns  States her food is healed  Denies any further UTI symptoms  States she is eating well and drinking plenty of fluids  Need to establish care with new PCP  Pt unable to talk at this time, will follow up with her in 1 week. -[facility]    07/08/22  Pt has had a CVA in the past and has had some mental decline with poor word finding. She gets easily frustrated when she is unable to verbalize what she is thinking. She does live by herself with no support of family, she does have a friend who checks on her and helps her out some. She does have an appoint with neuropsych in Oct, no previous testing noted. SW is also working with pt assisting with appoints/transportation as well as working with APS, pts rent is increasing and she may not be able to afford it. Her medications are prepared for her by Zoraida Buenrostro in pill packets which she says she is compliant with. She is completing an antibiotic course for UTI and is on schedule to complete this tomorrow. She often tells this ACM that she is not hungry and has not eaten in a couple of days, she reports that she has been better with drinking fluids lately. She does like to walk outside in her neighborhood/complex. Pt currently has a sore/swollen left big toe. No further swelling or pain; she was unable to say if there is any bruising. She is not sure if she stubbed her toe or how she has hurt it. Advised her to not go walking while it is swollen. Discussed elevating foot on pillows while on the couch and applying ice over a small towel several times a day. Pt states her understanding and said she will do this. Pt will:  Elevate left foot 4-5 times a day until improved without swelling/tenderness  Use ice pack over towel on left foot 4-5 times a day until swelling and tenderness has improved  This ACM will follow up with pt in 1 week. Pt does have this ACM's contact info.  -MLL

## 2023-01-20 NOTE — PROGRESS NOTES
HPI     Chief Complaint   Patient presents with    [facility]    Headache     She is a 67 y.o. female who presents to [facility]. Establishing Care    Pmhx : Dementia, Hx CVA, Permanent Afib, Moderate - severe MR, HTN, IGT, HLD, Diastolic CHF, pacemaker in place, CKD4, secondary hyperparathyroidism, hypothyroidism, Vit D def, Vit B12 def. Patient is a poor historian. Lives alone at home. She does not drive. She reports ADLs independent. She requires assistance with IADLs. Hx Permanent afib, Mod-severe MR, diastolic CHF. She has not followed up with cardiology in the past 1 year. Has stopped eliquis, but unsure why she stopped this. Denies any recent falls. Denies chest pain or dyspnea. Denies any signs of bleeding. Patient states she no longer wants to have breast or colorectal cancer screenings. - Chronic medical problems:  Past Medical History:   Diagnosis Date    Cancer ([facility] [address]     ovaian stomach    CVA (cerebral vascular accident) ([facility] [address]     Heart failure ([facility] [address]     MI    Hypertension      - Current medications:   Current Outpatient Medications   Medication Sig    albuterol (PROVENTIL HFA, VENTOLIN HFA, PROAIR HFA) 90 mcg/actuation inhaler Take 2 Puffs by inhalation every four (4) hours as needed for Shortness of Breath or Respiratory Distress. dilTIAZem ER (DILT-XR) 180 mg capsule TAKE ONE CAPSULE EVERY DAY. levothyroxine (SYNTHROID) 88 mcg tablet Take 1 Tablet by mouth Daily (before breakfast). metoprolol succinate (TOPROL-XL) 50 mg XL tablet Take 1 Tablet by mouth daily. pantoprazole (PROTONIX) 40 mg tablet Take 1 Tablet by mouth Daily (before breakfast). topiramate (TOPAMAX) 50 mg tablet Take 1 Tablet by mouth nightly. pravastatin (PRAVACHOL) 80 mg tablet Take 1 Tablet by mouth daily. traZODone (DESYREL) 50 mg tablet TAKE 1 TABLET BY MOUTH EVERY NIGHT    apixaban (Eliquis) 5 mg tablet Take 1 Tablet by mouth two (2) times a day.     losartan (COZAAR) 25 mg tablet Take 1 Tablet by mouth daily. cholecalciferol (VITAMIN D3) (1000 Units /25 mcg) tablet Take 2 Tabs by mouth daily. (Patient not taking: No sig reported)    ipratropium-albuteroL (Combivent Respimat)  mcg/actuation inhaler Take 1 Puff by inhalation every six (6) hours. (Patient not taking: Reported on 1/20/2023)    aspirin delayed-release (ADULT LOW DOSE ASPIRIN) 81 mg tablet Take 1 Tab by mouth daily. (Patient not taking: No sig reported)     No current facility-administered medications for this visit.      - Family history:   Family History   Problem Relation Age of Onset    Hypertension Mother     Hypertension Sister     Heart Disease Brother     Suicide Brother     Diabetes Brother     Diabetes Brother     Suicide Brother     Cancer Brother     Hypertension Sister     Hypertension Sister     Hypertension Sister     Hypertension Sister      - Allergies: No Known Allergies  - Surgical history:   Past Surgical History:   Procedure Laterality Date    HX GYN      hysterectomy    HX LUMBAR LAMINECTOMY  06/29/2016    L4-S1, Dr. Cris Kaplan PACEMAKER PLACEMENT  10/2021    [place] Caroline Wright      cancer removal     - Social history (sexually active, occupation, smoker, etoh use, etc):   Social History     Socioeconomic History    Marital status:      Spouse name: Not on file    Number of children: Not on file    Years of education: Not on file    Highest education level: Not on file   Occupational History    Not on file   Tobacco Use    Smoking status: Never    Smokeless tobacco: Never   Substance and Sexual Activity    Alcohol use: Not Currently    Drug use: No    Sexual activity: Not on file   Other Topics Concern    Not on file   Social History Narrative    Not on file     Social Determinants of Health     Financial Resource Strain: High Risk    Difficulty of Paying Living Expenses: Very hard   Food Insecurity: No Food Insecurity    Worried About Running Out of Food in the Last Year: Never true    Ran Out of Food in the Last Year: Never true   Transportation Needs: Not on file   Physical Activity: Insufficiently Active    Days of Exercise per Week: 7 days    Minutes of Exercise per Session: 20 min   Stress: Not on file   Social Connections: Not on file   Intimate Partner Violence: Not on file   Housing Stability: High Risk    Unable to Pay for Housing in the Last Year: Yes    Number of [place] in the Last Year: 1    Unstable Housing in the Last Year: No         Review of Systems  General : Denies fever, chills, unintentional weight loss. Cardiac : Denies chest pain, shortness of breath, orthopnea, PND. Pulm : Denies coughing, hemoptysis, dyspnea. GI: Denies abd pain, vomiting, change in bowel movements, black/bloody stool. Neuro : Denies syncope or presyncope. Denies focal neuro symptoms. Reviewed PmHx, RxHx, FmHx, SocHx, AllgHx and updated and dated in the chart. Physical Exam:  Visit Vitals  BP (!) 156/79 (BP 1 [facility]: Left upper arm, BP Patient Position: Sitting, BP Cuff Size: Small adult)   Pulse 69   Temp 97.5 °F (36.4 °C) (Temporal)   Resp 18   Ht 4' 11\" (1.499 m)   Wt 134 lb 6.4 oz (61 kg)   SpO2 98%   BMI 27.15 kg/m²     Physical Exam  Vitals reviewed. Constitutional:       Appearance: Normal appearance. HENT:      Head: Normocephalic and atraumatic. Eyes:      Conjunctiva/sclera: Conjunctivae normal.      Pupils: Pupils are equal, round, and reactive to light. Cardiovascular:      Rate and Rhythm: Normal rate and regular rhythm. Pulses: Normal pulses. Heart sounds: Normal heart sounds. Pulmonary:      Effort: Pulmonary effort is normal.      Breath sounds: Normal breath sounds. Musculoskeletal:      Right lower leg: No edema. Left lower leg: No edema. Skin:     General: Skin is warm and dry. Neurological:      Mental Status: She is alert. Comments: Alert. Oriented x 1.    Psychiatric:         Mood and Affect: Mood normal.            Assessment / Plan     Diagnoses and all orders for this visit:    1. IGT (impaired glucose tolerance)  -     HEMOGLOBIN A1C WITH EAG; Future    2. Essential hypertension  -     METABOLIC PANEL, COMPREHENSIVE; Future  -     dilTIAZem ER (DILT-XR) 180 mg capsule; TAKE ONE CAPSULE EVERY DAY. -     metoprolol succinate (TOPROL-XL) 50 mg XL tablet; Take 1 Tablet by mouth daily. 3. Chronic a-fib (HCC)  -     CBC WITH AUTOMATED DIFF; Future  -     METABOLIC PANEL, COMPREHENSIVE; Future  -     dilTIAZem ER (DILT-XR) 180 mg capsule; TAKE ONE CAPSULE EVERY DAY. -     metoprolol succinate (TOPROL-XL) 50 mg XL tablet; Take 1 Tablet by mouth daily.  -     REFERRAL TO CARDIOLOGY    4. Chronic systolic (congestive) heart failure  -     CBC WITH AUTOMATED DIFF; Future  -     REFERRAL TO CARDIOLOGY    5. Acquired hypothyroidism  -     THYROID CASCADE PROFILE; Future  -     levothyroxine (SYNTHROID) 88 mcg tablet; Take 1 Tablet by mouth Daily (before breakfast). 6. Nonintractable headache, unspecified chronicity pattern, unspecified headache type  -     topiramate (TOPAMAX) 50 mg tablet; Take 1 Tablet by mouth nightly. 7. Pure hypercholesterolemia  -     pravastatin (PRAVACHOL) 80 mg tablet; Take 1 Tablet by mouth daily. 8. Insomnia, unspecified type  -     traZODone (DESYREL) 50 mg tablet; TAKE 1 TABLET BY MOUTH EVERY NIGHT    Other orders  -     albuterol (PROVENTIL HFA, VENTOLIN HFA, PROAIR HFA) 90 mcg/actuation inhaler; Take 2 Puffs by inhalation every four (4) hours as needed for Shortness of Breath or Respiratory Distress. -     pantoprazole (PROTONIX) 40 mg tablet; Take 1 Tablet by mouth Daily (before breakfast). Encouraged patient to consider placement nursing home facility. We have scheduled a follow up with cardiology. Med rec today. Labs and follow up as indicated. I have discussed the diagnosis with the patient and the intended plan as seen in the above orders. I have discussed medication side effects and warnings with the patient as well.     Nell Oh, DO

## 2023-01-20 NOTE — PROGRESS NOTES
Social Work Note  1/20/2023    Patient attended appointment with new PCP today. Call - Siomara Limon (friend)  Received call from patient's friend, Siomara Limon while patient was at PCP appointment. He advised that patient has \"notice to vacate\" attached to her door. Tony reported that notice indicates that she must vacate by 9am on 1/31/23. Friend asked for assistance with housing. SW advised that APS Worker, Ms. Fang Joel, will need to assist with relocation. Provided phone number and name for Ms. Fang Joel ([phone]) and asked Tony to contact her with updated information. Call to [facility]   SW contacted Ms. Fang Joel, patient's APS worker ([phone] cell) and left message advising of call from patient's friend and impending eviction. Received return call. Worker asked about order for chest xray and assistance with physical form for [facility] placement. SW advised that patient was meeting with PCP and unaware of eviction at this time. Advised that [facility] can assist with completion of medical paperwork. Provided phone and fax number for APS worker to send Assisted Living H&P form to office for completion. Chart reviewed post visit. Information sent to Dr. Apryl De La Rosa and YOUSUF Peralta re: patient's pharmacy. Advised that patient receives bubble packed medications through [facility]. Patient status discussed with Yeyo Caro RN, Ambulatory Care Manager.     ROSA Plan:   Follow-up with patient  Follow-up with APS  re: need for CXR for assisted living placement  Medication follow-up    JET Miranda, ACSW, [facility]    Ambulatory Care Management   [phone]

## 2023-01-23 ENCOUNTER — PATIENT OUTREACH (OUTPATIENT)
Dept: CASE MANAGEMENT | Age: 73
End: 2023-01-23

## 2023-01-23 NOTE — PROGRESS NOTES
Social Work Note  1/23/2023    825 am  VM left for APS worker, Prachi Lynn.  Requested call re: requirements for assisted living H&P paperwork and pre-admission testing. 400 pm  Spoke with APS worker, Ms. Michelle Gibson.  She advised that patient needs CXR for assisted living and that she faxed history and physical form to Dr. Val Gaona office for completion. Per Ms. Michelle Gibson, she did not reach patient today. She stated that she and [address] [place] [address] Coordinator are both looking for assisted living facilities that will accept auxiliary grants. 415 pm  Contacted patient. Patient stated that she has not spoke with APS worker. SW asked if she received notice on her door. Patient stated that she received notice, but did not know what it meant. SW explained that Ghazala Sanders called this SW on Friday and explained that patient has been given notice to leave her apartment by 1/31/23 at 9:00 am.  SW advised that Ms. Michelle Gibson and patient's [facility] Care Coordinator are looking for an assisted living facility for patient. Advised that this SW is assisting with medical paperwork and that patient will need to have a CXR completed. Patient stated that Ghazala Sanders is sick and not available to assist patient. SW to schedule Medicaid transportation for imaging. Patient inquired as to what items she can take with her for facility. SW advised that she would need to talk with APS . SW asked if patient could talk with daughter, Sharyle Gunning, to see if she can help with some of her household items. Patient stated that she is not in contact with her daughters.      SW Plan:   Schedule transportation for CXR  Follow-up with patient re: transportation, CXR    Aleja Dean MSW, ACSW, [facility]    Ambulatory Care Management   [phone]

## 2023-01-23 NOTE — PROGRESS NOTES
Goals Addressed                   This Visit's Progress     Patient verbalizes understanding of self-management of chronic disease. On track     01/23/23  Pt reports she liked her new PCP, appoint on Friday 1/20  Discussed all of her meds were refilled and sent to [facility], she should expect them soon  Pt denies any complaints today  [address] spoke with [place], verified all prescriptions were received and ordered for bubble pack system  Plan to follow up with pt in 1 week. Amy Vasquez, MSN, RN - Ambulatory Care Manager - [phone]     01/20/23  Attempted to outreach with pt for CM follow up & to follow up on yesterdays sx. LM to return this call. Pt did see new PCP today. Rx were reordered though sent to [address] not [place] (she uses the pill packing service provided by [facility]). Noted that ASA, Vit D, and Losartan were not re-ordered. SW has sent note to PCP. CECELIA Castillo, RN - [facility] - [phone]     01/19/23  Call received from [address] with concerns for pt's elevated BP, SOB, and not feeling well  Spoke with pt. She does have a dry cough which she said she has had \"all of her life\". She denies SOB. Denies CP. Denies ha or blurred vision. C/o occ dizziness, though said she is resting. And she has not eaten. Pt took BP while on the phone with this ACM, 176/99, HR 77. Noteworthy, pt has had difficulty reading and writing numbers in the past.  Pt said she did take her meds today though unsure what those meds were  Pt has only drank coffee today for breakfast, has not eaten anything. She said she does have food. She reports a poor appetite. This ACM asked pt to eat/drink something (juice & toast), pt said she will  Pt states \"I'm fine\" and \"you don't need to worry\". She said she will take it easy today and see the doctor tomorrow  Asked pt if she has heard anything from her Maritzareinaldo Fernández, she replied Lily Mercedes is leaving me alone because there ar 3 others like me\". Asked pt if she has heard from [address] [place] [address] worker, Corina Fletcher is leaving me a lone too\"  Asked pt if she needs to be seen by urgent care and again she said \"I'm fine\"  Asked pt if she has seen her friend Riddhi Gant recently, she said she did see him yesterday though he is very busy  Skyler Kramer, CECELIA, RN - [facility] - [phone]     23  Attempted to outreach with pt for CM follow up. LM to return this call. Did attempt to make sooner appoint with various PCP's to establish care, soonest is around same time of current appoint of 3/7 with Dr Carlotta Calero. Skyler Kramer MSN, RN - Ambulatory Care Manager - [phone]     22  Attempted to outreach with pt for CM follow up. LM to return this call. -MLL    22  Pt is tired, she just woke up (1030 am). She denies any pain, SOB, or edema. She said she has plenty of food and is eating fair. Heart Failure Education outreach Date/Time: 2022 10:31 AM    Ambulatory Care Manager (ACM) contacted the patient by telephone to perform Ambulatory Care Coordination. Verified name and  with patient as identifiers. Provided introduction to self, and explanation of the Ambulatory Care Manager's role. ACM reviewed that a Health Healthy tips packet for the Holiday has been mailed to the them. ACM reviewed CHF zones, daily weights, the importance of low sodium diet, healthy tips packet with the patient. Instructed patient to call their Cardiologist if they have a weight gain of 3 lbs in 2 days or 5 lbs in a week. Patient reminded that there is a physician on call 24 hours a day / 7 days a week should the patient have questions or concerns. The patient verbalized understanding. Plan to follow up with pt in 2 weeks or sooner. Skyler Kramer, CECELIA, RN - [facility] - [phone]     22  Pt said she has not heard any verdict regarding her apartment. She has not spoken with her daughters.  She had difficulties with her word finding and expressing her thoughts. She did say she was alone with her 4 cats for Thanksgiving. She reports she feels fine and has plenty of food in her apartment. Korin Umana, CECELIA, RN - [facility] - [phone]     11/15/22  Spoke with pt, she did attend court yesterday. She does not know what was decided. She is unsure if a decision was made or if she does not remember the decision. She is unsure if she needs to pack or unpack. She admits she \"feels lost\". She said someone did attend court with her, she thinks it was [place].  Spoke with ROSA Lovelace  This ACM plans follow up in 1 week. CECELIA Lazo, RN - [facility] - [phone]       11/03/22  Reviewed ROSA Lovelace's notes  Pt states she has spoken with both of her daughters, they are unwilling to provide assistance   Pt is unsure where daughters live or when she has last seen them  Pt reports she has no other family/friends. She hasn't spoken with friend Emmett Frankel \"in a few days\"  Pt states she is fine, \"is that all\", she did not feel like talking  This ACM will follow up with pt in 1 week or as needed. CECELIA Lazo, RN - Ambulatory Care Manager - [phone]     10/21/22  Pt states she is feeling well, though still has a cough  Has not yet checked into dental clinics, friend Emmett Frankel has been ill & busy; she reports seeing dtr occ, though they are busy as well  Pt state she is eating and drinking well  Dr Vanessa Smith report from 10/13 noted: \"does not maintain capacity to make decisions\" & he recommends \"daily supervision\"  This ACM will follow up with pt in 10-14 days.    CECELIA Lazo, RN - Ambulatory Care Manager - [phone]       10/04/22  Sol Han called this ACM with concerns regarding pt's health today  Pt c/o being tired & cold (52 degrees outside, she has not yet turned heat on), she is using a blanket  She thinks she may have a fever, denies chills  She does have a cough  She c/o body aches all over  She denies any N&V or diarrhea  She does have a poor appetite, though as she said she is not a big eater, kriss for breakfast  Discussed drinking some hot tea & plenty of fluids, she said she will have some toast and wants to go back to bed  She said she does not feel like she needs to be seen by [facility]  She did say her dtr Ne [place] will be coming by today to check on her  Will follow up later this afternoon to see if her condition warrants being seen by [facility]  1540 Update   Pt resting, said she is doing ok. Reiterated importance of plenty of fluids  May take OTC Tylenol for aches/pains/ha/fever  Dtr was unable to come over, she has to work late  Pt denies need to be seen by [facility]  Will follow up with pt in 1 week or as needed. -[facility]      09/14/22  Pt reports she is having some dental issues, has at least 4 loose teeth  Spoke with friend Riddhi Gant and gave him names of 3 low cost/SS dental clinics  [facility] [address] to call pt back to let her know that the info was shared with Tony  This ACM will follow up with pt in 7-10 days. -[facility]    08/16/22  Pt states she is \"fine\" today and has no concerns  States her food is healed  Denies any further UTI symptoms  States she is eating well and drinking plenty of fluids  Need to establish care with new PCP  Pt unable to talk at this time, will follow up with her in 1 week. -[facility]    07/08/22  Pt has had a CVA in the past and has had some mental decline with poor word finding. She gets easily frustrated when she is unable to verbalize what she is thinking. She does live by herself with no support of family, she does have a friend who checks on her and helps her out some. She does have an appoint with neuropsych in Oct, no previous testing noted.  SW is also working with pt assisting with appoints/transportation as well as working with APS, pts rent is increasing and she may not be able to afford it. Her medications are prepared for her by [facility] Ontiver[facility] in pill packets which she says she is compliant with. She is completing an antibiotic course for UTI and is on schedule to complete this tomorrow. She often tells this ACM that she is not hungry and has not eaten in a couple of days, she reports that she has been better with drinking fluids lately. She does like to walk outside in her neighborhood/complex. Pt currently has a sore/swollen left big toe. No further swelling or pain; she was unable to say if there is any bruising. She is not sure if she stubbed her toe or how she has hurt it. Advised her to not go walking while it is swollen. Discussed elevating foot on pillows while on the couch and applying ice over a small towel several times a day. Pt states her understanding and said she will do this. Pt will:  Elevate left foot 4-5 times a day until improved without swelling/tenderness  Use ice pack over towel on left foot 4-5 times a day until swelling and tenderness has improved  This ACM will follow up with pt in 1 week. Pt does have this ACM's contact info.  -MLL

## 2023-01-24 ENCOUNTER — TELEPHONE (OUTPATIENT)
Dept: PRIMARY CARE CLINIC | Age: 73
End: 2023-01-24

## 2023-01-24 ENCOUNTER — PATIENT OUTREACH (OUTPATIENT)
Dept: CASE MANAGEMENT | Age: 73
End: 2023-01-24

## 2023-01-24 DIAGNOSIS — Z11.1 SCREENING-PULMONARY TB: Primary | ICD-10-CM

## 2023-01-24 NOTE — PROGRESS NOTES
Social Work Note  1/24/2023    Lauren DANG   Spoke with Catalino Yang,  with [facility] APS. She stated that she and a private caregiver, Lary De Leon, met with patient today and patient has been accepted by Ms. Albert Escalera for care in her home and patient was agreeable to move. Ms. Christa Singh advised that the caregiver would still like to have the CXR for admission. Discussed patient follow up needs:  cardiology, PCP, medications. Ms. Christa Singh will contact  on 1/25/23 with contact information for new private caregiver. SW to contact Ms. Albert Escalera to coordinate transfer of care. Transportation  Scheduled transportation trip for Friday 1/27/23 to [place] for Chest Xray.  time:  1015 am - 1045 am with arrival at 1100 am.    Return trip home:  12:15 pm   Reference #[id number]    Patient status discussed with Gordo Casillas RN, ACM.  Plan:   Obtain order for CXR.    Follow-up with patient and new provider for transition of care  Follow-up with APS worker    JET Kidd, ACSW, [facility]    Ambulatory Care Management   [phone]

## 2023-01-25 ENCOUNTER — PATIENT OUTREACH (OUTPATIENT)
Dept: CASE MANAGEMENT | Age: 73
End: 2023-01-25

## 2023-01-25 NOTE — TELEPHONE ENCOUNTER
----- Message from JET Sheikh sent at 1/23/2023  8:26 AM EST -----  Regarding: Chest X Ray order for assisted living placement  Dr. Kvng Lozano,    Good morning! I received notice Friday afternoon that Ms. Kenyon Beyer will need to move as soon as possible - she has to be out of her apartment by 1/31. In order for her to move to assisted living, she needs to have a CXR to show that she does not have TB. Can you put in an order for this? I will make the arrangements for her to have this completed this week. You may also be receiving a fax from the [place] [address] worker with an assisted living physical form to complete. She is supposed to send me a copy as well so please let me know if you have questions. Thank you for your help!     Eladio Whitney  [phone]

## 2023-01-25 NOTE — PROGRESS NOTES
Social Work Note  1/25/2023    VM left for Cordelia Reed, [facility] worker. Requested number for private caregiver - Ez Banerjee. Received return message with contact information. Spoke with patient re: visit with Ms. Juliana Ochoa and move. Verbal consent provided by patient for SW to contact Ms. Juliana Ochoa to coordinate upcoming appointments, including cardiology. Provided transportation arrangements for CXR on Friday 1/27/23. Reviewed information. Will reiterate with patient on Thursday. Patient reported that her daughters came and took her cats to provide care. She mentioned that her daughter Jameel Perez, and possible other daughters, are visiting [facility] to talk with patient. Patient asked if it would be possible to move in with family if that was option. SW advised that it arrangements would need to be discussed with Ms. Carolyn Turcios.  Patient stated that she will know more tomorrow.      ROSA Plan:   Follow-up with patient re: arrangements  Follow-up with Ms. Norma Kwaku - coordination of medical appointments, medications    JET Roque, ACSW, [facility]    Ambulatory Care Management   [phone]

## 2023-01-26 ENCOUNTER — PATIENT OUTREACH (OUTPATIENT)
Dept: CASE MANAGEMENT | Age: 73
End: 2023-01-26

## 2023-01-26 NOTE — PROGRESS NOTES
Social Work Note  1/26/2023     Contacted patient. Reviewed transportation plan for tomorrow morning and plan to have CXR completed. Confirmed patient understanding using teachback.      JET Littlejohn, ACSW, [facility]    Ambulatory Care Management   [phone]

## 2023-01-27 ENCOUNTER — HOSPITAL ENCOUNTER (OUTPATIENT)
Dept: GENERAL RADIOLOGY | Age: 73
Discharge: HOME | End: 2023-01-27
Payer: MEDICARE

## 2023-01-27 ENCOUNTER — PATIENT OUTREACH (OUTPATIENT)
Dept: CASE MANAGEMENT | Age: 73
End: 2023-01-27

## 2023-01-27 DIAGNOSIS — Z11.1 SCREENING-PULMONARY TB: ICD-10-CM

## 2023-01-27 PROCEDURE — 71046 X-RAY EXAM CHEST 2 VIEWS: CPT

## 2023-01-27 NOTE — PROGRESS NOTES
Social Work Note  1/27/2023    Call with patient  Spoke with patient. She asked who would be assisting with move to home of private caregiver. Advised patient that she will need to talk with Ms. Tim Peng at [facility]. SW asked if daughters can assist.  She stated that daughters told her that they would only help with cats. Patient voiced understanding of transportation arrangements and process for CXR. Call with APS worker  Spoke with Ms. Tim Peng with [facility] to check status of placement. She advised that patient appeared to have episode of anxiety re:  move and went to landlord's office yesterday to let them know that she did not want to move. Per Ms. Tim Peng, representative at the office appeared to provide conflicting information to patient and patient has now communicated that she doesn't think she has to move. Ms. Tim Peng advised that she has been in contact with rental office/landlord to straighten out confusion. She also advised that caregiver is planning to assist patient with move this weekend. Call with patient  Spoke with patient. She communicated that \"Saundra in the office says she may be able to stay\". SW observed patient having difficulty with words. Patient stated Maile Montanez, her friend, was speaking with APS worker. SW attempted to calm review basic information:  patient owes back rent, patient does not have income to cover new rental amount, utilities, bills, etc.. Patient did not appear to understand that she cannot stay in apartment. Provided support and reassurance that new caregiver will provide support and care. Patient communicated that she will wait to hear from office and  with APS.  - APS  Voicemail left with APS worker advising that CXR has been completed. Requested update re: patient status. Advised that SW will follow-up with caregiver when move is finalized.      ROSA Plan:   Follow-up with patient  Follow-up with APS Worker  Assess ability to attend cardiology appointment    JET Littlejohn, ACSW, [facility]    Ambulatory Care Management   [phone]

## 2023-01-30 ENCOUNTER — PATIENT OUTREACH (OUTPATIENT)
Dept: CASE MANAGEMENT | Age: 73
End: 2023-01-30

## 2023-01-30 NOTE — PROGRESS NOTES
Social Work Note  1/30/2023    Spoke with Lauren Purdy APS worker. She advised that patient will be moving to private caregiver's home [facility]) today on a temporary basis as Ms. Logan Archer can no longer provide long term care. Ms. Gale Nieto advised she has been in contact with another assisted living - [facility] - that has an opening. She advised that they need the completed H&P with information re: CXR to screen for TB.       H&P form sent to Johnny Hinds LPN - [facility]. Dr. Luis Gonzalez and YOUSUF Huynh LPN advised of need for completion of form for placement. Completed form to be sent to Ms. Gale Nieto at [place] [address] (SQK: [phone]). [facility] and rescheduled patient's appointment with Dr. Chen Avila for 2/16/23 at [place] [address].  Patient was to be seen on 1/2/19, but uncertainty of housing situation necessitated change. ROSA to assist with transportation scheduling for appointment. ROSA Plan:   Follow-up with APS re: placement  Contact Assisted Living once patient has been moved to permanent location to facilitate Transition of Care.      Jonas Landa, MSW, ACSW, [facility]    Ambulatory Care Management   [phone]

## 2023-02-01 ENCOUNTER — PATIENT OUTREACH (OUTPATIENT)
Dept: CASE MANAGEMENT | Age: 73
End: 2023-02-01

## 2023-02-01 NOTE — PROGRESS NOTES
Social Work Note  2/1/2023     8:40 am   VM left for Sofia Gilliam,  with [facility]. Requested return call re: status of patient move. 1:10 pm  Received call from Sofia Gilliam with [facility]. She advised that she received H&P from PCP and will be sending information to [facility]. Patient living temporarily with private caregiver, Ms. Shelia Sherman until other assisted living arrangements can be made. Ms. Lingbryce Durant advised that she will be requesting an update/rescreening for UAI paperwork. Advised that patient's Cardiology appointment was moved to later date. Asked Ms. Abram Durant to notify  with status of assisted living so that Transition of [address] [place] [address] can be made. She voiced understanding.  Plan:  Follow-up re: status of [facility] placement.       Marti Bean, MSW, ACSW, [facility]    Ambulatory Care Management   [phone]

## 2023-02-06 ENCOUNTER — PATIENT OUTREACH (OUTPATIENT)
Dept: CASE MANAGEMENT | Age: 73
End: 2023-02-06

## 2023-02-06 NOTE — PROGRESS NOTES
Social Work Note  2/6/2023    9:30 am   VM left for [email or web address]. Requested return call re: status of Assisted Living placement. Spoke with Dayron Preston at [address]. She advised that patient is temporarily living with     Denise Ulloa  (private caregiver home)  [address] [facility], [address] [place] [address]  [phone]    APS  has sent referral information to [facility] and is awaiting response. SW Plan:  Follow-up with with Ms. Celso Lemus re: patient's upcoming appointments.      Luis Garner MSW, ACSW, [facility]    Ambulatory Care Management   [phone]

## 2023-02-08 ENCOUNTER — PATIENT OUTREACH (OUTPATIENT)
Dept: CASE MANAGEMENT | Age: 73
End: 2023-02-08

## 2023-02-08 NOTE — PROGRESS NOTES
Patient has graduated from the Complex Case Management  program on 02/08/23 . Pt has transitioned to private assisted living residence. Patient/family has the ability to self-manage at this time. Care management goals have been completed. No further Ambulatory Care Manager follow up scheduled. Goals Addressed                   This Visit's Progress     COMPLETED: Patient verbalizes understanding of self-management of chronic disease. 02/08/23  Pt is transitioned to live with new caretaker, she said she is still getting used to this though they are taking good care of her  She reports not feeling well recently (covid?), though is feeling better now  She reports she is eating well and drinking plenty of fluids  She reports taking her meds, they are in the bubble packs. She has plenty of refills since she was just seen by PCP  Pt has no questions or concerns at this time  CECELIA Medina, RN - Ambulatory Care Manager - [phone]     02/01/23  Attempted to outreach with pt for CM follow up. EMR reviewed, noted plan for pt to [facility] to [facility]. CECELIA Medina, RN - Ambulatory Care Manager - [phone]     01/23/23  Pt reports she liked her new PCP, appoint on Friday 1/20  Discussed all of her meds were refilled and sent to [facility], she should expect them soon  Pt denies any complaints today  [address] spoke with Emilie Garcia, verified all prescriptions were received and ordered for bubble pack system  Plan to follow up with pt in 1 week. CECELIA Medina, RN - Ambulatory Care Manager - [phone]     01/20/23  Attempted to outreach with pt for CM follow up & to follow up on yesterdays sx. KITTY to return this call. Pt did see new PCP today. Rx were reordered though sent to [address] not [place] (she uses the pill packing service provided by [facility]). Noted that ASA, Vit D, and Losartan were not re-ordered. ROSA has sent note to PCP.   CECELIA Medina, RN - Ambulatory Care Manager - [phone]     01/19/23  Call received from [address] with concerns for pt's elevated BP, SOB, and not feeling well  Spoke with pt. She does have a dry cough which she said she has had \"all of her life\". She denies SOB. Denies CP. Denies ha or blurred vision. C/o occ dizziness, though said she is resting. And she has not eaten. Pt took BP while on the phone with this ACM, 176/99, HR 77. Noteworthy, pt has had difficulty reading and writing numbers in the past.  Pt said she did take her meds today though unsure what those meds were  Pt has only drank coffee today for breakfast, has not eaten anything. She said she does have food. She reports a poor appetite. This ACM asked pt to eat/drink something (juice & toast), pt said she will  Pt states \"I'm fine\" and \"you don't need to worry\". She said she will take it easy today and see the doctor tomorrow  Asked pt if she has heard anything from her Sterlingalfa Breen, she replied Renita Hendricks is leaving me alone because there ar 3 others like me\". Asked pt if she has heard from [address] [place] [address] worker, Herman Riggs is leaving me a lone too\"  Asked pt if she needs to be seen by urgent care and again she said \"I'm fine\"  Asked pt if she has seen her friend Siomara Limon recently, she said she did see him yesterday though he is very busy  CECELIA Jarrell, RN - [facility] - [phone]     01/16/23  Attempted to outreach with pt for CM follow up. LM to return this call. Did attempt to make sooner appoint with various PCP's to establish care, soonest is around same time of current appoint of 3/7 with Dr Tati Cox. Yeyo Caro, MSN, RN - Ambulatory Care Manager - [phone]     12/29/22  Attempted to outreach with pt for CM follow up. LM to return this call. -MLL    12/05/22  Pt is tired, she just woke up (1030 am). She denies any pain, SOB, or edema. She said she has plenty of food and is eating fair.     Heart Failure Education outreach Date/Time: 12/5/2022 10:31 AM    Ambulatory Care Manager (ACM) contacted the patient by telephone to perform Ambulatory Care Coordination. Verified name and  with patient as identifiers. Provided introduction to self, and explanation of the Ambulatory Care Manager's role. ACM reviewed that a Health Healthy tips packet for the Holiday has been mailed to the them. ACM reviewed CHF zones, daily weights, the importance of low sodium diet, healthy tips packet with the patient. Instructed patient to call their Cardiologist if they have a weight gain of 3 lbs in 2 days or 5 lbs in a week. Patient reminded that there is a physician on call 24 hours a day / 7 days a week should the patient have questions or concerns. The patient verbalized understanding. Plan to follow up with pt in 2 weeks or sooner. CECELIA Rivero, RN - [facility] - [phone]     22  Pt said she has not heard any verdict regarding her apartment. She has not spoken with her daughters. She had difficulties with her word finding and expressing her thoughts. She did say she was alone with her 4 cats for [employer]. She reports she feels fine and has plenty of food in her apartment. CECELIA Rivero, RN - [facility] - [phone]     11/15/22  Spoke with pt, she did attend court yesterday. She does not know what was decided. She is unsure if a decision was made or if she does not remember the decision. She is unsure if she needs to pack or unpack. She admits she \"feels lost\". She said someone did attend court with her, she thinks it was [place].  Spoke with ROSA Sommers  This ACM plans follow up in 1 week.  CECELIA Rivero, RN - Ambulatory Care Manager - [phone]       22  Reviewed ROSA Sommers's notes  Pt states she has spoken with both of her daughters, they are unwilling to provide assistance   Pt is unsure where daughters live or when she has last seen them  Pt reports she has no other family/friends. She hasn't spoken with friend Tena Liu \"in a few days\"  Pt states she is fine, \"is that all\", she did not feel like talking  This ACM will follow up with pt in 1 week or as needed. CECELIA Dodson, RN - Ambulatory Care Manager - [phone]     10/21/22  Pt states she is feeling well, though still has a cough  Has not yet checked into dental clinics, friend Isabelace Alexa has been ill & busy; she reports seeing dtr occ, though they are busy as well  Pt state she is eating and drinking well  Dr Amalia Lizama report from 10/13 noted: \"does not maintain capacity to make decisions\" & he recommends \"daily supervision\"  This ACM will follow up with pt in 10-14 days. CECELIA Dodson, RN - Ambulatory Care Manager - [phone]       10/04/22  Anabella Clarke called this ACM with concerns regarding pt's health today  Pt c/o being tired & cold (52 degrees outside, she has not yet turned heat on), she is using a blanket  She thinks she may have a fever, denies chills  She does have a cough  She c/o body aches all over  She denies any N&V or diarrhea  She does have a poor appetite, though as she said she is not a big eater, kriss for breakfast  Discussed drinking some hot tea & plenty of fluids, she said she will have some toast and wants to go back to bed  She said she does not feel like she needs to be seen by [facility]  She did say her dtr Vera Barksdale will be coming by today to check on her  Will follow up later this afternoon to see if her condition warrants being seen by [facility]  1540 Update   Pt resting, said she is doing ok. Reiterated importance of plenty of fluids  May take OTC Tylenol for aches/pains/ha/fever  Dtr was unable to come over, she has to work late  Pt denies need to be seen by [facility]  Will follow up with pt in 1 week or as needed.  -MLL      09/14/22  Pt reports she is having some dental issues, has at least 4 loose teeth  Spoke with friend Tena Liu and gave him names of 3 low cost/ dental clinics  [facility] [address]  [facility] [address] to call pt back to let her know that the info was shared with Tony  This ACM will follow up with pt in 7-10 days. -MLL    08/16/22  Pt states she is \"fine\" today and has no concerns  States her food is healed  Denies any further UTI symptoms  States she is eating well and drinking plenty of fluids  Need to establish care with new PCP  Pt unable to talk at this time, will follow up with her in 1 week. -MLL    07/08/22  Pt has had a CVA in the past and has had some mental decline with poor word finding. She gets easily frustrated when she is unable to verbalize what she is thinking. She does live by herself with no support of family, she does have a friend who checks on her and helps her out some. She does have an appoint with neuropsych in Oct, no previous testing noted. SW is also working with pt assisting with appoints/transportation as well as working with APS, pts rent is increasing and she may not be able to afford it. Her medications are prepared for her by Ashlee Gomes in pill packets which she says she is compliant with. She is completing an antibiotic course for UTI and is on schedule to complete this tomorrow. She often tells this ACM that she is not hungry and has not eaten in a couple of days, she reports that she has been better with drinking fluids lately. She does like to walk outside in her neighborhood/complex. Pt currently has a sore/swollen left big toe. No further swelling or pain; she was unable to say if there is any bruising. She is not sure if she stubbed her toe or how she has hurt it. Advised her to not go walking while it is swollen. Discussed elevating foot on pillows while on the couch and applying ice over a small towel several times a day. Pt states her understanding and said she will do this.   Pt will:  Elevate left foot 4-5 times a day until improved without swelling/tenderness  Use ice pack over towel on left foot 4-5 times a day until swelling and tenderness has improved  This ACM will follow up with pt in 1 week. Pt does have this ACM's contact info. -MLL              Patient has Ambulatory Care Manager's contact information for any further questions, concerns, or needs.   Patients upcoming visits:    Future Appointments   Date Time Provider Macey Roa   2/16/2023 10:00 AM MD iliana Ramos BS AMB

## 2023-02-09 NOTE — PROGRESS NOTES
Social Work Note  02/08/23    Received message from MsBao Saira Bae at [address] that patient is still with Ms. Jose Crawford and patient has contracted COVID. [facility] Message left. SW contacted Sana Welsh, caregiver, to discuss patient status. Information reviewed:  Patient has settled in well. Appetite good. Taking medications. Confirmed receipt of patient medications upon arrival.  Caregiver stated that she reviewed medications with patient and patient did not understand her medications or know what they were. Caregiver stated that patient does lack awareness of health care needs and cannot initiate plans to address health care needs  Caregiver stated that patient has COVID, confirmed by [facility] on 2/4/23. Per caregiver, patient developed cough day after moving in (approximately 2/1/23). Caregiver also reported that the health department called and indicated that patient has [place] contagious\" version of COVID. Other issues discussed:  Cardiology appointment - SW to reschedule due to COVID diagnosis. Discussed option of moving patient's appointment to provider closer to caregiver in [place], [place]. Caregiver stated that she will take patient to appointment. Insurance information:  Caregiver stated that patient does not have current insurance cards and was unable to assist with providing information. SW to assist with information. Family contacts:  Caregiver stated that family has not been in contact with patient, but friend Ermias Valdez has called routinely. SW Plan:   Reschedule Cardiology appointment and transportation due to [place] diagnosis  Provide contact information for providers to caregiver (PCP, Specialists, etc.)  Follow-up with APS worker re: long term plan as Ms. Jose Crawford is only temporary caregiver.      Liang Vicente, JET, ACSW, [facility]    Ambulatory Care Management   [phone]

## 2023-02-13 ENCOUNTER — PATIENT OUTREACH (OUTPATIENT)
Dept: CASE MANAGEMENT | Age: 73
End: 2023-02-13

## 2023-02-13 NOTE — LETTER
February 13, 2023      Ms. Reyes Speed  C/O Ms. Noble Mcacin  [address] [facility], [address]    Ms. Jose Nathan and Ms. Magdalena Wright,    I hope you are both doing well. Please find enclosed a list of contact information for medical providers, insurance, and family. If you have questions, please do not hesitate to contact me at [phone].     Sincerely,    Kaykay Morel, MSW, ACSW, [facility]    Ambulatory Care Management   [phone]

## 2023-02-13 NOTE — PROGRESS NOTES
Social Work Note  2/13/2023    . New patient appointment arranged with Cardiologist, Dr. Maurice Wen, per request of patient/caregiver, as provider is closer and more accessible. 2/21/23 at 10:15 am.   Patient information, including [facility]. Cancelled upcoming appointment on 2/16/23 with Dr. Earle Allen. Spoke with Jude Avery at [facility]. She advised that she has not spoken to nor heard back from [facility] after referring patient for placement consideration. Inquired about next step plans for placement of patient. Per MsBao Magen Liriano,  she will refer patient for UAI re-screening at [facility] and will search for nursing home placement if patient meets criteria. Per Ms. Magen Liriano, she is meeting patient and caregiver, Ms. Navarro Roger at [facility] on Thursday to review information. Spoke with [place] at [facility]. Provided updated address, phone, caregiver contact information. She advised that medications were \"run\" today and will be sent via mail (overnight) to caregiver's residence. Spoke with Ms. Navarro Roger, patient's caregiver. She advised that patient is doing well. Advised of new [facility], updated address and contact information with [facility] (including mailing of prescriptions), and advised that  is mailing letter to patient/caregiver with contact information for family, insurance companies, and medical providers. Ms. Navarro Roger advised that if medications need to be switched, all other residents use the [facility] on [address].        Plan:    Follow-up with caregiver to confirm receipt of letter/contact information  Complete transition of care    JET Sawyer, ACSW, [facility]    Ambulatory Care Management   [phone]

## 2023-02-21 ENCOUNTER — PATIENT OUTREACH (OUTPATIENT)
Dept: CASE MANAGEMENT | Age: 73
End: 2023-02-21

## 2023-02-21 NOTE — PROGRESS NOTES
Social Work Note  2/21/2023    Follow-up call with caregiver, Laisha Barrera. Information discussed:     She advised that she has not yet received information sent by .   Per caregiver, she has rescheduled patient's cardiology appointment to 3/8/22 due to scheduling conflict. Caregiver advised that patient had an \"anxiety attack\" this past weekend and caregiver will be contacting [facility] today to arrange an appointment. Patient scheduled to see psychiatrist on 3/23/22, but caregiver expressed that she did not believe that patient could wait that long. Medications:  Caregiver stated that they have not arrived from pharmacy. Caregiver stated that patient's dementia is worse in the evening and she expressed that patient may need nursing facility care in the future. Patient has been eating routinely and taking medications as directed. ROSA contacted [employer]. Confirmed that medications will be shipped by [facility] on 2/22/23, with delivery date of 2/23/23. VM left for caregiver advising of upcoming delivery. ROSA received email from [id number].  She advised that re-screening request (UAI) was made through [facility] on 2/15/23. SW to follow-up with caregiver to confirm receipt of information.      Paulett Osler, MSW, ACSW, [facility]    Ambulatory Care Management   [phone]

## 2023-02-22 ENCOUNTER — PATIENT OUTREACH (OUTPATIENT)
Dept: CASE MANAGEMENT | Age: 73
End: 2023-02-22

## 2023-02-22 NOTE — PROGRESS NOTES
Social Work Note  2/22/2023    8:10 am  Received call from Elke Ramirez, patient's caregiver (private assisted living home). Per Ms. Greg Johansen, patient is currently at [facility] awaiting psychiatric placement (voluntary) secondary to depression. Caregiver reported that patient was not eating, was self-isolating at home, and sleeping all day. Caregiver also advised that patient asked for her clothes (those that were not moved to assisted living), expressed missing her home, and shared history of traumatic life events in past.   Per Ms. Greg Johansen, the ED contacted her last evening to assist with patient's agitation, and Ms. Greg Johansen reported she was able to visit with patient and calm her. Caregiver mentioned that patient indicated that she saw a \"psychiatrist\" in 2022. SW advised that patient was evaluated by Eric Mccormick PsyD, Neuropsychology, in 10/2022, but there is no record of psychiatric visit. Discussed patient status and possible trigger of recent life changes (moving, loss of home, independence, pets, estrangement from family). Ms. Greg Johansen stated that she has not received patient's contact information that was mailed to patient/caregiver. Information resent via secure email per request of caregiver/assisted living provider. Also discussed potential need for Long Term Placement and possibility of UAI assessment while hospitalized.      Kj Hebert, MSW, ACSW, [facility]    Ambulatory Care Management   [phone]

## 2023-02-24 ENCOUNTER — PATIENT OUTREACH (OUTPATIENT)
Dept: CASE MANAGEMENT | Age: 73
End: 2023-02-24

## 2023-02-24 NOTE — PROGRESS NOTES
Social Work Note  2/24/2023    Current patient status:    Patient open to [facility] - Rayshawn Keith ([phone])  Patient moved to private assisted living/group home on 1/31/23 by APS after being evicted from apartment. Caregiver as of 2/24/23:    Jimena Melvins    [address] [place] [address], [facility] [address] [facility]  Phone: [phone]  Bubble pack medications are through [address]:  [phone]  Patient scheduled to see Dr. Abigail Blevins (Cardiology) to establish care:  3/8/23  2/24/23 - Patient currently at [facility] Emergency Department awaiting voluntary admission to psychiatric unit for depression. Per [facility] notes, statewide search for bed ongoing. Barrier to bed search:  patient has dementia. 10:00 am  Spoke with patient's caregiver, Ms. Zurdo Mcclain. She confirmed that patient is doing better and still in ED at [facility] awaiting bed in inpatient psychiatric unit. She advised that statewide search is being conducted due to patient's dementia. Asked about receipt of medications and patient contact information. Caregiver advised that neither medications nor contact information has been received. Resent contact information via secure email while on phone with caregiver. Confirmed receipt. 10:10 am  Spoke with Paola at [address]. Advised that medications were not received as indicated. Reviewed address and found that house number was incorrect in pharmacy system. Dixon advised that she would correct issue and update SW.      10:30 am   Received message from Earnest RiderFlipora at [facility] has not delivered medications so new medications will be sent to patient. Verified address. She advised that first set of medications would be returned to pharmacy by [facility] and that they will hold for shipment next month. 10:50 am  Attempted to reach caregiver. VM left advising of address error and that medications will be resent by [address]. Also advised caregiver of patient discharge from Ambulatory Care Management due to care by assisted living. 10:58 am  Attempted to reach Derral Knee with [facility] APS. VM left advising of patient discharge from Ambulatory Care Management. Patient has graduated from the Complex Case Management  program on 02/24/2023. At this time all Social Work goals have been completed and the patient/family has the ability to self-manage. No further Social Work follow-up scheduled. Patient/family has Social Work contact information for further questions, concerns, or needs. Goals Addressed                      This Visit's Progress       Chronic Disease      COMPLETED: Patient will receive supportive services in appropriate living environment         03/10/21  Transportation arrangements made on 03/08/21 for PCP appointment (3/10/21). Message left on patient's voicemail this am as reminder. AML    01/04/20  Assessment:  Transportation arrangements made through [place] for PCP appt today. AML    10/02/20  Assessment:  Per patient, medications are now being delivered. Transportation arrangements made for 10/5/20 PCP appointment. AML    08/28/20  Assessment:  Call to patient's daughter for update on status of home delivery of medications. Transportation for appointment cannot be scheduled until 30 days out from appointment - will schedule after 09/05/20. AML    08/24/20  Assessment:  Patient is continuing to receive home health nursing through [facility] [employer] [employer]. Daughters have arranged grocery delivery through [employer]. Daughter Savannah Kaur will be setting up home delivery of medications through [employer]. Patient has transportation through [facility] for medical appointments. SW Plan:    Follow up with daughter re: medication delivery  Schedule transportation for appointment on 10/5/20  Send directions to patient's daughters to for scheduling transportation.    AML      06/26/20   Assessment:  Initial SW evaluation completed with patient's daughter, Charly Musa. She reported that patient requires assistance in the home to remain independent and has been open to Adult Protective Services in past.     SW Plan:  Complete SW evaluation with patient to determine needs and plan of care. AML         General      COMPLETED: Attend Neuropsychology appointments (pt-stated)   On track      02/24/23   Patient seen and evaluated by Dr. Noy Hernandez in October 2022. Assisted living caregiver has scheduled patient with Dr. Dyllan Blackmon (psychiatry) for 3/23/23. AML    10/05/22  Follow-up call to patient to check on health status as she was sick on 10/4 and unable to attend appointment with Dr. Noy Hernandez. Patient stated that she is feeling better. SW advised of rescheduling of appointment on 10/13/22 and scheduling of testing appointment on 10/17/22. Explained purpose of testing appointment to patient. Transportation arrangements made with [facility] for appointments ([phone])  10/13/22:    time at home:  11:13 (+/- 15 minutes)  Appointment:  11:45 am  Return trip :  1:15 pm  Reference number for trip:  [phone]  10/17/22:  Della Holts up time at home:  12:13 pm (+/- 15 minutes)  Appointment 1:00 pm  Return trip :  4:15 pm  Reference number for trip:  [address]   Plan:  Follow-up with patient re: transportation arrangements. AML    09/29/22  Follow-up call to patient to discuss appointment with Dr. Noy Hernandez on 10/4/22. Reviewed patient appointment time and purpose of appointment. Inquired whether friend, Amber Barksdale, could attend with her. Patient stated that Amber Barksdale was unavailable as he is currently hospitalized. Patient does not have anyone to attend appointment with her or make transportation arrangements for appointments.    Transportation arranged for appointment with Dr. Noy Hernandez 10/4/22:  Della Holts up time:  9:58 am - 10:20 am  Arrival time:  10:45 am   Appointment: 11:00 am  Return trip home: 1:00 pm  Reference number: [id number]  If questions regarding transportation, call insurance: [phone] left for patient with directions/instructions for transportation on 10/4/22. SW requested return call to confirm receipt of information. Contacted Carol Milian at [address], message left re: appointment on 10/4/22. SW Plan:  Follow-up with patient to confirm receipt of transportation information.  DIPAK Manzo MSW, ACSW, [facility]    Ambulatory Care Management   [phone]

## 2023-02-27 ENCOUNTER — PATIENT OUTREACH (OUTPATIENT)
Dept: CASE MANAGEMENT | Age: 73
End: 2023-02-27

## 2023-02-27 NOTE — PROGRESS NOTES
Social Work Note  2/27/2023    Received call from Petty Dotson, patient's caregiver. She advised that patient has not received meds from [facility]. Patient discharged from ED on 2/25/27. Per caregiver, no psychiatric bed found and patient was diagnosed with UTI. [address]  [facility]. Per representative, patient's medications are out for delivery today via   [address] [employer]. Contacted Ms. Catarino Rinku to advise. She voiced understanding. Advised caregiver that she can make other arrangements for medications if needed. [address] number has been provided to caregiver. No further outreach planned.      Kamla Terry, MSW, ACSW, [facility]    Ambulatory Care Management   [phone]

## 2023-03-21 ENCOUNTER — APPOINTMENT (OUTPATIENT)
Dept: GENERAL RADIOLOGY | Age: 73
End: 2023-03-21
Attending: EMERGENCY MEDICINE
Payer: MEDICARE

## 2023-03-21 ENCOUNTER — PATIENT OUTREACH (OUTPATIENT)
Dept: CASE MANAGEMENT | Age: 73
End: 2023-03-21

## 2023-03-21 ENCOUNTER — HOSPITAL ENCOUNTER (EMERGENCY)
Age: 73
Discharge: HOME | End: 2023-03-21
Attending: EMERGENCY MEDICINE
Payer: MEDICARE

## 2023-03-21 ENCOUNTER — VIRTUAL VISIT (OUTPATIENT)
Dept: PRIMARY CARE CLINIC | Age: 73
End: 2023-03-21
Payer: MEDICARE

## 2023-03-21 VITALS
RESPIRATION RATE: 18 BRPM | OXYGEN SATURATION: 100 % | BODY MASS INDEX: 25.33 KG/M2 | DIASTOLIC BLOOD PRESSURE: 75 MMHG | HEART RATE: 68 BPM | SYSTOLIC BLOOD PRESSURE: 143 MMHG | HEIGHT: 59 IN | WEIGHT: 125.66 LBS | TEMPERATURE: 98 F

## 2023-03-21 DIAGNOSIS — R06.02 SOB (SHORTNESS OF BREATH): Primary | ICD-10-CM

## 2023-03-21 DIAGNOSIS — R06.02 SHORTNESS OF BREATH: Primary | ICD-10-CM

## 2023-03-21 LAB
ALBUMIN SERPL-MCNC: 4 G/DL (ref 3.5–5.2)
ALBUMIN/GLOB SERPL: 1.4 (ref 1.1–2.2)
ALP SERPL-CCNC: 77 U/L (ref 35–104)
ALT SERPL-CCNC: 11 U/L (ref 10–35)
ANION GAP SERPL CALC-SCNC: 13 MMOL/L (ref 5–15)
AST SERPL-CCNC: ABNORMAL U/L (ref 10–35)
ATRIAL RATE: 43 BPM
BASOPHILS # BLD: 0.1 K/UL (ref 0–1)
BASOPHILS NFR BLD: 1 % (ref 0–1)
BILIRUB SERPL-MCNC: 0.5 MG/DL (ref 0.2–1)
BUN SERPL-MCNC: 20 MG/DL (ref 8–23)
BUN/CREAT SERPL: 11 (ref 12–20)
CALCIUM SERPL-MCNC: 9.7 MG/DL (ref 8.8–10.2)
CALCULATED R AXIS, ECG10: -72 DEGREES
CALCULATED T AXIS, ECG11: 105 DEGREES
CHLORIDE SERPL-SCNC: 108 MMOL/L (ref 98–107)
CO2 SERPL-SCNC: 19 MMOL/L (ref 22–29)
CREAT SERPL-MCNC: 1.9 MG/DL (ref 0.5–0.9)
DIAGNOSIS, 93000: NORMAL
DIFFERENTIAL METHOD BLD: ABNORMAL
EOSINOPHIL # BLD: 0.1 K/UL (ref 0–0.4)
EOSINOPHIL NFR BLD: 2 %
ERYTHROCYTE [DISTWIDTH] IN BLOOD BY AUTOMATED COUNT: 15.7 % (ref 11.5–14.5)
GLOBULIN SER CALC-MCNC: 2.9 G/DL (ref 2–4)
GLUCOSE SERPL-MCNC: 127 MG/DL (ref 65–100)
HCT VFR BLD AUTO: 33.2 % (ref 35–47)
HGB BLD-MCNC: 10.9 G/DL (ref 11.5–16)
IMM GRANULOCYTES # BLD AUTO: 0 K/UL (ref 0–0.04)
IMM GRANULOCYTES NFR BLD AUTO: 0 % (ref 0–0.5)
LYMPHOCYTES # BLD: 1.6 K/UL (ref 0.8–3.5)
LYMPHOCYTES NFR BLD: 27 % (ref 12–49)
MCH RBC QN AUTO: 29.2 PG (ref 26–34)
MCHC RBC AUTO-ENTMCNC: 32.8 G/DL (ref 30–36.5)
MCV RBC AUTO: 89 FL (ref 80–99)
MONOCYTES # BLD: 0.7 K/UL (ref 0–1)
MONOCYTES NFR BLD: 12 % (ref 5–13)
NEUTS SEG # BLD: 3.5 K/UL (ref 1.8–8)
NEUTS SEG NFR BLD: 58 % (ref 32–75)
NRBC # BLD: 0 K/UL (ref 0–0.01)
NRBC BLD-RTO: 0 PER 100 WBC
PLATELET # BLD AUTO: 288 K/UL (ref 150–400)
PMV BLD AUTO: 11.1 FL (ref 8.9–12.9)
POTASSIUM SERPL-SCNC: ABNORMAL MMOL/L (ref 3.5–5.1)
PROT SERPL-MCNC: 6.9 G/DL (ref 6.4–8.3)
Q-T INTERVAL, ECG07: 486 MS
QRS DURATION, ECG06: 158 MS
QTC CALCULATION (BEZET), ECG08: 489 MS
RBC # BLD AUTO: 3.73 M/UL (ref 3.8–5.2)
SODIUM SERPL-SCNC: 140 MMOL/L (ref 136–145)
VENTRICULAR RATE, ECG03: 61 BPM
WBC # BLD AUTO: 6 K/UL (ref 3.6–11)

## 2023-03-21 PROCEDURE — 80053 COMPREHEN METABOLIC PANEL: CPT

## 2023-03-21 PROCEDURE — G8400 PT W/DXA NO RESULTS DOC: HCPCS | Performed by: FAMILY MEDICINE

## 2023-03-21 PROCEDURE — G9711 PT HX TOT COL OR COLON CA: HCPCS | Performed by: FAMILY MEDICINE

## 2023-03-21 PROCEDURE — 99285 EMERGENCY DEPT VISIT HI MDM: CPT

## 2023-03-21 PROCEDURE — G8536 NO DOC ELDER MAL SCRN: HCPCS | Performed by: FAMILY MEDICINE

## 2023-03-21 PROCEDURE — 85025 COMPLETE CBC W/AUTO DIFF WBC: CPT

## 2023-03-21 PROCEDURE — 99213 OFFICE O/P EST LOW 20 MIN: CPT | Performed by: FAMILY MEDICINE

## 2023-03-21 PROCEDURE — 93005 ELECTROCARDIOGRAM TRACING: CPT

## 2023-03-21 PROCEDURE — 1090F PRES/ABSN URINE INCON ASSESS: CPT | Performed by: FAMILY MEDICINE

## 2023-03-21 PROCEDURE — 1101F PT FALLS ASSESS-DOCD LE1/YR: CPT | Performed by: FAMILY MEDICINE

## 2023-03-21 PROCEDURE — G9717 DOC PT DX DEP/BP F/U NT REQ: HCPCS | Performed by: FAMILY MEDICINE

## 2023-03-21 PROCEDURE — G8427 DOCREV CUR MEDS BY ELIG CLIN: HCPCS | Performed by: FAMILY MEDICINE

## 2023-03-21 PROCEDURE — 36415 COLL VENOUS BLD VENIPUNCTURE: CPT

## 2023-03-21 PROCEDURE — 71046 X-RAY EXAM CHEST 2 VIEWS: CPT

## 2023-03-21 PROCEDURE — 1123F ACP DISCUSS/DSCN MKR DOCD: CPT | Performed by: FAMILY MEDICINE

## 2023-03-21 PROCEDURE — G8417 CALC BMI ABV UP PARAM F/U: HCPCS | Performed by: FAMILY MEDICINE

## 2023-03-21 NOTE — ED PROVIDER NOTES
79-year-old female presents from home accompanied by her care provider having been told to come to the ER by primary care doctor check labs. Patient had a virtual visit today with the PCP. PCP advised him to come to the emergency department so that they can get a baseline of blood work and labs. Patient expressing no complaints at this time. States she got a little bit short of breath yesterday when taking out the trash. Denies any chest pain, cough, fevers. No vomiting or diarrhea. Past medical history significant for cancer, CVA, heart failure, hypertension.        Past Medical History:   Diagnosis Date    Cancer ([address]     ovaian stomach    CVA (cerebral vascular accident) ([address]     Heart failure ([address]     MI    Hypertension        Past Surgical History:   Procedure Laterality Date    HX GYN      hysterectomy    HX LUMBAR LAMINECTOMY  06/29/2016    L4-S1, Dr. Carlos A Kilgore PACEMAKER PLACEMENT  10/2021    [place] Vear Band      cancer removal         Family History:   Problem Relation Age of Onset    Hypertension Mother     Hypertension Sister     Heart Disease Brother     Suicide Brother     Diabetes Brother     Diabetes Brother     Suicide Brother     Cancer Brother     Hypertension Sister     Hypertension Sister     Hypertension Sister     Hypertension Sister        Social History     Socioeconomic History    Marital status:      Spouse name: Not on file    Number of children: Not on file    Years of education: Not on file    Highest education level: Not on file   Occupational History    Not on file   Tobacco Use    Smoking status: Never    Smokeless tobacco: Never   Substance and Sexual Activity    Alcohol use: Not Currently    Drug use: No    Sexual activity: Not on file   Other Topics Concern    Not on file   Social History Narrative    Not on file     Social Determinants of Health     Financial Resource Strain: High Risk    Difficulty of Paying Living Expenses: Very hard   Food Insecurity: No Food Insecurity    Worried About Running Out of Food in the Last Year: Never true    Ran Out of Food in the Last Year: Never true   Transportation Needs: No Transportation Needs    Lack of Transportation (Medical): No    Lack of Transportation (Non-Medical): No   Physical Activity: Insufficiently Active    Days of Exercise per Week: 7 days    Minutes of Exercise per Session: 20 min   Stress: Not on file   Social Connections: Not on file   Intimate Partner Violence: Not on file   Housing Stability: High Risk    Unable to Pay for Housing in the Last Year: Yes    Number of Places Lived in the Last Year: 1    Unstable Housing in the Last Year: No         ALLERGIES: Patient has no known allergies. Review of Systems   Constitutional:  Negative for fever. HENT:  Negative for facial swelling. Eyes:  Negative for visual disturbance. Respiratory:  Negative for chest tightness. Cardiovascular:  Negative for chest pain. Gastrointestinal:  Negative for abdominal pain. Genitourinary:  Negative for difficulty urinating and dysuria. Musculoskeletal:  Negative for arthralgias. Skin:  Negative for rash. Neurological:  Negative for headaches. Hematological:  Negative for adenopathy. Psychiatric/Behavioral:  Negative for suicidal ideas. Vitals:    03/21/23 1432   BP: (!) 141/78   Pulse: 62   Resp: 20   Temp: 97.6 °F (36.4 °C)   SpO2: 98%   Weight: 57 kg (125 lb 10.6 oz)   Height: 4' 11\" (1.499 m)            Physical Exam  Vitals and nursing note reviewed. Constitutional:       General: She is not in acute distress. Appearance: She is well-developed. HENT:      Head: Normocephalic and atraumatic. Eyes:      General: No scleral icterus. Conjunctiva/sclera: Conjunctivae normal.      Pupils: Pupils are equal, round, and reactive to light. Cardiovascular:      Rate and Rhythm: Normal rate. Heart sounds: No murmur heard.   Pulmonary:      Effort: Pulmonary effort is normal. No respiratory distress. Abdominal:      General: There is no distension. Musculoskeletal:         General: Normal range of motion. Cervical back: Normal range of motion and neck supple. Skin:     General: Skin is warm and dry. Findings: No rash. Neurological:      Mental Status: She is alert and oriented to person, place, and time. Medical Decision Making  Assessment: Patient sent in after a virtual visit with primary care doctor. I assume this is because she complained of an episode of shortness of breath yesterday while taking out the trash. Patient reports no complaints at this time. Vital signs are unremarkable. Exam reassuring. She had an EKG, chest ray, blood work done which were also reassuring. No evidence of hypoxia or respiratory distress. No evidence of heart failure. She has stable renal failure and anemia but no other significant lab abnormalities. Stable for discharge home to follow-up with her regular doctor as needed    Amount and/or Complexity of Data Reviewed  Labs: ordered. Radiology: ordered. ECG/medicine tests: ordered. Decision-making details documented in ED Course. ED Course as of 03/21/23 1606   Tu Mar 21, 2023   1439 EKG, 12 LEAD, INITIAL  ED EKG interpretation:  Rhythm: v-paced. Rate (approx.): 61.  Axis: normal.  ST segment:  No concerning ST elevations or depressions. This EKG was independently interpreted by Christ Jones MD,ED Provider.    [JM]      ED Course User Index  [JM] Sherren Servant, MD       Procedures

## 2023-03-21 NOTE — PROGRESS NOTES
Chief Complaint   Patient presents with    Breathing Problem    Dizziness    Headache     1. \"Have you been to the ER, urgent care clinic since your last visit? Hospitalized since your last visit? \" 02/02/23 patient first and then admiitted for Covid    2. \"Have you seen or consulted any other health care providers outside of the [address] [place] [address] since your last visit? \" Dr. Luis Henry ? Cardio

## 2023-03-21 NOTE — ED TRIAGE NOTES
PT's caregiver advises the PT's PCP wanted her to bring the PT in today b/c yesterday the PT got dizzy and SOB while taking out the trash. PT denies S/S today.   She is A&O, VS stable and able to ambulate w/o assist.

## 2023-03-21 NOTE — PROGRESS NOTES
Lizbeth Storey (: 1950) is a 75 y.o. female, established patient, here for evaluation of the following chief complaint(s):   Breathing Problem (Admitted for Covid  Call [phone]), Dizziness, and Headache       ASSESSMENT/PLAN:  Below is the assessment and plan developed based on review of pertinent history, labs, studies, and medications. 1. Shortness of breath  No appreciated distress on visual exam today. Virtual exam presents significant limitations. Advised further eval and care at ER or urgent care. Patient is agreeable to doing so and her caregiver agrees to take her today. SUBJECTIVE/OBJECTIVE:  HPI  Pmhx : Dementia, Hx CVA, Permanent Afib, Moderate - severe MR, HTN, IGT, HLD, Diastolic CHF, pacemaker in place, CKD4, secondary hyperparathyroidism, hypothyroidism, Vit D def, Vit B12 def. Patient lives at a group home. History of provided by caregiver at the group home. Episode yesterday of dizziness, lightheadedness and dyspnea. Occurred while taking out the trash yesterday. None of these symptoms are present today. Denies chest pains. No dyspnea today. No fever.          Physical Exam      Constitutional: [x] Appears well-developed and well-nourished [x] No apparent distress      [] Abnormal -     Mental status: [x] Alert and awake  [x] Oriented to person/place/time [x] Able to follow commands    [] Abnormal -     Eyes:   EOM    [x]  Normal    [] Abnormal -   Sclera  [x]  Normal    [] Abnormal -          Discharge [x]  None visible   [] Abnormal -     HENT: [x] Normocephalic, atraumatic  [] Abnormal -   [x] Mouth/Throat: Mucous membranes are moist    External Ears [x] Normal  [] Abnormal -    Neck: [x] No visualized mass [] Abnormal -     Pulmonary/Chest: [x] Respiratory effort normal   [x] No visualized signs of difficulty breathing or respiratory distress        [] Abnormal -       Neurological:        [x] No Facial Asymmetry (Cranial nerve 7 motor function) (limited exam due to video visit)          [x] No gaze palsy        [] Abnormal -          Skin:        [x] No significant exanthematous lesions or discoloration noted on facial skin         [] Abnormal -            Psychiatric:       [x] Normal Affect [] Abnormal -        [x] No Hallucinations    Molly Fruit, was evaluated through a synchronous (real-time) audio-video encounter. The patient (or guardian if applicable) is aware that this is a billable service, which includes applicable co-pays. This Virtual Visit was conducted with patient's (and/or legal guardian's) consent. The visit was conducted pursuant to the emergency declaration under the [facility], [address] [place] [address] authority and the [employer] and [employer] General Act. Patient identification was verified, and a caregiver was present when appropriate. The patient was located at: Home: [address] [place] [address]  The provider was located at: Facility [facility] [address] [place] [address]       An electronic signature was used to authenticate this note.   -- Renetta Bardales, DO

## 2023-03-21 NOTE — ED NOTES
Patient set for discharge. Caretaker lacho montero made aware. Provided patent with discharge information. Care taker to  patient from ED. Patient is in stable condition and in room for .

## 2023-03-21 NOTE — ED NOTES
Patient has verbally agreed to allow [facility] staff to share pertinent medical information with caretaker Veronica Chiang [phone]

## 2023-03-21 NOTE — PROGRESS NOTES
Social Work Note  3/21/2023    Received email from patient's caregiver, Sandra Gibbons, requesting call. Returned call. Ms. Conteh Monday inquired whether patient could be scheduled with \"her neurologist\" as patient has been experiencing the following symptoms:  SOB on exertion  Dizziness  Daily Headaches    SW advised that patient does not have neurologist.   Asked about weight gain as patient has history of CHF. Per caregiver, patient has gained 6# since moving into home since she is now eating 3 meals/day. Caregiver denied swelling in extremities and stated that patient has not yet seen cardiology but is scheduled. Per caregiver, patient has had periods of anxiety and recently saw a psychiatrist who increased the dosage of her trazodone. Caregiver stated that patient is scheduled to see therapist next week. SW contacted [facility]. VV scheduled with Dr. Santosh Alexander - 12:30 pm today. 3/21/23. Caregiver's name/number provided:   Sandra Gibbons  [phone]    Caregiver notified of VV at 12:30 pm.     No further needs at this time. SW available as needed for assistance.      Jeremy Fleming, MSW, ACSW, [facility]    Ambulatory Care Management   [phone]

## 2023-04-14 DIAGNOSIS — I48.91 ATRIAL FIBRILLATION WITH RVR (HCC): ICD-10-CM

## 2023-05-05 ENCOUNTER — TELEPHONE (OUTPATIENT)
Dept: PRIMARY CARE CLINIC | Age: 73
End: 2023-05-05

## 2023-05-05 RX ORDER — PANTOPRAZOLE SODIUM 40 MG/1
40 TABLET, DELAYED RELEASE ORAL
Qty: 90 TABLET | Refills: 0 | Status: SHIPPED | OUTPATIENT
Start: 2023-05-05

## 2023-05-05 RX ORDER — LOSARTAN POTASSIUM 25 MG/1
25 TABLET ORAL DAILY
Qty: 90 TABLET | Refills: 0 | Status: SHIPPED | OUTPATIENT
Start: 2023-05-05

## 2023-05-15 RX ORDER — METOPROLOL SUCCINATE 50 MG/1
50 TABLET, EXTENDED RELEASE ORAL DAILY
Qty: 90 TABLET | Refills: 0 | Status: SHIPPED | OUTPATIENT
Start: 2023-05-15

## 2023-05-24 NOTE — PROGRESS NOTES
H&P/Consult Note/Progress Note/Office Note:   Neeta Pearl  MRN: [id number]  :1985  Age:37 y.o.    HPI: Neeta Pearl is a 44 y.o. male who is s/p ventral hernia repair with small circular 4cm Ventralex mesh on 20. Prior to surgery he saw me on 19 with bulging in his upper abdomen. He noticed this with straining and valsalva. He could hear noise in the upper abdomen when he palpated the bulge. No N/V. He had open heart surgery as a child to repair for Tetralogy of Fallot. I previously repaired a right indirect inguinal hernia with mesh on 3/23/12. He is doing well from this and has no symptoms bulging or pain in the groin. 20 CT abd/pelvis without contrast  Hx:  Ventral hernia, abdominal pain  COMPARISON: None     FINDINGS:  -LUNG BASES: Left diaphragm is elevated with associated scarring/atelectasis in the left lung base. Right lung base is clear. -LIVER: Normal in size and appearance. -GALLBLADDER/BILE DUCTS: No gallstones or bile duct dilatation. -PANCREAS: Normal.  -SPLEEN: Spleen is normal in size and appearance. There is a 16 mm mass in the splenic hilum near the stomach and tail of the pancreas. -ADRENALS: Normal.  -KIDNEYS/URETERS: Small nonobstructing stones in both kidneys. No hydronephrosis or significant mass. -BLADDER: Normal.  -REPRODUCTIVE ORGANS: No pelvic masses. -BOWEL: Normal caliber. No inflammatory changes. -LYMPH NODES: No significant retroperitoneal, mesenteric, or pelvic adenopathy. There are surgical staples in the left groin. -BONES: No fracture or significant bone lesion. -VASCULATURE: Normal  -OTHER: There is a midline ventral hernia just above the umbilicus. Defect in the peritoneum measures 2.4 cm diameter. There is no bowel involvement. IMPRESSION:   1. Small ventral hernia, no bowel involvement. 2.  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   3.  Small indeterminate mass TANYA:  -order received to arrange for follow up nancy'ts-done  -order to arrange home oxygen if needed. Candido responded \"testing would need to be documented in a note and without prior treatment methods for the CHF coverage would be denied\". Discussed with RN who will do the oxygen challenge(PO 95% on 4L and 94% on 2L). -pt sleeping. I called dtr Wale Nevarez at [phone] to discuss d/c planning. She was amenable to the discussion. She states: pt lives alone in an apt on [address]; she is in agreement with [facility] for nursing, PT, OT, HHA, and ; pt has keys to apt; has cats; the pt has 2 dtrs, Wale Nevarez and Tonya Lockhart(?spelling) neither of which get along with the patient; pt has a felony record for embezzlement which makes it difficult to find her housing; her present apt is $800/month and her monthly income is $700/month; Li pays the difference; pt has been without heat or hot water; she receives food stamps; Vivek Russ has contacted [employer],  with APS, and has tried several times to get [facility] to assist with housing. We discussed obtaining additional help, applying for full Medicaid, group home, personal care, and/or nursing home. She will reach out to pt's  with Medicaid  -she will call me tomorrow to let me know if she can transport pt at 2pm or later.

## 2023-07-06 ENCOUNTER — TELEPHONE (OUTPATIENT)
Dept: PRIMARY CARE CLINIC | Facility: CLINIC | Age: 73
End: 2023-07-06

## 2023-07-12 DIAGNOSIS — E03.9 HYPOTHYROIDISM, UNSPECIFIED: ICD-10-CM

## 2023-07-12 DIAGNOSIS — I48.91 UNSPECIFIED ATRIAL FIBRILLATION (HCC): ICD-10-CM

## 2023-07-14 RX ORDER — APIXABAN 5 MG/1
TABLET, FILM COATED ORAL
Qty: 180 TABLET | Refills: 0 | OUTPATIENT
Start: 2023-07-14

## 2023-07-14 RX ORDER — LEVOTHYROXINE SODIUM 88 UG/1
TABLET ORAL
Qty: 90 TABLET | Refills: 1 | OUTPATIENT
Start: 2023-07-14

## 2023-07-18 DIAGNOSIS — E03.9 HYPOTHYROIDISM, UNSPECIFIED: ICD-10-CM

## 2023-07-18 RX ORDER — LEVOTHYROXINE SODIUM 88 UG/1
TABLET ORAL
Qty: 90 TABLET | Refills: 0 | Status: SHIPPED | OUTPATIENT
Start: 2023-07-18

## 2023-07-27 DIAGNOSIS — G47.00 INSOMNIA, UNSPECIFIED: ICD-10-CM

## 2023-07-27 DIAGNOSIS — E78.00 PURE HYPERCHOLESTEROLEMIA, UNSPECIFIED: ICD-10-CM

## 2023-07-27 DIAGNOSIS — I10 ESSENTIAL (PRIMARY) HYPERTENSION: Primary | ICD-10-CM

## 2023-07-27 DIAGNOSIS — R51.9 HEADACHE, UNSPECIFIED: ICD-10-CM

## 2023-07-31 RX ORDER — METOPROLOL SUCCINATE 50 MG/1
TABLET, EXTENDED RELEASE ORAL
Qty: 90 TABLET | Refills: 1 | Status: SHIPPED | OUTPATIENT
Start: 2023-07-31

## 2023-07-31 RX ORDER — LOSARTAN POTASSIUM 25 MG/1
TABLET ORAL
Qty: 90 TABLET | Refills: 1 | Status: SHIPPED | OUTPATIENT
Start: 2023-07-31

## 2023-07-31 RX ORDER — PANTOPRAZOLE SODIUM 40 MG/1
TABLET, DELAYED RELEASE ORAL
Qty: 90 TABLET | Refills: 1 | Status: SHIPPED | OUTPATIENT
Start: 2023-07-31

## 2023-07-31 RX ORDER — PRAVASTATIN SODIUM 80 MG/1
TABLET ORAL
Qty: 90 TABLET | Refills: 1 | Status: SHIPPED | OUTPATIENT
Start: 2023-07-31

## 2023-07-31 RX ORDER — TRAZODONE HYDROCHLORIDE 50 MG/1
TABLET ORAL
Qty: 90 TABLET | Refills: 1 | Status: SHIPPED | OUTPATIENT
Start: 2023-07-31

## 2023-07-31 RX ORDER — TOPIRAMATE 50 MG/1
TABLET, FILM COATED ORAL
Qty: 90 TABLET | Refills: 1 | Status: SHIPPED | OUTPATIENT
Start: 2023-07-31

## 2023-08-08 ENCOUNTER — APPOINTMENT (OUTPATIENT)
Facility: HOSPITAL | Age: 73
End: 2023-08-08
Payer: MEDICARE

## 2023-08-08 ENCOUNTER — HOSPITAL ENCOUNTER (EMERGENCY)
Facility: HOSPITAL | Age: 73
Discharge: HOME | End: 2023-08-08
Attending: EMERGENCY MEDICINE
Payer: MEDICARE

## 2023-08-08 VITALS
BODY MASS INDEX: 22.07 KG/M2 | OXYGEN SATURATION: 99 % | DIASTOLIC BLOOD PRESSURE: 68 MMHG | WEIGHT: 109.46 LBS | RESPIRATION RATE: 16 BRPM | TEMPERATURE: 97.3 F | HEART RATE: 69 BPM | SYSTOLIC BLOOD PRESSURE: 90 MMHG | HEIGHT: 59 IN

## 2023-08-08 DIAGNOSIS — R10.84 GENERALIZED ABDOMINAL PAIN: Primary | ICD-10-CM

## 2023-08-08 LAB
ALBUMIN SERPL-MCNC: 4.1 G/DL (ref 3.5–5.2)
ALBUMIN/GLOB SERPL: 1.4 (ref 1.1–2.2)
ALP SERPL-CCNC: 111 U/L (ref 35–104)
ALT SERPL-CCNC: 9 U/L (ref 10–35)
ANION GAP SERPL CALC-SCNC: 13 MMOL/L (ref 5–15)
APPEARANCE UR: ABNORMAL
AST SERPL-CCNC: 13 U/L (ref 10–35)
BACTERIA URNS QL MICRO: NEGATIVE /HPF
BASOPHILS # BLD: 0 K/UL (ref 0–1)
BASOPHILS NFR BLD: 1 % (ref 0–1)
BILIRUB SERPL-MCNC: 0.5 MG/DL (ref 0.2–1)
BILIRUB UR QL: NEGATIVE
BUN SERPL-MCNC: 28 MG/DL (ref 8–23)
BUN/CREAT SERPL: 15 (ref 12–20)
CALCIUM SERPL-MCNC: 9.8 MG/DL (ref 8.8–10.2)
CHLORIDE SERPL-SCNC: 110 MMOL/L (ref 98–107)
CO2 SERPL-SCNC: 20 MMOL/L (ref 22–29)
COLOR UR: ABNORMAL
CREAT SERPL-MCNC: 1.89 MG/DL (ref 0.5–0.9)
DIFFERENTIAL METHOD BLD: ABNORMAL
EOSINOPHIL # BLD: 0.2 K/UL (ref 0–0.4)
EOSINOPHIL NFR BLD: 2 %
EPITH CASTS URNS QL MICRO: ABNORMAL /LPF
ERYTHROCYTE [DISTWIDTH] IN BLOOD BY AUTOMATED COUNT: 14 % (ref 11.5–14.5)
GLOBULIN SER CALC-MCNC: 2.9 G/DL (ref 2–4)
GLUCOSE SERPL-MCNC: 115 MG/DL (ref 65–100)
GLUCOSE UR STRIP.AUTO-MCNC: NEGATIVE MG/DL
HCT VFR BLD AUTO: 36.2 % (ref 35–47)
HGB BLD-MCNC: 11.8 G/DL (ref 11.5–16)
HGB UR QL STRIP: NEGATIVE
IMM GRANULOCYTES # BLD AUTO: 0 K/UL (ref 0–0.04)
IMM GRANULOCYTES NFR BLD AUTO: 0 % (ref 0–0.5)
KETONES UR QL STRIP.AUTO: ABNORMAL MG/DL
LEUKOCYTE ESTERASE UR QL STRIP.AUTO: ABNORMAL
LIPASE SERPL-CCNC: 49 U/L (ref 13–60)
LYMPHOCYTES # BLD: 2 K/UL (ref 0.8–3.5)
LYMPHOCYTES NFR BLD: 30 % (ref 12–49)
MCH RBC QN AUTO: 29.8 PG (ref 26–34)
MCHC RBC AUTO-ENTMCNC: 32.6 G/DL (ref 30–36.5)
MCV RBC AUTO: 91.4 FL (ref 80–99)
MONOCYTES # BLD: 0.7 K/UL (ref 0–1)
MONOCYTES NFR BLD: 11 % (ref 5–13)
NEUTS SEG # BLD: 3.7 K/UL (ref 1.8–8)
NEUTS SEG NFR BLD: 56 % (ref 32–75)
NITRITE UR QL STRIP.AUTO: NEGATIVE
NRBC # BLD: 0 K/UL (ref 0–0.01)
NRBC BLD-RTO: 0 PER 100 WBC
PH UR STRIP: 6 (ref 5–8)
PLATELET # BLD AUTO: 230 K/UL (ref 150–400)
PMV BLD AUTO: 10.1 FL (ref 8.9–12.9)
POTASSIUM SERPL-SCNC: 3.9 MMOL/L (ref 3.5–5.1)
PROT SERPL-MCNC: 7 G/DL (ref 6.4–8.3)
PROT UR STRIP-MCNC: ABNORMAL MG/DL
RBC # BLD AUTO: 3.96 M/UL (ref 3.8–5.2)
RBC #/AREA URNS HPF: ABNORMAL /HPF
SODIUM SERPL-SCNC: 143 MMOL/L (ref 136–145)
SP GR UR REFRACTOMETRY: 1.02 (ref 1–1.03)
UROBILINOGEN UR QL STRIP.AUTO: 0.2 EU/DL (ref 0.2–1)
WBC # BLD AUTO: 6.6 K/UL (ref 3.6–11)
WBC URNS QL MICRO: ABNORMAL /HPF (ref 0–4)

## 2023-08-08 PROCEDURE — 74176 CT ABD & PELVIS W/O CONTRAST: CPT

## 2023-08-08 PROCEDURE — 80053 COMPREHEN METABOLIC PANEL: CPT

## 2023-08-08 PROCEDURE — 81001 URINALYSIS AUTO W/SCOPE: CPT

## 2023-08-08 PROCEDURE — 36415 COLL VENOUS BLD VENIPUNCTURE: CPT

## 2023-08-08 PROCEDURE — 83690 ASSAY OF LIPASE: CPT

## 2023-08-08 PROCEDURE — 85025 COMPLETE CBC W/AUTO DIFF WBC: CPT

## 2023-08-08 PROCEDURE — 99284 EMERGENCY DEPT VISIT MOD MDM: CPT

## 2023-08-08 RX ORDER — SUCRALFATE 1 G/1
1 TABLET ORAL 4 TIMES DAILY
Qty: 40 TABLET | Refills: 0 | Status: SHIPPED | OUTPATIENT
Start: 2023-08-08 | End: 2023-08-18

## 2023-08-08 RX ORDER — HYDROXYZINE PAMOATE 25 MG/1
CAPSULE ORAL
COMMUNITY
Start: 2023-08-03

## 2023-08-08 ASSESSMENT — ENCOUNTER SYMPTOMS
EYE DISCHARGE: 0
ABDOMINAL PAIN: 1
SHORTNESS OF BREATH: 0

## 2023-08-08 ASSESSMENT — PAIN SCALES - GENERAL: PAINLEVEL_OUTOF10: 3

## 2023-08-08 ASSESSMENT — PAIN DESCRIPTION - LOCATION: LOCATION: ABDOMEN

## 2023-08-08 ASSESSMENT — PAIN - FUNCTIONAL ASSESSMENT: PAIN_FUNCTIONAL_ASSESSMENT: 0-10

## 2023-08-08 ASSESSMENT — PAIN DESCRIPTION - DESCRIPTORS: DESCRIPTORS: ACHING

## 2023-08-08 ASSESSMENT — PAIN DESCRIPTION - ORIENTATION: ORIENTATION: RIGHT

## 2023-08-08 NOTE — ED PROVIDER NOTES
g/dL    Globulin 2.9 2.0 - 4.0 g/dL    Albumin/Globulin Ratio 1.4 1.1 - 2.2     CBC with Auto Differential    Collection Time: 08/08/23 12:38 PM   Result Value Ref Range    WBC 6.6 3.6 - 11.0 K/uL    RBC 3.96 3.80 - 5.20 M/uL    Hemoglobin 11.8 11.5 - 16.0 g/dL    Hematocrit 36.2 35.0 - 47.0 %    MCV 91.4 80.0 - 99.0 FL    MCH 29.8 26.0 - 34.0 PG    MCHC 32.6 30.0 - 36.5 g/dL    RDW 14.0 11.5 - 14.5 %    Platelets 372 793 - 800 K/uL    MPV 10.1 8.9 - 12.9 FL    Nucleated RBCs 0.0 0  WBC    nRBC 0.00 0.00 - 0.01 K/uL    Neutrophils % 56 32 - 75 %    Lymphocytes % 30 12 - 49 %    Monocytes % 11 5 - 13 %    Eosinophils % 2 (L) 7 %    Basophils % 1 0 - 1 %    Immature Granulocytes 0 0 - 0.5 %    Neutrophils Absolute 3.7 1.8 - 8.0 K/UL    Lymphocytes Absolute 2.0 0.8 - 3.5 K/UL    Monocytes Absolute 0.7 0.0 - 1.0 K/UL    Eosinophils Absolute 0.2 0.0 - 0.4 K/UL    Basophils Absolute 0.0 0 - 1 K/UL    Absolute Immature Granulocyte 0.0 0.00 - 0.04 K/UL    Differential Type AUTOMATED     Lipase    Collection Time: 08/08/23 12:38 PM   Result Value Ref Range    Lipase 49 13 - 60 U/L       IMAGING RESULTS:  CT ABDOMEN PELVIS WO CONTRAST Additional Contrast? None   Final Result   No acute findings seen            MEDICATIONS GIVEN:  Medications - No data to display       Medical Decision Making  Patient presents today with chief complaint of abdominal pain. On exam, patient does have tenderness to palpation in the right lower/ left lower quadrant but exam is non-peritoneal, no guarding or rigidity. Considered differentials including pyelonephritis, UA shows no evidence of UTI. Considered nephrolithiasis, small bowel obstruction, acute appendicitis, diverticulitis, cholecystitis, aortic dissection, pancreatitis, so CT abdomen and pelvis performed and shows unremarkable for this. Patient has history of hysterectomy. Blood work is reassuring, normal kidney function, normal liver enzymes.  Presentation not consistent with

## 2023-08-08 NOTE — ED NOTES
Pt's caregiver Alisa Marion called at this time by Qiana Spaulding NP. Gi Fought given update in care and discharge. Gi Fought on her way to  patient. Pt dressed and ready for discharge.       Nelson Trujillo RN  08/08/23 8505

## 2023-08-08 NOTE — ED NOTES
Pt picked up by Pamella Marshall, ambulated out in stable condition. As per EVS, pt and caregiver took discharge papers, no signature page given back to RN. Caregiver understands discharge instructions as instructed by Vandana Escoto NP.       Sonia Patel RN  08/08/23 7141

## 2023-08-08 NOTE — ED TRIAGE NOTES
Pt ambulatory into ER accompanied by her Caregiver with cc of RLQ abdominal pain that began last night after eating chicken parmesan. Caregiver reports patient also became dizzy during this episode. Pt denies dysuria. Pt is disoriented at baseline r/t [place]. Caregiver has a list of patient's medications.     Patient has lived with caregiver since Feb 1st.

## 2023-08-08 NOTE — DISCHARGE INSTRUCTIONS
Thank you for allowing us to provide you with medical care today. We realize that you have many choices for your emergency care needs. We thank you for choosing [facility]. Please choose us in the future for any continued health care needs. We hope we addressed all of your medical concerns. We strive to provide excellent quality care in the Emergency Department. Anything less than excellent does not meet our expectations. The exam and treatment you received in the Emergency Department were for an emergent problem and are not intended as complete care. It is important that you follow up with a doctor, nurse practitioner, or [address] [place] [address] assistant for ongoing care. If your symptoms worsen or you do not improve as expected and you are unable to reach your usual health care provider, you should return to the Emergency Department. We are available 24 hours a day. Take this sheet with you when you go to your follow-up visit. If you have any problem arranging the follow-up visit, contact the Emergency Department immediately. Make an appointment your family doctor for follow up of this visit. Return to the ER if you are unable to be seen in a timely manner.

## 2023-08-14 DIAGNOSIS — I48.91 UNSPECIFIED ATRIAL FIBRILLATION (HCC): ICD-10-CM

## 2023-08-21 RX ORDER — APIXABAN 5 MG/1
TABLET, FILM COATED ORAL
Qty: 30 TABLET | Refills: 0 | Status: SHIPPED | OUTPATIENT
Start: 2023-08-21 | End: 2023-09-29

## 2023-09-08 ENCOUNTER — APPOINTMENT (OUTPATIENT)
Facility: HOSPITAL | Age: 73
End: 2023-09-08
Payer: MEDICARE

## 2023-09-08 ENCOUNTER — HOSPITAL ENCOUNTER (EMERGENCY)
Facility: HOSPITAL | Age: 73
Discharge: HOME | End: 2023-09-08
Attending: STUDENT IN AN ORGANIZED HEALTH CARE EDUCATION/TRAINING PROGRAM
Payer: MEDICARE

## 2023-09-08 VITALS
RESPIRATION RATE: 23 BRPM | BODY MASS INDEX: 21.17 KG/M2 | TEMPERATURE: 97.6 F | DIASTOLIC BLOOD PRESSURE: 83 MMHG | WEIGHT: 105 LBS | SYSTOLIC BLOOD PRESSURE: 157 MMHG | OXYGEN SATURATION: 99 % | HEART RATE: 60 BPM | HEIGHT: 59 IN

## 2023-09-08 DIAGNOSIS — R07.9 CHEST PAIN, UNSPECIFIED TYPE: Primary | ICD-10-CM

## 2023-09-08 DIAGNOSIS — M54.9 UPPER BACK PAIN ON RIGHT SIDE: ICD-10-CM

## 2023-09-08 LAB
ALBUMIN SERPL-MCNC: 4.6 G/DL (ref 3.5–5.2)
ALBUMIN/GLOB SERPL: 1.6 (ref 1.1–2.2)
ALP SERPL-CCNC: 127 U/L (ref 35–104)
ALT SERPL-CCNC: 13 U/L (ref 10–35)
ANION GAP SERPL CALC-SCNC: 14 MMOL/L (ref 5–15)
AST SERPL-CCNC: 17 U/L (ref 10–35)
BASOPHILS # BLD: 0 K/UL (ref 0–1)
BASOPHILS NFR BLD: 0 % (ref 0–1)
BILIRUB SERPL-MCNC: 0.7 MG/DL (ref 0.2–1)
BUN SERPL-MCNC: 16 MG/DL (ref 8–23)
BUN/CREAT SERPL: 9 (ref 12–20)
CALCIUM SERPL-MCNC: 10.2 MG/DL (ref 8.8–10.2)
CHLORIDE SERPL-SCNC: 113 MMOL/L (ref 98–107)
CO2 SERPL-SCNC: 20 MMOL/L (ref 22–29)
COMMENT:: NORMAL
CREAT SERPL-MCNC: 1.7 MG/DL (ref 0.5–0.9)
DIFFERENTIAL METHOD BLD: ABNORMAL
EKG DIAGNOSIS: NORMAL
EKG Q-T INTERVAL: 396 MS
EKG QRS DURATION: 78 MS
EKG QTC CALCULATION (BAZETT): 405 MS
EKG R AXIS: 22 DEGREES
EKG T AXIS: -54 DEGREES
EKG VENTRICULAR RATE: 63 BPM
EOSINOPHIL # BLD: 0.2 K/UL (ref 0–0.4)
EOSINOPHIL NFR BLD: 2 %
ERYTHROCYTE [DISTWIDTH] IN BLOOD BY AUTOMATED COUNT: 15.8 % (ref 11.5–14.5)
GLOBULIN SER CALC-MCNC: 2.8 G/DL (ref 2–4)
GLUCOSE SERPL-MCNC: 118 MG/DL (ref 65–100)
HCT VFR BLD AUTO: 35.6 % (ref 35–47)
HGB BLD-MCNC: 11.7 G/DL (ref 11.5–16)
IMM GRANULOCYTES # BLD AUTO: 0 K/UL (ref 0–0.04)
IMM GRANULOCYTES NFR BLD AUTO: 0 % (ref 0–0.5)
LIPASE SERPL-CCNC: 24 U/L (ref 13–60)
LYMPHOCYTES # BLD: 1.7 K/UL (ref 0.8–3.5)
LYMPHOCYTES NFR BLD: 18 % (ref 12–49)
MAGNESIUM SERPL-MCNC: 2 MG/DL (ref 1.6–2.4)
MCH RBC QN AUTO: 30.2 PG (ref 26–34)
MCHC RBC AUTO-ENTMCNC: 32.9 G/DL (ref 30–36.5)
MCV RBC AUTO: 91.8 FL (ref 80–99)
MONOCYTES # BLD: 1.2 K/UL (ref 0–1)
MONOCYTES NFR BLD: 12 % (ref 5–13)
NEUTS SEG # BLD: 6.3 K/UL (ref 1.8–8)
NEUTS SEG NFR BLD: 68 % (ref 32–75)
NRBC # BLD: 0 K/UL (ref 0–0.01)
NRBC BLD-RTO: 0 PER 100 WBC
PLATELET # BLD AUTO: 236 K/UL (ref 150–400)
PMV BLD AUTO: 9.6 FL (ref 8.9–12.9)
POTASSIUM SERPL-SCNC: 4.1 MMOL/L (ref 3.5–5.1)
PROT SERPL-MCNC: 7.4 G/DL (ref 6.4–8.3)
RBC # BLD AUTO: 3.88 M/UL (ref 3.8–5.2)
SODIUM SERPL-SCNC: 147 MMOL/L (ref 136–145)
SPECIMEN HOLD: NORMAL
TROPONIN I BLD-MCNC: <0.04 NG/ML (ref 0–0.08)
WBC # BLD AUTO: 9.5 K/UL (ref 3.6–11)

## 2023-09-08 PROCEDURE — 83735 ASSAY OF MAGNESIUM: CPT

## 2023-09-08 PROCEDURE — 84484 ASSAY OF TROPONIN QUANT: CPT

## 2023-09-08 PROCEDURE — 71045 X-RAY EXAM CHEST 1 VIEW: CPT

## 2023-09-08 PROCEDURE — 6370000000 HC RX 637 (ALT 250 FOR IP): Performed by: STUDENT IN AN ORGANIZED HEALTH CARE EDUCATION/TRAINING PROGRAM

## 2023-09-08 PROCEDURE — 93010 ELECTROCARDIOGRAM REPORT: CPT | Performed by: INTERNAL MEDICINE

## 2023-09-08 PROCEDURE — 85025 COMPLETE CBC W/AUTO DIFF WBC: CPT

## 2023-09-08 PROCEDURE — 83690 ASSAY OF LIPASE: CPT

## 2023-09-08 PROCEDURE — 80053 COMPREHEN METABOLIC PANEL: CPT

## 2023-09-08 PROCEDURE — 99285 EMERGENCY DEPT VISIT HI MDM: CPT

## 2023-09-08 PROCEDURE — 36415 COLL VENOUS BLD VENIPUNCTURE: CPT

## 2023-09-08 PROCEDURE — 93005 ELECTROCARDIOGRAM TRACING: CPT | Performed by: STUDENT IN AN ORGANIZED HEALTH CARE EDUCATION/TRAINING PROGRAM

## 2023-09-08 RX ORDER — ASPIRIN 81 MG/1
324 TABLET, CHEWABLE ORAL
Status: DISCONTINUED | OUTPATIENT
Start: 2023-09-08 | End: 2023-09-08

## 2023-09-08 RX ORDER — ACETAMINOPHEN 500 MG
1000 TABLET ORAL
Status: COMPLETED | OUTPATIENT
Start: 2023-09-08 | End: 2023-09-08

## 2023-09-08 RX ORDER — LIDOCAINE 4 G/G
1 PATCH TOPICAL
Status: DISCONTINUED | OUTPATIENT
Start: 2023-09-08 | End: 2023-09-08 | Stop reason: HOSPADM

## 2023-09-08 RX ORDER — NITROGLYCERIN 0.4 MG/1
0.4 TABLET SUBLINGUAL EVERY 5 MIN PRN
Status: DISCONTINUED | OUTPATIENT
Start: 2023-09-08 | End: 2023-09-08

## 2023-09-08 RX ADMIN — ACETAMINOPHEN 1000 MG: 500 TABLET ORAL at 12:06

## 2023-09-08 ASSESSMENT — PAIN DESCRIPTION - LOCATION: LOCATION: SHOULDER

## 2023-09-08 ASSESSMENT — PAIN DESCRIPTION - ORIENTATION: ORIENTATION: RIGHT

## 2023-09-08 ASSESSMENT — PAIN SCALES - GENERAL: PAINLEVEL_OUTOF10: 5

## 2023-09-08 ASSESSMENT — LIFESTYLE VARIABLES
HOW OFTEN DO YOU HAVE A DRINK CONTAINING ALCOHOL: NEVER
HOW MANY STANDARD DRINKS CONTAINING ALCOHOL DO YOU HAVE ON A TYPICAL DAY: PATIENT DOES NOT DRINK

## 2023-09-08 ASSESSMENT — PAIN - FUNCTIONAL ASSESSMENT: PAIN_FUNCTIONAL_ASSESSMENT: 0-10

## 2023-09-08 ASSESSMENT — PAIN DESCRIPTION - PAIN TYPE: TYPE: ACUTE PAIN

## 2023-09-08 ASSESSMENT — PAIN DESCRIPTION - FREQUENCY: FREQUENCY: INTERMITTENT

## 2023-09-08 NOTE — ED TRIAGE NOTES
Pt comes in via EMS [facility]/o right sided chest pain that radiates to shoulder.

## 2023-09-08 NOTE — ED NOTES
Patient AxO4 and in stable condition at this time. Reviewed discharge instructions with patient and provided education and follow up instructions. Patient stated understanding.        Antionette Wolf RN  09/08/23 3923

## 2023-09-08 NOTE — DISCHARGE INSTRUCTIONS
You presented to the ED from urgent care with concern for abnormal EKG. EKG here shows her usual A-fib. No fast heart rate or other concerning features. Work-up for heart attack was unremarkable. You do have right upper back pain that is most likely muscle skeletal  in nature. Recommend topical lidocaine patches over-the-counter, Tylenol 1000 mg, heat and follow-up with PCP for possible physical therapy if symptoms not improving.

## 2023-09-28 DIAGNOSIS — I48.91 UNSPECIFIED ATRIAL FIBRILLATION (HCC): ICD-10-CM

## 2023-09-28 DIAGNOSIS — E03.9 HYPOTHYROIDISM, UNSPECIFIED: ICD-10-CM

## 2023-09-29 DIAGNOSIS — E03.9 HYPOTHYROIDISM, UNSPECIFIED: ICD-10-CM

## 2023-09-29 RX ORDER — LEVOTHYROXINE SODIUM 88 UG/1
88 TABLET ORAL DAILY
Qty: 90 TABLET | Refills: 0 | Status: SHIPPED | OUTPATIENT
Start: 2023-09-29

## 2023-10-13 ENCOUNTER — NURSE TRIAGE (OUTPATIENT)
Dept: OTHER | Facility: CLINIC | Age: 73
End: 2023-10-13

## 2023-10-13 RX ORDER — APIXABAN 5 MG/1
TABLET, FILM COATED ORAL
Qty: 30 TABLET | Refills: 0 | OUTPATIENT
Start: 2023-10-13

## 2023-10-13 RX ORDER — LEVOTHYROXINE SODIUM 88 UG/1
TABLET ORAL
Qty: 90 TABLET | Refills: 0 | OUTPATIENT
Start: 2023-10-13

## 2023-10-13 NOTE — TELEPHONE ENCOUNTER
[facility] [place]    Received call from Armen Long at [facility] [place]; Patient with Red Flag Complaint requesting to establish care with Evert Song. Subjective: Caller states \"Severe migraine, took tylenol and not helping. \"     Limited Triage, patients caregiver Jaison Gannon calling for patient. Current Symptoms:   HA was only yesterday and the day before but no headache today. \"Off and on HAs for a while. \"    Onset: several weeks ago; waxing and waning    Associated Symptoms: NA    Pain Severity: 0/10; N/A; none    Temperature: Denies     What has been tried: Tylenol    Recommended disposition: See in Office Within 2 Weeks    Care advice provided, patient verbalizes understanding; denies any other questions or concerns; instructed to call back for any new or worsening symptoms. Patient/Caller agrees with recommended disposition; writer provided warm transfer to [place] at [facility] [place] for appointment scheduling    Attention Provider: Thank you for allowing me to participate in the care of your patient. The patient was connected to triage in response to information provided to the [facility]. Please do not respond through this encounter as the response is not directed to a shared pool.     Reason for Disposition   Headache is a chronic symptom (recurrent or ongoing AND lasting > 4 weeks)    Protocols used: Headache-ADULT-[place]

## 2023-10-16 ENCOUNTER — TELEPHONE (OUTPATIENT)
Dept: PRIMARY CARE CLINIC | Facility: CLINIC | Age: 73
End: 2023-10-16

## 2023-10-16 DIAGNOSIS — E03.9 HYPOTHYROIDISM, UNSPECIFIED: ICD-10-CM

## 2023-10-16 RX ORDER — LEVOTHYROXINE SODIUM 88 UG/1
88 TABLET ORAL DAILY
Qty: 30 TABLET | Refills: 0 | Status: SHIPPED | OUTPATIENT
Start: 2023-10-16 | End: 2023-10-19 | Stop reason: SDUPTHER

## 2023-10-16 NOTE — TELEPHONE ENCOUNTER
Called number on file to let pt know that her refill has been sent but she is overdue for a follow up visit, but when I called and asked to speak with the pt the person hung up after they said this was not [facility]. I called back and was sent straight to [facility].

## 2023-10-19 ENCOUNTER — TELEMEDICINE (OUTPATIENT)
Dept: PRIMARY CARE CLINIC | Facility: CLINIC | Age: 73
End: 2023-10-19
Payer: MEDICARE

## 2023-10-19 DIAGNOSIS — I48.91 ATRIAL FIBRILLATION, UNSPECIFIED TYPE (HCC): Primary | ICD-10-CM

## 2023-10-19 DIAGNOSIS — D68.69 SECONDARY HYPERCOAGULABLE STATE (HCC): ICD-10-CM

## 2023-10-19 DIAGNOSIS — E03.9 ACQUIRED HYPOTHYROIDISM: ICD-10-CM

## 2023-10-19 DIAGNOSIS — G43.809 OTHER MIGRAINE WITHOUT STATUS MIGRAINOSUS, NOT INTRACTABLE: ICD-10-CM

## 2023-10-19 DIAGNOSIS — E78.00 PURE HYPERCHOLESTEROLEMIA, UNSPECIFIED: ICD-10-CM

## 2023-10-19 DIAGNOSIS — N18.4 STAGE 4 CHRONIC KIDNEY DISEASE (HCC): ICD-10-CM

## 2023-10-19 PROCEDURE — 99214 OFFICE O/P EST MOD 30 MIN: CPT | Performed by: FAMILY MEDICINE

## 2023-10-19 PROCEDURE — 1123F ACP DISCUSS/DSCN MKR DOCD: CPT | Performed by: FAMILY MEDICINE

## 2023-10-19 PROCEDURE — 1090F PRES/ABSN URINE INCON ASSESS: CPT | Performed by: FAMILY MEDICINE

## 2023-10-19 PROCEDURE — G8427 DOCREV CUR MEDS BY ELIG CLIN: HCPCS | Performed by: FAMILY MEDICINE

## 2023-10-19 PROCEDURE — G8400 PT W/DXA NO RESULTS DOC: HCPCS | Performed by: FAMILY MEDICINE

## 2023-10-19 PROCEDURE — 3017F COLORECTAL CA SCREEN DOC REV: CPT | Performed by: FAMILY MEDICINE

## 2023-10-19 RX ORDER — ACETAMINOPHEN 500 MG
500 TABLET ORAL 4 TIMES DAILY PRN
Qty: 60 TABLET | Refills: 5 | Status: SHIPPED | OUTPATIENT
Start: 2023-10-19

## 2023-10-19 RX ORDER — LEVOTHYROXINE SODIUM 88 UG/1
88 TABLET ORAL DAILY
Qty: 90 TABLET | Refills: 0 | Status: SHIPPED | OUTPATIENT
Start: 2023-10-19

## 2023-10-19 RX ORDER — PRAVASTATIN SODIUM 80 MG/1
80 TABLET ORAL NIGHTLY
Qty: 90 TABLET | Refills: 1 | Status: SHIPPED | OUTPATIENT
Start: 2023-10-19

## 2023-10-19 RX ORDER — TRAZODONE HYDROCHLORIDE 50 MG/1
50 TABLET ORAL NIGHTLY
Qty: 90 TABLET | Refills: 1 | Status: SHIPPED | OUTPATIENT
Start: 2023-10-19

## 2023-10-19 RX ORDER — ONDANSETRON 4 MG/1
4 TABLET, FILM COATED ORAL DAILY PRN
Qty: 30 TABLET | Refills: 0 | Status: SHIPPED | OUTPATIENT
Start: 2023-10-19

## 2023-10-19 RX ORDER — TOPIRAMATE 50 MG/1
50 TABLET, FILM COATED ORAL NIGHTLY
Qty: 90 TABLET | Refills: 1 | Status: SHIPPED | OUTPATIENT
Start: 2023-10-19

## 2023-10-19 RX ORDER — LOSARTAN POTASSIUM 25 MG/1
25 TABLET ORAL DAILY
Qty: 90 TABLET | Refills: 1 | Status: SHIPPED | OUTPATIENT
Start: 2023-10-19

## 2023-10-19 RX ORDER — PANTOPRAZOLE SODIUM 40 MG/1
40 TABLET, DELAYED RELEASE ORAL EVERY MORNING
Qty: 90 TABLET | Refills: 1 | Status: SHIPPED | OUTPATIENT
Start: 2023-10-19

## 2023-10-19 RX ORDER — DILTIAZEM HYDROCHLORIDE 180 MG/1
180 CAPSULE, EXTENDED RELEASE ORAL DAILY
Qty: 90 CAPSULE | Refills: 1 | Status: SHIPPED | OUTPATIENT
Start: 2023-10-19

## 2023-10-19 RX ORDER — METOPROLOL SUCCINATE 50 MG/1
50 TABLET, EXTENDED RELEASE ORAL DAILY
Qty: 90 TABLET | Refills: 1 | Status: SHIPPED | OUTPATIENT
Start: 2023-10-19

## 2023-10-19 SDOH — ECONOMIC STABILITY: INCOME INSECURITY: HOW HARD IS IT FOR YOU TO PAY FOR THE VERY BASICS LIKE FOOD, HOUSING, MEDICAL CARE, AND HEATING?: PATIENT DECLINED

## 2023-10-19 SDOH — ECONOMIC STABILITY: HOUSING INSECURITY
IN THE LAST 12 MONTHS, WAS THERE A TIME WHEN YOU DID NOT HAVE A STEADY PLACE TO SLEEP OR SLEPT IN A SHELTER (INCLUDING NOW)?: PATIENT REFUSED

## 2023-10-19 SDOH — ECONOMIC STABILITY: FOOD INSECURITY: WITHIN THE PAST 12 MONTHS, YOU WORRIED THAT YOUR FOOD WOULD RUN OUT BEFORE YOU GOT MONEY TO BUY MORE.: PATIENT DECLINED

## 2023-10-19 SDOH — ECONOMIC STABILITY: FOOD INSECURITY: WITHIN THE PAST 12 MONTHS, THE FOOD YOU BOUGHT JUST DIDN'T LAST AND YOU DIDN'T HAVE MONEY TO GET MORE.: PATIENT DECLINED

## 2023-10-19 ASSESSMENT — PATIENT HEALTH QUESTIONNAIRE - PHQ9: DEPRESSION UNABLE TO ASSESS: PT REFUSES

## 2023-11-03 DIAGNOSIS — I48.20 CHRONIC ATRIAL FIBRILLATION, UNSPECIFIED (HCC): ICD-10-CM

## 2023-11-03 DIAGNOSIS — I10 ESSENTIAL (PRIMARY) HYPERTENSION: ICD-10-CM

## 2023-11-03 DIAGNOSIS — E03.9 HYPOTHYROIDISM, UNSPECIFIED: ICD-10-CM

## 2023-11-03 RX ORDER — APIXABAN 2.5 MG/1
TABLET, FILM COATED ORAL
Qty: 60 TABLET | Refills: 0 | Status: SHIPPED | OUTPATIENT
Start: 2023-11-03 | End: 2023-11-24

## 2023-11-03 RX ORDER — LEVOTHYROXINE SODIUM 88 UG/1
88 TABLET ORAL DAILY
Qty: 90 TABLET | Refills: 0 | Status: SHIPPED | OUTPATIENT
Start: 2023-11-03

## 2023-11-03 RX ORDER — DILTIAZEM HYDROCHLORIDE 180 MG/1
CAPSULE, EXTENDED RELEASE ORAL DAILY
Qty: 90 CAPSULE | Refills: 0 | Status: SHIPPED | OUTPATIENT
Start: 2023-11-03

## 2023-11-24 DIAGNOSIS — I48.20 CHRONIC ATRIAL FIBRILLATION, UNSPECIFIED (HCC): ICD-10-CM

## 2023-11-24 RX ORDER — APIXABAN 2.5 MG/1
TABLET, FILM COATED ORAL
Qty: 60 TABLET | Refills: 0 | Status: SHIPPED | OUTPATIENT
Start: 2023-11-24

## 2023-11-26 NOTE — PROGRESS NOTES
Hospitalist Progress Note    NAME: Mary Fitzpatrick   :  1950   MRN:  [id number]       Assessment / Plan:  Acute Respiratory Failure with Hypoxia   Due to pneumonia, RML Pneumonia on X ray  Rule out COVID 19 f/u results  Isolation, hold HC with arrythmias  IV abx  Pulmonary to see  Clinically not in distress      Persistent Afib  RVR on admission  IV cardizem drip transition to PO  IV heparin drip changed to sq lovenox, defer to cards for long term AC  Echo  pend  Cardio on case     ROJELIO  IVF hydration  Hold nephrotoxic agents  Monitor crt slowly improving if not renal consult     CAP  Right Middle lobe  IV Rocephin,azithromycin  X ray Right middle lobe infiltrate suspicious for pneumonia in the  appropriate clinical setting     CHF, Chronic  Continue medical management  Well compensated  Echo     Hypothyroidism  Cont home dose     DVT Prophylaxis: lovenox  GI Prophylaxis:IV protonix  Code status full code   Baseline: AAOx 3     Subjective:     Chief Complaint / Reason for Physician Visit  Denies chest pain, palpitations    Discussed with RN events overnight. Review of Systems:  Symptom Y/N Comments  Symptom Y/N Comments   Fever/Chills n   Chest Pain n    Poor Appetite n   Edema     Cough n   Abdominal Pain n    Sputum n   Joint Pain     SOB/RIVERS n   Pruritis/Rash     Nausea/vomit n   Tolerating PT/OT     Diarrhea n   Tolerating Diet y    Constipation n   Other       Could NOT obtain due to:      Objective:     VITALS:   Last 24hrs VS reviewed since prior progress note.  Most recent are:  Patient Vitals for the past 24 hrs:   Temp Pulse Resp BP SpO2   20 1546  70      20 1200  69      20 1014 98.6 °F (37 °C) 61 18 (!) 80/66 90 %   20 0804 97.4 °F (36.3 °C) (!) 106 19 118/81 95 %   20 0801  (!) 107  118/81    20 0800  (!) 107      20 0400 98 °F (36.7 °C) (!) 103 19 105/73 94 %   20 0000 98.4 °F (36.9 °C) 94 23 98/65 93 %   20 2000 97.8 °F (36.6 °C) 82 18 104/49 96 %       Intake/Output Summary (Last 24 hours) at 4/25/2020 1721  Last data filed at 4/24/2020 1851  Gross per 24 hour   Intake 600 ml   Output    Net 600 ml        PHYSICAL EXAM:  General: WD, WN. Alert, cooperative, no acute distress    EENT:  EOMI. Anicteric sclerae. MMM  Resp:  CTA bilaterally, no wheezing or rales. No accessory muscle use  CV:  Regular  rhythm,  No edema  GI:  Soft, Non distended, Non tender.  +Bowel sounds  Neurologic:  Alert and oriented X 3, normal speech,   Psych:   Not anxious nor agitated  Skin:  No rashes. No jaundice    Reviewed most current lab test results and cultures  YES  Reviewed most current radiology test results   YES  Review and summation of old records today    NO  Reviewed patient's current orders and MAR    YES  PMH/SH reviewed - no change compared to H&P  ________________________________________________________________________  Care Plan discussed with:    Comments   Patient x    Family      RN x    Care Manager     Consultant                        Multidiciplinary team rounds were held today with , nursing, pharmacist and clinical coordinator. Patient's plan of care was discussed; medications were reviewed and discharge planning was addressed. ________________________________________________________________________  Total NON critical care TIME:  30   Minutes    Total CRITICAL CARE TIME Spent:   Minutes non procedure based      Comments   >50% of visit spent in counseling and coordination of care     ________________________________________________________________________  Carlitos Silva DO     Procedures: see electronic medical records for all procedures/Xrays and details which were not copied into this note but were reviewed prior to creation of Plan. LABS:  I reviewed today's most current labs and imaging studies.   Pertinent labs include:  Recent Labs     04/25/20  1137 04/24/20  0334 04/23/20  1838   WBC 7.0 8.5 7.3   HGB 9.4* 9.5* 11.0*   HCT 30.5* 30.4* 35.8    253 297     Recent Labs     04/25/20  1137 04/24/20  0334 04/23/20  1838    143 140   K 4.7 4.9 4.3   * 114* 111*   CO2 17* 20* 20*   * 90 166*   BUN 29* 26* 24*   CREA 2.60* 2.22* 2.31*   CA 8.3* 8.4* 8.9   MG  --  2.0  --    ALB  --  3.3* 3.7   TBILI  --  0.4 0.7   SGOT  --  12* 11*   ALT  --  17 21       Signed: Mi Wilkinson, DO 74

## 2023-12-05 ENCOUNTER — HOSPITAL ENCOUNTER (EMERGENCY)
Facility: HOSPITAL | Age: 73
Discharge: HOME | End: 2023-12-05
Attending: EMERGENCY MEDICINE
Payer: MEDICARE

## 2023-12-05 VITALS
WEIGHT: 105 LBS | HEIGHT: 59 IN | RESPIRATION RATE: 16 BRPM | DIASTOLIC BLOOD PRESSURE: 90 MMHG | SYSTOLIC BLOOD PRESSURE: 164 MMHG | TEMPERATURE: 97.6 F | BODY MASS INDEX: 21.17 KG/M2 | OXYGEN SATURATION: 99 % | HEART RATE: 61 BPM

## 2023-12-05 DIAGNOSIS — H11.31 SUBCONJUNCTIVAL HEMORRHAGE OF RIGHT EYE: Primary | ICD-10-CM

## 2023-12-05 PROCEDURE — 99282 EMERGENCY DEPT VISIT SF MDM: CPT

## 2023-12-05 ASSESSMENT — PAIN SCALES - GENERAL: PAINLEVEL_OUTOF10: 0

## 2023-12-05 ASSESSMENT — ENCOUNTER SYMPTOMS
COUGH: 0
VOMITING: 0
SORE THROAT: 0

## 2023-12-05 ASSESSMENT — PAIN - FUNCTIONAL ASSESSMENT: PAIN_FUNCTIONAL_ASSESSMENT: 0-10

## 2023-12-05 NOTE — ED TRIAGE NOTES
Pt to ER with c/o redness in right eye with mild blurriness since Saturday morning. Pt denies any trauma or injury to head.

## 2023-12-05 NOTE — ED NOTES
Patient does not appear to be in any acute distress/shows no evidence of clinical instability at this time. Provider has reviewed discharge instructions with the patient/family. The patient/family verbalized understanding instructions as well as need for follow up for any further symptoms. Discharge papers given, education provided, and any questions answered.         Chas Acharya RN  12/05/23 9717

## 2023-12-05 NOTE — ED PROVIDER NOTES
[facility] & [facility] EMERGENCY DEPT  EMERGENCY DEPARTMENT ENCOUNTER      Pt Name: Aggie Brittle  MRN: [id number]  [address] 1950  Date of evaluation: 12/5/2023  Provider: Crow Stewart MD    [address]       Chief Complaint   Patient presents with    Eye Problem         HISTORY OF PRESENT ILLNESS   ([facility]/Symptom, Timing/Onset, Context/Setting, Quality, Duration, Modifying Factors, Severity)  Note limiting factors. 65-year-old female presents from home accompanied by her caregiver with complaint of redness to her right eye. States she woke up with it in the morning a couple days ago. She looked in the mirror and saw a redness around her right eye. 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 She states is not irritated or tearing. No pain. Does not take any blood thinning medications. The history is provided by the patient and a caregiver. Review of External Medical Records:     Nursing Notes were reviewed. REVIEW OF SYSTEMS    (2-9 systems for level 4, 10 or more for level 5)     Review of Systems   Constitutional:  Negative for fatigue. HENT:  Negative for sore throat. Eyes:  Negative for visual disturbance. Respiratory:  Negative for cough. Cardiovascular:  Negative for palpitations. Gastrointestinal:  Negative for vomiting. Genitourinary:  Negative for difficulty urinating. Musculoskeletal:  Negative for myalgias. Skin:  Negative for rash. Neurological:  Negative for weakness. Except as noted above the remainder of the review of systems was reviewed and negative.        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

## 2023-12-26 DIAGNOSIS — I48.20 CHRONIC ATRIAL FIBRILLATION, UNSPECIFIED (HCC): ICD-10-CM

## 2023-12-26 RX ORDER — APIXABAN 2.5 MG/1
TABLET, FILM COATED ORAL
Qty: 60 TABLET | Refills: 0 | OUTPATIENT
Start: 2023-12-26

## 2023-12-29 ENCOUNTER — APPOINTMENT (OUTPATIENT)
Facility: HOSPITAL | Age: 73
End: 2023-12-29
Payer: MEDICARE

## 2023-12-29 ENCOUNTER — HOSPITAL ENCOUNTER (EMERGENCY)
Facility: HOSPITAL | Age: 73
Discharge: HOME | End: 2023-12-29
Attending: EMERGENCY MEDICINE
Payer: MEDICARE

## 2023-12-29 VITALS
RESPIRATION RATE: 18 BRPM | OXYGEN SATURATION: 97 % | BODY MASS INDEX: 21.17 KG/M2 | HEIGHT: 59 IN | TEMPERATURE: 97.8 F | DIASTOLIC BLOOD PRESSURE: 83 MMHG | WEIGHT: 105 LBS | SYSTOLIC BLOOD PRESSURE: 147 MMHG | HEART RATE: 81 BPM

## 2023-12-29 DIAGNOSIS — J44.1 COPD EXACERBATION (HCC): Primary | ICD-10-CM

## 2023-12-29 PROCEDURE — 71046 X-RAY EXAM CHEST 2 VIEWS: CPT

## 2023-12-29 PROCEDURE — 99283 EMERGENCY DEPT VISIT LOW MDM: CPT

## 2023-12-29 PROCEDURE — 6370000000 HC RX 637 (ALT 250 FOR IP): Performed by: FAMILY MEDICINE

## 2023-12-29 PROCEDURE — 6370000000 HC RX 637 (ALT 250 FOR IP)

## 2023-12-29 RX ORDER — DOXYCYCLINE HYCLATE 100 MG
100 TABLET ORAL 2 TIMES DAILY
Qty: 14 TABLET | Refills: 0 | Status: SHIPPED | OUTPATIENT
Start: 2023-12-29 | End: 2023-12-29

## 2023-12-29 RX ORDER — IPRATROPIUM BROMIDE AND ALBUTEROL SULFATE 2.5; .5 MG/3ML; MG/3ML
SOLUTION RESPIRATORY (INHALATION)
Status: COMPLETED
Start: 2023-12-29 | End: 2023-12-29

## 2023-12-29 RX ORDER — PREDNISONE 50 MG/1
50 TABLET ORAL DAILY
Qty: 5 TABLET | Refills: 0 | Status: SHIPPED | OUTPATIENT
Start: 2023-12-29 | End: 2024-01-03

## 2023-12-29 RX ORDER — IPRATROPIUM BROMIDE AND ALBUTEROL SULFATE 2.5; .5 MG/3ML; MG/3ML
1 SOLUTION RESPIRATORY (INHALATION)
Status: COMPLETED | OUTPATIENT
Start: 2023-12-29 | End: 2023-12-29

## 2023-12-29 RX ORDER — PREDNISONE 50 MG/1
50 TABLET ORAL DAILY
Qty: 5 TABLET | Refills: 0 | Status: SHIPPED | OUTPATIENT
Start: 2023-12-29 | End: 2023-12-29

## 2023-12-29 RX ORDER — DOXYCYCLINE HYCLATE 100 MG
100 TABLET ORAL 2 TIMES DAILY
Qty: 14 TABLET | Refills: 0 | Status: SHIPPED | OUTPATIENT
Start: 2023-12-29 | End: 2024-01-05

## 2023-12-29 RX ADMIN — IPRATROPIUM BROMIDE AND ALBUTEROL SULFATE 1 DOSE: .5; 3 SOLUTION RESPIRATORY (INHALATION) at 14:18

## 2023-12-29 RX ADMIN — PREDNISONE 50 MG: 20 TABLET ORAL at 14:48

## 2023-12-29 RX ADMIN — IPRATROPIUM BROMIDE AND ALBUTEROL SULFATE 1 DOSE: .5; 3 SOLUTION RESPIRATORY (INHALATION) at 14:19

## 2023-12-29 RX ADMIN — IPRATROPIUM BROMIDE AND ALBUTEROL SULFATE 1 DOSE: .5; 3 SOLUTION RESPIRATORY (INHALATION) at 14:15

## 2023-12-29 NOTE — ED TRIAGE NOTES
Pt presents ambulatory into ed a&ox3, no acute distress, breaths even and unlabored c/o dry cough x 1-2 weeks. Denies any other symptoms with the cough.

## 2023-12-29 NOTE — DISCHARGE INSTRUCTIONS
Benzoyl Peroxide Pregnancy And Lactation Text: This medication is Pregnancy Category C. It is unknown if benzoyl peroxide is excreted in breast milk. Thank you for allowing us to provide you with medical care today. We realize that you have many choices for your emergency care needs. We thank you for choosing [facility]. Please choose us in the future for any continued health care needs. The exam and treatment you received in the emergency department were for an emergent problem and are not intended as complete care. It is important that you follow-up with a doctor. If your symptoms worsen or you do not improve you should return to the emergency department. We are available 24 hours a day. Please make an appointment with your health care provider for follow-up of your emergency department visit. Take this sheet with you when you go to your follow-up visit. Birth Control Pills Pregnancy And Lactation Text: This medication should be avoided if pregnant and for the first 30 days post-partum. Use Enhanced Medication Counseling?: No High Dose Vitamin A Counseling: Side effects reviewed, pt to contact office should one occur. Isotretinoin Pregnancy And Lactation Text: This medication is Pregnancy Category X and is considered extremely dangerous during pregnancy. It is unknown if it is excreted in breast milk. Topical Clindamycin Counseling: Patient counseled that this medication may cause skin irritation or allergic reactions.  In the event of skin irritation, the patient was advised to reduce the amount of the drug applied or use it less frequently.   The patient verbalized understanding of the proper use and possible adverse effects of clindamycin.  All of the patient's questions and concerns were addressed. Azithromycin Counseling:  I discussed with the patient the risks of azithromycin including but not limited to GI upset, allergic reaction, drug rash, diarrhea, and yeast infections. Birth Control Pills Counseling: Birth Control Pill Counseling: I discussed with the patient the potential side effects of OCPs including but not limited to increased risk of stroke, heart attack, thrombophlebitis, deep venous thrombosis, hepatic adenomas, breast changes, GI upset, headaches, and depression.  The patient verbalized understanding of the proper use and possible adverse effects of OCPs. All of the patient's questions and concerns were addressed. Spironolactone Counseling: Patient advised regarding risks of diarrhea, abdominal pain, hyperkalemia, birth defects (for female patients), liver toxicity and renal toxicity. The patient may need blood work to monitor liver and kidney function and potassium levels while on therapy. The patient verbalized understanding of the proper use and possible adverse effects of spironolactone.  All of the patient's questions and concerns were addressed. Topical Sulfur Applications Pregnancy And Lactation Text: This medication is Pregnancy Category C and has an unknown safety profile during pregnancy. It is unknown if this topical medication is excreted in breast milk. Detail Level: Zone Erythromycin Pregnancy And Lactation Text: This medication is Pregnancy Category B and is considered safe during pregnancy. It is also excreted in breast milk. High Dose Vitamin A Pregnancy And Lactation Text: High dose vitamin A therapy is contraindicated during pregnancy and breast feeding. Topical Retinoid counseling:  Patient advised to apply a pea-sized amount only at bedtime and wait 30 minutes after washing their face before applying.  If too drying, patient may add a non-comedogenic moisturizer. The patient verbalized understanding of the proper use and possible adverse effects of retinoids.  All of the patient's questions and concerns were addressed. Azithromycin Pregnancy And Lactation Text: This medication is considered safe during pregnancy and is also secreted in breast milk. Tetracycline Pregnancy And Lactation Text: This medication is Pregnancy Category D and not consider safe during pregnancy. It is also excreted in breast milk. Spironolactone Pregnancy And Lactation Text: This medication can cause feminization of the male fetus and should be avoided during pregnancy. The active metabolite is also found in breast milk. Doxycycline Counseling:  Patient counseled regarding possible photosensitivity and increased risk for sunburn.  Patient instructed to avoid sunlight, if possible.  When exposed to sunlight, patients should wear protective clothing, sunglasses, and sunscreen.  The patient was instructed to call the office immediately if the following severe adverse effects occur:  hearing changes, easy bruising/bleeding, severe headache, or vision changes.  The patient verbalized understanding of the proper use and possible adverse effects of doxycycline.  All of the patient's questions and concerns were addressed. Isotretinoin Counseling: Patient should get monthly blood tests, not donate blood, not drive at night if vision affected, not share medication, and not undergo elective surgery for 6 months after tx completed. Side effects reviewed, pt to contact office should one occur. Doxycycline Pregnancy And Lactation Text: This medication is Pregnancy Category D and not consider safe during pregnancy. It is also excreted in breast milk but is considered safe for shorter treatment courses. Bactrim Pregnancy And Lactation Text: This medication is Pregnancy Category D and is known to cause fetal risk.  It is also excreted in breast milk. Erythromycin Counseling:  I discussed with the patient the risks of erythromycin including but not limited to GI upset, allergic reaction, drug rash, diarrhea, increase in liver enzymes, and yeast infections. Minocycline Counseling: Patient advised regarding possible photosensitivity and discoloration of the teeth, skin, lips, tongue and gums.  Patient instructed to avoid sunlight, if possible.  When exposed to sunlight, patients should wear protective clothing, sunglasses, and sunscreen.  The patient was instructed to call the office immediately if the following severe adverse effects occur:  hearing changes, easy bruising/bleeding, severe headache, or vision changes.  The patient verbalized understanding of the proper use and possible adverse effects of minocycline.  All of the patient's questions and concerns were addressed. Dapsone Counseling: I discussed with the patient the risks of dapsone including but not limited to hemolytic anemia, agranulocytosis, rashes, methemoglobinemia, kidney failure, peripheral neuropathy, headaches, GI upset, and liver toxicity.  Patients who start dapsone require monitoring including baseline LFTs and weekly CBCs for the first month, then every month thereafter.  The patient verbalized understanding of the proper use and possible adverse effects of dapsone.  All of the patient's questions and concerns were addressed. Dapsone Pregnancy And Lactation Text: This medication is Pregnancy Category C and is not considered safe during pregnancy or breast feeding. Bactrim Counseling:  I discussed with the patient the risks of sulfa antibiotics including but not limited to GI upset, allergic reaction, drug rash, diarrhea, dizziness, photosensitivity, and yeast infections.  Rarely, more serious reactions can occur including but not limited to aplastic anemia, agranulocytosis, methemoglobinemia, blood dyscrasias, liver or kidney failure, lung infiltrates or desquamative/blistering drug rashes. Benzoyl Peroxide Counseling: Patient counseled that medicine may cause skin irritation and bleach clothing.  In the event of skin irritation, the patient was advised to reduce the amount of the drug applied or use it less frequently.   The patient verbalized understanding of the proper use and possible adverse effects of benzoyl peroxide.  All of the patient's questions and concerns were addressed. Topical Retinoid Pregnancy And Lactation Text: This medication is Pregnancy Category C. It is unknown if this medication is excreted in breast milk. Tetracycline Counseling: Patient counseled regarding possible photosensitivity and increased risk for sunburn.  Patient instructed to avoid sunlight, if possible.  When exposed to sunlight, patients should wear protective clothing, sunglasses, and sunscreen.  The patient was instructed to call the office immediately if the following severe adverse effects occur:  hearing changes, easy bruising/bleeding, severe headache, or vision changes.  The patient verbalized understanding of the proper use and possible adverse effects of tetracycline.  All of the patient's questions and concerns were addressed. Patient understands to avoid pregnancy while on therapy due to potential birth defects. Tazorac Counseling:  Patient advised that medication is irritating and drying.  Patient may need to apply sparingly and wash off after an hour before eventually leaving it on overnight.  The patient verbalized understanding of the proper use and possible adverse effects of tazorac.  All of the patient's questions and concerns were addressed. Topical Sulfur Applications Counseling: Topical Sulfur Counseling: Patient counseled that this medication may cause skin irritation or allergic reactions.  In the event of skin irritation, the patient was advised to reduce the amount of the drug applied or use it less frequently.   The patient verbalized understanding of the proper use and possible adverse effects of topical sulfur application.  All of the patient's questions and concerns were addressed. Topical Clindamycin Pregnancy And Lactation Text: This medication is Pregnancy Category B and is considered safe during pregnancy. It is unknown if it is excreted in breast milk. Tazorac Pregnancy And Lactation Text: This medication is not safe during pregnancy. It is unknown if this medication is excreted in breast milk.

## 2023-12-29 NOTE — ED PROVIDER NOTES
[facility] & [facility] EMERGENCY DEPT  EMERGENCY DEPARTMENT ENCOUNTER      Pt Name: Vilma Lezama  MRN: [id number]  [address] [facility] 1950  Date of evaluation: 12/29/2023  Provider: NAIN Phelps - NP    [address]       Chief Complaint   Patient presents with    Cough         HISTORY OF PRESENT ILLNESS   ([facility]/Symptom, Timing/Onset, Context/Setting, Quality, Duration, Modifying Factors, Severity)  Note limiting factors. Patient is a 76-year-old female with history of Alzheimer's disease, COPD, CVA, MI, CKD presenting to the emergency department for evaluation of cough. Patient reports that her symptoms have been going on for approximately 1 to 2 weeks. Reports that it started as regular upper respiratory viral symptoms then seem to settle in her chest.  Notes a history of COPD and bronchitis and this feels similar. Initially had fevers, but has not had fevers in the past week. Some shortness of breath associated. Has been taking home nebulizer without much symptom relief. No chest pain. The history is provided by the patient. Review of External Medical Records:     Nursing Notes were reviewed. REVIEW OF SYSTEMS    (2-9 systems for level 4, 10 or more for level 5)     Review of Systems   Constitutional:  Negative for unexpected weight change. HENT:  Negative for congestion. Eyes:  Negative for visual disturbance. Respiratory:  Positive for cough, shortness of breath and wheezing. Cardiovascular:  Negative for chest pain and palpitations. Gastrointestinal:  Negative for abdominal pain, nausea and vomiting. Endocrine: Negative for polyuria. Genitourinary:  Negative for dysuria and flank pain. Musculoskeletal:  Negative for back pain. Skin:  Negative for pallor. Allergic/Immunologic: Negative for immunocompromised state. Neurological:  Negative for dizziness and headaches. Hematological:  Negative for adenopathy. Psychiatric/Behavioral:  Negative for agitation.

## 2023-12-29 NOTE — ED NOTES
Patient refused labs and IV steroids. Stefano Pozo NP aware and spoke with patient.  Labs d/c and steroids changed to PO

## 2024-01-18 ENCOUNTER — OFFICE VISIT (OUTPATIENT)
Dept: PRIMARY CARE CLINIC | Facility: CLINIC | Age: 74
End: 2024-01-18
Payer: MEDICARE

## 2024-01-18 VITALS
WEIGHT: 104.8 LBS | TEMPERATURE: 97.5 F | DIASTOLIC BLOOD PRESSURE: 80 MMHG | BODY MASS INDEX: 21.13 KG/M2 | SYSTOLIC BLOOD PRESSURE: 137 MMHG | RESPIRATION RATE: 12 BRPM | HEART RATE: 68 BPM | HEIGHT: 59 IN | OXYGEN SATURATION: 98 %

## 2024-01-18 DIAGNOSIS — E03.9 ACQUIRED HYPOTHYROIDISM: ICD-10-CM

## 2024-01-18 DIAGNOSIS — I10 ESSENTIAL (PRIMARY) HYPERTENSION: Primary | ICD-10-CM

## 2024-01-18 DIAGNOSIS — R73.02 IMPAIRED GLUCOSE TOLERANCE (ORAL): ICD-10-CM

## 2024-01-18 DIAGNOSIS — F33.1 MAJOR DEPRESSIVE DISORDER, RECURRENT, MODERATE (HCC): ICD-10-CM

## 2024-01-18 DIAGNOSIS — F01.B3 VASCULAR DEMENTIA, MODERATE, WITH MOOD DISTURBANCE (HCC): ICD-10-CM

## 2024-01-18 DIAGNOSIS — Z71.89 ADVANCED CARE PLANNING/COUNSELING DISCUSSION: ICD-10-CM

## 2024-01-18 DIAGNOSIS — D68.69 SECONDARY HYPERCOAGULABLE STATE (HCC): ICD-10-CM

## 2024-01-18 DIAGNOSIS — N25.81 SECONDARY HYPERPARATHYROIDISM OF RENAL ORIGIN (HCC): ICD-10-CM

## 2024-01-18 DIAGNOSIS — I48.20 CHRONIC ATRIAL FIBRILLATION, UNSPECIFIED (HCC): ICD-10-CM

## 2024-01-18 DIAGNOSIS — Z95.0 PACEMAKER: ICD-10-CM

## 2024-01-18 DIAGNOSIS — N18.30 STAGE 3 CHRONIC KIDNEY DISEASE, UNSPECIFIED WHETHER STAGE 3A OR 3B CKD (HCC): ICD-10-CM

## 2024-01-18 DIAGNOSIS — R63.4 WEIGHT LOSS: ICD-10-CM

## 2024-01-18 DIAGNOSIS — I50.32 DIASTOLIC CHF, CHRONIC (HCC): ICD-10-CM

## 2024-01-18 PROCEDURE — G8400 PT W/DXA NO RESULTS DOC: HCPCS | Performed by: FAMILY MEDICINE

## 2024-01-18 PROCEDURE — 1036F TOBACCO NON-USER: CPT | Performed by: FAMILY MEDICINE

## 2024-01-18 PROCEDURE — 3017F COLORECTAL CA SCREEN DOC REV: CPT | Performed by: FAMILY MEDICINE

## 2024-01-18 PROCEDURE — 3079F DIAST BP 80-89 MM HG: CPT | Performed by: FAMILY MEDICINE

## 2024-01-18 PROCEDURE — G8420 CALC BMI NORM PARAMETERS: HCPCS | Performed by: FAMILY MEDICINE

## 2024-01-18 PROCEDURE — 1123F ACP DISCUSS/DSCN MKR DOCD: CPT | Performed by: FAMILY MEDICINE

## 2024-01-18 PROCEDURE — 99214 OFFICE O/P EST MOD 30 MIN: CPT | Performed by: FAMILY MEDICINE

## 2024-01-18 PROCEDURE — G8427 DOCREV CUR MEDS BY ELIG CLIN: HCPCS | Performed by: FAMILY MEDICINE

## 2024-01-18 PROCEDURE — G8484 FLU IMMUNIZE NO ADMIN: HCPCS | Performed by: FAMILY MEDICINE

## 2024-01-18 PROCEDURE — 3075F SYST BP GE 130 - 139MM HG: CPT | Performed by: FAMILY MEDICINE

## 2024-01-18 PROCEDURE — 1090F PRES/ABSN URINE INCON ASSESS: CPT | Performed by: FAMILY MEDICINE

## 2024-01-18 ASSESSMENT — PATIENT HEALTH QUESTIONNAIRE - PHQ9
2. FEELING DOWN, DEPRESSED OR HOPELESS: 0
SUM OF ALL RESPONSES TO PHQ QUESTIONS 1-9: 0
SUM OF ALL RESPONSES TO PHQ QUESTIONS 1-9: 0
1. LITTLE INTEREST OR PLEASURE IN DOING THINGS: 0
SUM OF ALL RESPONSES TO PHQ QUESTIONS 1-9: 0
SUM OF ALL RESPONSES TO PHQ QUESTIONS 1-9: 0
SUM OF ALL RESPONSES TO PHQ9 QUESTIONS 1 & 2: 0

## 2024-01-18 ASSESSMENT — LIFESTYLE VARIABLES: HOW MANY STANDARD DRINKS CONTAINING ALCOHOL DO YOU HAVE ON A TYPICAL DAY: PATIENT DOES NOT DRINK

## 2024-01-18 NOTE — PROGRESS NOTES
Subjective  Akua Chandra is an 73 y.o. female who presents for physical exam.     Pmhx : Dementia, Hx CVA, Permanent Afib, Moderate - severe MR, CKD3, HTN, IGT, HLD, Diastolic CHF, pacemaker in place, secondary hyperparathyroidism, hypothyroidism, Vit D def, Vit B12 def, migraines.      Patient is a poor historian.   Transitioning to assisted living facility. Requesting admission physical.    She has not followed up with specialists in some time.     Denies acute illness.  Denies recent falls.  Denies signs of bleeding.  States she has been taking her medications.   Recent group home was a negative experience, and she is no longer able to stay there.  She has lost about 30 lb in the past one year. Possibly not eating well.    She does not want to have a mammogram.     Accompanied today by a .       Allergies - reviewed:   No Known Allergies      Medications - reviewed:   Current Outpatient Medications   Medication Sig    ELIQUIS 2.5 MG TABS tablet TAKE 1 TABLET BY MOUTH TWICE DAILY IN THE MORNING AND THE EVENING **MUST MAKE APPT WITH MD FOR FURTHER REFILLS**    DILT- MG extended release capsule TAKE 1 CAPSULE BY MOUTH EVERY DAY    levothyroxine (SYNTHROID) 88 MCG tablet TAKE 1 TABLET BY MOUTH EVERY DAY    losartan (COZAAR) 25 MG tablet Take 1 tablet by mouth daily    metoprolol succinate (TOPROL XL) 50 MG extended release tablet Take 1 tablet by mouth daily    pantoprazole (PROTONIX) 40 MG tablet Take 1 tablet by mouth every morning    pravastatin (PRAVACHOL) 80 MG tablet Take 1 tablet by mouth nightly    traZODone (DESYREL) 50 MG tablet Take 1 tablet by mouth nightly    topiramate (TOPAMAX) 50 MG tablet Take 1 tablet by mouth nightly    albuterol sulfate HFA (PROVENTIL;VENTOLIN;PROAIR) 108 (90 Base) MCG/ACT inhaler Inhale 2 puffs into the lungs every 4 hours as needed    aspirin 81 MG EC tablet Take 1 tablet by mouth daily    sucralfate (CARAFATE) 1 GM tablet Take 1 tablet by mouth 4 times daily

## 2024-01-18 NOTE — PROGRESS NOTES
\"Have you been to the ER, urgent care clinic since your last visit?  Hospitalized since your last visit?\"    NO    “Have you seen or consulted any other health care providers outside of [facility] since your last visit?”    NO    “Have you had a colorectal cancer screening such as a colonoscopy/FIT/Cologuard?    NO     Have you had a mammogram?”   NO

## 2024-01-19 ENCOUNTER — CLINICAL DOCUMENTATION (OUTPATIENT)
Dept: CASE MANAGEMENT | Age: 74
End: 2024-01-19

## 2024-01-19 NOTE — ACP (ADVANCE CARE PLANNING)
Advance Care Planning   Ambulatory ACP Specialist Patient Outreach    Date:  1/19/2024    ACP Specialist:  Gwendolyn Joshua LCSW    Outreach call to patient in follow-up to ACP Specialist referral from:Ping Hess DO    [x] PCP  [] Provider   [] Ambulatory Care Management [] Other     For:                  [x] Advance Directive Assistance              [] Complete Portable DNR order              [] Complete POST/POLST/MOST              [x] Code Status Discussion             [x] Discuss Goals of Care             [] Early ACP Decision-Making              [] Other (Specify)    Date Referral Received:1/19/2024    Next Step:   [] ACP scheduled conversation  [] Outreach again in one week               [] Email / Mail ACP Info Sheets  [] Email / Mail Advance Directive   [] Closing referral.  Routing closure to referring provider/staff and to ACP Specialist .    [] Closure letter mailed to patient with invitation to contact ACP Specialist if / when ready.   [x] Other (Specify here):  Obtain further instructions from PCP about patient's ACP needs       [x] At this time, Healthcare Decision Maker Is:        [x] Primary agent named in scanned advance directive.    [] Legal Next of Kin.     [] Unable to determine legal decision maker at this time.    Outreaches:  [x] 1st -  Date:  1/19/2024               Intervention:  [] Spoke with Patient   [] Left Voice mail [] Email / Mail    [] Tipstarhart  [x] Other (Specify) :  Spoke with Ambulatory Case Management Social Worker, Dilma Flynn    Outcomes: ACP Specialist received ACP referral for patient and after reviewing medical record, it was determined that patient does not have capacity to participate in Advance Care Planning discussion or complete any ACP documents on her own behalf at this time.  Pt does have an active [place] Advance Directive for Health Care document dated for April 30, 2020 naming her daughter, Irina Brown, as her primary healthcare agent.  ACP

## 2024-01-23 ENCOUNTER — CLINICAL DOCUMENTATION (OUTPATIENT)
Dept: CASE MANAGEMENT | Age: 74
End: 2024-01-23

## 2024-01-23 NOTE — ACP (ADVANCE CARE PLANNING)
Specialist contacted Ambulatory Case Management Social Worker, Dilma Flynn, to inquire about patient's ACP needs since Ms. Flynn has had recent involvement with patient and family. Ms. Flynn was not aware of the ACP referral and did not have any additional information regarding the ACP needs of patient at this time.      Plan:  ACP Manager, ANDRE Garcia, will contact PCP to get a clearer understanding of patient's current ACP needs and the involvement needed by the Advance Care Planning team.      [x] 2nd -  Date:  1/23/2024               Intervention:  [] Spoke with Patient   [x] Left Voice mail [] Email / Mail    [] MyChart  [] Other (Specify) :      Outcomes: ACP Specialist made a telephone call to patient's daughter, Irina Brown, at the mobile number ([phone]) listed for daughter in patient's medical record.  Voicemail message was left for daughter requesting a return telephone call if there were any additional Advance Care Planning needs that daughter was seeking for patient at this time.  Currently there is an active [place] Advance Directive for Health Care document on patient's medical record, naming her daughter, Irina Brown,  as primary health care agent. Message to daughter indicated that if daughter needed any additional information or assistance with ACP needs for patient, she could contact this ACP Specialist.  ACP Specialist will make a final attempt to reach patient's daughter, Irina Brown, in a means to try to speak with her about any additional ACP needs prior to closing this referral.    Angela Joshua, MANUEL, CHERIEW-CP  Advance Care   [facility]  [phone]

## 2024-01-29 ENCOUNTER — CLINICAL DOCUMENTATION (OUTPATIENT)
Dept: CASE MANAGEMENT | Age: 74
End: 2024-01-29

## 2024-02-13 ENCOUNTER — TELEPHONE (OUTPATIENT)
Dept: PRIMARY CARE CLINIC | Facility: CLINIC | Age: 74
End: 2024-02-13

## 2024-02-13 ENCOUNTER — OFFICE VISIT (OUTPATIENT)
Dept: PRIMARY CARE CLINIC | Facility: CLINIC | Age: 74
End: 2024-02-13
Payer: MEDICARE

## 2024-02-13 VITALS
OXYGEN SATURATION: 99 % | WEIGHT: 105.8 LBS | RESPIRATION RATE: 12 BRPM | HEART RATE: 63 BPM | BODY MASS INDEX: 21.33 KG/M2 | SYSTOLIC BLOOD PRESSURE: 146 MMHG | DIASTOLIC BLOOD PRESSURE: 73 MMHG | HEIGHT: 59 IN | TEMPERATURE: 97.5 F

## 2024-02-13 DIAGNOSIS — E03.9 ACQUIRED HYPOTHYROIDISM: ICD-10-CM

## 2024-02-13 DIAGNOSIS — N18.30 STAGE 3 CHRONIC KIDNEY DISEASE, UNSPECIFIED WHETHER STAGE 3A OR 3B CKD (HCC): ICD-10-CM

## 2024-02-13 DIAGNOSIS — F01.B3 MODERATE VASCULAR DEMENTIA WITH MOOD DISTURBANCE (HCC): ICD-10-CM

## 2024-02-13 DIAGNOSIS — I48.20 CHRONIC ATRIAL FIBRILLATION, UNSPECIFIED (HCC): ICD-10-CM

## 2024-02-13 DIAGNOSIS — M54.50 ACUTE LEFT-SIDED LOW BACK PAIN WITHOUT SCIATICA: Primary | ICD-10-CM

## 2024-02-13 DIAGNOSIS — G43.809 OTHER MIGRAINE WITHOUT STATUS MIGRAINOSUS, NOT INTRACTABLE: ICD-10-CM

## 2024-02-13 PROCEDURE — 1123F ACP DISCUSS/DSCN MKR DOCD: CPT | Performed by: FAMILY MEDICINE

## 2024-02-13 PROCEDURE — 99214 OFFICE O/P EST MOD 30 MIN: CPT | Performed by: FAMILY MEDICINE

## 2024-02-13 PROCEDURE — G8484 FLU IMMUNIZE NO ADMIN: HCPCS | Performed by: FAMILY MEDICINE

## 2024-02-13 PROCEDURE — 1090F PRES/ABSN URINE INCON ASSESS: CPT | Performed by: FAMILY MEDICINE

## 2024-02-13 PROCEDURE — G8400 PT W/DXA NO RESULTS DOC: HCPCS | Performed by: FAMILY MEDICINE

## 2024-02-13 PROCEDURE — 3077F SYST BP >= 140 MM HG: CPT | Performed by: FAMILY MEDICINE

## 2024-02-13 PROCEDURE — 3078F DIAST BP <80 MM HG: CPT | Performed by: FAMILY MEDICINE

## 2024-02-13 PROCEDURE — 1036F TOBACCO NON-USER: CPT | Performed by: FAMILY MEDICINE

## 2024-02-13 PROCEDURE — 3017F COLORECTAL CA SCREEN DOC REV: CPT | Performed by: FAMILY MEDICINE

## 2024-02-13 PROCEDURE — G8420 CALC BMI NORM PARAMETERS: HCPCS | Performed by: FAMILY MEDICINE

## 2024-02-13 PROCEDURE — G8427 DOCREV CUR MEDS BY ELIG CLIN: HCPCS | Performed by: FAMILY MEDICINE

## 2024-02-13 RX ORDER — TOPIRAMATE 50 MG/1
50 TABLET, FILM COATED ORAL NIGHTLY
Qty: 90 TABLET | Refills: 1 | Status: SHIPPED | OUTPATIENT
Start: 2024-02-13

## 2024-02-13 RX ORDER — LIDOCAINE 50 MG/G
1 PATCH TOPICAL DAILY
Qty: 30 PATCH | Refills: 0 | Status: SHIPPED | OUTPATIENT
Start: 2024-02-13 | End: 2024-03-14

## 2024-02-13 NOTE — PROGRESS NOTES
Subjective  Akua Chandra is an 73 y.o. female who presents for back pain.     Pmhx : Dementia, Hx CVA, Permanent Afib, Moderate - severe MR, CKD3, HTN, IGT, HLD, Diastolic CHF, pacemaker in place, secondary hyperparathyroidism, hypothyroidism, Vit D def, Vit B12 def, migraines.      Patient is a poor historian.   In assisted living facility.    C/o left low back pain. Onset a few days ago. Pain improves after a period of moving/walking.   No injury.   No fever. No new urinary complaints.    Uri last month. She reports these symptoms have resolved.       Allergies - reviewed:   No Known Allergies      Medications - reviewed:   Current Outpatient Medications   Medication Sig    ELIQUIS 2.5 MG TABS tablet TAKE 1 TABLET BY MOUTH TWICE DAILY IN THE MORNING AND THE EVENING **MUST MAKE APPT WITH MD FOR FURTHER REFILLS**    DILT- MG extended release capsule TAKE 1 CAPSULE BY MOUTH EVERY DAY    levothyroxine (SYNTHROID) 88 MCG tablet TAKE 1 TABLET BY MOUTH EVERY DAY    losartan (COZAAR) 25 MG tablet Take 1 tablet by mouth daily    metoprolol succinate (TOPROL XL) 50 MG extended release tablet Take 1 tablet by mouth daily    pantoprazole (PROTONIX) 40 MG tablet Take 1 tablet by mouth every morning    pravastatin (PRAVACHOL) 80 MG tablet Take 1 tablet by mouth nightly    traZODone (DESYREL) 50 MG tablet Take 1 tablet by mouth nightly    topiramate (TOPAMAX) 50 MG tablet Take 1 tablet by mouth nightly    sucralfate (CARAFATE) 1 GM tablet Take 1 tablet by mouth 4 times daily for 10 days    albuterol sulfate HFA (PROVENTIL;VENTOLIN;PROAIR) 108 (90 Base) MCG/ACT inhaler Inhale 2 puffs into the lungs every 4 hours as needed    aspirin 81 MG EC tablet Take 1 tablet by mouth daily     No current facility-administered medications for this visit.         Past Medical History - reviewed:  Past Medical History:   Diagnosis Date    AD (Alzheimer's disease) (HCC)     Cancer (HCC)     ovaian stomach    COPD (chronic obstructive

## 2024-02-13 NOTE — TELEPHONE ENCOUNTER
Patent was seen in office today by provider. Appointment time was 12p patient was escorted down to the lab after the visit.   Approx 2 hours later one of staff went on break and saw patient still sitting in lobby. She reached out to the assisted living where the patient resides and was told that the patient was provided a number to all after her appointment.   The staff came to writer and in reached to the assisted living asking to speak with a nurse supervisor. The person Adela that answered the phone stated that they do not have one only med techs. I explained that the patient was sent alone to an appointment and she repeated to me the same thing regarding the note that was given to the patient and that she was transported via medicare transportation.  I asked if she realized the patient has dementia and she said yes however again she was given a number to call.  I also explained that the patient has been sitting downstairs for almost 2 hours ad that I had sent the nurse down to escort the patient back to our [facility] area to wait.   I asked if their facility provided staff to come with her to the appointments and she said they could do that going forward  The nurse came back and stated the patient was gone. We checked around the building and could not find her.  I called the facility back and they stated that she was there.    Patient with one or more new problems requiring additional work-up/treatment.

## 2024-02-13 NOTE — PROGRESS NOTES
\"Have you been to the ER, urgent care clinic since your last visit?  Hospitalized since your last visit?\"    NO    “Have you seen or consulted any other health care providers outside of [facility] since your last visit?”    NO    “Have you had a colorectal cancer screening such as a colonoscopy/FIT/Cologuard?    NO     Have you had a mammogram?”   NO

## 2024-02-14 LAB
ALBUMIN SERPL-MCNC: 4 G/DL (ref 3.5–5)
ALBUMIN/GLOB SERPL: 1.1 (ref 1.1–2.2)
ALP SERPL-CCNC: 144 U/L (ref 45–117)
ALT SERPL-CCNC: 13 U/L (ref 12–78)
ANION GAP SERPL CALC-SCNC: 6 MMOL/L (ref 5–15)
AST SERPL-CCNC: 12 U/L (ref 15–37)
BILIRUB SERPL-MCNC: 0.7 MG/DL (ref 0.2–1)
BUN SERPL-MCNC: 14 MG/DL (ref 6–20)
BUN/CREAT SERPL: 8 (ref 12–20)
CALCIUM SERPL-MCNC: 10 MG/DL (ref 8.5–10.1)
CHLORIDE SERPL-SCNC: 112 MMOL/L (ref 97–108)
CO2 SERPL-SCNC: 21 MMOL/L (ref 21–32)
CREAT SERPL-MCNC: 1.86 MG/DL (ref 0.55–1.02)
CREAT UR-MCNC: 214 MG/DL
ERYTHROCYTE [DISTWIDTH] IN BLOOD BY AUTOMATED COUNT: 15 % (ref 11.5–14.5)
GLOBULIN SER CALC-MCNC: 3.6 G/DL (ref 2–4)
GLUCOSE SERPL-MCNC: 130 MG/DL (ref 65–100)
HCT VFR BLD AUTO: 38.4 % (ref 35–47)
HGB BLD-MCNC: 12.5 G/DL (ref 11.5–16)
MCH RBC QN AUTO: 29.8 PG (ref 26–34)
MCHC RBC AUTO-ENTMCNC: 32.6 G/DL (ref 30–36.5)
MCV RBC AUTO: 91.6 FL (ref 80–99)
MICROALBUMIN UR-MCNC: 33.3 MG/DL
MICROALBUMIN/CREAT UR-RTO: 156 MG/G (ref 0–30)
NRBC # BLD: 0 K/UL (ref 0–0.01)
NRBC BLD-RTO: 0 PER 100 WBC
PLATELET # BLD AUTO: 273 K/UL (ref 150–400)
PMV BLD AUTO: 10.2 FL (ref 8.9–12.9)
POTASSIUM SERPL-SCNC: 5.1 MMOL/L (ref 3.5–5.1)
PROT SERPL-MCNC: 7.6 G/DL (ref 6.4–8.2)
RBC # BLD AUTO: 4.19 M/UL (ref 3.8–5.2)
SODIUM SERPL-SCNC: 139 MMOL/L (ref 136–145)
SPECIMEN HOLD: NORMAL
TSH SERPL DL<=0.05 MIU/L-ACNC: 0.02 UIU/ML (ref 0.36–3.74)
WBC # BLD AUTO: 7.7 K/UL (ref 3.6–11)

## 2024-02-19 DIAGNOSIS — E03.9 HYPOTHYROIDISM, UNSPECIFIED: ICD-10-CM

## 2024-02-19 DIAGNOSIS — R74.8 ELEVATED ALKALINE PHOSPHATASE LEVEL: ICD-10-CM

## 2024-02-19 DIAGNOSIS — N18.30 STAGE 3 CHRONIC KIDNEY DISEASE, UNSPECIFIED WHETHER STAGE 3A OR 3B CKD (HCC): Primary | ICD-10-CM

## 2024-02-19 RX ORDER — LEVOTHYROXINE SODIUM 0.07 MG/1
75 TABLET ORAL DAILY
Qty: 60 TABLET | Refills: 0 | Status: SHIPPED | OUTPATIENT
Start: 2024-02-19

## 2024-02-20 ENCOUNTER — TELEPHONE (OUTPATIENT)
Dept: PRIMARY CARE CLINIC | Facility: CLINIC | Age: 74
End: 2024-02-20

## 2024-02-20 NOTE — TELEPHONE ENCOUNTER
Spoke with medication aide at pt assistant living Adaline who was made aware of change of synthroid dose. Lab results as well as referrals will be faxed to assisted living.

## 2024-02-20 NOTE — TELEPHONE ENCOUNTER
----- Message from Ping Hess DO sent at 2/19/2024  3:56 PM EST -----  Hi, can you please call to inform pt and caregiver her labs indicate decreased kidney function.   I have entered a referral to nephro and I'd recommend they follow up with nephrology in the next 3 months.    Labs indicate thyroid hormone dose is too high. I've sent a prescription to decrease the levothyroxine dose to 75mcg daily.     Alkaline phosphatase level has been mildly elevated. This is chronic. Please recommended follow up labs in the next 1 month. Avoid tylenol, advil, aleve for now.    Thank you!

## 2024-04-12 NOTE — TELEPHONE ENCOUNTER
PCP: Ping Hess DO    Last Visit 2/13/2024   No future appointments.    Requested Prescriptions      No prescriptions requested or ordered in this encounter     Received request from [facility] for Lidocaine Pad 5%      Other Comments: Last Refill

## 2024-04-16 RX ORDER — LIDOCAINE 50 MG/G
1 PATCH TOPICAL DAILY
Qty: 30 PATCH | Refills: 0 | Status: SHIPPED | OUTPATIENT
Start: 2024-04-16 | End: 2024-05-16

## 2024-05-27 NOTE — Clinical Note
Safe sheath inserted over the wire. Age: 60y    Gender: Female    POCT Blood Glucose:  302 mg/dL (05-27-24 @ 12:12)  228 mg/dL (05-27-24 @ 10:03)  249 mg/dL (05-27-24 @ 08:06)  221 mg/dL (05-27-24 @ 02:10)  278 mg/dL (05-26-24 @ 21:23)  309 mg/dL (05-26-24 @ 16:49)      eMAR:  insulin glargine Injectable (LANTUS)   14 Unit(s) SubCutaneous (05-26-24 @ 21:26)    insulin lispro (ADMELOG) corrective regimen sliding scale   2 Unit(s) SubCutaneous (05-26-24 @ 21:25)    insulin lispro (ADMELOG) corrective regimen sliding scale   8 Unit(s) SubCutaneous (05-27-24 @ 12:16)   4 Unit(s) SubCutaneous (05-27-24 @ 08:24)   8 Unit(s) SubCutaneous (05-26-24 @ 17:41)    insulin lispro Injectable (ADMELOG).   5 Unit(s) SubCutaneous (05-27-24 @ 13:05)    methylPREDNISolone sodium succinate Injectable   40 milliGRAM(s) IV Push (05-27-24 @ 05:28)     POC glucose, insulin requirements, lab values reviewed.   BG continue to be hyperglycemic 200s-300s. patient continues to eat at variable times.   Will increase Lantus to 16u QHS  Will change mealtime order of 10units to say "May give 5 units if patient is eating <50% of meals".  Will increase moderate dose correctional scale by 1 unit per each increment to try to keep BG at goal of 140-180mg/dl.     Spoke with CAROLINE Fournier.  Contact via [employer] Teams during business hours  To reach covering provider access AMION via sunrise tools  For Urgent matters/after-hours/weekends/holidays please page endocrine fellow on call   For nonurgent matters please email [email or web address]    Please note that this patient may be followed by different provider tomorrow.  Notify endocrine 24 hours prior to discharge for final recommendations

## 2024-06-05 NOTE — TELEPHONE ENCOUNTER
PCP: Ping Hess DO    Last Visit 2/13/2024   Future Appointments   Date Time Provider Department Center   7/23/2024  1:15 PM Ping Hess DO [facility]       Requested Prescriptions     Pending Prescriptions Disp Refills    sucralfate (CARAFATE) 1 GM tablet 40 tablet 0     Sig: Take 1 tablet by mouth 4 times daily for 10 days         Other Comments: Last Refill   08/08/23

## 2024-06-05 NOTE — TELEPHONE ENCOUNTER
Adela Benito from [facility] said the patient said she needs acid reflux medicine refilled and [place] to   [place] [facility] - P [phone] - f [phone] [[id number]]

## 2024-06-06 RX ORDER — SUCRALFATE 1 G/1
1 TABLET ORAL 4 TIMES DAILY
Qty: 40 TABLET | Refills: 0 | OUTPATIENT
Start: 2024-06-06 | End: 2024-06-16

## 2024-07-23 ENCOUNTER — OFFICE VISIT (OUTPATIENT)
Dept: PRIMARY CARE CLINIC | Facility: CLINIC | Age: 74
End: 2024-07-23
Payer: MEDICARE

## 2024-07-23 VITALS
TEMPERATURE: 97.6 F | WEIGHT: 104 LBS | HEART RATE: 66 BPM | DIASTOLIC BLOOD PRESSURE: 71 MMHG | HEIGHT: 59 IN | OXYGEN SATURATION: 98 % | RESPIRATION RATE: 16 BRPM | BODY MASS INDEX: 20.96 KG/M2 | SYSTOLIC BLOOD PRESSURE: 175 MMHG

## 2024-07-23 DIAGNOSIS — R74.8 ELEVATED ALKALINE PHOSPHATASE LEVEL: ICD-10-CM

## 2024-07-23 DIAGNOSIS — F01.B3 MODERATE VASCULAR DEMENTIA WITH MOOD DISTURBANCE (HCC): ICD-10-CM

## 2024-07-23 DIAGNOSIS — K21.9 GASTROESOPHAGEAL REFLUX DISEASE WITHOUT ESOPHAGITIS: ICD-10-CM

## 2024-07-23 DIAGNOSIS — E03.9 HYPOTHYROIDISM, UNSPECIFIED: ICD-10-CM

## 2024-07-23 DIAGNOSIS — E03.9 ACQUIRED HYPOTHYROIDISM: ICD-10-CM

## 2024-07-23 DIAGNOSIS — N18.30 STAGE 3 CHRONIC KIDNEY DISEASE, UNSPECIFIED WHETHER STAGE 3A OR 3B CKD (HCC): ICD-10-CM

## 2024-07-23 DIAGNOSIS — N18.30 STAGE 3 CHRONIC KIDNEY DISEASE, UNSPECIFIED WHETHER STAGE 3A OR 3B CKD (HCC): Primary | ICD-10-CM

## 2024-07-23 PROBLEM — N18.4 STAGE 4 CHRONIC KIDNEY DISEASE (HCC): Status: RESOLVED | Noted: 2022-05-16 | Resolved: 2024-07-23

## 2024-07-23 PROBLEM — N18.4 CKD (CHRONIC KIDNEY DISEASE) STAGE 4, GFR 15-29 ML/MIN (HCC): Status: RESOLVED | Noted: 2022-05-16 | Resolved: 2024-07-23

## 2024-07-23 PROCEDURE — G8400 PT W/DXA NO RESULTS DOC: HCPCS | Performed by: FAMILY MEDICINE

## 2024-07-23 PROCEDURE — 3078F DIAST BP <80 MM HG: CPT | Performed by: FAMILY MEDICINE

## 2024-07-23 PROCEDURE — 1123F ACP DISCUSS/DSCN MKR DOCD: CPT | Performed by: FAMILY MEDICINE

## 2024-07-23 PROCEDURE — G8427 DOCREV CUR MEDS BY ELIG CLIN: HCPCS | Performed by: FAMILY MEDICINE

## 2024-07-23 PROCEDURE — 3017F COLORECTAL CA SCREEN DOC REV: CPT | Performed by: FAMILY MEDICINE

## 2024-07-23 PROCEDURE — 1090F PRES/ABSN URINE INCON ASSESS: CPT | Performed by: FAMILY MEDICINE

## 2024-07-23 PROCEDURE — G8420 CALC BMI NORM PARAMETERS: HCPCS | Performed by: FAMILY MEDICINE

## 2024-07-23 PROCEDURE — 99214 OFFICE O/P EST MOD 30 MIN: CPT | Performed by: FAMILY MEDICINE

## 2024-07-23 PROCEDURE — 3077F SYST BP >= 140 MM HG: CPT | Performed by: FAMILY MEDICINE

## 2024-07-23 PROCEDURE — 1036F TOBACCO NON-USER: CPT | Performed by: FAMILY MEDICINE

## 2024-07-23 RX ORDER — OMEPRAZOLE 20 MG/1
20 CAPSULE, DELAYED RELEASE ORAL
Qty: 30 CAPSULE | Refills: 5 | Status: SHIPPED | OUTPATIENT
Start: 2024-07-23

## 2024-07-23 RX ORDER — TRAMADOL HYDROCHLORIDE 50 MG/1
50 TABLET ORAL AS NEEDED
COMMUNITY

## 2024-07-23 NOTE — PROGRESS NOTES
Subjective  Akua Chandra is an 74 y.o. female who presents for heart burn.   Pmhx : Dementia, Hx CVA, Permanent Afib, Moderate - severe MR, CKD3, HTN, IGT, HLD, Diastolic CHF, pacemaker in place, secondary hyperparathyroidism, hypothyroidism, Vit D def, Vit B12 def, migraines.      Patient is a poor historian. Hx obtained from the pt and her caregiver here today from the group home.   In assisted living facility.    C/o heart burn symptoms. She is not taking anything for heart burn.  Bothers her after eating.   No pain today.  Denies dysphagia.    She walks daily for exercise.   Denies chest pain or dyspnea with walking.   Denies any other acute complaints.     Allergies - reviewed:   No Known Allergies      Medications - reviewed:   Current Outpatient Medications   Medication Sig    traMADol (ULTRAM) 50 MG tablet Take 1 tablet by mouth as needed for Pain. As needed bid    LIDOCAINE, LIDODERM, 5% PATCH AND REMOVAL     omeprazole (PRILOSEC) 20 MG delayed release capsule Take 1 capsule by mouth every morning (before breakfast)    levothyroxine (SYNTHROID) 75 MCG tablet Take 1 tablet by mouth daily    topiramate (TOPAMAX) 50 MG tablet Take 1 tablet by mouth nightly    albuterol-ipratropium (COMBIVENT RESPIMAT)  MCG/ACT AERS inhaler Inhale 1 puff into the lungs every 6 hours as needed for Wheezing    ELIQUIS 2.5 MG TABS tablet TAKE 1 TABLET BY MOUTH TWICE DAILY IN THE MORNING AND THE EVENING **MUST MAKE APPT WITH MD FOR FURTHER REFILLS**    DILT- MG extended release capsule TAKE 1 CAPSULE BY MOUTH EVERY DAY    losartan (COZAAR) 25 MG tablet Take 1 tablet by mouth daily    metoprolol succinate (TOPROL XL) 50 MG extended release tablet Take 1 tablet by mouth daily    pravastatin (PRAVACHOL) 80 MG tablet Take 1 tablet by mouth nightly    traZODone (DESYREL) 50 MG tablet Take 1 tablet by mouth nightly    albuterol sulfate HFA (PROVENTIL;VENTOLIN;PROAIR) 108 (90 Base) MCG/ACT inhaler Inhale 2 puffs into the lungs

## 2024-07-23 NOTE — PROGRESS NOTES
\"Have you been to the ER, urgent care clinic since your last visit?  Hospitalized since your last visit?\"    NO    “Have you seen or consulted any other health care providers outside of [facility] since your last visit?”    NO        “Have you had a colorectal cancer screening such as a colonoscopy/FIT/Cologuard?    NO    No colonoscopy on file  No cologuard on file  No FIT/FOBT on file   No flexible sigmoidoscopy on file         Click Here for Release of Records Request

## 2024-07-24 LAB
ALBUMIN SERPL-MCNC: 4.2 G/DL (ref 3.5–5)
ALBUMIN/GLOB SERPL: 1.2 (ref 1.1–2.2)
ALP SERPL-CCNC: 125 U/L (ref 45–117)
ALT SERPL-CCNC: 18 U/L (ref 12–78)
ANION GAP SERPL CALC-SCNC: 7 MMOL/L (ref 5–15)
ANION GAP SERPL CALC-SCNC: 8 MMOL/L (ref 5–15)
AST SERPL-CCNC: 12 U/L (ref 15–37)
BILIRUB SERPL-MCNC: 0.6 MG/DL (ref 0.2–1)
BUN SERPL-MCNC: 17 MG/DL (ref 6–20)
BUN SERPL-MCNC: 18 MG/DL (ref 6–20)
BUN/CREAT SERPL: 9 (ref 12–20)
BUN/CREAT SERPL: 9 (ref 12–20)
CALCIUM SERPL-MCNC: 10 MG/DL (ref 8.5–10.1)
CALCIUM SERPL-MCNC: 9.6 MG/DL (ref 8.5–10.1)
CHLORIDE SERPL-SCNC: 112 MMOL/L (ref 97–108)
CHLORIDE SERPL-SCNC: 112 MMOL/L (ref 97–108)
CO2 SERPL-SCNC: 20 MMOL/L (ref 21–32)
CO2 SERPL-SCNC: 20 MMOL/L (ref 21–32)
CREAT SERPL-MCNC: 1.89 MG/DL (ref 0.55–1.02)
CREAT SERPL-MCNC: 2.03 MG/DL (ref 0.55–1.02)
GGT SERPL-CCNC: 67 U/L (ref 5–55)
GLOBULIN SER CALC-MCNC: 3.4 G/DL (ref 2–4)
GLUCOSE SERPL-MCNC: 118 MG/DL (ref 65–100)
GLUCOSE SERPL-MCNC: 122 MG/DL (ref 65–100)
POTASSIUM SERPL-SCNC: 4.8 MMOL/L (ref 3.5–5.1)
POTASSIUM SERPL-SCNC: 5 MMOL/L (ref 3.5–5.1)
PROT SERPL-MCNC: 7.6 G/DL (ref 6.4–8.2)
SODIUM SERPL-SCNC: 139 MMOL/L (ref 136–145)
SODIUM SERPL-SCNC: 140 MMOL/L (ref 136–145)
TSH SERPL DL<=0.05 MIU/L-ACNC: 0.01 UIU/ML (ref 0.36–3.74)

## 2024-07-29 RX ORDER — LEVOTHYROXINE SODIUM 0.05 MG/1
50 TABLET ORAL DAILY
Qty: 60 TABLET | Refills: 0 | Status: SHIPPED | OUTPATIENT
Start: 2024-07-29

## 2024-12-17 NOTE — PROGRESS NOTES
catheterization;  Surgeon: Dai Rowan DO;  [facility];  Service: Cardiology    CARDIAC CATHETERIZATION N/A 12/27/2022    Procedure: Cardiac Coronary Angiogram;  Surgeon: Dai Rowan DO;  [facility];  Service: Cardiology    CARDIAC ELECTROPHYSIOLOGY PROCEDURE N/A 4/14/2023    Procedure: Cardiac pacer implant;  Surgeon: Bright Lynch MD;  [facility];  Social Work Note  1/13/2023    Received message from patient requesting assistance with scheduling appointment with new PCP. SW to return call.      JET Arnold, ACSW, [facility]    Ambulatory Care Management   [phone] (

## 2025-01-12 NOTE — TELEPHONE ENCOUNTER
----- Message from Morena Patel sent at 5/29/2020  3:17 PM EDT -----  Regarding: Dr. Neftali Hay first and last name:  Jaime Moon, Physical Therapist, [address] [place] [address]      Reason for call:   Possible drug interaction      Callback required yes/no and why: Yes, possible drug interaction Aspirin taken with \"Eliquis 5mg \" (take twice a day). Ms Timothy Dang is currently with the pt.       Best contact number(s):  [phone]      Details to clarify the request:      Morena Patel
I talked to Eze Gutiérrez, Physical Therapist, [facility] she stated that patient has a level 2 interaction between Eliquis and aspirin since both are considered blood thinners. Would like to know if Dr. Vee Manuel would like to make any changes. Also requested an order for a  to come to the home for additional assistance. Patient has had her gas cut off and there are some financial concerns. Patient does live alone. Please advise.     Erendira Ann [phone]
I talked to Katernia Charles and notified her of Dr. Rajani Anton note and recommendations. She verbalized her understanding and had no questions at this time.
The combined aspirin and eliquis regimen was prescribed directly by the cardiologist.  So, unless she develops a bleeding complication, she should continue both for now.     Agree with ROSA and I spoke to [facility] Garrido clinical supervisor today and made the same request.
[id number]

## 2025-02-04 ENCOUNTER — OFFICE VISIT (OUTPATIENT)
Facility: CLINIC | Age: 75
End: 2025-02-04
Payer: MEDICARE

## 2025-02-04 VITALS
BODY MASS INDEX: 22.58 KG/M2 | SYSTOLIC BLOOD PRESSURE: 155 MMHG | DIASTOLIC BLOOD PRESSURE: 79 MMHG | WEIGHT: 112 LBS | HEIGHT: 59 IN | HEART RATE: 61 BPM | RESPIRATION RATE: 16 BRPM

## 2025-02-04 DIAGNOSIS — E03.9 HYPOTHYROIDISM, UNSPECIFIED TYPE: ICD-10-CM

## 2025-02-04 DIAGNOSIS — N18.30 STAGE 3 CHRONIC KIDNEY DISEASE, UNSPECIFIED WHETHER STAGE 3A OR 3B CKD (HCC): ICD-10-CM

## 2025-02-04 DIAGNOSIS — J45.20 MILD INTERMITTENT ASTHMA WITHOUT COMPLICATION: ICD-10-CM

## 2025-02-04 DIAGNOSIS — I10 PRIMARY HYPERTENSION: ICD-10-CM

## 2025-02-04 DIAGNOSIS — R73.09 ELEVATED GLUCOSE LEVEL: ICD-10-CM

## 2025-02-04 DIAGNOSIS — I10 PRIMARY HYPERTENSION: Primary | ICD-10-CM

## 2025-02-04 DIAGNOSIS — E78.2 MIXED HYPERLIPIDEMIA: ICD-10-CM

## 2025-02-04 PROBLEM — I69.90 LATE EFFECTS OF CEREBROVASCULAR DISEASE: Status: ACTIVE | Noted: 2024-09-06

## 2025-02-04 PROBLEM — C53.9 CERVICAL CANCER (HCC): Status: ACTIVE | Noted: 2024-09-06

## 2025-02-04 PROBLEM — E55.9 VITAMIN D DEFICIENCY: Status: ACTIVE | Noted: 2024-09-06

## 2025-02-04 PROBLEM — I69.359 HEMIPARESIS AS LATE EFFECT OF CEREBROVASCULAR ACCIDENT (CVA) (HCC): Status: ACTIVE | Noted: 2024-09-06

## 2025-02-04 PROBLEM — I69.359 CVA, OLD, HEMIPARESIS (HCC): Status: ACTIVE | Noted: 2025-02-04

## 2025-02-04 PROBLEM — R41.3 MEMORY IMPAIRMENT: Status: ACTIVE | Noted: 2024-09-06

## 2025-02-04 PROBLEM — F32.A DEPRESSIVE DISORDER: Status: ACTIVE | Noted: 2023-02-21

## 2025-02-04 PROBLEM — N18.4 STAGE 4 CHRONIC KIDNEY DISEASE (HCC): Status: ACTIVE | Noted: 2024-09-06

## 2025-02-04 PROBLEM — M81.0 OSTEOPOROSIS: Status: ACTIVE | Noted: 2024-09-06

## 2025-02-04 PROBLEM — N18.9 CKD (CHRONIC KIDNEY DISEASE): Status: RESOLVED | Noted: 2017-07-11 | Resolved: 2025-02-04

## 2025-02-04 PROBLEM — I63.9 CEREBROVASCULAR ACCIDENT (HCC): Status: ACTIVE | Noted: 2017-03-15

## 2025-02-04 PROBLEM — M50.20 DISPLACEMENT OF CERVICAL INTERVERTEBRAL DISC WITHOUT MYELOPATHY: Status: ACTIVE | Noted: 2025-02-04

## 2025-02-04 PROCEDURE — 3077F SYST BP >= 140 MM HG: CPT

## 2025-02-04 PROCEDURE — 1123F ACP DISCUSS/DSCN MKR DOCD: CPT

## 2025-02-04 PROCEDURE — 99204 OFFICE O/P NEW MOD 45 MIN: CPT

## 2025-02-04 PROCEDURE — 3078F DIAST BP <80 MM HG: CPT

## 2025-02-04 PROCEDURE — 1159F MED LIST DOCD IN RCRD: CPT

## 2025-02-04 RX ORDER — OLMESARTAN MEDOXOMIL 20 MG/1
20 TABLET ORAL DAILY
Qty: 30 TABLET | Refills: 0 | Status: SHIPPED | OUTPATIENT
Start: 2025-02-04

## 2025-02-04 SDOH — ECONOMIC STABILITY: FOOD INSECURITY: WITHIN THE PAST 12 MONTHS, YOU WORRIED THAT YOUR FOOD WOULD RUN OUT BEFORE YOU GOT MONEY TO BUY MORE.: NEVER TRUE

## 2025-02-04 SDOH — ECONOMIC STABILITY: FOOD INSECURITY: WITHIN THE PAST 12 MONTHS, THE FOOD YOU BOUGHT JUST DIDN'T LAST AND YOU DIDN'T HAVE MONEY TO GET MORE.: NEVER TRUE

## 2025-02-04 ASSESSMENT — PATIENT HEALTH QUESTIONNAIRE - PHQ9
1. LITTLE INTEREST OR PLEASURE IN DOING THINGS: NOT AT ALL
SUM OF ALL RESPONSES TO PHQ9 QUESTIONS 1 & 2: 0
SUM OF ALL RESPONSES TO PHQ QUESTIONS 1-9: 0
2. FEELING DOWN, DEPRESSED OR HOPELESS: NOT AT ALL

## 2025-02-04 NOTE — PATIENT INSTRUCTIONS
Please check your blood pressure daily (at least one hour after your morning blood pressure medications.)  Keep a written record of your blood pressure readings and bring it to each appointment with your PCP.  If your systolic blood pressure is consistently greater than 150 mmHg and/or diastolic more than 90 mmHg then please message via [employer] or call at the office.     Please continue low-salt diet and exercise regularly.    Please complete lab work as ordered and discussed today and follow up in 4 weeks to 3 months, or as needed.

## 2025-02-10 PROBLEM — I69.359 HEMIPARESIS AS LATE EFFECT OF CEREBROVASCULAR ACCIDENT (CVA) (HCC): Status: RESOLVED | Noted: 2024-09-06 | Resolved: 2025-02-10

## 2025-02-10 PROBLEM — N18.4 STAGE 4 CHRONIC KIDNEY DISEASE (HCC): Status: RESOLVED | Noted: 2024-09-06 | Resolved: 2025-02-10

## 2025-02-10 PROBLEM — C53.9 CERVICAL CANCER (HCC): Status: RESOLVED | Noted: 2024-09-06 | Resolved: 2025-02-10

## 2025-02-10 PROBLEM — J45.20 MILD INTERMITTENT ASTHMA WITHOUT COMPLICATION: Status: ACTIVE | Noted: 2025-02-10

## 2025-02-10 PROBLEM — I69.359 CVA, OLD, HEMIPARESIS (HCC): Status: RESOLVED | Noted: 2025-02-04 | Resolved: 2025-02-10

## 2025-02-10 NOTE — PROGRESS NOTES
Chief Complaint   Patient presents with    Establish Care     1. Have you been to the ER, urgent care clinic since your last visit?  Hospitalized since your last visit?No    2. Have you seen or consulted any other health care providers outside of the [facility] since your last visit?  Include any pap smears or colon screening. No    
Encounters:   02/04/25 (!) 155/79   07/23/24 (!) 175/71   02/13/24 (!) 146/73     Reviewed PmHx, RxHx, FmHx, SocHx, AllgHx and updated in chart.     Review of Systems  Constitutional: negative for fevers, chills, anorexia and weight loss  Eyes:   negative for visual disturbance and irritation  ENT:   negative for tinnitus,sore throat,nasal congestion,ear pains.hoarseness  Respiratory:  negative for cough, hemoptysis, dyspnea,wheezing  CV:   negative for chest pain, palpitations, lower extremity edema  GI:   negative for nausea, vomiting, diarrhea, abdominal pain,melena  Endo:               negative for polyuria,polydipsia,polyphagia,heat intolerance  Genitourinary: negative for frequency, dysuria and hematuria  Integument:  negative for rash and pruritus  Hematologic:  negative for easy bruising and gum/nose bleeding  Musculoskel: negative for myalgias, arthralgias, back pain, muscle weakness, joint pain  Neurological:  negative for headaches, dizziness, vertigo, memory problems and gait   Behavl/Psych: negative for feelings of anxiety, depression, mood changes    Past Medical History:   Diagnosis Date    AD (Alzheimer's disease) (HCC)     Cancer (HCC)     ovaian stomach    COPD (chronic obstructive pulmonary disease) (HCC)     CVA (cerebral vascular accident) (HCC)     Heart failure (HCC)     MI    History of placement of internal cardiac defibrillator     Hypertension     Stage 4 chronic kidney disease (HCC)      Past Surgical History:   Procedure Laterality Date    GYN      hysterectomy    LUMBAR LAMINECTOMY  06/29/2016    L4-S1, Dr. Vaca    PACEMAKER PLACEMENT  10/2021    UT UNLISTED PROCEDURE ABDOMEN PERITONEUM & OMENTUM      cancer removal     Social History     Socioeconomic History    Marital status:      Spouse name: None    Number of children: None    Years of education: None    Highest education level: None   Tobacco Use    Smoking status: Never    Smokeless tobacco: Never   Vaping Use

## 2025-03-04 ENCOUNTER — OFFICE VISIT (OUTPATIENT)
Facility: CLINIC | Age: 75
End: 2025-03-04

## 2025-03-04 VITALS
RESPIRATION RATE: 16 BRPM | HEART RATE: 64 BPM | BODY MASS INDEX: 22.78 KG/M2 | OXYGEN SATURATION: 98 % | DIASTOLIC BLOOD PRESSURE: 87 MMHG | SYSTOLIC BLOOD PRESSURE: 171 MMHG | HEIGHT: 59 IN | WEIGHT: 113 LBS

## 2025-03-04 DIAGNOSIS — I10 UNCONTROLLED HYPERTENSION: Primary | ICD-10-CM

## 2025-03-04 DIAGNOSIS — E03.9 HYPOTHYROIDISM, UNSPECIFIED TYPE: ICD-10-CM

## 2025-03-04 DIAGNOSIS — N18.30 STAGE 3 CHRONIC KIDNEY DISEASE, UNSPECIFIED WHETHER STAGE 3A OR 3B CKD (HCC): ICD-10-CM

## 2025-03-04 DIAGNOSIS — I10 PRIMARY HYPERTENSION: ICD-10-CM

## 2025-03-04 DIAGNOSIS — E78.2 MIXED HYPERLIPIDEMIA: ICD-10-CM

## 2025-03-04 DIAGNOSIS — R73.09 ELEVATED GLUCOSE LEVEL: ICD-10-CM

## 2025-03-04 DIAGNOSIS — F41.8 SITUATIONAL ANXIETY: ICD-10-CM

## 2025-03-04 PROBLEM — I50.32 DIASTOLIC CHF, CHRONIC (HCC): Status: RESOLVED | Noted: 2020-04-30 | Resolved: 2025-03-04

## 2025-03-04 PROBLEM — I48.91 ATRIAL FIBRILLATION WITH RVR (HCC): Status: RESOLVED | Noted: 2020-04-23 | Resolved: 2025-03-04

## 2025-03-04 PROBLEM — I50.22 CHRONIC SYSTOLIC (CONGESTIVE) HEART FAILURE (HCC): Status: RESOLVED | Noted: 2023-01-20 | Resolved: 2025-03-04

## 2025-03-04 PROBLEM — I48.20 CHRONIC A-FIB (HCC): Status: RESOLVED | Noted: 2020-05-29 | Resolved: 2025-03-04

## 2025-03-04 RX ORDER — ACETAMINOPHEN 500 MG
500 TABLET ORAL 2 TIMES DAILY PRN
Qty: 60 TABLET | Refills: 2 | Status: SHIPPED | OUTPATIENT
Start: 2025-03-04

## 2025-03-04 RX ORDER — CLONIDINE HYDROCHLORIDE 0.2 MG/1
0.2 TABLET ORAL 2 TIMES DAILY PRN
Qty: 60 TABLET | Refills: 2 | Status: SHIPPED | OUTPATIENT
Start: 2025-03-04

## 2025-03-04 NOTE — PROGRESS NOTES
Chief Complaint   Patient presents with    Hypertension     1. Have you been to the ER, urgent care clinic since your last visit?  Hospitalized since your last visit?No    2. Have you seen or consulted any other health care providers outside of the [facility] since your last visit?  Include any pap smears or colon screening. No

## 2025-03-05 ENCOUNTER — TELEPHONE (OUTPATIENT)
Dept: PHARMACY | Facility: CLINIC | Age: 75
End: 2025-03-05

## 2025-03-05 LAB
ALBUMIN SERPL-MCNC: 3.8 G/DL (ref 3.5–5)
ALBUMIN/GLOB SERPL: 1 (ref 1.1–2.2)
ALP SERPL-CCNC: 110 U/L (ref 45–117)
ALT SERPL-CCNC: 14 U/L (ref 12–78)
ANION GAP SERPL CALC-SCNC: 10 MMOL/L (ref 2–12)
AST SERPL-CCNC: 22 U/L (ref 15–37)
BILIRUB SERPL-MCNC: 0.6 MG/DL (ref 0.2–1)
BUN SERPL-MCNC: 19 MG/DL (ref 6–20)
BUN/CREAT SERPL: 10 (ref 12–20)
CALCIUM SERPL-MCNC: 9.5 MG/DL (ref 8.5–10.1)
CHLORIDE SERPL-SCNC: 113 MMOL/L (ref 97–108)
CHOLEST SERPL-MCNC: 160 MG/DL
CO2 SERPL-SCNC: 17 MMOL/L (ref 21–32)
CREAT SERPL-MCNC: 1.93 MG/DL (ref 0.55–1.02)
ERYTHROCYTE [DISTWIDTH] IN BLOOD BY AUTOMATED COUNT: 13.6 % (ref 11.5–14.5)
EST. AVERAGE GLUCOSE BLD GHB EST-MCNC: 111 MG/DL
GLOBULIN SER CALC-MCNC: 3.9 G/DL (ref 2–4)
GLUCOSE SERPL-MCNC: 132 MG/DL (ref 65–100)
HBA1C MFR BLD: 5.5 % (ref 4–5.6)
HCT VFR BLD AUTO: 37.1 % (ref 35–47)
HDLC SERPL-MCNC: 71 MG/DL
HDLC SERPL: 2.3 (ref 0–5)
HGB BLD-MCNC: 12.2 G/DL (ref 11.5–16)
LDLC SERPL CALC-MCNC: 70.6 MG/DL (ref 0–100)
MCH RBC QN AUTO: 31 PG (ref 26–34)
MCHC RBC AUTO-ENTMCNC: 32.9 G/DL (ref 30–36.5)
MCV RBC AUTO: 94.2 FL (ref 80–99)
NRBC # BLD: 0 K/UL (ref 0–0.01)
NRBC BLD-RTO: 0 PER 100 WBC
PLATELET # BLD AUTO: 257 K/UL (ref 150–400)
PMV BLD AUTO: 10 FL (ref 8.9–12.9)
POTASSIUM SERPL-SCNC: 4.4 MMOL/L (ref 3.5–5.1)
PROT SERPL-MCNC: 7.7 G/DL (ref 6.4–8.2)
RBC # BLD AUTO: 3.94 M/UL (ref 3.8–5.2)
SODIUM SERPL-SCNC: 140 MMOL/L (ref 136–145)
TRIGL SERPL-MCNC: 92 MG/DL
VLDLC SERPL CALC-MCNC: 18.4 MG/DL
WBC # BLD AUTO: 6.4 K/UL (ref 3.6–11)

## 2025-03-05 NOTE — TELEPHONE ENCOUNTER
Reason for referral: Uncontrolled hypertension   Referring provider: GALEN TERRY   Referring provider office: [facility]  Referred to: William Jones, PharmD, BCPS  Status of Patient: New Patient  Length of Appt: 60 minutes  Type of Appt: In Person  Patient need address: [address] [place] [id number]

## 2025-03-05 NOTE — TELEPHONE ENCOUNTER
**[facility]**    Attempt made to contact patient at the mobile number.    Spoke to assisted living facility nurse    Patient verified understanding and scheduled a in person appointment .  Appointment scheduled for 3/11/25 at 9:00 am with William Jones.    [facility]   Ambulatory Pharmacy Clinical   [phone]  Department, toll free: [phone], option 2       For Pharmacy Admin Tracking Only    Program: Medical Group  Recommendation Provided To: Patient/Caregiver: 1 via Telephone  Intervention Detail: Scheduled Appointment  Intervention Accepted By: Patient/Caregiver: 1  Gap Closed?: Yes   Time Spent (min): 10

## 2025-03-06 LAB
THYROPEROXIDASE AB SERPL-ACNC: 11 IU/ML (ref 0–34)
TSH SERPL DL<=0.05 MIU/L-ACNC: 1.13 UIU/ML (ref 0.45–4.5)

## 2025-04-07 ENCOUNTER — OFFICE VISIT (OUTPATIENT)
Facility: CLINIC | Age: 75
End: 2025-04-07

## 2025-04-07 VITALS
BODY MASS INDEX: 22.38 KG/M2 | WEIGHT: 111 LBS | SYSTOLIC BLOOD PRESSURE: 150 MMHG | DIASTOLIC BLOOD PRESSURE: 86 MMHG | RESPIRATION RATE: 16 BRPM | HEIGHT: 59 IN | HEART RATE: 61 BPM

## 2025-04-07 DIAGNOSIS — I48.20 CHRONIC ATRIAL FIBRILLATION, UNSPECIFIED (HCC): ICD-10-CM

## 2025-04-07 DIAGNOSIS — I10 PRIMARY HYPERTENSION: ICD-10-CM

## 2025-04-07 DIAGNOSIS — I10 UNCONTROLLED HYPERTENSION: ICD-10-CM

## 2025-04-07 DIAGNOSIS — Z00.00 MEDICARE ANNUAL WELLNESS VISIT, SUBSEQUENT: Primary | ICD-10-CM

## 2025-04-07 ASSESSMENT — PATIENT HEALTH QUESTIONNAIRE - PHQ9
SUM OF ALL RESPONSES TO PHQ QUESTIONS 1-9: 0
1. LITTLE INTEREST OR PLEASURE IN DOING THINGS: NOT AT ALL
10. IF YOU CHECKED OFF ANY PROBLEMS, HOW DIFFICULT HAVE THESE PROBLEMS MADE IT FOR YOU TO DO YOUR WORK, TAKE CARE OF THINGS AT HOME, OR GET ALONG WITH OTHER PEOPLE: NOT DIFFICULT AT ALL
6. FEELING BAD ABOUT YOURSELF - OR THAT YOU ARE A FAILURE OR HAVE LET YOURSELF OR YOUR FAMILY DOWN: NOT AT ALL
7. TROUBLE CONCENTRATING ON THINGS, SUCH AS READING THE NEWSPAPER OR WATCHING TELEVISION: NOT AT ALL
9. THOUGHTS THAT YOU WOULD BE BETTER OFF DEAD, OR OF HURTING YOURSELF: NOT AT ALL
4. FEELING TIRED OR HAVING LITTLE ENERGY: NOT AT ALL
2. FEELING DOWN, DEPRESSED OR HOPELESS: NOT AT ALL
SUM OF ALL RESPONSES TO PHQ QUESTIONS 1-9: 0
3. TROUBLE FALLING OR STAYING ASLEEP: NOT AT ALL
8. MOVING OR SPEAKING SO SLOWLY THAT OTHER PEOPLE COULD HAVE NOTICED. OR THE OPPOSITE, BEING SO FIGETY OR RESTLESS THAT YOU HAVE BEEN MOVING AROUND A LOT MORE THAN USUAL: NOT AT ALL
5. POOR APPETITE OR OVEREATING: NOT AT ALL

## 2025-04-07 NOTE — PATIENT INSTRUCTIONS
Chest pain or pressure, or a strange feeling in the chest.     Sweating.     Shortness of breath.     Pain, pressure, or a strange feeling in the back, neck, jaw, or upper belly or in one or both shoulders or arms.     Lightheadedness or sudden weakness.     A fast or irregular heartbeat.   After you call 911, the  may tell you to chew 1 adult-strength or 2 to 4 low-dose aspirin. Wait for an ambulance. Do not try to drive yourself.  Watch closely for changes in your health, and be sure to contact your doctor if you have any problems.  Where can you learn more?  Go to https://www.[employer].net/patientEd and enter F075 to learn more about \"A Healthy Heart: Care Instructions.\"  Current as of: July 31, 2024  Content Version: 14.4  © 4123-7404 [employer].   Care instructions adapted under license by [employer]. If you have questions about a medical condition or this instruction, always ask your healthcare professional. [employer] disclaims any warranty or liability for your use of this information.    Personalized Preventive Plan for Akua Chandra - 4/7/2025  Medicare offers a range of preventive health benefits. Some of the tests and screenings are paid in full while other may be subject to a deductible, co-insurance, and/or copay.  Some of these benefits include a comprehensive review of your medical history including lifestyle, illnesses that may run in your family, and various assessments and screenings as appropriate.  After reviewing your medical record and screening and assessments performed today your provider may have ordered immunizations, labs, imaging, and/or referrals for you.  A list of these orders (if applicable) as well as your Preventive Care list are included within your After Visit Summary for your review.

## 2025-04-07 NOTE — PROGRESS NOTES
Chief Complaint   Patient presents with    Medicare AWV     1. Have you been to the ER, urgent care clinic since your last visit?  Hospitalized since your last visit?No    2. Have you seen or consulted any other health care providers outside of the [facility] since your last visit?  Include any pap smears or colon screening. No    
CKD.    Hypertension  - Home BP readings ~160  - Cardiologist consultation discussed but not undertaken  - Occasional chest pain  - No aspirin use  - No melena  - Clonidine BID, additional dose PRN for significant BP elevation, causes drowsiness  - On metoprolol 50 mg, olmesartan 20 mg,     Hyperlipidemia  - History of two strokes  - Compliant with cholesterol medication  - Well tolerated    Atrial Fibrillation  - Eliquis 2.5 mg BID    Thyroid   - Patient is seen for followup of thyroid dysfunction; hypothyroidism.   Thyroid ROS: denies fatigue, weight changes, heat/cold intolerance, bowel/skin changes or CVS symptoms.  Taking medication as prescribed- levothyroxine 50 mcg daily  Last TSH below  Lab Results   Component Value Date/Time    TSH 1.130 03/04/2025 01:49 PM    TSH 0.01 07/23/2024 02:38 PM     Stage 4 CKD  - Nephrologist consultation 2-3 months ago  - Stable kidney function    Supplemental information: No depression or anxiety. Takes Topamax for migraine and trazodone for sleep. Tolerating well.     Review of Systems  Constitutional: negative for fevers, chills, anorexia and weight loss  Eyes:   negative for visual disturbance, drainage, and irritation  ENT:   negative for tinnitus,sore throat,nasal congestion,ear pain,and hoarseness  Respiratory:  negative for cough, hemoptysis, dyspnea, and wheezing  CV:   negative for chest pain, palpitations, and lower extremity edema  GI:   negative for nausea, vomiting, diarrhea, abdominal pain, and melena  Endo:               negative for polyuria,polydipsia,polyphagia, and heat intolerance  Genitourinary: negative for frequency, urgency, dysuria, retention, and hematuria  Integument:  negative for rash, ulcerations, and pruritus  Hematologic:  negative for easy bruising and bleeding  Musculoskel: negative for arthralgias, muscle weakness,and joint pain/swelling  Neurological:  negative for headaches, dizziness, vertigo,and memory/gait problems  Behavl/Psych:

## 2025-04-13 PROBLEM — I48.20 CHRONIC ATRIAL FIBRILLATION, UNSPECIFIED (HCC): Status: ACTIVE | Noted: 2020-05-29

## 2025-04-13 PROBLEM — I10 PRIMARY HYPERTENSION: Status: ACTIVE | Noted: 2025-04-13

## 2025-04-14 ENCOUNTER — PHARMACY VISIT (OUTPATIENT)
Age: 75
End: 2025-04-14

## 2025-04-14 VITALS — DIASTOLIC BLOOD PRESSURE: 74 MMHG | SYSTOLIC BLOOD PRESSURE: 148 MMHG

## 2025-04-14 DIAGNOSIS — I10 UNCONTROLLED HYPERTENSION: Primary | ICD-10-CM

## 2025-04-14 NOTE — PROGRESS NOTES
Pharmacy Progress Note - Hypertension Management    S/O: Ms. Akua Chandra is a 75 y.o. female , referred by Nicolasa Preston APRN - NP , with a PMH of HTN, HLD CKD and afib, who was seen today for an initial pharmacy visit.    Assessment and Plan:    Hypertension Management:  BP in clinic today was 148/74 mmHg, which is slightly above BP goal of less than 140/90 mmHg (relaxed goal due to comorbidities). However, patient's blood pressure is much improved over the past few months, and her home BP log shows blood pressures starting to trend towards goals more consistently   Per review of BP log, nursing facility is only checking BP once daily. Advised them to check blood pressure twice daily to get a better idea of how controlled patient's readings are throughout the day. Additionally, this will allow them to give another does of clonidine in the evening (as prescribed) if systolic BP is over 150  Patient does report that clonidine makes her sleepy. Discussed potentially increasing her Olmesartan to 40 mg once daily to hopefully reduce the need for clonidine. Will discuss with PCP.  Instructed patient to continue current medications at this time. Will follow up as appropriate once response received from PCP regarding adjusting her medications.        Antihypertensive Medication Regimen:  Olmesartan 20 mg once daily  Diltiazem  mg once daily  Metoprolol succinate 50 mg once daily  Clonidine 0.2 mg twice daily for systolic BP > 150 (only giving in the morning)    Blood Pressure in Clinic Today:     Does patient currently monitor blood pressure at home? Yes - once daily   Home blood pressure log: Yes  S/sx of hypertension: Denies  S/sx of hypotension: Denies  Diet/Lifestyle: Walks 1-2 hours per day; does not usually have a huge appetite               Medication Adherence/Access:  - Pt did not bring in home medications to visit.   - Pt reports compliance to med regimen.   - Pt uses pill box/organizer at home: No

## 2025-04-14 NOTE — PATIENT INSTRUCTIONS
Your Visit Summary:     Plan:  - Continue current medications for now  - Please check blood pressure twice daily if possible (morning before medications/breakfast) and evening before bedtime (or before shift ends)  - Will consider increasing Olmesartan to 40 mg once daily (will discuss with MEET knowles)      Call me with any questions or concerns  William Jones [phone], select option 2

## 2025-04-16 ENCOUNTER — ENROLLMENT (OUTPATIENT)
Age: 75
End: 2025-04-16

## 2025-05-13 DIAGNOSIS — I10 PRIMARY HYPERTENSION: ICD-10-CM

## 2025-05-13 RX ORDER — OLMESARTAN MEDOXOMIL 20 MG/1
20 TABLET ORAL DAILY
Qty: 90 TABLET | Refills: 0 | Status: SHIPPED | OUTPATIENT
Start: 2025-05-13

## 2025-05-13 NOTE — TELEPHONE ENCOUNTER
Last appointment: 04/07/2025 MEET Preston   Next appointment: 07/07/2025 MEET Preston   Previous refill encounter(s):   02/04/2025 [facility] #[id number]     For Pharmacy Admin Tracking Only    Program: Medication Refill  Intervention Detail: New Rx: 1, reason: Patient Preference  Time Spent (min): 5    Requested Prescriptions     Pending Prescriptions Disp Refills    olmesartan (BENICAR) 20 MG tablet 90 tablet 0     Sig: Take 1 tablet by mouth daily

## 2025-07-07 ENCOUNTER — OFFICE VISIT (OUTPATIENT)
Facility: CLINIC | Age: 75
End: 2025-07-07
Payer: MEDICARE

## 2025-07-07 VITALS
TEMPERATURE: 96.8 F | OXYGEN SATURATION: 97 % | HEART RATE: 85 BPM | SYSTOLIC BLOOD PRESSURE: 131 MMHG | HEIGHT: 59 IN | BODY MASS INDEX: 21.93 KG/M2 | RESPIRATION RATE: 14 BRPM | WEIGHT: 108.8 LBS | DIASTOLIC BLOOD PRESSURE: 70 MMHG

## 2025-07-07 DIAGNOSIS — J45.20 MILD INTERMITTENT ASTHMA WITHOUT COMPLICATION: Primary | ICD-10-CM

## 2025-07-07 DIAGNOSIS — F41.9 ANXIETY AND DEPRESSION: ICD-10-CM

## 2025-07-07 DIAGNOSIS — E03.9 ACQUIRED HYPOTHYROIDISM: ICD-10-CM

## 2025-07-07 DIAGNOSIS — R51.9 CHRONIC NONINTRACTABLE HEADACHE, UNSPECIFIED HEADACHE TYPE: ICD-10-CM

## 2025-07-07 DIAGNOSIS — E78.00 HYPERCHOLESTEROLEMIA: ICD-10-CM

## 2025-07-07 DIAGNOSIS — F32.A ANXIETY AND DEPRESSION: ICD-10-CM

## 2025-07-07 DIAGNOSIS — I10 PRIMARY HYPERTENSION: ICD-10-CM

## 2025-07-07 DIAGNOSIS — F41.8 SITUATIONAL ANXIETY: ICD-10-CM

## 2025-07-07 DIAGNOSIS — G89.29 CHRONIC NONINTRACTABLE HEADACHE, UNSPECIFIED HEADACHE TYPE: ICD-10-CM

## 2025-07-07 DIAGNOSIS — I48.20 CHRONIC ATRIAL FIBRILLATION, UNSPECIFIED (HCC): ICD-10-CM

## 2025-07-07 PROBLEM — R45.851 PASSIVE SUICIDAL IDEATIONS: Status: RESOLVED | Noted: 2021-10-22 | Resolved: 2025-07-07

## 2025-07-07 PROBLEM — R05.9 COUGH: Status: RESOLVED | Noted: 2020-05-01 | Resolved: 2025-07-07

## 2025-07-07 PROBLEM — R80.9 PROTEINURIA: Status: RESOLVED | Noted: 2017-07-11 | Resolved: 2025-07-07

## 2025-07-07 PROBLEM — Z91.199 NONCOMPLIANCE: Status: RESOLVED | Noted: 2021-10-22 | Resolved: 2025-07-07

## 2025-07-07 PROBLEM — Z53.20 COLONOSCOPY REFUSED: Status: RESOLVED | Noted: 2018-05-22 | Resolved: 2025-07-07

## 2025-07-07 PROBLEM — I16.1 HYPERTENSIVE EMERGENCY: Status: RESOLVED | Noted: 2021-10-22 | Resolved: 2025-07-07

## 2025-07-07 PROBLEM — R73.09 ELEVATED GLUCOSE LEVEL: Status: RESOLVED | Noted: 2025-03-04 | Resolved: 2025-07-07

## 2025-07-07 PROBLEM — R41.89 IMPAIRED INSIGHT: Status: RESOLVED | Noted: 2021-10-22 | Resolved: 2025-07-07

## 2025-07-07 PROBLEM — R53.83 FATIGUE: Status: RESOLVED | Noted: 2020-05-01 | Resolved: 2025-07-07

## 2025-07-07 PROBLEM — R06.02 SOB (SHORTNESS OF BREATH): Status: RESOLVED | Noted: 2020-05-01 | Resolved: 2025-07-07

## 2025-07-07 PROBLEM — Z53.20 MAMMOGRAM DECLINED: Status: RESOLVED | Noted: 2018-05-22 | Resolved: 2025-07-07

## 2025-07-07 PROBLEM — Z71.89 GOALS OF CARE, COUNSELING/DISCUSSION: Status: RESOLVED | Noted: 2020-05-01 | Resolved: 2025-07-07

## 2025-07-07 PROBLEM — J18.9 CAP (COMMUNITY ACQUIRED PNEUMONIA): Status: RESOLVED | Noted: 2020-04-30 | Resolved: 2025-07-07

## 2025-07-07 PROCEDURE — G8427 DOCREV CUR MEDS BY ELIG CLIN: HCPCS

## 2025-07-07 PROCEDURE — 99214 OFFICE O/P EST MOD 30 MIN: CPT

## 2025-07-07 PROCEDURE — 3075F SYST BP GE 130 - 139MM HG: CPT

## 2025-07-07 PROCEDURE — G8400 PT W/DXA NO RESULTS DOC: HCPCS

## 2025-07-07 PROCEDURE — 1090F PRES/ABSN URINE INCON ASSESS: CPT

## 2025-07-07 PROCEDURE — 1126F AMNT PAIN NOTED NONE PRSNT: CPT

## 2025-07-07 PROCEDURE — 1159F MED LIST DOCD IN RCRD: CPT

## 2025-07-07 PROCEDURE — 1036F TOBACCO NON-USER: CPT

## 2025-07-07 PROCEDURE — 3078F DIAST BP <80 MM HG: CPT

## 2025-07-07 PROCEDURE — 1123F ACP DISCUSS/DSCN MKR DOCD: CPT

## 2025-07-07 PROCEDURE — G8420 CALC BMI NORM PARAMETERS: HCPCS

## 2025-07-07 RX ORDER — PRAVASTATIN SODIUM 80 MG/1
80 TABLET ORAL NIGHTLY
Qty: 90 TABLET | Refills: 1 | Status: SHIPPED | OUTPATIENT
Start: 2025-07-07

## 2025-07-07 RX ORDER — OLMESARTAN MEDOXOMIL 20 MG/1
20 TABLET ORAL DAILY
Qty: 90 TABLET | Refills: 1 | Status: SHIPPED | OUTPATIENT
Start: 2025-07-07

## 2025-07-07 RX ORDER — TOPIRAMATE 50 MG/1
50 TABLET, FILM COATED ORAL NIGHTLY
Qty: 90 TABLET | Refills: 1 | Status: SHIPPED | OUTPATIENT
Start: 2025-07-07

## 2025-07-07 RX ORDER — DILTIAZEM HYDROCHLORIDE 180 MG/1
180 CAPSULE, EXTENDED RELEASE ORAL DAILY
Qty: 90 CAPSULE | Refills: 1 | Status: SHIPPED | OUTPATIENT
Start: 2025-07-07

## 2025-07-07 RX ORDER — ALBUTEROL SULFATE 90 UG/1
2 INHALANT RESPIRATORY (INHALATION) EVERY 4 HOURS PRN
Qty: 18 G | Refills: 5 | Status: SHIPPED | OUTPATIENT
Start: 2025-07-07

## 2025-07-07 RX ORDER — LEVOTHYROXINE SODIUM 50 UG/1
50 TABLET ORAL DAILY
Qty: 180 TABLET | Refills: 1 | Status: SHIPPED | OUTPATIENT
Start: 2025-07-07

## 2025-07-07 RX ORDER — METOPROLOL SUCCINATE 50 MG/1
50 TABLET, EXTENDED RELEASE ORAL DAILY
Qty: 90 TABLET | Refills: 1 | Status: SHIPPED | OUTPATIENT
Start: 2025-07-07

## 2025-07-07 RX ORDER — CLONIDINE HYDROCHLORIDE 0.2 MG/1
0.2 TABLET ORAL 2 TIMES DAILY PRN
Qty: 60 TABLET | Refills: 2 | Status: SHIPPED | OUTPATIENT
Start: 2025-07-07

## 2025-07-07 RX ORDER — TRAZODONE HYDROCHLORIDE 50 MG/1
50 TABLET ORAL NIGHTLY
Qty: 90 TABLET | Refills: 1 | Status: SHIPPED | OUTPATIENT
Start: 2025-07-07

## 2025-07-07 ASSESSMENT — PATIENT HEALTH QUESTIONNAIRE - PHQ9
SUM OF ALL RESPONSES TO PHQ QUESTIONS 1-9: 0
1. LITTLE INTEREST OR PLEASURE IN DOING THINGS: NOT AT ALL
2. FEELING DOWN, DEPRESSED OR HOPELESS: NOT AT ALL

## 2025-07-07 NOTE — PROGRESS NOTES
Chief Complaint   Patient presents with    Follow-up     1. Have you been to the ER, urgent care clinic since your last visit?  Hospitalized since your last visit?No    2. Have you seen or consulted any other health care providers outside of the [facility] since your last visit?  Include any pap smears or colon screening. No

## 2025-07-07 NOTE — PROGRESS NOTES
Akua Chandra is a 75 y.o. female , established patient, here for evaluation of the following chief complaint(s): Follow-up     Subjective:  History of Present Illness  75-year-old female presents for follow-up.  Patient lives in assisted facility.  She has her file with medicine list and blood pressure recording from the office.    Blood Pressure Management  - Monitors BP twice daily  - Takes clonidine 0.2 mg BID, diltiazem 180 mg ER, Eliquis 2.5 mg twice a day, metoprolol 50 mg daily, olmesartan 20 mg daily  - Under nephrologist care  - Tolerating medicine well  - Denies any headache, blurry vision or dizziness    Stroke History  - History of stroke; scheduled to see neurologist  - On Eliquis for several years, unsure of duration  - Occasional palpitations; pacemaker implanted  - Last cardiology appointment in 2024; plans to return as advised  - Denies any chest pain, shortness of breath, weakness or memory issues    Respiratory Issues  - No asthma; uses inhalers PRN for breathing difficulties when tense  - No seasonal allergies    Mental Health  - Experiences panic attacks; not seeing therapist  - Desires to move to a small apartment; contacted  multiple times without response  - Attempted to call patient's daughter but she did not answer, left voicemail  - Patient has a history of depression and dementia, not safe to leave home alone  - Denies any suicidal ideations or thoughts of harming herself    Sleep and Migraines  - Taking Topamax and trazodone as ordered  - Tolerating medicine well    BP Readings from Last 3 Encounters:   07/07/25 131/70   04/14/25 (!) 148/74   04/07/25 (!) 150/86     Reviewed PmHx, RxHx, FmHx, SocHx, AllgHx and updated in chart.     Review of Systems  Constitutional: negative for fevers, chills, anorexia and weight loss  Eyes:   negative for visual disturbance and irritation  ENT:   negative for tinnitus,sore throat,nasal congestion,ear pains.hoarseness  Respiratory:

## 2025-07-07 NOTE — PATIENT INSTRUCTIONS
PLEASE BRING ALL YOUR MEDICINE AND HOME BLOOD PRESSURE LOG.    Please check your blood pressure daily (at least one hour after your morning blood pressure medications.)  Keep a written record of your blood pressure readings and bring it to each appointment with your PCP.  If your systolic blood pressure is consistently greater than 150 mmHg and/or diastolic more than 90 mmHg then please message via [employer] or call at the office.     Please continue low-salt diet and exercise regularly.

## 2025-07-14 PROBLEM — F33.1 MODERATE EPISODE OF RECURRENT MAJOR DEPRESSIVE DISORDER (HCC): Status: RESOLVED | Noted: 2022-10-13 | Resolved: 2025-07-14

## 2025-07-26 ENCOUNTER — HOSPITAL ENCOUNTER (EMERGENCY)
Facility: HOSPITAL | Age: 75
Discharge: NURSING, REHAB OR LONG-TERM CARE | End: 2025-07-27
Attending: EMERGENCY MEDICINE
Payer: MEDICARE

## 2025-07-26 DIAGNOSIS — Z00.00 ENCOUNTER FOR ADULT WELLNESS VISIT: Primary | ICD-10-CM

## 2025-07-26 LAB
COMMENT:: NORMAL
SPECIMEN HOLD: NORMAL

## 2025-07-26 PROCEDURE — 99283 EMERGENCY DEPT VISIT LOW MDM: CPT

## 2025-07-26 PROCEDURE — 6370000000 HC RX 637 (ALT 250 FOR IP): Performed by: EMERGENCY MEDICINE

## 2025-07-26 PROCEDURE — 4500000002 HC ER NO CHARGE

## 2025-07-26 RX ORDER — CLONIDINE HYDROCHLORIDE 0.1 MG/1
0.2 TABLET ORAL
Status: COMPLETED | OUTPATIENT
Start: 2025-07-26 | End: 2025-07-26

## 2025-07-26 RX ORDER — LOSARTAN POTASSIUM 50 MG/1
50 TABLET ORAL DAILY
Status: DISCONTINUED | OUTPATIENT
Start: 2025-07-26 | End: 2025-07-27 | Stop reason: HOSPADM

## 2025-07-26 RX ORDER — DILTIAZEM HYDROCHLORIDE 180 MG/1
180 CAPSULE, COATED, EXTENDED RELEASE ORAL ONCE
Status: COMPLETED | OUTPATIENT
Start: 2025-07-26 | End: 2025-07-26

## 2025-07-26 RX ADMIN — LOSARTAN POTASSIUM 50 MG: 50 TABLET, FILM COATED ORAL at 21:02

## 2025-07-26 RX ADMIN — DILTIAZEM HYDROCHLORIDE 180 MG: 180 CAPSULE, EXTENDED RELEASE ORAL at 21:02

## 2025-07-26 RX ADMIN — CLONIDINE HYDROCHLORIDE 0.2 MG: 0.1 TABLET ORAL at 21:02

## 2025-07-26 ASSESSMENT — PAIN - FUNCTIONAL ASSESSMENT: PAIN_FUNCTIONAL_ASSESSMENT: NONE - DENIES PAIN
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
## 2025-07-27 VITALS
RESPIRATION RATE: 18 BRPM | WEIGHT: 93.7 LBS | SYSTOLIC BLOOD PRESSURE: 137 MMHG | HEART RATE: 70 BPM | TEMPERATURE: 97.9 F | OXYGEN SATURATION: 100 % | DIASTOLIC BLOOD PRESSURE: 83 MMHG | BODY MASS INDEX: 18.89 KG/M2 | HEIGHT: 59 IN
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
## 2025-07-27 NOTE — ED NOTES
Bedside and Verbal shift change report given to CRISTIN Good (oncoming nurse) by CRISTIN Magaña (offgoing nurse). Report included the following information Nurse Handoff Report, Index, ED Encounter Summary, ED SBAR, and Adult Overview.

## 2025-07-27 NOTE — ED NOTES
APS report made using the APS mandated  portal. Submitted 7/27/2025 at 0523. Confirmation number [id number]

## (undated) DEVICE — GUIDEWIRE VASC L40CM DIA0018IN NDL 21GA L7CM Z S STL MAK

## (undated) DEVICE — STRIP,CLOSURE,WOUND,MEDI-STRIP,1/2X4: Brand: MEDLINE

## (undated) DEVICE — KIT ACCS INTRO 4FR L10CM NDL 21GA L7CM GWIRE L40CM

## (undated) DEVICE — SUTURE COAT VCRL SZ 4-0 L18IN ABSRB UD L19MM PS-2 1/2 CIR J496G

## (undated) DEVICE — SUTURE VCRL 2-0 L27IN ABSRB UD PS-2 L19MM 1/2 CIR J428H

## (undated) DEVICE — SUTURE VCRL SZ 2-0 L36IN ABSRB UD L40MM CT 1/2 CIR J957H

## (undated) DEVICE — 3M™ IOBAN™ 2 ANTIMICROBIAL INCISE DRAPE 6650EZ: Brand: IOBAN™ 2

## (undated) DEVICE — INTRO PEELWY HEMVLV 7F 13CM -- SHRT PRELUDE SNAP

## (undated) DEVICE — TRAY,IRRIGATION,PISTON SYRINGE,60ML,STRL: Brand: MEDLINE

## (undated) DEVICE — SUT SLK 0 30IN SH BLK --